# Patient Record
Sex: MALE | Race: WHITE | NOT HISPANIC OR LATINO | Employment: FULL TIME | URBAN - METROPOLITAN AREA
[De-identification: names, ages, dates, MRNs, and addresses within clinical notes are randomized per-mention and may not be internally consistent; named-entity substitution may affect disease eponyms.]

---

## 2017-10-05 ENCOUNTER — HOSPITAL ENCOUNTER (INPATIENT)
Facility: HOSPITAL | Age: 62
LOS: 2 days | Discharge: HOME | DRG: 381 | End: 2017-10-07
Attending: EMERGENCY MEDICINE | Admitting: INTERNAL MEDICINE
Payer: COMMERCIAL

## 2017-10-05 ENCOUNTER — APPOINTMENT (EMERGENCY)
Dept: RADIOLOGY | Facility: HOSPITAL | Age: 62
DRG: 381 | End: 2017-10-05
Payer: COMMERCIAL

## 2017-10-05 DIAGNOSIS — K92.2 GI BLEED: ICD-10-CM

## 2017-10-05 DIAGNOSIS — R07.9 RIGHT-SIDED CHEST PAIN: ICD-10-CM

## 2017-10-05 DIAGNOSIS — D50.9 MICROCYTIC ANEMIA: ICD-10-CM

## 2017-10-05 DIAGNOSIS — K92.1 MELENA: Primary | ICD-10-CM

## 2017-10-05 LAB
ABO GROUP BLD: NORMAL
ALBUMIN SERPL BCP-MCNC: 3.8 G/DL (ref 3.5–5)
ALP SERPL-CCNC: 59 U/L (ref 46–116)
ALT SERPL W P-5'-P-CCNC: 18 U/L (ref 12–78)
ANION GAP SERPL CALCULATED.3IONS-SCNC: 8 MMOL/L (ref 4–13)
AST SERPL W P-5'-P-CCNC: 18 U/L (ref 5–45)
BASOPHILS # BLD AUTO: 0.16 THOUSANDS/ΜL (ref 0–0.1)
BASOPHILS NFR BLD AUTO: 2 % (ref 0–1)
BILIRUB SERPL-MCNC: 0.15 MG/DL (ref 0.2–1)
BLD GP AB SCN SERPL QL: NEGATIVE
BUN SERPL-MCNC: 19 MG/DL (ref 5–25)
CALCIUM SERPL-MCNC: 10.3 MG/DL (ref 8.3–10.1)
CHLORIDE SERPL-SCNC: 101 MMOL/L (ref 100–108)
CO2 SERPL-SCNC: 28 MMOL/L (ref 21–32)
CREAT SERPL-MCNC: 1.2 MG/DL (ref 0.6–1.3)
EOSINOPHIL # BLD AUTO: 0.33 THOUSAND/ΜL (ref 0–0.61)
EOSINOPHIL NFR BLD AUTO: 4 % (ref 0–6)
ERYTHROCYTE [DISTWIDTH] IN BLOOD BY AUTOMATED COUNT: 18.4 % (ref 11.6–15.1)
GFR SERPL CREATININE-BSD FRML MDRD: 64 ML/MIN/1.73SQ M
GLUCOSE SERPL-MCNC: 104 MG/DL (ref 65–140)
HCT VFR BLD AUTO: 21 % (ref 36.5–49.3)
HGB BLD-MCNC: 6 G/DL (ref 12–17)
LIPASE SERPL-CCNC: 239 U/L (ref 73–393)
LYMPHOCYTES # BLD AUTO: 1.64 THOUSANDS/ΜL (ref 0.6–4.47)
LYMPHOCYTES NFR BLD AUTO: 17 % (ref 14–44)
MCH RBC QN AUTO: 19.6 PG (ref 26.8–34.3)
MCHC RBC AUTO-ENTMCNC: 28.6 G/DL (ref 31.4–37.4)
MCV RBC AUTO: 69 FL (ref 82–98)
MONOCYTES # BLD AUTO: 1.37 THOUSAND/ΜL (ref 0.17–1.22)
MONOCYTES NFR BLD AUTO: 15 % (ref 4–12)
NEUTROPHILS # BLD AUTO: 5.94 THOUSANDS/ΜL (ref 1.85–7.62)
NEUTS SEG NFR BLD AUTO: 62 % (ref 43–75)
NRBC BLD AUTO-RTO: 0 /100 WBCS
PLATELET # BLD AUTO: 535 THOUSANDS/UL (ref 149–390)
PMV BLD AUTO: 10.2 FL (ref 8.9–12.7)
POTASSIUM SERPL-SCNC: 3.8 MMOL/L (ref 3.5–5.3)
PROT SERPL-MCNC: 7.1 G/DL (ref 6.4–8.2)
RBC # BLD AUTO: 3.06 MILLION/UL (ref 3.88–5.62)
RH BLD: POSITIVE
SODIUM SERPL-SCNC: 137 MMOL/L (ref 136–145)
SPECIMEN EXPIRATION DATE: NORMAL
SPECIMEN SOURCE: NORMAL
TROPONIN I BLD-MCNC: 0 NG/ML (ref 0–0.08)
WBC # BLD AUTO: 9.44 THOUSAND/UL (ref 4.31–10.16)

## 2017-10-05 PROCEDURE — 86901 BLOOD TYPING SEROLOGIC RH(D): CPT | Performed by: EMERGENCY MEDICINE

## 2017-10-05 PROCEDURE — C9113 INJ PANTOPRAZOLE SODIUM, VIA: HCPCS | Performed by: PHYSICIAN ASSISTANT

## 2017-10-05 PROCEDURE — 36415 COLL VENOUS BLD VENIPUNCTURE: CPT | Performed by: EMERGENCY MEDICINE

## 2017-10-05 PROCEDURE — 83540 ASSAY OF IRON: CPT | Performed by: PHYSICIAN ASSISTANT

## 2017-10-05 PROCEDURE — 84484 ASSAY OF TROPONIN QUANT: CPT

## 2017-10-05 PROCEDURE — P9021 RED BLOOD CELLS UNIT: HCPCS

## 2017-10-05 PROCEDURE — 30233N1 TRANSFUSION OF NONAUTOLOGOUS RED BLOOD CELLS INTO PERIPHERAL VEIN, PERCUTANEOUS APPROACH: ICD-10-PCS | Performed by: INTERNAL MEDICINE

## 2017-10-05 PROCEDURE — 96361 HYDRATE IV INFUSION ADD-ON: CPT

## 2017-10-05 PROCEDURE — 93005 ELECTROCARDIOGRAM TRACING: CPT | Performed by: EMERGENCY MEDICINE

## 2017-10-05 PROCEDURE — 86850 RBC ANTIBODY SCREEN: CPT | Performed by: EMERGENCY MEDICINE

## 2017-10-05 PROCEDURE — 96374 THER/PROPH/DIAG INJ IV PUSH: CPT

## 2017-10-05 PROCEDURE — 86920 COMPATIBILITY TEST SPIN: CPT

## 2017-10-05 PROCEDURE — C9113 INJ PANTOPRAZOLE SODIUM, VIA: HCPCS | Performed by: EMERGENCY MEDICINE

## 2017-10-05 PROCEDURE — 83550 IRON BINDING TEST: CPT | Performed by: PHYSICIAN ASSISTANT

## 2017-10-05 PROCEDURE — 82728 ASSAY OF FERRITIN: CPT | Performed by: PHYSICIAN ASSISTANT

## 2017-10-05 PROCEDURE — 85025 COMPLETE CBC W/AUTO DIFF WBC: CPT | Performed by: EMERGENCY MEDICINE

## 2017-10-05 PROCEDURE — 83690 ASSAY OF LIPASE: CPT | Performed by: EMERGENCY MEDICINE

## 2017-10-05 PROCEDURE — 80053 COMPREHEN METABOLIC PANEL: CPT | Performed by: EMERGENCY MEDICINE

## 2017-10-05 PROCEDURE — 71020 HB CHEST X-RAY 2VW FRONTAL&LATL: CPT

## 2017-10-05 PROCEDURE — 86900 BLOOD TYPING SEROLOGIC ABO: CPT | Performed by: EMERGENCY MEDICINE

## 2017-10-05 RX ORDER — KETOROLAC TROMETHAMINE 30 MG/ML
15 INJECTION, SOLUTION INTRAMUSCULAR; INTRAVENOUS ONCE
Status: COMPLETED | OUTPATIENT
Start: 2017-10-05 | End: 2017-10-05

## 2017-10-05 RX ORDER — SUCRALFATE ORAL 1 G/10ML
1000 SUSPENSION ORAL ONCE
Status: COMPLETED | OUTPATIENT
Start: 2017-10-05 | End: 2017-10-05

## 2017-10-05 RX ORDER — LIDOCAINE 50 MG/G
1 PATCH TOPICAL ONCE
Status: COMPLETED | OUTPATIENT
Start: 2017-10-05 | End: 2017-10-05

## 2017-10-05 RX ORDER — ACETAMINOPHEN 325 MG/1
975 TABLET ORAL ONCE
Status: COMPLETED | OUTPATIENT
Start: 2017-10-05 | End: 2017-10-05

## 2017-10-05 RX ADMIN — SODIUM CHLORIDE 8 MG/HR: 9 INJECTION, SOLUTION INTRAVENOUS at 23:49

## 2017-10-05 RX ADMIN — SUCRALFATE 1000 MG: 1 SUSPENSION ORAL at 22:07

## 2017-10-05 RX ADMIN — SODIUM CHLORIDE 1000 ML: 0.9 INJECTION, SOLUTION INTRAVENOUS at 22:10

## 2017-10-05 RX ADMIN — SODIUM CHLORIDE 80 MG: 9 INJECTION, SOLUTION INTRAVENOUS at 23:24

## 2017-10-05 RX ADMIN — KETOROLAC TROMETHAMINE 15 MG: 30 INJECTION, SOLUTION INTRAMUSCULAR at 22:10

## 2017-10-05 RX ADMIN — LIDOCAINE 1 PATCH: 50 PATCH CUTANEOUS at 22:11

## 2017-10-05 RX ADMIN — ACETAMINOPHEN 975 MG: 325 TABLET, FILM COATED ORAL at 22:07

## 2017-10-06 ENCOUNTER — ANESTHESIA EVENT (INPATIENT)
Dept: GASTROENTEROLOGY | Facility: HOSPITAL | Age: 62
DRG: 381 | End: 2017-10-06
Payer: COMMERCIAL

## 2017-10-06 ENCOUNTER — ANESTHESIA (INPATIENT)
Dept: GASTROENTEROLOGY | Facility: HOSPITAL | Age: 62
DRG: 381 | End: 2017-10-06
Payer: COMMERCIAL

## 2017-10-06 PROBLEM — N17.9 AKI (ACUTE KIDNEY INJURY) (HCC): Status: ACTIVE | Noted: 2017-10-06

## 2017-10-06 PROBLEM — R07.9 CHEST PAIN: Status: ACTIVE | Noted: 2017-10-06

## 2017-10-06 PROBLEM — Q27.30 AVM (ARTERIOVENOUS MALFORMATION): Status: ACTIVE | Noted: 2017-10-06

## 2017-10-06 PROBLEM — D75.839 THROMBOCYTOSIS: Status: ACTIVE | Noted: 2017-10-06

## 2017-10-06 PROBLEM — K92.2 GI BLEED: Status: ACTIVE | Noted: 2017-10-05

## 2017-10-06 PROBLEM — K25.9 CAMERON ULCER: Status: ACTIVE | Noted: 2017-10-06

## 2017-10-06 PROBLEM — K92.2 GI BLEEDING: Status: ACTIVE | Noted: 2017-10-06

## 2017-10-06 PROBLEM — K92.1 MELENA: Status: ACTIVE | Noted: 2017-10-05

## 2017-10-06 PROBLEM — D50.0 IRON DEFICIENCY ANEMIA DUE TO CHRONIC BLOOD LOSS: Status: ACTIVE | Noted: 2017-10-06

## 2017-10-06 LAB
ANION GAP SERPL CALCULATED.3IONS-SCNC: 6 MMOL/L (ref 4–13)
APTT PPP: 35 SECONDS (ref 23–35)
BUN SERPL-MCNC: 16 MG/DL (ref 5–25)
CALCIUM SERPL-MCNC: 8.7 MG/DL (ref 8.3–10.1)
CHLORIDE SERPL-SCNC: 104 MMOL/L (ref 100–108)
CO2 SERPL-SCNC: 26 MMOL/L (ref 21–32)
CREAT SERPL-MCNC: 0.95 MG/DL (ref 0.6–1.3)
FERRITIN SERPL-MCNC: 2 NG/ML (ref 8–388)
GFR SERPL CREATININE-BSD FRML MDRD: 85 ML/MIN/1.73SQ M
GLUCOSE SERPL-MCNC: 98 MG/DL (ref 65–140)
HCT VFR BLD AUTO: 23.2 % (ref 36.5–49.3)
HGB BLD-MCNC: 7 G/DL (ref 12–17)
IRON SATN MFR SERPL: 50 %
IRON SERPL-MCNC: 224 UG/DL (ref 65–175)
IRON SERPL-MCNC: 32 UG/DL (ref 65–175)
POTASSIUM SERPL-SCNC: 3.8 MMOL/L (ref 3.5–5.3)
SODIUM SERPL-SCNC: 136 MMOL/L (ref 136–145)
TIBC SERPL-MCNC: 444 UG/DL (ref 250–450)
TROPONIN I SERPL-MCNC: <0.02 NG/ML
TROPONIN I SERPL-MCNC: <0.02 NG/ML

## 2017-10-06 PROCEDURE — 80048 BASIC METABOLIC PNL TOTAL CA: CPT | Performed by: PHYSICIAN ASSISTANT

## 2017-10-06 PROCEDURE — 88312 SPECIAL STAINS GROUP 1: CPT | Performed by: NURSE PRACTITIONER

## 2017-10-06 PROCEDURE — 88305 TISSUE EXAM BY PATHOLOGIST: CPT | Performed by: NURSE PRACTITIONER

## 2017-10-06 PROCEDURE — C9113 INJ PANTOPRAZOLE SODIUM, VIA: HCPCS | Performed by: INTERNAL MEDICINE

## 2017-10-06 PROCEDURE — P9021 RED BLOOD CELLS UNIT: HCPCS

## 2017-10-06 PROCEDURE — 99285 EMERGENCY DEPT VISIT HI MDM: CPT

## 2017-10-06 PROCEDURE — 84484 ASSAY OF TROPONIN QUANT: CPT | Performed by: PHYSICIAN ASSISTANT

## 2017-10-06 PROCEDURE — 85018 HEMOGLOBIN: CPT | Performed by: PHYSICIAN ASSISTANT

## 2017-10-06 PROCEDURE — 85014 HEMATOCRIT: CPT | Performed by: PHYSICIAN ASSISTANT

## 2017-10-06 PROCEDURE — 0DB78ZX EXCISION OF STOMACH, PYLORUS, VIA NATURAL OR ARTIFICIAL OPENING ENDOSCOPIC, DIAGNOSTIC: ICD-10-PCS | Performed by: INTERNAL MEDICINE

## 2017-10-06 PROCEDURE — 88342 IMHCHEM/IMCYTCHM 1ST ANTB: CPT | Performed by: NURSE PRACTITIONER

## 2017-10-06 PROCEDURE — 85730 THROMBOPLASTIN TIME PARTIAL: CPT | Performed by: PHYSICIAN ASSISTANT

## 2017-10-06 PROCEDURE — 36430 TRANSFUSION BLD/BLD COMPNT: CPT

## 2017-10-06 PROCEDURE — 36415 COLL VENOUS BLD VENIPUNCTURE: CPT | Performed by: PHYSICIAN ASSISTANT

## 2017-10-06 PROCEDURE — 0DB58ZX EXCISION OF ESOPHAGUS, VIA NATURAL OR ARTIFICIAL OPENING ENDOSCOPIC, DIAGNOSTIC: ICD-10-PCS | Performed by: INTERNAL MEDICINE

## 2017-10-06 PROCEDURE — C9113 INJ PANTOPRAZOLE SODIUM, VIA: HCPCS | Performed by: PHYSICIAN ASSISTANT

## 2017-10-06 PROCEDURE — 88344 IMHCHEM/IMCYTCHM EA MLT ANTB: CPT | Performed by: NURSE PRACTITIONER

## 2017-10-06 RX ORDER — MIDAZOLAM HYDROCHLORIDE 1 MG/ML
INJECTION INTRAMUSCULAR; INTRAVENOUS AS NEEDED
Status: DISCONTINUED | OUTPATIENT
Start: 2017-10-06 | End: 2017-10-06 | Stop reason: SURG

## 2017-10-06 RX ORDER — ACETAMINOPHEN 500 MG
500 TABLET ORAL
Status: ON HOLD | COMMUNITY
Start: 2017-03-03 | End: 2017-11-07 | Stop reason: CLARIF

## 2017-10-06 RX ORDER — SUCRALFATE ORAL 1 G/10ML
1 SUSPENSION ORAL
COMMUNITY
Start: 2017-07-18 | End: 2017-11-07 | Stop reason: HOSPADM

## 2017-10-06 RX ORDER — PANTOPRAZOLE SODIUM 40 MG/1
40 INJECTION, POWDER, FOR SOLUTION INTRAVENOUS EVERY 12 HOURS SCHEDULED
Status: DISCONTINUED | OUTPATIENT
Start: 2017-10-06 | End: 2017-10-07

## 2017-10-06 RX ORDER — SODIUM CHLORIDE 9 MG/ML
INJECTION, SOLUTION INTRAVENOUS CONTINUOUS PRN
Status: DISCONTINUED | OUTPATIENT
Start: 2017-10-06 | End: 2017-10-06 | Stop reason: SURG

## 2017-10-06 RX ORDER — DEXTROSE AND SODIUM CHLORIDE 5; .9 G/100ML; G/100ML
100 INJECTION, SOLUTION INTRAVENOUS CONTINUOUS
Status: DISCONTINUED | OUTPATIENT
Start: 2017-10-06 | End: 2017-10-06

## 2017-10-06 RX ORDER — THIAMINE MONONITRATE (VIT B1) 100 MG
100 TABLET ORAL DAILY
Status: DISCONTINUED | OUTPATIENT
Start: 2017-10-06 | End: 2017-10-07 | Stop reason: HOSPADM

## 2017-10-06 RX ORDER — FOLIC ACID 1 MG/1
1 TABLET ORAL DAILY
Status: DISCONTINUED | OUTPATIENT
Start: 2017-10-06 | End: 2017-10-07 | Stop reason: HOSPADM

## 2017-10-06 RX ORDER — SUCRALFATE ORAL 1 G/10ML
1000 SUSPENSION ORAL
Status: DISCONTINUED | OUTPATIENT
Start: 2017-10-06 | End: 2017-10-07 | Stop reason: HOSPADM

## 2017-10-06 RX ORDER — LORAZEPAM 0.5 MG/1
0.5 TABLET ORAL EVERY 8 HOURS PRN
Status: DISCONTINUED | OUTPATIENT
Start: 2017-10-06 | End: 2017-10-07 | Stop reason: HOSPADM

## 2017-10-06 RX ORDER — LANOLIN ALCOHOL/MO/W.PET/CERES
3 CREAM (GRAM) TOPICAL
Status: DISCONTINUED | OUTPATIENT
Start: 2017-10-06 | End: 2017-10-07 | Stop reason: HOSPADM

## 2017-10-06 RX ORDER — ACETAMINOPHEN 325 MG/1
650 TABLET ORAL EVERY 6 HOURS PRN
Status: DISCONTINUED | OUTPATIENT
Start: 2017-10-06 | End: 2017-10-07 | Stop reason: HOSPADM

## 2017-10-06 RX ORDER — PROPOFOL 10 MG/ML
INJECTION, EMULSION INTRAVENOUS AS NEEDED
Status: DISCONTINUED | OUTPATIENT
Start: 2017-10-06 | End: 2017-10-06 | Stop reason: SURG

## 2017-10-06 RX ADMIN — SUCRALFATE 1000 MG: 1 SUSPENSION ORAL at 18:55

## 2017-10-06 RX ADMIN — PROPOFOL 50 MG: 10 INJECTION, EMULSION INTRAVENOUS at 10:18

## 2017-10-06 RX ADMIN — DEXTROSE AND SODIUM CHLORIDE 100 ML/HR: 5; .9 INJECTION, SOLUTION INTRAVENOUS at 03:36

## 2017-10-06 RX ADMIN — PROPOFOL 100 MG: 10 INJECTION, EMULSION INTRAVENOUS at 10:15

## 2017-10-06 RX ADMIN — SUCRALFATE 1000 MG: 1 SUSPENSION ORAL at 12:14

## 2017-10-06 RX ADMIN — Medication 100 MG: at 08:15

## 2017-10-06 RX ADMIN — PROPOFOL 50 MG: 10 INJECTION, EMULSION INTRAVENOUS at 10:12

## 2017-10-06 RX ADMIN — PANTOPRAZOLE SODIUM 40 MG: 40 INJECTION, POWDER, FOR SOLUTION INTRAVENOUS at 12:14

## 2017-10-06 RX ADMIN — SODIUM CHLORIDE 8 MG/HR: 9 INJECTION, SOLUTION INTRAVENOUS at 08:37

## 2017-10-06 RX ADMIN — SUCRALFATE 1000 MG: 1 SUSPENSION ORAL at 23:07

## 2017-10-06 RX ADMIN — SUCRALFATE 1000 MG: 1 SUSPENSION ORAL at 06:32

## 2017-10-06 RX ADMIN — FOLIC ACID 1 MG: 1 TABLET ORAL at 08:15

## 2017-10-06 RX ADMIN — PROPOFOL 50 MG: 10 INJECTION, EMULSION INTRAVENOUS at 10:09

## 2017-10-06 RX ADMIN — MELATONIN TAB 3 MG 3 MG: 3 TAB at 23:07

## 2017-10-06 RX ADMIN — PROPOFOL 50 MG: 10 INJECTION, EMULSION INTRAVENOUS at 10:11

## 2017-10-06 RX ADMIN — PANTOPRAZOLE SODIUM 40 MG: 40 INJECTION, POWDER, FOR SOLUTION INTRAVENOUS at 23:07

## 2017-10-06 RX ADMIN — PROPOFOL 50 MG: 10 INJECTION, EMULSION INTRAVENOUS at 10:10

## 2017-10-06 RX ADMIN — SODIUM CHLORIDE: 0.9 INJECTION, SOLUTION INTRAVENOUS at 09:59

## 2017-10-06 RX ADMIN — MIDAZOLAM HYDROCHLORIDE 5 MG: 1 INJECTION, SOLUTION INTRAMUSCULAR; INTRAVENOUS at 10:09

## 2017-10-06 NOTE — H&P
History and Physical - [facility] Internal Medicine    Patient Information: Darrell Hahn  58 y o  male MRN: [id number]  Unit/Bed#: ED 22 Encounter: [id number]  Admitting Physician: Meera Rosa PA-C  PCP: No primary care provider on file  Date of Admission:  10/06/17    Assessment/Plan:    Hospital Problem List:     Principal Problem:    GI bleeding  Active Problems:    Iron deficiency anemia due to chronic blood loss    Chest pain    SHALA (acute kidney injury) ([facility] )    [place] ulcer    AVM (arteriovenous malformation)    Thrombocytosis ([facility] )      Plan for the Primary Problem(s):  · Acute on chronic anemia:  · Hemoglobin 6 0 on admission, MCV 69   · Baseline hemoglobin at [facility] appears to be 9-10   · Last transfused blood on 7/15/2017, 3 units with an additional 1 unit on 7/16/2017  · Transfuse 2 units PRBCs  · Iron studies  · Suspected upper GI bleeding:  · BUN not elevated, currently at 19  History of camerons ulcers, gastritis/esophagitis, duodenal AVMs on EGD 7/2017  Noncompliant with PPI at home  · PPI IV continuous infusion  · trend H&H q8hrs  · NPO for potential EGD in AM  Consult GI  · Chest pain  · Suspect GI etiology vs related to worsening anemia   · Continue to monitor on telemetry, trend troponins x3  First trop and EKG negative  Tele monitoring    Plan for Additional Problems:   · Acute kidney injury:  Baseline creatinine is noted to be 0 6-0 8  On admission 1 20  Avoid further Toradol, NSAIDs, nephrotoxins  Continue IV fluids  · Barretts esophagus: PPI infusion  carafate when able to take PO  · [place] ulcers: await GI recommendations  · Esophageal stricture: was not dilated on last EGD  · Alcohol use: 3-4 vodka/cranberry drinks twice weekly  Thiamine, folic acid when taking PO   Ativan  PRN  · Thrombocytosis:  Likely reactive   · Insomnia: melatonin    VTE Prophylaxis: Pharmacologic VTE Prophylaxis contraindicated due to GI bleeding  / sequential compression device   Code Status: FULL CODE> discussed with patient and daughter  POLST: There is no POLST form on file for this patient (pre-hospital)    Anticipated Length of Stay:  Patient will be admitted on an Inpatient basis with an anticipated length of stay of  > 2 midnights  Justification for Hospital Stay: GI bleeding    Total Time for Visit, including Counseling / Coordination of Care: 1 hour  Greater than 50% of this total time spent on direct patient counseling and coordination of care  Chief Complaint:   Chest pain    History of Present Illness:    Waylon Bey  is a 58 y o  male who presents with chest pain  He has a past medical history of GERD, chronic esophagitis maintained on Protonix and Carafate, it 3 of anemia requiring blood transfusions  The patient presented to the emergency department complaining of right-sided chest pain for the past 3 days  He reported it was noted to be worse with eating as well as with exertion however denies shortness of breath, nausea, vomiting, diarrhea  The patient denied melena, black or tarry stools  The patient reported that the pain would wax and wane  The patient was noted to have a hemoglobin of 6 and was noted to have rectal exam performed in the emergency department which was noted to be heme-positive and melanotic on exam     Recent EGD:  7/17/2017:  Esophageal stricture, Wills's esophagus, Cirilo's erosions, duodenal AVMs  Started on b i d  PPI and Carafate 4 times daily with recommended repeat EGD as an outpatient in 3 months  Recent colonoscopy:  7/18/2017:  Small nonbleeding internal hemorrhoids    Review of Systems:    Review of Systems   Constitutional: Positive for activity change, appetite change and fatigue  Respiratory: Positive for shortness of breath  Negative for cough  Cardiovascular: Positive for chest pain and palpitations  Gastrointestinal: Positive for blood in stool (on hemoccult and rectal exam melanotic stool) and nausea  Negative for diarrhea and vomiting  Musculoskeletal: Negative for neck stiffness  All other systems reviewed and are negative  Past Medical and Surgical History:     Past Medical History:   Diagnosis Date    Anemia     AVM (arteriovenous malformation) of small bowel, acquired ([facility] )     Wills's esophagus     Cirilo ulcer     Esophagitis     History of blood transfusion     22 prior transfusions       Past Surgical History:   Procedure Laterality Date    APPENDECTOMY      EGD AND COLONOSCOPY  07/18/2017       Meds/Allergies:    Prior to Admission medications    Not on File     I have reviewed home medications with patient personally  Allergies: doxylamine     Social History:     Marital Status:    Occupation:   Patient Pre-hospital Living Situation: at home   Patient Pre-hospital Level of Mobility: no restrictions  Patient Pre-hospital Diet Restrictions: none  Substance Use History:   History   Alcohol Use    Yes     History   Smoking Status    Never Smoker   Smokeless Tobacco    Never Used     History   Drug Use No       Family History:    History reviewed  No pertinent family history  Physical Exam:     Vitals:   Blood Pressure: 119/79 (10/06/17 0030)  Pulse: 77 (10/06/17 0030)  Temperature: 97 5 °F (36 4 °C) (10/06/17 0030)  Temp Source: Oral (10/06/17 0030)  Respirations: 16 (10/06/17 0030)  Weight - Scale: 75 3 kg (166 lb) (10/05/17 2144)  SpO2: 98 % (10/06/17 0015)    Physical Exam   Constitutional: No distress  HENT:   Head: Normocephalic and atraumatic  Eyes: Conjunctivae are normal    Neck: No JVD present  Cardiovascular: Normal rate, regular rhythm, normal heart sounds and intact distal pulses  No murmur heard  Pulmonary/Chest: Effort normal and breath sounds normal  No respiratory distress  He has no wheezes  He has no rales  Abdominal: Soft  Bowel sounds are normal  He exhibits no distension  There is no tenderness  There is no rebound and no guarding     ER performed rectal with melanotic stool   Musculoskeletal: He exhibits no edema  Neurological: He is alert  Speech slow but clear  Follows commands  Skin: Skin is warm and dry  Rash (face) noted  He is not diaphoretic  No erythema  There is pallor  erythamatous macules on face   Psychiatric: He has a normal mood and affect  His behavior is normal    Nursing note and vitals reviewed  Additional Data:     Lab Results: I have personally reviewed pertinent reports  Results from last 7 days  Lab Units 10/05/17  2204   WBC Thousand/uL 9 44   HEMOGLOBIN g/dL 6 0*   HEMATOCRIT % 21 0*   PLATELETS Thousands/uL 535*   NEUTROS PCT % 62   LYMPHS PCT % 17   MONOS PCT % 15*   EOS PCT % 4       Results from last 7 days  Lab Units 10/05/17  2204   SODIUM mmol/L 137   POTASSIUM mmol/L 3 8   CHLORIDE mmol/L 101   CO2 mmol/L 28   BUN mg/dL 19   CREATININE mg/dL 1 20   CALCIUM mg/dL 10 3*   TOTAL PROTEIN g/dL 7 1   BILIRUBIN TOTAL mg/dL 0 15*   ALK PHOS U/L 59   ALT U/L 18   AST U/L 18   GLUCOSE RANDOM mg/dL 104           Imaging: I have personally reviewed pertinent films in PACS    CXR: no acute pathology    EKG, Pathology, and Other Studies Reviewed on Admission:   · EKG: NSR  No acute ST/T wave changes  Allscripts / Epic Records Reviewed: Yes Care Everywhere     ** Please Note: This note has been constructed using a voice recognition system   **

## 2017-10-06 NOTE — CONSULTS
Patient MRN: [id number]  Date of Service: 10/6/2017  Referring Physician: Dr Bam Dailey  Provider Creating Note: ROMA Infante  PCP: No primary care provider on file  Reason for Consult:  GI bleed/melena  HPI  Wendy Hart  is a 58 y o  male who was admitted with GI bleeding  He reported to the ER with a chief complaint of progressive fatigue and right-sided chest pain  He also describes significant fatigue with any exertion  He was noted to have a hemoglobin of 6 0 on admission with an MCV of 69  He had no elevation in his BUN  He was recently hospitalized at [facility] on 07/14/2017 at which time he presented with GI bleed  He did receive blood transfusions at that time  He had an EGD which showed esophageal stricture, Wills's esophagus, hiatal hernia with Kiley Mena lesions, nonbleeding duodenal AVM  He also had a colonoscopy at that time which showed small nonbleeding hemorrhoids  He has a history of a food bolus in April of 2017 and that time the EGD showed severe esophagitis and previous EGD on 03/02/2017 showed esophageal stricture and he was dilated with a 10 mm balloon  He does have a history of chronic microcytic anemia  At this time the chest pain has resolved  He did receive 1 unit of packed red blood cells and current hemoglobin is 7 0  RDW are elevated  He had denies any epigastric pain nausea vomiting dysphagia or early satiety  He states that he had not noted any change in his bowel habits and did not notice melena prior to admission  He also has a history of smoking which she tells me he quit 2 months ago  He does drink alcohol 2 days a week  Troponin is less than 0 2    And EKG negative    Past Medical History:   Diagnosis Date    Anemia     AVM (arteriovenous malformation) of small bowel, acquired ([facility] )     Wills's esophagus     Cirilo ulcer     Esophagitis     History of blood transfusion     22 prior transfusions     Past Surgical History:   Procedure Laterality Date    APPENDECTOMY      EGD AND COLONOSCOPY  07/18/2017     Medications  Home Medications:   Prior to Admission medications    Medication Sig Start Date End Date Taking? Authorizing Provider   acetaminophen (TYLENOL) 500 mg tablet Take 500 mg by mouth 3/3/17  Yes Historical Provider, MD   Melatonin 1 MG CAPS Take 1 mg by mouth   Yes Historical Provider, MD   sucralfate (CARAFATE) 1 g/10 mL suspension Take 1 g by mouth 7/18/17  Yes Historical Provider, MD       Inhouse Medications    Current Facility-Administered Medications:     acetaminophen (TYLENOL) tablet 650 mg, 650 mg, Oral, Q6H PRN    dextrose 5 % and sodium chloride 0 9 % infusion, 100 mL/hr, Intravenous, Continuous, Stopped at 39/56/30 5028    folic acid (FOLVITE) tablet 1 mg, 1 mg, Oral, Daily, 1 mg at 10/06/17 0815    LORazepam (ATIVAN) tablet 0 5 mg, 0 5 mg, Oral, Q8H PRN    melatonin tablet 3 mg, 3 mg, Oral, HS    pantoprazole (PROTONIX) 80 mg in sodium chloride 0 9 % 100 mL infusion, 8 mg/hr, Intravenous, Continuous, 8 mg/hr at 10/05/17 2349    sucralfate (CARAFATE) oral suspension 1,000 mg, 1,000 mg, Oral, 4x Daily (AC & HS), 1,000 mg at 10/06/17 0860    thiamine (VITAMIN B1) tablet 100 mg, 100 mg, Oral, Daily, 100 mg at 10/06/17 0815    Allergies  Allergies   Allergen Reactions    Doxylamine      Social History   reports that he has never smoked  He has never used smokeless tobacco  He reports that he drinks about 4 8 oz of alcohol per week   He reports that he does not use drugs  Family History  History reviewed  No pertinent family history  ROS  ROS: Denies CP, SOB, fever, weight loss, adenopathy, rash  All others negative except as noted in HPI  Objective   Vitals  Blood pressure 108/73, pulse 62, temperature (!) 97 3 °F (36 3 °C), temperature source Tympanic, resp  rate 16, height 6' 1" (1 854 m), weight 65 3 kg (143 lb 15 4 oz), SpO2 99 %    [unfilled]    Laboratory Studies  Lab Results   Component Value Date    WBC 9 44 10/05/2017    HGB 7 0 (L) 10/06/2017    HCT 23 2 (L) 10/06/2017     (H) 10/05/2017    MCV 69 (L) 10/05/2017     Lab Results   Component Value Date    CREATININE 0 95 10/06/2017    BUN 16 10/06/2017    K 3 8 10/06/2017     10/06/2017    CO2 26 10/06/2017    GLUCOSE 98 10/06/2017    CALCIUM 8 7 10/06/2017    PROT 7 1 10/05/2017    ALKPHOS 59 10/05/2017    ALBUMIN 3 8 10/05/2017    BILITOT 0 15 (L) 10/05/2017    AST 18 10/05/2017    ALT 18 10/05/2017     No results found for: PROTIME, INR, APTT    Imaging and Other Studies      Assessment and Plan:  1  Acute GI bleed  Patient has history of severe esophagitis, Heddie Stabs lesions and duodenal AVMs  He will be scheduled for an EGD today  Patient will remain NPO and receive IV hydration  Continue PPI infusion  When patient is allowed oral intake will add Carafate 4 times a day  2   Acute on chronic anemia  Patient received 1 unit of packed red blood cells he will be receiving a 2nd unit today  Monitor H and H frequently      Principal Problem:    GI bleeding  Active Problems:    Iron deficiency anemia due to chronic blood loss    Chest pain    SHALA (acute kidney injury) ([facility] )    Heddie Stabs ulcer    AVM (arteriovenous malformation)    Thrombocytosis ([facility] )    Melena    GI bleed      ROMA Mcfadden

## 2017-10-06 NOTE — OP NOTE
OPERATIVE REPORT  PATIENT NAME: Taran Gallo  :  1955  MRN: [id number]  Pt Location: [facility] GI ROOM 01    SURGERY DATE: 10/6/2017    Surgeon(s) and Role:     * Peri Cortez MD - Primary    Preop Diagnosis:  Melena [K92 1]  GI bleed [K92 2]    Post-Op Diagnosis Codes:     * Melena [K92 1]     * GI bleed [K92 2]     * Esophagitis [530 1]    Procedure(s) (LRB):  ESOPHAGOGASTRODUODENOSCOPY (EGD) (N/A)    Specimen(s):  ID Type Source Tests Collected by Time Destination   1 : Gastric antrum r/o h pylori Tissue Stomach TISSUE EXAM Peri Cortez MD 10/6/2017 1023    2 : Duodenal biopsy r/o celiac Tissue Small Bowel, NOS TISSUE EXAM Felipe Nguyen MD 10/6/2017 1024    3 : distal and mid esophagus biopsies r/o viral esophagitis Tissue Esophagus TISSUE EXAM Peri Cortez MD 10/6/2017 1026        Estimated Blood Loss:   Minimal    Drains:       Anesthesia Type:   Conscious Sedation     Operative Indications:  Melena [K92 1]  GI bleed [K92 2]      Operative Findings:    Instrument: Olympus gastroscope  Medications: MAC per Anesthesia Service    UPPER ENDOSCOPY Procedure: Pre-procedure eval including cardiopulmonary exam was benign  The pt was felt to be stable for sedation and endoscopy  Consent was obtained  The pt was monitored with automatic BP, pOx and close clinical observation  UPPER ENDOSCOPY Findings: The esophagus was intubated and evaluated throughout its entire length to the GEJ at 39 cm  The hiatus was at 41 cm  There was a 2 cm hiatal hernia  The Z-line was quite indistinct at around 29 cm  There appeared to be a long segment of circumferential Wills's from 29 to 39 cm with a length of 10 cm  At 29 cm there appeared to be significant cobblestoning of the mucosa  Significant erosive esophagitis was present at 29 cm    More distally from 30 to 33 cm there was circumferential ulceration of the esophagus with significant scarring and denudation of the mucosa  The ulcer distal and a 33 cm was accompanied by mild ring-like stricturing of the lumen     There was diffuse mild bleeding from the surface of the esophageal ulcer  A few random biopsies were obtained from the area of cobblestoning  No biopsies could be obtained from the ulcerated surface as there appeared to be no mucosal tissue to obtain  The stomach was well evaluated including retroflexed views of the angulus cardia and fundus and appeared unremarkable  Biopsies were obtained from the antrum  The pylorus, duodenal bulb and sweep were well evaluated  A tiny angiodysplasia was seen in the bulb  It was probed and aspirated with the tip of the endoscope and appeared stable without bleeding  Random biopsies were obtained from the bulb and sweep  The pt tolerated the procedure well without undue dicomfort, desaturation or hypotension  The patient recovered in the GI lab and was transferred to inpatient room  Post-Endoscopic Dx:     1  Severe ulcerative reflux esophagitis with mild stricturing in the midesophagus  Severe erosive and ulcerative changes are seen in the midesophagus close to the Z-line  Long segment 10 cm circumferential Wills's esophagus with the ulceration mainly at its proximal end   2   Small hiatal hernia  3  Nonbleeding AVM in the duodenal bulb      Recommendations:   1  Start pantoprazole 40 mg twice a day and Carafate 4 times a day  2  Await pathology results  3    Repeat upper endoscopy in 2 to 3 months to confirm healing of esophagitis    Complications:   None    Procedure and Technique:     I was present for the entire procedure    Patient Disposition:  hemodynamically stable    SIGNATURE: Celeste Pat MD  DATE: October 6, 2017  TIME: 10:30 AM

## 2017-10-06 NOTE — ED NOTES
Numbness in fingers reported on and off   Dizziness reported at times  "almost fell per the family today " "He usually goes to [facility] "     Mayte Ayala RN  10/05/17 7025

## 2017-10-06 NOTE — ANESTHESIA PREPROCEDURE EVALUATION
Review of Systems/Medical History  Patient summary reviewed  Chart reviewed  No history of anesthetic complications     Cardiovascular  Negative cardio ROS    Pulmonary  Negative pulmonary ROS ,        GI/Hepatic    GI bleeding , active, PUD (caio ulcer), Esophageal disease (esophagitis) bryant esophagus and benign esophageal stricture,   Comment: Alcoholic    AVM's of small bowel     Negative  ROS        Endo/Other  Negative endo/other ROS      GYN       Hematology  Anemia acute blood loss anemia,  Lymphoma   Musculoskeletal       Neurology  Negative neurology ROS      Psychology   Negative psychology ROS            Physical Exam    Airway    Mallampati score: III  TM Distance: >3 FB  Neck ROM: full     Dental   No notable dental hx upper dentures,     Cardiovascular  Comment: Negative ROS, Rhythm: regular, Rate: normal, Cardiovascular exam normal    Pulmonary  Pulmonary exam normal Breath sounds clear to auscultation,     Other Findings        Anesthesia Plan  ASA Score- 3       Anesthesia Type- IV sedation with anesthesia        Induction- intravenous      Informed Consent  Anesthetic plan and risks discussed with patient

## 2017-10-06 NOTE — CASE MANAGEMENT
Initial Clinical Review    Admission: Date/Time/Statement: 10/5/17 @ 2314     Orders Placed This Encounter   Procedures    Inpatient Admission (expected length of stay for this patient is greater than two midnights)     Standing Status:   Standing     Number of Occurrences:   1     Order Specific Question:   Admitting Physician     Answer:   OFELIA BUCKNER [[id number]]     Order Specific Question:   Level of Care     Answer:   Med Surg [[id number]]     Order Specific Question:   Estimated length of stay     Answer:   More than 2 Midnights     Order Specific Question:   Certification     Answer:   I certify that inpatient services are medically necessary for this patient for a duration of greater than two midnights  See H&P and MD Progress Notes for additional information about the patient's course of treatment  ED: Date/Time/Mode of Arrival:   ED Arrival Information     Expected Arrival Acuity Means of Arrival Escorted By Service Admission Type    - 10/5/2017 21:29 Urgent Walk-In Family Member General Medicine Urgent    Arrival Complaint    [place] Pain; Fatigue          Chief Complaint:   Chief Complaint   Patient presents with    Chest Pain     right sided chest pain and right outward rib pain  low hemoglobin hx per family  ongoing issue per family  History of Illness:   Roderick Pastrana  is a 58 y o  male who presents with chest pain  He has a past medical history of GERD, chronic esophagitis maintained on Protonix and Carafate, it 3 of anemia requiring blood transfusions  The patient presented to the emergency department complaining of right-sided chest pain for the past 3 days  He reported it was noted to be worse with eating as well as with exertion however denies shortness of breath, nausea, vomiting, diarrhea  The patient denied melena, black or tarry stools  The patient reported that the pain would wax and wane    The patient was noted to have a hemoglobin of 6 and was noted to have rectal exam performed in the emergency department which was noted to be heme-positive and melanotic on exam      Recent EGD:  7/17/2017:  Esophageal stricture, Wills's esophagus, Cirilo's erosions, duodenal AVMs  Started on b i d   PPI and Carafate 4 times daily with recommended repeat EGD as an outpatient in 3 months        ED Vital Signs:   ED Triage Vitals   Temperature Pulse Respirations Blood Pressure SpO2   10/05/17 2144 10/05/17 2145 10/05/17 2145 10/05/17 2145 10/05/17 2147   98 2 °F (36 8 °C) 82 18 122/62 99 %      Temp Source Heart Rate Source Patient Position - Orthostatic VS BP Location FiO2 (%)   10/06/17 0030 10/05/17 2317 10/05/17 2317 10/05/17 2317 --   Oral Monitor Lying Right arm       Pain Score       10/05/17 2144       5        Wt Readings from Last 1 Encounters:   10/06/17 65 3 kg (143 lb 15 4 oz)       Vital Signs (abnormal):    above    Abnormal Labs/Diagnostic Test Results:   H/H    6/21  RBC  3 06  Total  Bili   0 15  CXR:  NAD    ED Treatment:   Medication Administration from 10/05/2017 2129 to 10/06/2017 0830       Date/Time Order Dose Route Action Action by Comments     10/05/2017 2311 sodium chloride 0 9 % bolus 1,000 mL 0 mL Intravenous Stopped Esteban Andrew RN      10/05/2017 2210 sodium chloride 0 9 % bolus 1,000 mL 1,000 mL Intravenous [address] Tyler Wen RN      10/05/2017 2211 lidocaine (LIDODERM) 5 % patch 1 patch 1 patch Transdermal Medication Applied Tyler Wen RN      10/05/2017 2207 sucralfate (CARAFATE) oral suspension 1,000 mg 1,000 mg Oral Given Tyler Wen RN      10/05/2017 2207 acetaminophen (TYLENOL) tablet 975 mg 975 mg Oral Given Tyler Wen RN      10/05/2017 2210 ketorolac (TORADOL) 30 mg/mL injection 15 mg 15 mg Intravenous Given Tyler Wen RN      10/05/2017 2349 pantoprazole (PROTONIX) 80 mg in sodium chloride 0 9 % 100 mL IVPB 0 mg Intravenous Stopped Esteban Andrew RN      10/05/2017 8634 pantoprazole (PROTONIX) 80 mg in sodium chloride 0 9 % 100 mL IVPB 80 mg Intravenous [address] Yesika Hodges, [address]      10/05/2017 2349 pantoprazole (PROTONIX) 80 mg in sodium chloride 0 9 % 100 mL infusion 8 mg/hr Intravenous New Bag Yesika Hodges RN           Past Medical/Surgical History: Active Ambulatory Problems     Diagnosis Date Noted    AVM (arteriovenous malformation) of small bowel, acquired ([facility] )     History of blood transfusion     Cirilo ulcer     Wills's esophagus      Resolved Ambulatory Problems     Diagnosis Date Noted    No Resolved Ambulatory Problems     Past Medical History:   Diagnosis Date    Anemia     AVM (arteriovenous malformation) of small bowel, acquired ([facility] )     Wills's esophagus     Cirilo ulcer     Esophagitis     History of blood transfusion        Admitting Diagnosis: Melena [K92 1]  Microcytic anemia [D50 9]  Chest pain [R07 9]  GI bleed [K92 2]  Right-sided chest pain [R07 9]    Age/Sex: 58 y o  male    · Assessment/Plan:    Acute on chronic anemia:  · Hemoglobin 6 0 on admission, MCV 69   · Baseline hemoglobin at [facility] appears to be 9-10   · Last transfused blood on 7/15/2017, 3 units with an additional 1 unit on 7/16/2017  · Transfuse 2 units PRBCs  · Iron studies  · Suspected upper GI bleeding:  · BUN not elevated, currently at 19  History of camerons ulcers, gastritis/esophagitis, duodenal AVMs on EGD 7/2017  Noncompliant with PPI at home  · PPI IV continuous infusion  · trend H&H q8hrs  · NPO for potential EGD in AM  Consult GI  · Chest pain  ? Suspect GI etiology vs related to worsening anemia   ? Continue to monitor on telemetry, trend troponins x3  First trop and EKG negative  Tele monitoring     Plan for Additional Problems:   · Acute kidney injury:  Baseline creatinine is noted to be 0 6-0 8  On admission 1 20  Avoid further Toradol, NSAIDs, nephrotoxins  Continue IV fluids  · Barretts esophagus: PPI infusion   carafate when able to take PO  · Camerons ulcers: await GI recommendations  · Esophageal stricture: was not dilated on last EGD  Alcohol use: 3-4 vodka/cranberry drinks twice weekly  Thiamine, folic acid when taking PO  Ativan  PRN  · Thrombocytosis:  Likely reactive   · Insomnia: melatonin     VTE Prophylaxis: Pharmacologic VTE Prophylaxis contraindicated due to GI bleeding  / sequential compression device   Code Status: FULL CODE> discussed with patient and daughter  POLST: There is no POLST form on file for this patient (pre-hospital)     Anticipated Length of Stay:  Patient will be admitted on an Inpatient basis with an anticipated length of stay of  > 2 midnights  Justification for Hospital Stay: GI bleeding    Admission Orders:   IP    10/5  @    5029  Scheduled Meds:   folic acid 1 mg Oral Daily   melatonin 3 mg Oral HS   pantoprazole 40 mg Intravenous Q12H [address]   sucralfate 1,000 mg Oral 4x Daily (AC & HS)   thiamine 100 mg Oral Daily     Continuous Infusions:    PRN Meds:   acetaminophen    LORazepam     Tele  Cons  GI  H/H   Q   8  Hrs  Serial troponin  PRBC  Cl liq  Diet    St  [address] [facility] in the [facility] by [address] for 2017  Network Utilization Review Department  Phone: [phone]; Fax [phone]  ATTENTION: The Network Utilization Review Department is now centralized for our 7 Facilities  Make a note that we have a new phone and fax numbers for our Department  Please call with any questions or concerns to [phone] and carefully follow the prompts so that you are directed to the right person  All voicemails are confidential  Fax any determinations, approvals, denials, and requests for initial or continue stay review clinical to [phone]  Due to HIGH CALL volume, it would be easier if you could please send faxed requests to expedite your requests and in part, help us provide discharge notifications faster

## 2017-10-06 NOTE — ED PROVIDER NOTES
History  Chief Complaint   Patient presents with    Chest Pain     right sided chest pain and right outward rib pain  low hemoglobin hx per family  ongoing issue per family  HPI  41-year-old male past history GERD, chronic esophagitis on Protonix as well as Carafate presents with right-sided chest pain for the last 3 days  Patient says his pain is exacerbated with eating as well as exertion he otherwise denies shortness of breath diaphoresis, fevers, chills, nausea, vomiting, diarrhea  No melena, black or bloody stools  Patient describes the pain as a sharp, stabbing pain that is intermittent waxes and wanes intensity as worse as a 6/10 and radiates up as well as down his chest   He has not tried taking anything for symptoms  Patient has a history of multiple endoscopies as well as colonoscopies in the past [facility]  No history of heart attack or stroke  Patient is a former smoker with a 40+ pack years no other cardiac risk factors  Impression and plan 41-year-old male presents with right-sided chest pain has exacerbated with eating as well as exertion  Is otherwise well appearing in no acute distress  Likely chest wall pain or is gastritis referred to the chest   We will do a cardiac workup, check abdominal labs  Patient has a heart score 3 will be able to do a delta troponin here will treat his symptoms with IV fluids, Carafate, Lidoderm patch, Tylenol, Toradol on reassess  None       Past Medical History:   Diagnosis Date    Anemia     Esophagitis        Past Surgical History:   Procedure Laterality Date    APPENDECTOMY         History reviewed  No pertinent family history  I have reviewed and agree with the history as documented      Social History   Substance Use Topics    Smoking status: Never Smoker    Smokeless tobacco: Never Used    Alcohol use Yes        Review of Systems   Constitutional: Negative for activity change, appetite change, chills, diaphoresis, fatigue, fever and unexpected weight change  HENT: Negative for trouble swallowing and voice change  Eyes: Negative for photophobia  Respiratory: Negative for shortness of breath  Cardiovascular: Positive for chest pain  Negative for palpitations and leg swelling  Gastrointestinal: Negative for abdominal pain, diarrhea, nausea and vomiting  Endocrine: Negative for polyuria  Genitourinary: Negative for difficulty urinating, dysuria and hematuria  Musculoskeletal: Negative for back pain  Skin: Negative for rash and wound  Allergic/Immunologic: Negative  Neurological: Negative for dizziness, syncope, weakness, numbness and headaches  Hematological: Negative for adenopathy  Does not bruise/bleed easily  Psychiatric/Behavioral: Negative for agitation  Physical Exam  ED Triage Vitals   Temperature Pulse Respirations Blood Pressure SpO2   10/05/17 2144 10/05/17 2145 10/05/17 2145 10/05/17 2145 10/05/17 2147   98 2 °F (36 8 °C) 82 18 122/62 99 %      Temp src Heart Rate Source Patient Position - Orthostatic VS BP Location FiO2 (%)   -- 10/05/17 2317 10/05/17 2317 10/05/17 2317 --    Monitor Lying Right arm       Pain Score       10/05/17 2144       5           Physical Exam   Constitutional: He is oriented to person, place, and time  He appears well-developed and well-nourished  No distress  HENT:   Head: Normocephalic and atraumatic  Right Ear: No hemotympanum  Left Ear: No hemotympanum  Nose: No nasal septal hematoma  Mouth/Throat: Uvula is midline and oropharynx is clear and moist  No oropharyngeal exudate  Eyes: EOM are normal  Pupils are equal, round, and reactive to light  Right eye exhibits no discharge  Left eye exhibits no discharge  Neck: Normal range of motion  Neck supple  No JVD present  No tracheal deviation present  Cardiovascular: Normal rate, regular rhythm, normal heart sounds and intact distal pulses  Exam reveals no gallop and no friction rub      No murmur heard   Pulmonary/Chest: Effort normal and breath sounds normal  No stridor  No respiratory distress  He has no wheezes  He has no rales  He exhibits no tenderness  No skin changes on chest wall  No chest wall tenderness   Abdominal: Soft  Bowel sounds are normal  There is no tenderness  There is no rebound and no guarding  No hernia  Genitourinary:   Genitourinary Comments: Melanotic guiac positive stool   Musculoskeletal: Normal range of motion  He exhibits no edema  Lymphadenopathy:     He has no cervical adenopathy  Neurological: He is alert and oriented to person, place, and time  He has normal strength and normal reflexes  He is not disoriented  No cranial nerve deficit or sensory deficit  GCS eye subscore is 4  GCS verbal subscore is 5  GCS motor subscore is 6  Reflex Scores:       Patellar reflexes are 2+ on the right side and 2+ on the left side  Achilles reflexes are 2+ on the right side and 2+ on the left side  Cn 2-12 grossly intact  No pronator drift  Normal gait  Normal strength/sensation   Skin: Skin is warm and dry  He is not diaphoretic  No erythema  Psychiatric: He has a normal mood and affect         ED Medications  Medications   lidocaine (LIDODERM) 5 % patch 1 patch (1 patch Transdermal Medication Applied 10/5/17 2211)   pantoprazole (PROTONIX) 80 mg in sodium chloride 0 9 % 100 mL infusion (not administered)   sodium chloride 0 9 % bolus 1,000 mL (0 mL Intravenous Stopped 10/5/17 2311)   sucralfate (CARAFATE) oral suspension 1,000 mg (1,000 mg Oral Given 10/5/17 2207)   acetaminophen (TYLENOL) tablet 975 mg (975 mg Oral Given 10/5/17 2207)   ketorolac (TORADOL) 30 mg/mL injection 15 mg (15 mg Intravenous Given 10/5/17 2210)   pantoprazole (PROTONIX) 80 mg in sodium chloride 0 9 % 100 mL IVPB (80 mg Intravenous New Bag 10/5/17 2324)       Diagnostic Studies  Labs Reviewed   CBC AND DIFFERENTIAL - Abnormal        Result Value Ref Range Status    RBC 3 06 (*) 3 88 - 5 62 Million/uL Final    Hemoglobin 6 0 (*) 12 0 - 17 0 g/dL Final    Comment: This result has been called to [address] [place] [address] Drive by Pat España on 10 05 2017 at 2241, and has been read back  Hematocrit 21 0 (*) 36 5 - 49 3 % Final    MCV 69 (*) 82 - 98 fL Final    MCH 19 6 (*) 26 8 - 34 3 pg Final    MCHC 28 6 (*) 31 4 - 37 4 g/dL Final    RDW 18 4 (*) 11 6 - 15 1 % Final    Platelets 109 (*) 321 - 390 Thousands/uL Final    Monocytes Relative 15 (*) 4 - 12 % Final    Basophils Relative 2 (*) 0 - 1 % Final    Monocytes Absolute 1 37 (*) 0 17 - 1 22 Thousand/µL Final    Basophils Absolute 0 16 (*) 0 00 - 0 10 Thousands/µL Final    WBC 9 44  4 31 - 10 16 Thousand/uL Final    MPV 10 2  8 9 - 12 7 fL Final    nRBC 0  /100 WBCs Final    Neutrophils Relative 62  43 - 75 % Final    Lymphocytes Relative 17  14 - 44 % Final    Eosinophils Relative 4  0 - 6 % Final    Neutrophils Absolute 5 94  1 85 - 7 62 Thousands/µL Final    Lymphocytes Absolute 1 64  0 60 - 4 47 Thousands/µL Final    Eosinophils Absolute 0 33  0 00 - 0 61 Thousand/µL Final   COMPREHENSIVE METABOLIC PANEL - Abnormal     Calcium 10 3 (*) 8 3 - 10 1 mg/dL Final    Total Bilirubin 0 15 (*) 0 20 - 1 00 mg/dL Final    Sodium 137  136 - 145 mmol/L Final    Potassium 3 8  3 5 - 5 3 mmol/L Final    Chloride 101  100 - 108 mmol/L Final    CO2 28  21 - 32 mmol/L Final    Anion Gap 8  4 - 13 mmol/L Final    BUN 19  5 - 25 mg/dL Final    Creatinine 1 20  0 60 - 1 30 mg/dL Final    Comment: Standardized to IDMS reference method    Glucose 104  65 - 140 mg/dL Final    Comment:   If the patient is fasting, the ADA then defines impaired fasting glucose as > 100 mg/dL and diabetes as > or equal to 123 mg/dL  Specimen collection should occur prior to Sulfasalazine administration due to the potential for falsely depressed results  Specimen collection should occur prior to Sulfapyridine administration due to the potential for falsely elevated results      AST 18  5 - 45 U/L Final Comment:   Specimen collection should occur prior to Sulfasalazine administration due to the potential for falsely depressed results  ALT 18  12 - 78 U/L Final    Comment:   Specimen collection should occur prior to Sulfasalazine administration due to the potential for falsely depressed results  Alkaline Phosphatase 59  46 - 116 U/L Final    Total Protein 7 1  6 4 - 8 2 g/dL Final    Albumin 3 8  3 5 - 5 0 g/dL Final    eGFR 64  ml/min/1 73sq m Final    Narrative:     National Kidney Disease Education Program recommendations are as follows:  GFR calculation is accurate only with a steady state creatinine  Chronic Kidney disease less than 60 ml/min/1 73 sq  meters  Kidney failure less than 15 ml/min/1 73 sq  meters  LIPASE - Normal    Lipase 239  73 - 393 u/L Final   POCT TROPONIN - Normal    POC Troponin I 0 00  0 00 - 0 08 ng/ml Final    Specimen Type VENOUS   Final    Narrative:     [employer] i-Stat handheld analyzer 99% cutoff is > 0 08ng/mL in [facility] Emergency Departments    o cTnI 99% cutoff is useful only when applied to patients in the clinical setting of myocardial ischemia  o cTnI 99% cutoff should be interpreted in the context of clinical history, ECG findings and possibly cardiac imaging to establish correct diagnosis  o cTnI 99% cutoff may be suggestive but clearly not indicative of a coronary event without the clinical setting of myocardial ischemia     TYPE AND SCREEN   PREPARE RBC       XR chest 2 views   ED Interpretation   No acute findings          Procedures  ECG 12 Lead Documentation  Date/Time: 10/5/2017 10:05 PM  Performed by: Hesham Aguilar by: Regina Garza     ECG reviewed by me, the ED Provider: yes    Patient location:  ED  Previous ECG:     Previous ECG:  Unavailable  Interpretation:     Interpretation: normal    Rate:     ECG rate assessment: normal    Rhythm:     Rhythm: sinus rhythm    Ectopy:     Ectopy: none    QRS:     QRS axis:  Right  Conduction:     Conduction: normal    ST segments:     ST segments:  Normal  T waves:     T waves: normal            Phone Consults  ED Phone Contact    ED Course  ED Course as of Oct 05 2346   Thu Oct 05, 2017   2222 On bedside US  Gb wnl  No wall thickening, no sonographic de jesus's sign  No gallstones    2227 Calcium: (!) 10 3   2238 Pt  Says pain is improved on reassessment    2258 Melanotic stool that is heme positive on rectal exam    2258 Blood consent obtained  We will give 1 U PRBCs, giev 80 mg protonix IV and admit for GI bleed          HEART Risk Score    Flowsheet Row Most Recent Value   History  1 Filed at: 10/05/2017 2203   ECG  0 Filed at: 10/05/2017 2203   Age  1 Filed at: 10/05/2017 2203   Risk Factors  1 Filed at: 10/05/2017 2203   Troponin  0 Filed at: 10/05/2017 2203   Heart Score Risk Calculator   History  1 Filed at: 10/05/2017 2203   ECG  0 Filed at: 10/05/2017 2203   Age  1 Filed at: 10/05/2017 2203   Risk Factors  1 Filed at: 10/05/2017 2203   Troponin  0 Filed at: 10/05/2017 2203   HEART Score  3 Filed at: 10/05/2017 2203   HEART Score  3 Filed at: 10/05/2017 2203                            [facility]  CritCare Time    Disposition  Final diagnoses:   Melena   Right-sided chest pain   Microcytic anemia   GI bleed     ED Disposition     ED Disposition Condition Comment    Admit  Case was discussed with JOANN and the patient's admission status was agreed to be Admission Status: inpatient status to the service of Dr Luiz Mckeon   Follow-up Information    None       Patient's Medications    No medications on file     No discharge procedures on file  ED Provider  Attending physically available and evaluated Wagner Stinson I managed the patient along with the ED Attending      Electronically Signed by       Snow Nunn MD  Resident  10/05/17 3254

## 2017-10-06 NOTE — ANESTHESIA POSTPROCEDURE EVALUATION
Post-Op Assessment Note      CV Status:  Stable    Mental Status:  Alert and awake    Hydration Status:  Euvolemic    PONV Controlled:  Controlled    Airway Patency:  Patent    Post Op Vitals Reviewed: Yes          Staff: Anesthesiologist, CRNA           /56 (10/06/17 1047)    Temp      Pulse 62 (10/06/17 1047)   Resp 15 (10/06/17 1047)    SpO2 98 % (10/06/17 1047)

## 2017-10-06 NOTE — PROGRESS NOTES
Progress Note - Shruthi Tiwari  58 y o  male MRN: [id number]    Unit/Bed#: E2 -01 Encounter: [id number]    Assessment/Plan:    Acute blood loss anemia  possibly related to GI loss       No active bleeding today       Status post 1 unit of packed red blood cells       Another unit planned for today       Serial E monitor hemoglobin    GI bleeding    no active bleeding today       Seen by GI-EGD today       Continue PPI for acid control       Possible related to ongoing alcohol abuse       History of AVMs and Cirilo ulcer    Alcohol abuse   reports drinking approximately 4 days weekly        Thiamin and folate with Ativan p r n  Recommend stop usage of alcohol forever    Acute kidney injury   appears related to dehydration       Now resolved creatinine 0 95       DC IV fluids     Chest pain    appears GI related continue acid control     Subjective:   Feels much better today denies chest pain shortness of breath nausea vomiting diarrhea no abdominal pain no fever is hungry but NPO for EGD    Objective:     Vitals: Blood pressure 108/73, pulse 62, temperature (!) 97 3 °F (36 3 °C), resp  rate 18, height 6' 1" (1 854 m), weight 65 3 kg (143 lb 15 4 oz), SpO2 99 %  ,Body mass index is 18 99 kg/m²          Results from last 7 days  Lab Units 10/06/17  0345 10/05/17  2204   WBC Thousand/uL  --  9 44   HEMOGLOBIN g/dL 7 0* 6 0*   HEMATOCRIT % 23 2* 21 0*   PLATELETS Thousands/uL  --  535*       Results from last 7 days  Lab Units 10/06/17  0451 10/05/17  2204   SODIUM mmol/L 136 137   POTASSIUM mmol/L 3 8 3 8   CHLORIDE mmol/L 104 101   CO2 mmol/L 26 28   BUN mg/dL 16 19   CREATININE mg/dL 0 95 1 20   CALCIUM mg/dL 8 7 10 3*   TOTAL PROTEIN g/dL  --  7 1   BILIRUBIN TOTAL mg/dL  --  0 15*   ALK PHOS U/L  --  59   ALT U/L  --  18   AST U/L  --  18   GLUCOSE RANDOM mg/dL 98 104       Scheduled Meds:    folic acid 1 mg Oral Daily   melatonin 3 mg Oral HS   sucralfate 1,000 mg Oral 4x Daily (AC & HS) thiamine 100 mg Oral Daily       Continuous Infusions:    dextrose 5 % and sodium chloride 0 9 % 100 mL/hr Last Rate: Stopped (10/06/17 0820)   pantoprozole (PROTONIX) infusion (Continuous) 8 mg/hr Last Rate: 8 mg/hr (10/06/17 0837)     Physical exam:  General appearance:  Alert no distress interaction appropriate   Head/Eyes:  Nonicteric pale PERRL EOMI  Neck:  Supple  Lungs: CTA bilateral no wheezing rhonchi or rales  Heart: normal S1 S2 regular  Abdomen: Soft nontender with bowel sounds  Extremities: no edema  Skin: no rash    Invasive Devices     Peripheral Intravenous Line            Peripheral IV 10/05/17 Left Antecubital less than 1 day    Peripheral IV 10/05/17 Right Wrist less than 1 day                  VTE Pharmacologic Prophylaxis:  SCDs   VTE Mechanical Prophylaxis:  SCDs                     Counseling / Coordination of Care  Total floor / unit time spent today 30  minutes  Greater than 50% of total time was spent with the patient and / or family counseling and / or coordination of care    A description of the counseling / coordination of care:  Discussed with GI

## 2017-10-06 NOTE — ED ATTENDING ATTESTATION
Carlos Negrete MD, saw and evaluated the patient  I have discussed the patient with the resident/non-physician practitioner and agree with the resident's/non-physician practitioner's findings, Plan of Care, and MDM as documented in the resident's/non-physician practitioner's note, except where noted  All available labs and Radiology studies were reviewed  At this point I agree with the current assessment done in the Emergency Department  I have conducted an independent evaluation of this patient a history and physical is as follows:    20-year-old male presents for evaluation of right side discomfort  Patient states he has had sharp constant pain for 3 days in his right lateral chest wall that radiates knows right upper quadrant of his abdomen opened on his right side  The pain is constant, without modifying factors  No associated nausea, vomiting, fevers, chills, shortness of breath, paroxysmal nocturnal dyspnea, orthopnea, dyspnea on exertion, risk factors for DVT or pulmonary embolism  Ten systems reviewed otherwise negative  On exam no acute distress, HEENT is normal, neck normal lungs normal cardiac normal, abdomen normal, no lower extremity edema no calf tenderness palpation  Medical decision making; right-sided chest/upper quadrant abdominal pain which has been constant for 3 days with a benign exam-patient has no risk factors for DVT or pulmonary embolism, is low risk on heart score  Will do bedside gallbladder ultrasound, chest x-ray, single EKG and troponin given duration of symptoms constant greater than 6 hours, treat symptoms, reassess      Critical Care Time  CritCare Time

## 2017-10-06 NOTE — ED PROCEDURE NOTE
PROCEDURE  CriticalCare Time  Performed by: Mendel Spark by: Merlin Hoguet     Critical care provider statement:     Critical care time (minutes):  35    Critical care time was exclusive of:  Separately billable procedures and treating other patients and teaching time    Critical care was necessary to treat or prevent imminent or life-threatening deterioration of the following conditions:  Cardiac failure and circulatory failure    Critical care was time spent personally by me on the following activities:  Blood draw for specimens, obtaining history from patient or surrogate, development of treatment plan with patient or surrogate, discussions with consultants, evaluation of patient's response to treatment, examination of patient, interpretation of cardiac output measurements, ordering and performing treatments and interventions, ordering and review of laboratory studies, ordering and review of radiographic studies, re-evaluation of patient's condition and review of old charts  Comments:      Anemia requiring blood transfusion

## 2017-10-07 VITALS
RESPIRATION RATE: 18 BRPM | BODY MASS INDEX: 18.76 KG/M2 | SYSTOLIC BLOOD PRESSURE: 131 MMHG | WEIGHT: 141.54 LBS | OXYGEN SATURATION: 96 % | DIASTOLIC BLOOD PRESSURE: 85 MMHG | TEMPERATURE: 96.7 F | HEIGHT: 73 IN | HEART RATE: 70 BPM

## 2017-10-07 PROBLEM — K92.2 GI BLEED: Status: RESOLVED | Noted: 2017-10-05 | Resolved: 2017-10-07

## 2017-10-07 PROBLEM — K92.1 MELENA: Status: RESOLVED | Noted: 2017-10-05 | Resolved: 2017-10-07

## 2017-10-07 PROBLEM — R07.9 CHEST PAIN: Status: RESOLVED | Noted: 2017-10-06 | Resolved: 2017-10-07

## 2017-10-07 PROBLEM — N17.9 AKI (ACUTE KIDNEY INJURY) (HCC): Status: RESOLVED | Noted: 2017-10-06 | Resolved: 2017-10-07

## 2017-10-07 PROBLEM — K92.2 GI BLEEDING: Status: RESOLVED | Noted: 2017-10-06 | Resolved: 2017-10-07

## 2017-10-07 PROBLEM — D75.839 THROMBOCYTOSIS: Status: RESOLVED | Noted: 2017-10-06 | Resolved: 2017-10-07

## 2017-10-07 LAB
ABO GROUP BLD BPU: NORMAL
ABO GROUP BLD BPU: NORMAL
ANION GAP SERPL CALCULATED.3IONS-SCNC: 8 MMOL/L (ref 4–13)
BASOPHILS # BLD AUTO: 0.19 THOUSANDS/ΜL (ref 0–0.1)
BASOPHILS NFR BLD AUTO: 4 % (ref 0–1)
BPU ID: NORMAL
BPU ID: NORMAL
BUN SERPL-MCNC: 10 MG/DL (ref 5–25)
CALCIUM SERPL-MCNC: 8.3 MG/DL (ref 8.3–10.1)
CHLORIDE SERPL-SCNC: 106 MMOL/L (ref 100–108)
CO2 SERPL-SCNC: 25 MMOL/L (ref 21–32)
CREAT SERPL-MCNC: 0.74 MG/DL (ref 0.6–1.3)
CROSSMATCH: NORMAL
CROSSMATCH: NORMAL
EOSINOPHIL # BLD AUTO: 0.43 THOUSAND/ΜL (ref 0–0.61)
EOSINOPHIL NFR BLD AUTO: 10 % (ref 0–6)
ERYTHROCYTE [DISTWIDTH] IN BLOOD BY AUTOMATED COUNT: 20 % (ref 11.6–15.1)
GFR SERPL CREATININE-BSD FRML MDRD: 99 ML/MIN/1.73SQ M
GLUCOSE SERPL-MCNC: 88 MG/DL (ref 65–140)
HCT VFR BLD AUTO: 28.2 % (ref 36.5–49.3)
HGB BLD-MCNC: 8.5 G/DL (ref 12–17)
LYMPHOCYTES # BLD AUTO: 0.87 THOUSANDS/ΜL (ref 0.6–4.47)
LYMPHOCYTES NFR BLD AUTO: 19 % (ref 14–44)
MCH RBC QN AUTO: 22.3 PG (ref 26.8–34.3)
MCHC RBC AUTO-ENTMCNC: 30.1 G/DL (ref 31.4–37.4)
MCV RBC AUTO: 74 FL (ref 82–98)
MONOCYTES # BLD AUTO: 0.64 THOUSAND/ΜL (ref 0.17–1.22)
MONOCYTES NFR BLD AUTO: 14 % (ref 4–12)
NEUTROPHILS # BLD AUTO: 2.37 THOUSANDS/ΜL (ref 1.85–7.62)
NEUTS SEG NFR BLD AUTO: 53 % (ref 43–75)
NRBC BLD AUTO-RTO: 0 /100 WBCS
PLATELET # BLD AUTO: 372 THOUSANDS/UL (ref 149–390)
PMV BLD AUTO: 9.7 FL (ref 8.9–12.7)
POTASSIUM SERPL-SCNC: 3.7 MMOL/L (ref 3.5–5.3)
RBC # BLD AUTO: 3.81 MILLION/UL (ref 3.88–5.62)
SODIUM SERPL-SCNC: 139 MMOL/L (ref 136–145)
UNIT DISPENSE STATUS: NORMAL
UNIT DISPENSE STATUS: NORMAL
UNIT PRODUCT CODE: NORMAL
UNIT PRODUCT CODE: NORMAL
UNIT RH: NORMAL
UNIT RH: NORMAL
WBC # BLD AUTO: 4.5 THOUSAND/UL (ref 4.31–10.16)

## 2017-10-07 PROCEDURE — 80048 BASIC METABOLIC PNL TOTAL CA: CPT | Performed by: INTERNAL MEDICINE

## 2017-10-07 PROCEDURE — 85025 COMPLETE CBC W/AUTO DIFF WBC: CPT | Performed by: INTERNAL MEDICINE

## 2017-10-07 RX ORDER — PANTOPRAZOLE SODIUM 40 MG/1
40 TABLET, DELAYED RELEASE ORAL
Status: DISCONTINUED | OUTPATIENT
Start: 2017-10-07 | End: 2017-10-07 | Stop reason: HOSPADM

## 2017-10-07 RX ORDER — LANOLIN ALCOHOL/MO/W.PET/CERES
100 CREAM (GRAM) TOPICAL DAILY
Qty: 30 TABLET | Refills: 3 | Status: SHIPPED | OUTPATIENT
Start: 2017-10-07 | End: 2018-01-21

## 2017-10-07 RX ORDER — FOLIC ACID 1 MG/1
1 TABLET ORAL DAILY
Qty: 30 TABLET | Refills: 3 | Status: SHIPPED | OUTPATIENT
Start: 2017-10-07 | End: 2018-01-21

## 2017-10-07 RX ORDER — PANTOPRAZOLE SODIUM 40 MG/1
40 TABLET, DELAYED RELEASE ORAL 2 TIMES DAILY
Qty: 60 TABLET | Refills: 2 | Status: ON HOLD | OUTPATIENT
Start: 2017-10-07 | End: 2018-03-27

## 2017-10-07 RX ADMIN — FOLIC ACID 1 MG: 1 TABLET ORAL at 09:09

## 2017-10-07 RX ADMIN — PANTOPRAZOLE SODIUM 40 MG: 40 TABLET, DELAYED RELEASE ORAL at 09:09

## 2017-10-07 RX ADMIN — Medication 100 MG: at 09:09

## 2017-10-07 RX ADMIN — SUCRALFATE 1000 MG: 1 SUSPENSION ORAL at 06:02

## 2017-10-07 NOTE — PROGRESS NOTES
Patient Name: Cesar Goss  Patient MRN: [id number]  Date: 10/07/17  Service: [facility]    Subjective   Cesar Goss  is a 58 y o  male who was admitted with GI bleeding  He states that he feels well today  His hemoglobin is currently 8 7  He has no esophageal pain and no epigastric pain  He has not passed any melena  Patient had an EGD yesterday which showed severe ulcerative reflux esophagitis with mild stricturing in the mid esophagus with severe erosive and ulcerative changes in the midesophagus, long segment circumferential Wills's esophagus with ulceration and small hiatal hernia  Patient was also noted to have a 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  Biopsies were taken  Patient denies: Chest pain, shortness of breath  All others negative except as noted in HPI  Objective     Vitals  Blood pressure 115/74, pulse 63, temperature (!) 97 1 °F (36 2 °C), temperature source Tympanic, resp  rate 18, height 6' 1" (1 854 m), weight 64 2 kg (141 lb 8 6 oz), SpO2 97 %  General: Alert, no apparent distress  Eyes: No scleral icterus  ENT: MMM  Card: RRR no murmur  Lungs: Clear to ascultation b/l  No wheezes, rales, rhonchi  Abdomen: Soft  Nontender  Nondistended  Bowel sounds present and normoactive  No hepatosplenomegaly    Skin: No jaundice  Neuro: Alert and oriented x3        Laboratory Studies  Lab Results   Component Value Date    CREATININE 0 74 10/07/2017    BUN 10 10/07/2017    K 3 7 10/07/2017     10/07/2017    CO2 25 10/07/2017    GLUCOSE 88 10/07/2017    CALCIUM 8 3 10/07/2017    PROT 7 1 10/05/2017    ALKPHOS 59 10/05/2017    ALBUMIN 3 8 10/05/2017    BILITOT 0 15 (L) 10/05/2017    AST 18 10/05/2017    ALT 18 10/05/2017     Lab Results   Component Value Date    WBC 4 50 10/07/2017    HGB 8 5 (L) 10/07/2017    HCT 28 2 (L) 10/07/2017     10/07/2017    MCV 74 (L) 10/07/2017     No results found for: PROTIME, INR, APTT    Imaging and Other Studies    Inhouse Medications       Current Facility-Administered Medications:     acetaminophen (TYLENOL) tablet 650 mg, 650 mg, Oral, P8M PRN    folic acid (FOLVITE) tablet 1 mg, 1 mg, Oral, Daily, 1 mg at 10/06/17 0815    LORazepam (ATIVAN) tablet 0 5 mg, 0 5 mg, Oral, Q8H PRN    melatonin tablet 3 mg, 3 mg, Oral, HS, 3 mg at 10/06/17 2307    pantoprazole (PROTONIX) EC tablet 40 mg, 40 mg, Oral, BID AC    sucralfate (CARAFATE) oral suspension 1,000 mg, 1,000 mg, Oral, 4x Daily (AC & HS), 1,000 mg at 10/07/17 0602    thiamine (VITAMIN B1) tablet 100 mg, 100 mg, Oral, Daily, 100 mg at 10/06/17 0815      Assessment/Plan:  1  Acute upper GI bleed secondary to severe ulcerative esophagitis with erosions  Patient is status post EGD 10/06/2017 which showed severe ulcerative reflux esophagitis with mild stricturing in the midesophagus and cervix severe erosive and ulcerative changes seen in the mid esophagus, long segment circumferential Wills's esophagus with ulceration, small hiatal hernia, and 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

## 2017-10-07 NOTE — PLAN OF CARE
Potential for Falls     Patient will remain free of falls Adequate for Discharge        Prexisting or High Potential for Compromised Skin Integrity     Skin integrity is maintained or improved Adequate for Discharge

## 2017-10-07 NOTE — PROGRESS NOTES
[facility] Progress Note  Patient: Teddy Dumont  58 y o  male   MRN: [id number]  PCP: No primary care provider on file  Unit/Bed#: E2 -42 Encounter: [id number]  Date Of Visit: 10/07/17      Assessment/plan  Principal problem  1  Acute blood loss anemia due to upper GI bleed- s/p 3 units of prbc  H/h stable  S/p EGD that did not show active bleeding but showed severe ulcerative reflux esophagitis with mild stricture of the mid esophagus  Severe erosive and ulcerative changes mid esophagus due close to the z line  Long segment 10 cm circumferential barretts esophagus with ulceration at the proximal end  nonbleeding avm in the duodenal bulb  GI recommended ppi bid and carafate  Awaiting pathology  He iwll need repeat endoscopy in 2 to 3 months to confirm healing  Stop using etoh  Active problems   1  Alcohol abuse- continue thiamine and folate  Stop using alcohol  Monitor for withdrawal symptoms  2  Acute kidney injury- due to dehydration  Has resolved with IVF    3  Chest pain- atypical due to GI related issues and not cardiac            dispo- d/c home if okay with GI  Follow up with GI outpt    Subjective:   Pt seen and examined  Pt states no vomiting blood  No blood in stools  No melena  No abd pain  No f/c no cp no sob     Objective:     Vitals: Blood pressure 115/74, pulse 63, temperature (!) 97 1 °F (36 2 °C), temperature source Tympanic, resp  rate 18, height 6' 1" (1 854 m), weight 64 2 kg (141 lb 8 6 oz), SpO2 97 %  ,Body mass index is 18 67 kg/m²      Lab, Imaging and other studies:    Results from last 7 days  Lab Units 10/07/17  0558   WBC Thousand/uL 4 50   HEMOGLOBIN g/dL 8 5*   HEMATOCRIT % 28 2*   PLATELETS Thousands/uL 372       Results from last 7 days  Lab Units 10/07/17  0558  10/05/17  2204   SODIUM mmol/L 139  < > 137   POTASSIUM mmol/L 3 7  < > 3 8   CHLORIDE mmol/L 106  < > 101   CO2 mmol/L 25  < > 28   BUN mg/dL 10  < > 19   CREATININE mg/dL 0 74  < > 1 20   CALCIUM mg/dL 8 3  < > 10 3*   TOTAL PROTEIN g/dL  --   --  7 1   BILIRUBIN TOTAL mg/dL  --   --  0 15*   ALK PHOS U/L  --   --  59   ALT U/L  --   --  18   AST U/L  --   --  18   GLUCOSE RANDOM mg/dL 88  < > 104   < > = values in this interval not displayed  Results from last 7 days  Lab Units 10/06/17  0351 10/06/17  0102   TROPONIN I ng/mL <0 02 <0 02     No results found for: Yulissa Pitts      Xr Chest 2 Views    Result Date: 10/6/2017  Narrative: CHEST INDICATION:  Right flank and chest pain  One week of fatigue  COMPARISON:  None VIEWS:  Frontal and lateral projections IMAGES:  2 FINDINGS:     Cardiomediastinal silhouette appears unremarkable  The lungs are clear  No pneumothorax or pleural effusion  Visualized osseous structures appear within normal limits for the patient's age  Impression: No active pulmonary disease  Workstation performed: [id number]         Physical exam:  Physical Exam  General appearance: alert, appears stated age and cooperative  Head: Normocephalic, without obvious abnormality, atraumatic  Eyes: conjunctivae/corneas clear  PERRL, EOM's intact  Fundi benign    Neck: no adenopathy, no carotid bruit, no JVD, supple, symmetrical, trachea midline and thyroid not enlarged, symmetric, no tenderness/mass/nodules  Lungs: clear to auscultation bilaterally  Heart: regular rate and rhythm, S1, S2 normal, no murmur, click, rub or gallop  Abdomen: soft, non-tender; bowel sounds normal; no masses,  no organomegaly  Extremities: extremities normal, atraumatic, no cyanosis or edema  Pulses: 2+ and symmetric  Skin: Skin color, texture, turgor normal  No rashes or lesions  Neurologic: Grossly normal

## 2017-10-07 NOTE — DISCHARGE SUMMARY
Discharge Summary - [facility]    Patient Information: Wagner Stinson  58 y o  male MRN: [id number]  Unit/Bed#: E2 -01 Encounter: [id number]    Discharging Physician / Practitioner: Toshia Hutchison DO  PCP: No primary care provider on file  Admission Date: 10/5/2017  Discharge Date: 10/07/17    Reason for Admission: GI bleed    Discharge Diagnoses:     Principal Problem:    GI bleeding  Active Problems:    Iron deficiency anemia due to chronic blood loss    Chest pain    SHALA (acute kidney injury) ([address] )    Noralee Shannon ulcer    AVM (arteriovenous malformation)    Thrombocytosis (HCC)    Melena    GI bleed  Resolved Problems:    * No resolved hospital problems  *      Consultations During Hospital Stay:  · GI, Dr Latonya Bangura  Procedures Performed:     EGD that showed severe ulcerative reflux esophagitis with mild stricture of the mid esophagus  Severe erosive and ulcerative changes mid esophagus due close to the z line  Long segment 10 cm circumferential barretts esophagus with ulceration at the proximal end  It also showed a nonbleeding avm in the duodenal bulb  Incidental Findings:   · none    Test Results Pending at Discharge (will require follow up): · Biopsy of esophagus  He can follow up with GI     Outpatient Tests Requested:  Repeat cbc in 1 week  EGD in 2 to 3 months to confirm healing    Hospital Course:     Wagner Stinson  is a 58 y o  male patient who originally presented to the hospital on 10/5/2017 due Acute blood loss anemia due to upper GI bleed  He was admitted with a hemoglobin of 6 0  He is s/p 3 units of prbc and his h/h is currently 8 5  He has had no further melena  No vomiting blood  He was assessed by GI and had an EGD that did not show active bleeding but showed severe ulcerative reflux esophagitis with mild stricture of the mid esophagus  Severe erosive and ulcerative changes mid esophagus due close to the z line   Long segment 10 cm circumferential barretts esophagus with ulceration at the proximal end  It also showed a nonbleeding avm in the duodenal bulb  GI recommended ppi bid and carafate  Pathology is currently pending  He will need repeat endoscopy in 2 to 3 months to confirm healing  He was also instructed to stop using etoh  For his ETOH abuse he was continued on thiamine and folate  He did not have withdrawal symptoms while in the hospital     Pt also had acute kidney injury due to dehydration that resolved with IVF  Finally he had right sided chest pain that was not due to acs but was due to the severe erosive gastritis      He was discharged home  He will need to follow up with GI and his family doctor  I do recommend that he has a repeat cbc in 1 week  Discharge Day Visit / Exam:     * Please refer to separate progress note for these details *    Discharge instructions/Information to patient and family:   See after visit summary for information provided to patient and family  Provisions for Follow-Up Care:  See after visit summary for information related to follow-up care and any pertinent home health orders  Discharge Statement:  I spent 32 minutes discharging the patient  This time was spent on the day of discharge  I had direct contact with the patient on the day of discharge  Greater than 50% of the total time was spent examining patient, answering all patient questions, arranging and discussing plan of care with patient as well as directly providing post-discharge instructions  Additional time then spent on discharge activities  Discharge Medications:  See after visit summary for reconciled discharge medications provided to patient and family

## 2017-10-08 LAB
ATRIAL RATE: 82 BPM
P AXIS: 79 DEGREES
PR INTERVAL: 204 MS
QRS AXIS: 104 DEGREES
QRSD INTERVAL: 88 MS
QT INTERVAL: 344 MS
QTC INTERVAL: 401 MS
T WAVE AXIS: 81 DEGREES
VENTRICULAR RATE: 82 BPM

## 2017-10-09 LAB
ABO GROUP BLD BPU: NORMAL
BPU ID: NORMAL
CROSSMATCH: NORMAL
UNIT DISPENSE STATUS: NORMAL
UNIT PRODUCT CODE: NORMAL
UNIT RH: NORMAL

## 2017-10-09 NOTE — CASE MANAGEMENT
Notification of Discharge  This is a Notification of Discharge from our facility [address]  Please be advised that this patient has been discharge from our facility  Below you will find the admission and discharge date and time including the patients disposition  PRESENTATION DATE: 10/5/2017  9:34 PM  IP ADMISSION DATE: 10/5/17 2314  DISCHARGE DATE: 10/7/2017  2:11 PM  DISPOSITION: Home/Self Care    [address] [facility] in the [facility] by [address] for 2017  Network Utilization Review Department  Phone: [phone]; Fax [phone]  ATTENTION: The Network Utilization Review Department is now centralized for our 7 Facilities  Make a note that we have a new phone and fax numbers for our Department  Please call with any questions or concerns to [phone] and carefully follow the prompts so that you are directed to the right person  All voicemails are confidential  Fax any determinations, approvals, denials, and requests for initial or continue stay review clinical to [phone]  Due to HIGH CALL volume, it would be easier if you could please send faxed requests to expedite your requests and in part, help us provide discharge notifications faster

## 2017-11-03 ENCOUNTER — HOSPITAL ENCOUNTER (INPATIENT)
Facility: HOSPITAL | Age: 62
LOS: 4 days | Discharge: HOME | DRG: 379 | End: 2017-11-07
Attending: EMERGENCY MEDICINE | Admitting: INTERNAL MEDICINE
Payer: COMMERCIAL

## 2017-11-03 ENCOUNTER — ANESTHESIA EVENT (INPATIENT)
Dept: GASTROENTEROLOGY | Facility: HOSPITAL | Age: 62
DRG: 379 | End: 2017-11-03
Payer: COMMERCIAL

## 2017-11-03 ENCOUNTER — ANESTHESIA (INPATIENT)
Dept: GASTROENTEROLOGY | Facility: HOSPITAL | Age: 62
DRG: 379 | End: 2017-11-03
Payer: COMMERCIAL

## 2017-11-03 DIAGNOSIS — K92.1 MELENA: ICD-10-CM

## 2017-11-03 DIAGNOSIS — Q27.30 AVM (ARTERIOVENOUS MALFORMATION): ICD-10-CM

## 2017-11-03 DIAGNOSIS — D50.0 IRON DEFICIENCY ANEMIA DUE TO CHRONIC BLOOD LOSS: ICD-10-CM

## 2017-11-03 DIAGNOSIS — D64.9 ANEMIA: ICD-10-CM

## 2017-11-03 DIAGNOSIS — K92.2 GI BLEEDING: Primary | ICD-10-CM

## 2017-11-03 LAB
ABO GROUP BLD: NORMAL
ALBUMIN SERPL BCP-MCNC: 3.5 G/DL (ref 3.5–5)
ALP SERPL-CCNC: 79 U/L (ref 46–116)
ALT SERPL W P-5'-P-CCNC: 18 U/L (ref 12–78)
ANION GAP SERPL CALCULATED.3IONS-SCNC: 5 MMOL/L (ref 4–13)
APTT PPP: 32 SECONDS (ref 23–35)
AST SERPL W P-5'-P-CCNC: 20 U/L (ref 5–45)
ATRIAL RATE: 70 BPM
BASOPHILS # BLD AUTO: 0.12 THOUSANDS/ΜL (ref 0–0.1)
BASOPHILS NFR BLD AUTO: 1 % (ref 0–1)
BILIRUB SERPL-MCNC: 0.45 MG/DL (ref 0.2–1)
BLD GP AB SCN SERPL QL: NEGATIVE
BUN SERPL-MCNC: 12 MG/DL (ref 5–25)
CALCIUM SERPL-MCNC: 8.3 MG/DL (ref 8.3–10.1)
CHLORIDE SERPL-SCNC: 105 MMOL/L (ref 100–108)
CO2 SERPL-SCNC: 28 MMOL/L (ref 21–32)
CREAT SERPL-MCNC: 0.91 MG/DL (ref 0.6–1.3)
EOSINOPHIL # BLD AUTO: 0.42 THOUSAND/ΜL (ref 0–0.61)
EOSINOPHIL NFR BLD AUTO: 4 % (ref 0–6)
ERYTHROCYTE [DISTWIDTH] IN BLOOD BY AUTOMATED COUNT: 21.7 % (ref 11.6–15.1)
GFR SERPL CREATININE-BSD FRML MDRD: 90 ML/MIN/1.73SQ M
GLUCOSE SERPL-MCNC: 100 MG/DL (ref 65–140)
HCT VFR BLD AUTO: 27.1 % (ref 36.5–49.3)
HCT VFR BLD AUTO: 29.2 % (ref 36.5–49.3)
HGB BLD-MCNC: 7.7 G/DL (ref 12–17)
HGB BLD-MCNC: 8.3 G/DL (ref 12–17)
INR PPP: 1.05 (ref 0.86–1.16)
LIPASE SERPL-CCNC: 201 U/L (ref 73–393)
LYMPHOCYTES # BLD AUTO: 1.28 THOUSANDS/ΜL (ref 0.6–4.47)
LYMPHOCYTES NFR BLD AUTO: 13 % (ref 14–44)
MCH RBC QN AUTO: 20.7 PG (ref 26.8–34.3)
MCHC RBC AUTO-ENTMCNC: 28.4 G/DL (ref 31.4–37.4)
MCV RBC AUTO: 73 FL (ref 82–98)
MONOCYTES # BLD AUTO: 0.77 THOUSAND/ΜL (ref 0.17–1.22)
MONOCYTES NFR BLD AUTO: 8 % (ref 4–12)
NEUTROPHILS # BLD AUTO: 7.52 THOUSANDS/ΜL (ref 1.85–7.62)
NEUTS SEG NFR BLD AUTO: 74 % (ref 43–75)
NRBC BLD AUTO-RTO: 0 /100 WBCS
P AXIS: 68 DEGREES
PLATELET # BLD AUTO: 563 THOUSANDS/UL (ref 149–390)
PMV BLD AUTO: 10 FL (ref 8.9–12.7)
POTASSIUM SERPL-SCNC: 3.9 MMOL/L (ref 3.5–5.3)
PR INTERVAL: 180 MS
PROT SERPL-MCNC: 7 G/DL (ref 6.4–8.2)
PROTHROMBIN TIME: 13.7 SECONDS (ref 12.1–14.4)
QRS AXIS: 99 DEGREES
QRSD INTERVAL: 102 MS
QT INTERVAL: 386 MS
QTC INTERVAL: 416 MS
RBC # BLD AUTO: 4.01 MILLION/UL (ref 3.88–5.62)
RH BLD: POSITIVE
SODIUM SERPL-SCNC: 138 MMOL/L (ref 136–145)
SPECIMEN EXPIRATION DATE: NORMAL
SPECIMEN SOURCE: NORMAL
T WAVE AXIS: 79 DEGREES
TROPONIN I BLD-MCNC: 0 NG/ML (ref 0–0.08)
VENTRICULAR RATE: 70 BPM
WBC # BLD AUTO: 10.11 THOUSAND/UL (ref 4.31–10.16)

## 2017-11-03 PROCEDURE — 84484 ASSAY OF TROPONIN QUANT: CPT

## 2017-11-03 PROCEDURE — 85025 COMPLETE CBC W/AUTO DIFF WBC: CPT | Performed by: EMERGENCY MEDICINE

## 2017-11-03 PROCEDURE — 88342 IMHCHEM/IMCYTCHM 1ST ANTB: CPT | Performed by: NURSE PRACTITIONER

## 2017-11-03 PROCEDURE — 82272 OCCULT BLD FECES 1-3 TESTS: CPT

## 2017-11-03 PROCEDURE — 36415 COLL VENOUS BLD VENIPUNCTURE: CPT | Performed by: EMERGENCY MEDICINE

## 2017-11-03 PROCEDURE — 0DB58ZX EXCISION OF ESOPHAGUS, VIA NATURAL OR ARTIFICIAL OPENING ENDOSCOPIC, DIAGNOSTIC: ICD-10-PCS | Performed by: INTERNAL MEDICINE

## 2017-11-03 PROCEDURE — C9113 INJ PANTOPRAZOLE SODIUM, VIA: HCPCS | Performed by: PHYSICIAN ASSISTANT

## 2017-11-03 PROCEDURE — 85018 HEMOGLOBIN: CPT | Performed by: PHYSICIAN ASSISTANT

## 2017-11-03 PROCEDURE — 85730 THROMBOPLASTIN TIME PARTIAL: CPT | Performed by: EMERGENCY MEDICINE

## 2017-11-03 PROCEDURE — 86900 BLOOD TYPING SEROLOGIC ABO: CPT | Performed by: EMERGENCY MEDICINE

## 2017-11-03 PROCEDURE — 88305 TISSUE EXAM BY PATHOLOGIST: CPT | Performed by: NURSE PRACTITIONER

## 2017-11-03 PROCEDURE — 86901 BLOOD TYPING SEROLOGIC RH(D): CPT | Performed by: EMERGENCY MEDICINE

## 2017-11-03 PROCEDURE — C9113 INJ PANTOPRAZOLE SODIUM, VIA: HCPCS | Performed by: EMERGENCY MEDICINE

## 2017-11-03 PROCEDURE — 85610 PROTHROMBIN TIME: CPT | Performed by: EMERGENCY MEDICINE

## 2017-11-03 PROCEDURE — 83690 ASSAY OF LIPASE: CPT | Performed by: EMERGENCY MEDICINE

## 2017-11-03 PROCEDURE — 96365 THER/PROPH/DIAG IV INF INIT: CPT

## 2017-11-03 PROCEDURE — 93005 ELECTROCARDIOGRAM TRACING: CPT | Performed by: EMERGENCY MEDICINE

## 2017-11-03 PROCEDURE — 86850 RBC ANTIBODY SCREEN: CPT | Performed by: EMERGENCY MEDICINE

## 2017-11-03 PROCEDURE — 88312 SPECIAL STAINS GROUP 1: CPT | Performed by: NURSE PRACTITIONER

## 2017-11-03 PROCEDURE — 85014 HEMATOCRIT: CPT | Performed by: PHYSICIAN ASSISTANT

## 2017-11-03 PROCEDURE — 80053 COMPREHEN METABOLIC PANEL: CPT | Performed by: EMERGENCY MEDICINE

## 2017-11-03 PROCEDURE — 99285 EMERGENCY DEPT VISIT HI MDM: CPT

## 2017-11-03 RX ORDER — SODIUM CHLORIDE 9 MG/ML
125 INJECTION, SOLUTION INTRAVENOUS CONTINUOUS
Status: DISCONTINUED | OUTPATIENT
Start: 2017-11-03 | End: 2017-11-06

## 2017-11-03 RX ORDER — PROPOFOL 10 MG/ML
INJECTION, EMULSION INTRAVENOUS AS NEEDED
Status: DISCONTINUED | OUTPATIENT
Start: 2017-11-03 | End: 2017-11-03 | Stop reason: SURG

## 2017-11-03 RX ORDER — ONDANSETRON 2 MG/ML
4 INJECTION INTRAMUSCULAR; INTRAVENOUS EVERY 6 HOURS PRN
Status: DISCONTINUED | OUTPATIENT
Start: 2017-11-03 | End: 2017-11-07 | Stop reason: HOSPADM

## 2017-11-03 RX ORDER — SODIUM CHLORIDE 9 MG/ML
75 INJECTION, SOLUTION INTRAVENOUS CONTINUOUS
Status: DISCONTINUED | OUTPATIENT
Start: 2017-11-03 | End: 2017-11-03 | Stop reason: SDUPTHER

## 2017-11-03 RX ORDER — ACETAMINOPHEN 325 MG/1
650 TABLET ORAL EVERY 6 HOURS PRN
Status: DISCONTINUED | OUTPATIENT
Start: 2017-11-03 | End: 2017-11-07 | Stop reason: HOSPADM

## 2017-11-03 RX ORDER — PANTOPRAZOLE SODIUM 40 MG/1
40 INJECTION, POWDER, FOR SOLUTION INTRAVENOUS EVERY 12 HOURS SCHEDULED
Status: DISCONTINUED | OUTPATIENT
Start: 2017-11-03 | End: 2017-11-07

## 2017-11-03 RX ORDER — LANOLIN ALCOHOL/MO/W.PET/CERES
3 CREAM (GRAM) TOPICAL
Status: DISCONTINUED | OUTPATIENT
Start: 2017-11-03 | End: 2017-11-07 | Stop reason: HOSPADM

## 2017-11-03 RX ORDER — LORAZEPAM 2 MG/ML
0.5 INJECTION INTRAMUSCULAR 4 TIMES DAILY PRN
Status: DISCONTINUED | OUTPATIENT
Start: 2017-11-03 | End: 2017-11-07 | Stop reason: HOSPADM

## 2017-11-03 RX ADMIN — PROPOFOL 110 MG: 10 INJECTION, EMULSION INTRAVENOUS at 13:57

## 2017-11-03 RX ADMIN — PROPOFOL 20 MG: 10 INJECTION, EMULSION INTRAVENOUS at 13:59

## 2017-11-03 RX ADMIN — PROPOFOL 40 MG: 10 INJECTION, EMULSION INTRAVENOUS at 14:03

## 2017-11-03 RX ADMIN — SODIUM CHLORIDE 80 MG: 9 INJECTION, SOLUTION INTRAVENOUS at 09:51

## 2017-11-03 RX ADMIN — PROPOFOL 20 MG: 10 INJECTION, EMULSION INTRAVENOUS at 13:58

## 2017-11-03 RX ADMIN — SODIUM CHLORIDE 75 ML/HR: 0.9 INJECTION, SOLUTION INTRAVENOUS at 13:27

## 2017-11-03 RX ADMIN — FOLIC ACID: 5 INJECTION, SOLUTION INTRAMUSCULAR; INTRAVENOUS; SUBCUTANEOUS at 15:29

## 2017-11-03 RX ADMIN — SODIUM CHLORIDE: 0.9 INJECTION, SOLUTION INTRAVENOUS at 13:48

## 2017-11-03 RX ADMIN — PROPOFOL 20 MG: 10 INJECTION, EMULSION INTRAVENOUS at 14:00

## 2017-11-03 RX ADMIN — SODIUM CHLORIDE 500 ML: 0.9 INJECTION, SOLUTION INTRAVENOUS at 09:29

## 2017-11-03 RX ADMIN — SODIUM CHLORIDE 75 ML/HR: 0.9 INJECTION, SOLUTION INTRAVENOUS at 12:13

## 2017-11-03 RX ADMIN — MELATONIN TAB 3 MG 3 MG: 3 TAB at 21:04

## 2017-11-03 RX ADMIN — LIDOCAINE HYDROCHLORIDE 60 MG: 20 INJECTION, SOLUTION INTRAVENOUS at 13:57

## 2017-11-03 RX ADMIN — PANTOPRAZOLE SODIUM 40 MG: 40 INJECTION, POWDER, FOR SOLUTION INTRAVENOUS at 21:04

## 2017-11-03 NOTE — H&P (VIEW-ONLY)
Patient MRN: [id number]  Date of Service: 11/3/2017  Referring Physician: Dr Radha Pinon  Provider Creating Note: ROMA Batres  PCP: No primary care provider on file  Reason for Consult:  melena/heme-positive stool/iron deficiency anemia  SPRING Pastrana  is a 58 y o  male who was admitted with Melena  He presented to the emergency room with chief complaint of melena x1 week with worsening fatigue  Patient is also experiencing some lower abdominal cramping no bright red blood per rectum no diarrhea  He states he has also been having some pain with swallowing and worsening GERD over the last week  He was admitted in October of 2017 with a hemoglobin of 6 0 and had an EGD which showed severe ulcerative reflux esophagitis with mild stricturing in the mid esophagus, severe erosive and ulcerative changes seen in the mid esophagus close to the Z-line and long segment 10 cm circumferential Wills's esophagus with ulceration small hiatal hernia and a 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  Patient had also presented to [facility] on 7/14/2717 with a GI bleed and did receive multiple transfusions at that time  He did have an EGD which showed Wills's esophagus hiatal hernia with [place] Frame lesions and nonbleeding duodenal AVM  He has a history of food bolus in April of 2017  Patient states that he has been taking his Protonix twice a day as well as Carafate 4 times a day and following low acid diet  He does have a history of excessive alcohol use and he states that he has significantly decreased that but has not completely stopped drinking  Presently his hemoglobin is 8 3 hemoglobin 3 weeks ago was 8 5 current MCV is 73 and MCH is 20 7 and BUN is 12 he has no nausea or vomiting and no epigastric pain  Patient has history of chronic microcytic anemia    He is scheduled for an EGD and colonoscopy outpatient in our office  Past Medical History:   Diagnosis Date    Anemia     AVM (arteriovenous malformation) of small bowel, acquired ([address] )     Wills's esophagus     Cirilo ulcer     Esophagitis     History of blood transfusion     22 prior transfusions     Past Surgical History:   Procedure Laterality Date    APPENDECTOMY      EGD AND COLONOSCOPY  07/18/2017    ESOPHAGOGASTRODUODENOSCOPY N/A 10/6/2017    Procedure: ESOPHAGOGASTRODUODENOSCOPY (EGD) with biopsy;  Surgeon: Osbaldo Easton MD;  Location: [facility]; Service: Gastroenterology     Medications  Home Medications:   Prior to Admission medications    Medication Sig Start Date End Date Taking? Authorizing Provider   acetaminophen (TYLENOL) 500 mg tablet Take 500 mg by mouth 3/3/17   Historical Provider, MD   folic acid (FOLVITE) 1 mg tablet Take 1 tablet by mouth daily 10/7/17   Rebeca Durham DO   Melatonin 1 MG CAPS Take 1 mg by mouth    Historical Provider, MD   pantoprazole (PROTONIX) 40 mg tablet Take 1 tablet by mouth 2 (two) times a day 10/7/17   Rebeca Durham DO   sucralfate (CARAFATE) 1 g/10 mL suspension Take 1 g by mouth 7/18/17   Historical Provider, MD   thiamine 100 MG tablet Take 1 tablet by mouth daily 10/7/17   Ricky Garcia DO       Inhouse Medications  No current facility-administered medications for this encounter  Current Outpatient Prescriptions:     acetaminophen (TYLENOL) 500 mg tablet    folic acid (FOLVITE) 1 mg tablet    Melatonin 1 MG CAPS    pantoprazole (PROTONIX) 40 mg tablet    sucralfate (CARAFATE) 1 g/10 mL suspension    thiamine 100 MG tablet    Allergies  Allergies   Allergen Reactions    Doxylamine      Social History   reports that he quit smoking about 3 years ago  He has never used smokeless tobacco  He reports that he drinks about 4 8 oz of alcohol per week   He reports that he does not use drugs  Family History  History reviewed  No pertinent family history  ROS  ROS: Denies CP, SOB, fever    Positive odynophagia positive lower abdominal cramping positive melena positive fatigue  All others negative except as noted in HPI  Objective   Vitals  Blood pressure 129/79, pulse 67, temperature (!) 97 4 °F (36 3 °C), temperature source Oral, resp  rate 16, weight 66 5 kg (146 lb 11 oz), SpO2 100 %  General: Alert, no apparent distress  Eyes: No scleral icterus  ENT: MMM  Card: RRR no murmur  Lungs: Clear to ascultation b/l  No wheezes, rales, rhonchi  Abdomen: Soft  Nontender  Nondistended  Bowel sounds present and normoactive  No hepatosplenomegaly  Skin: No jaundice  Neuro: Alert and oriented x3        Laboratory Studies  Lab Results   Component Value Date    WBC 10 11 11/03/2017    HGB 8 3 (L) 11/03/2017    HCT 29 2 (L) 11/03/2017     (H) 11/03/2017    MCV 73 (L) 11/03/2017     Lab Results   Component Value Date    CREATININE 0 91 11/03/2017    BUN 12 11/03/2017    K 3 9 11/03/2017     11/03/2017    CO2 28 11/03/2017    GLUCOSE 100 11/03/2017    CALCIUM 8 3 11/03/2017    PROT 7 0 11/03/2017    ALKPHOS 79 11/03/2017    ALBUMIN 3 5 11/03/2017    BILITOT 0 45 11/03/2017    AST 20 11/03/2017    ALT 18 11/03/2017     Lab Results   Component Value Date    PROTIME 13 7 11/03/2017    INR 1 05 11/03/2017       Imaging and Other Studies      Assessment and Plan:  1  Melena likely upper GI bleed  Will do EGD today  Patient should remain NPO with Protonix drip  Colonoscopy monday  2  Acute on chronic Iron deficiency anemia secondary to #1  Frequent  H&H, transfuse as needed  3  Odynophagia likely secondary to erosive esophagitis  Will likely restart Carafate after EGD      Principal Problem:    Melena  Active Problems:    Iron deficiency anemia due to chronic blood loss    Ardeth  ulcer    AVM (arteriovenous malformation)    Wills's esophagus    Esophagitis      ROMA Farias

## 2017-11-03 NOTE — ED PROVIDER NOTES
History  Chief Complaint   Patient presents with    Rectal Bleeding     First noted blood in stools since last week- getting worse  States has hx of same  Feels weak       History provided by:  Patient   used: No    Medical Problem   Quality:  Dark stools  Severity:  Moderate  Onset quality:  Gradual  Duration:  1 week  Timing:  Constant  Progression:  Unchanged  Chronicity:  Recurrent  Context:  Hx of Severe Ulcerative Esophagitis, Duodenal AVM  Relieved by:  Nothing  Worsened by:  Nothing  Ineffective treatments:  None  Associated symptoms: no abdominal pain, no chest pain, no cough, no diarrhea, no fever, no headaches, no nausea, no rash, no shortness of breath, no sore throat and no vomiting    Risk factors:  Hx of GI bleeding      Prior to Admission Medications   Prescriptions Last Dose Informant Patient Reported? Taking? Melatonin 1 MG CAPS   Yes No   Sig: Take 1 mg by mouth   acetaminophen (TYLENOL) 500 mg tablet   Yes No   Sig: Take 500 mg by mouth   folic acid (FOLVITE) 1 mg tablet   No No   Sig: Take 1 tablet by mouth daily   pantoprazole (PROTONIX) 40 mg tablet   No No   Sig: Take 1 tablet by mouth 2 (two) times a day   sucralfate (CARAFATE) 1 g/10 mL suspension   Yes No   Sig: Take 1 g by mouth   thiamine 100 MG tablet   No No   Sig: Take 1 tablet by mouth daily      Facility-Administered Medications: None       Past Medical History:   Diagnosis Date    Anemia     AVM (arteriovenous malformation) of small bowel, [facility] ([facility]     Wills's esophagus     Cirilo ulcer     Esophagitis     History of blood transfusion     22 prior transfusions       Past Surgical History:   Procedure Laterality Date    APPENDECTOMY      EGD AND COLONOSCOPY  07/18/2017    ESOPHAGOGASTRODUODENOSCOPY N/A 10/6/2017    Procedure: ESOPHAGOGASTRODUODENOSCOPY (EGD) with biopsy;  Surgeon: Rudolph Esquivel MD;  Location: [facility]; Service: Gastroenterology       History reviewed   No pertinent family history  I have reviewed and agree with the history as documented  Social History   Substance Use Topics    Smoking status: Former Smoker     Quit date: 11/3/2014    Smokeless tobacco: Never Used    Alcohol use 4 8 oz/week     8 Shots of liquor per week        Review of Systems   Constitutional: Negative for activity change, chills and fever  HENT: Negative for facial swelling, sore throat and trouble swallowing  Eyes: Negative for pain and visual disturbance  Respiratory: Negative for cough, chest tightness and shortness of breath  Cardiovascular: Negative for chest pain and leg swelling  Gastrointestinal: Negative for abdominal pain, blood in stool, diarrhea, nausea and vomiting  Jyoti   Genitourinary: Negative for dysuria and flank pain  Musculoskeletal: Negative for back pain, neck pain and neck stiffness  Skin: Negative for pallor and rash  Allergic/Immunologic: Negative for environmental allergies and immunocompromised state  Neurological: Negative for dizziness and headaches  Hematological: Negative for adenopathy  Does not bruise/bleed easily  Psychiatric/Behavioral: Negative for agitation and behavioral problems  All other systems reviewed and are negative        Physical Exam  ED Triage Vitals   Temperature Pulse Respirations Blood Pressure SpO2   11/03/17 0902 11/03/17 0902 11/03/17 0902 11/03/17 0902 11/03/17 0904   (!) 97 4 °F (36 3 °C) 68 14 140/74 100 %      Temp Source Heart Rate Source Patient Position - Orthostatic VS BP Location FiO2 (%)   11/03/17 0902 11/03/17 0957 11/03/17 1200 11/03/17 1200 --   Oral Monitor Lying Left leg       Pain Score       11/03/17 0902       No Pain           Orthostatic Vital Signs  Vitals:    11/03/17 0902 11/03/17 0957 11/03/17 1130 11/03/17 1200   BP: 140/74 129/79 132/80 130/74   Pulse: 68 67 72 76   Patient Position - Orthostatic VS:    Lying       Physical Exam   Constitutional: He is oriented to person, place, and time  He appears well-developed and well-nourished  No distress  HENT:   Head: Normocephalic and atraumatic  Eyes: EOM are normal    Neck: Normal range of motion  Neck supple  Cardiovascular: Normal rate, regular rhythm, normal heart sounds and intact distal pulses  Pulmonary/Chest: Effort normal and breath sounds normal    Abdominal: Soft  Bowel sounds are normal  There is no tenderness  There is no rebound and no guarding  Genitourinary: Rectal exam shows guaiac positive stool  Musculoskeletal: Normal range of motion  Neurological: He is alert and oriented to person, place, and time  Skin: Skin is warm and dry  Psychiatric: He has a normal mood and affect  Nursing note and vitals reviewed        ED Medications  Medications   melatonin tablet 3 mg (not administered)   ondansetron (ZOFRAN) injection 4 mg (not administered)   acetaminophen (TYLENOL) tablet 650 mg (not administered)   pantoprazole (PROTONIX) injection 40 mg (not administered)   LORazepam (ATIVAN) 2 mg/mL injection 0 5 mg (not administered)   sodium chloride 0 9 % infusion (not administered)   thiamine (VITAMIN B1) 854 mg, folic acid 1 mg in sodium chloride 0 9 % 100 mL IVPB (not administered)   sodium chloride 0 9 % bolus 500 mL (0 mL Intravenous Stopped 11/3/17 1019)   pantoprazole (PROTONIX) 80 mg in sodium chloride 0 9 % 100 mL IVPB (0 mg Intravenous Stopped 11/3/17 1011)       Diagnostic Studies  Results Reviewed     Procedure Component Value Units Date/Time    Hemoglobin and hematocrit, blood [[id number]]     Lab Status:  No result Specimen:  Blood     Comprehensive metabolic panel [[id number]] Collected:  11/03/17 0923    Lab Status:  Final result Specimen:  Blood from Arm, Left Updated:  11/03/17 0953     Sodium 138 mmol/L      Potassium 3 9 mmol/L      Chloride 105 mmol/L      CO2 28 mmol/L      Anion Gap 5 mmol/L      BUN 12 mg/dL      Creatinine 0 91 mg/dL      Glucose 100 mg/dL      Calcium 8 3 mg/dL      AST 20 U/L      ALT 18 U/L      Alkaline Phosphatase 79 U/L      Total Protein 7 0 g/dL      Albumin 3 5 g/dL      Total Bilirubin 0 45 mg/dL      eGFR 90 ml/min/1 73sq m     Narrative:         National Kidney Disease Education Program recommendations are as follows:  GFR calculation is accurate only with a steady state creatinine  Chronic Kidney disease less than 60 ml/min/1 73 sq  meters  Kidney failure less than 15 ml/min/1 73 sq  meters  Lipase [[id number]]  (Normal) Collected:  11/03/17 0923    Lab Status:  Final result Specimen:  Blood from Arm, Left Updated:  11/03/17 0953     Lipase 201 u/L     Protime-INR [[id number]]  (Normal) Collected:  11/03/17 0923    Lab Status:  Final result Specimen:  Blood from Arm, Left Updated:  11/03/17 0938     Protime 13 7 seconds      INR 1 05    APTT [[id number]]  (Normal) Collected:  11/03/17 0923    Lab Status:  Final result Specimen:  Blood from Arm, Left Updated:  11/03/17 0938     PTT 32 seconds     Narrative: Therapeutic Heparin Range = 60-90 seconds    POCT troponin [[id number]]  (Normal) Collected:  11/03/17 0924    Lab Status:  Final result Updated:  11/03/17 0937     POC Troponin I 0 00 ng/ml      Specimen Type VENOUS    Narrative:         [employer] i-Stat handheld analyzer 99% cutoff is > 0 08ng/mL in [facility]artments    o cTnI 99% cutoff is useful only when applied to patients in the clinical setting of myocardial ischemia  o cTnI 99% cutoff should be interpreted in the context of clinical history, ECG findings and possibly cardiac imaging to establish correct diagnosis  o cTnI 99% cutoff may be suggestive but clearly not indicative of a coronary event without the clinical setting of myocardial ischemia      CBC and differential [[id number]]  (Abnormal) Collected:  11/03/17 0923    Lab Status:  Final result Specimen:  Blood from Arm, Left Updated:  11/03/17 0934     WBC 10 11 Thousand/uL      RBC 4 01 Million/uL      Hemoglobin 8 3 (L) g/dL      Hematocrit 29 2 (L) %      MCV 73 (L) fL      MCH 20 7 (L) pg      MCHC 28 4 (L) g/dL      RDW 21 7 (H) %      MPV 10 0 fL      Platelets 648 (H) Thousands/uL      nRBC 0 /100 WBCs      Neutrophils Relative 74 %      Lymphocytes Relative 13 (L) %      Monocytes Relative 8 %      Eosinophils Relative 4 %      Basophils Relative 1 %      Neutrophils Absolute 7 52 Thousands/µL      Lymphocytes Absolute 1 28 Thousands/µL      Monocytes Absolute 0 77 Thousand/µL      Eosinophils Absolute 0 42 Thousand/µL      Basophils Absolute 0 12 (H) Thousands/µL                  No orders to display              Procedures  ECG 12 Lead Documentation  Date/Time: 11/3/2017 9:26 AM  Performed by: Varinder Chilel  Authorized by: Varinder Chilel     Indications / Diagnosis:  GI Bleeding  ECG reviewed by me, the ED Provider: yes    Patient location:  ED  Interpretation:     Interpretation: normal    Rate:     ECG rate:  70    ECG rate assessment: normal    Rhythm:     Rhythm: sinus rhythm    Ectopy:     Ectopy: none    QRS:     QRS axis:  Normal  Conduction:     Conduction: normal    ST segments:     ST segments:  Non-specific    Depression:  AVL  T waves:     T waves: normal    Comments:      Unchanged EKG           Phone Contacts  ED Phone Contact    ED Course  ED Course as of Nov 03 1246   Fri Nov 03, 2017   0959 Hb noted, slight drop form 8 5, 3 weeks ago  Hemoglobin: (!) 8 3   1008 Case discussed with DANTE Arias with Dr Rosalin Lefort, GI; advised to admit to [facility], will see as consult  MDM  Number of Diagnoses or Management Options  Anemia: new and requires workup  GI bleeding: new and requires workup  Diagnosis management comments: Patient with recent history of severe reflux ulcerative esophagitis and duodenal AVM, comes in with complaint of black stools for the past 1 week, worsening generalized weakness, fatigue, denies chest pain, dyspnea, fever, abdominal pain or diarrhea  On exam no acute distress, vital signs stable, abdomen is soft, nontender, rest of physical exam within normal limits  Rectal heme positive  D/D: GI Beeding, Jyoti, we will check labs, give Protonix, admit to [facility]  Will check labs including CBC, CMP, lipase, coags, give IV fluids, give Protonix, call GI  Amount and/or Complexity of Data Reviewed  Clinical lab tests: reviewed and ordered  Tests in the medicine section of CPT®: ordered and reviewed  Review and summarize past medical records: yes  Discuss the patient with other providers: yes      CritCare Time    Disposition  Final diagnoses:   GI bleeding   Anemia     Time reflects when diagnosis was documented in both MDM as applicable and the Disposition within this note     Time User Action Codes Description Comment    11/3/2017 10:19 AM Delle Mate Add [K92 2] GI bleeding     11/3/2017 10:19 AM Delle Mate Add [D64 9] Anemia     [facility] 10[facility] AVM (arteriovenous malformation)     11/3/2017 10:20 AM Blake, [address] [place] [address] [Q27 30] AVM (arteriovenous malformation)     11/3/2017 10:20 AM Blake, [address] [facility] [K92 1] Melena       ED Disposition     ED Disposition Condition Comment    Admit  Case was discussed with JOANN Amin) and the patient's admission status was agreed to be Admission Status: inpatient status to the service of Dr So Hahn          Follow-up Information    None       Current Discharge Medication List      CONTINUE these medications which have NOT CHANGED    Details   acetaminophen (TYLENOL) 500 mg tablet Take 500 mg by mouth      folic acid (FOLVITE) 1 mg tablet Take 1 tablet by mouth daily  Qty: 30 tablet, Refills: 3      Melatonin 1 MG CAPS Take 1 mg by mouth      pantoprazole (PROTONIX) 40 mg tablet Take 1 tablet by mouth 2 (two) times a day  Qty: 60 tablet, Refills: 2      sucralfate (CARAFATE) 1 g/10 mL suspension Take 1 g by mouth      thiamine 100 MG tablet Take 1 tablet by mouth daily  Qty: 30 tablet, Refills: 3           No discharge procedures on file      ED Provider  Electronically Signed by           Pat Contreras MD  11/03/17 [phone]

## 2017-11-03 NOTE — ANESTHESIA PREPROCEDURE EVALUATION
Review of Systems/Medical History  Patient summary reviewed  Chart reviewed      Cardiovascular  Negative cardio ROS    Pulmonary  Smoker ex-smoker , ,        GI/Hepatic    GI bleeding , history of,   Comment: Wills's esophagus    AVM of small bowl     Esophagitis    Cirilo ulcer  Hx of severe ulcerative esophagitis  Bright red blood per rectum this am           Endo/Other  Negative endo/other ROS      GYN       Hematology  Anemia iron deficiency anemia,     Musculoskeletal  Negative musculoskeletal ROS        Neurology  Negative neurology ROS      Psychology   Negative psychology ROS            Physical Exam    Airway    Mallampati score: I  TM Distance: <3 FB  Neck ROM: full     Dental   upper dentures,     Cardiovascular  Comment: Negative ROS, Rhythm: regular, Rate: normal, Cardiovascular exam normal    Pulmonary  Pulmonary exam normal     Other Findings        Anesthesia Plan  ASA Score- 3 Emergent      Anesthesia Type- IV sedation with anesthesia with ASA Monitors  Additional Monitors:   Airway Plan:           Induction- intravenous  Informed Consent- Anesthetic plan and risks discussed with patient

## 2017-11-03 NOTE — ANESTHESIA POSTPROCEDURE EVALUATION
Post-Op Assessment Note      CV Status:  Stable    Mental Status:  Alert and awake    Hydration Status:  Euvolemic    PONV Controlled:  Controlled    Airway Patency:  Patent    Post Op Vitals Reviewed:  Yes              BP      Temp     Pulse     Resp      SpO2

## 2017-11-03 NOTE — CONSULTS
Patient MRN: [id number]  Date of Service: 11/3/2017  Referring Physician: Dr Perez Richardson  Provider Creating Note: ROMA Kilgore  PCP: No primary care provider on file  Reason for Consult:  melena/heme-positive stool/iron deficiency anemia  HPI  Reymundo Diez  is a 58 y o  male who was admitted with Melena  He presented to the emergency room with chief complaint of melena x1 week with worsening fatigue  Patient is also experiencing some lower abdominal cramping no bright red blood per rectum no diarrhea  He states he has also been having some pain with swallowing and worsening GERD over the last week  He was admitted in October of 2017 with a hemoglobin of 6 0 and had an EGD which showed severe ulcerative reflux esophagitis with mild stricturing in the mid esophagus, severe erosive and ulcerative changes seen in the mid esophagus close to the Z-line and long segment 10 cm circumferential Wills's esophagus with ulceration small hiatal hernia and a 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  Patient had also presented to [facility] on 7/14/2717 with a GI bleed and did receive multiple transfusions at that time  He did have an EGD which showed Wills's esophagus hiatal hernia with Lerry Gildardo lesions and nonbleeding duodenal AVM  He has a history of food bolus in April of 2017  Patient states that he has been taking his Protonix twice a day as well as Carafate 4 times a day and following low acid diet  He does have a history of excessive alcohol use and he states that he has significantly decreased that but has not completely stopped drinking  Presently his hemoglobin is 8 3 hemoglobin 3 weeks ago was 8 5 current MCV is 73 and MCH is 20 7 and BUN is 12 he has no nausea or vomiting and no epigastric pain  Patient has history of chronic microcytic anemia    He is scheduled for an EGD and colonoscopy outpatient in our office  Past Medical History:   Diagnosis Date    Anemia     AVM (arteriovenous malformation) of [facility] bowel, [facility] ([facility]     Wills's esophagus     Cirilo ulcer     Esophagitis     History of blood transfusion     22 prior transfusions     Past Surgical History:   Procedure Laterality Date    APPENDECTOMY      EGD AND COLONOSCOPY  07/18/2017    ESOPHAGOGASTRODUODENOSCOPY N/A 10/6/2017    Procedure: ESOPHAGOGASTRODUODENOSCOPY (EGD) with biopsy;  Surgeon: Celeste Pat MD;  Location: [facility]; Service: Gastroenterology     Medications  Home Medications:   Prior to Admission medications    Medication Sig Start Date End Date Taking? Authorizing Provider   acetaminophen (TYLENOL) 500 mg tablet Take 500 mg by mouth 3/3/17   Historical Provider, MD   folic acid (FOLVITE) 1 mg tablet Take 1 tablet by mouth daily 10/7/17   Rebeca Durham DO   Melatonin 1 MG CAPS Take 1 mg by mouth    Historical Provider, MD   pantoprazole (PROTONIX) 40 mg tablet Take 1 tablet by mouth 2 (two) times a day 10/7/17   Rebeca Durham DO   sucralfate (CARAFATE) 1 g/10 mL suspension Take 1 g by mouth 7/18/17   Historical Provider, MD   thiamine 100 MG tablet Take 1 tablet by mouth daily 10/7/17   Lisha Mata DO       Inhouse Medications  No current facility-administered medications for this encounter  Current Outpatient Prescriptions:     acetaminophen (TYLENOL) 500 mg tablet    folic acid (FOLVITE) 1 mg tablet    Melatonin 1 MG CAPS    pantoprazole (PROTONIX) 40 mg tablet    sucralfate (CARAFATE) 1 g/10 mL suspension    thiamine 100 MG tablet    Allergies  Allergies   Allergen Reactions    Doxylamine      Social History   reports that he quit smoking about 3 years ago  He has never used smokeless tobacco  He reports that he drinks about 4 8 oz of alcohol per week   He reports that he does not use drugs  Family History  History reviewed  No pertinent family history  ROS  ROS: Denies CP, SOB, fever    Positive odynophagia positive lower abdominal cramping positive melena positive fatigue  All others negative except as noted in HPI  Objective   Vitals  Blood pressure 129/79, pulse 67, temperature (!) 97 4 °F (36 3 °C), temperature source Oral, resp  rate 16, weight 66 5 kg (146 lb 11 oz), SpO2 100 %  General: Alert, no apparent distress  Eyes: No scleral icterus  ENT: MMM  Card: RRR no murmur  Lungs: Clear to ascultation b/l  No wheezes, rales, rhonchi  Abdomen: Soft  Nontender  Nondistended  Bowel sounds present and normoactive  No hepatosplenomegaly  Skin: No jaundice  Neuro: Alert and oriented x3        Laboratory Studies  Lab Results   Component Value Date    WBC 10 11 11/03/2017    HGB 8 3 (L) 11/03/2017    HCT 29 2 (L) 11/03/2017     (H) 11/03/2017    MCV 73 (L) 11/03/2017     Lab Results   Component Value Date    CREATININE 0 91 11/03/2017    BUN 12 11/03/2017    K 3 9 11/03/2017     11/03/2017    CO2 28 11/03/2017    GLUCOSE 100 11/03/2017    CALCIUM 8 3 11/03/2017    PROT 7 0 11/03/2017    ALKPHOS 79 11/03/2017    ALBUMIN 3 5 11/03/2017    BILITOT 0 45 11/03/2017    AST 20 11/03/2017    ALT 18 11/03/2017     Lab Results   Component Value Date    PROTIME 13 7 11/03/2017    INR 1 05 11/03/2017       Imaging and Other Studies      Assessment and Plan:  1  Melena likely upper GI bleed  Will do EGD today  Patient should remain NPO with Protonix drip  Colonoscopy monday  2  Acute on chronic Iron deficiency anemia secondary to #1  Frequent  H&H, transfuse as needed  3  Odynophagia likely secondary to erosive esophagitis  Will likely restart Carafate after EGD      Principal Problem:    Melena  Active Problems:    Iron deficiency anemia due to chronic blood loss    [place] ulcer    AVM (arteriovenous malformation)    Wills's esophagus    Esophagitis      ROMA Laird

## 2017-11-03 NOTE — H&P
History and Physical - [facility]    Patient Information: Caron Luna  58 y o  male MRN: [id number]  Unit/Bed#: ED 32 Encounter: [id number]  Admitting Physician: Adebayo Salinas PA-C  PCP: No primary care provider on file  Date of Admission:  11/03/17    Assessment/Plan  Patient is a pleasant 61-year-old male with a known history of Cirilo lesions, Wills's esophagitis, duodenal AVM, and a history of intermittent alcohol excessive usage  The patient presents today with 1 week of progressive fatigue nd add dark black stools  He is getting occasional abdominal cramping  He has not seen any homer blood  The patient claims he is compliant with his medication regimen and modifying his diet  However he still is having a little bit of alcohol  Today he was extremely fatigued and came to the hospital for evaluation  In the ER the patient's vital signs were stable  His comprehensive metabolic panel was within normal limits  Hemoglobin 8 3, , platelets 589  Troponin negative  EKG 70 beats per minute normal sinus rhythm      Hospital Problem List:     Principal Problem:    Melena  Active Problems:    Iron deficiency anemia due to chronic blood loss    Cirilo ulcer    AVM (arteriovenous malformation)    Wills's esophagus    Esophagitis      Plan for the Primary Problem(s):  1  Melena likely secondary to Wills's esophagitis, Cirilo ulcer and AVM  With concomitant alcohol usage  Fortunately the patient's hemoglobin is stable at 8 3  Discussed the case with Gastroenterology nurse practitioner [facility]  She is currently investigating whether the patient may be able to get an EGD today  We will make the patient NPO  We will provide Protonix IV b i d     Check an H&H Q 6    Plan for Additional Problems:   2  Iron deficiency anemia due to chronic blood loss-the patient's hemoglobin is 8 3 on discharge approximately 3 weeks ago it was 8 5    During the prior admission the patient did need transfusion  Given his hemoglobin is stable as is his vital signs will hold off on any transfusion  Monitor with fluids for any dilutional effect  3  Alcohol excessive usage-the patient was counseled that he must stop drinking all alcohol including wine  We will provide the patient with a banana bag running at 75 mL an hour  Will provide Ativan p r n  Lynen [place] Patient has no signs of withdrawal  4  Insomnia-melatonin HS p r n  VTE Prophylaxis: scd   Code Status:  Full code    Anticipated Length of Stay:  Patient will be admitted on an Inpatient basis with an anticipated length of stay of  Greater than 2 midnights  Total Time for Visit, including Counseling / Coordination of Care: 45 minutes  Greater than 50% of this total time spent on direct patient counseling and coordination of care  Chief Complaint:     Dark stools & progressive weakness    History of Present Illness:    Emely Mccormick  is a 58 y o  male who presents with dark black stools and progressive weakness and fatigue  No homer blood  Occasional abdominal cramping not necessarily associated with bowel movements  He is also having severe reflux and burning in his chest but no substernal chest pain or shortness of breath or palpitations  He says the reflexes so severe it has caused a change in his voice  The patient denies any nausea or vomiting  He claims that his stools have been formed but very black  He denies any dysuria or dark urine or urinary retention  He further denies any fevers chills or recent illnesses    Review of Systems:  A 12-point review of systems was done  Please see the HPI for the full details  All other systems negative        Past Medical and Surgical History:     Past Medical History:   Diagnosis Date    Anemia     AVM (arteriovenous malformation) of small bowel, acquired ([facility] [address] )     Wills's esophagus     Cirilo ulcer     Esophagitis     History of blood transfusion     22 prior transfusions       Past Surgical History:   Procedure Laterality Date    APPENDECTOMY      EGD AND COLONOSCOPY  07/18/2017    ESOPHAGOGASTRODUODENOSCOPY N/A 10/6/2017    Procedure: ESOPHAGOGASTRODUODENOSCOPY (EGD) with biopsy;  Surgeon: Natalie Calzada MD;  Location: [facility]; Service: Gastroenterology       Meds/Allergies:    No current facility-administered medications for this encounter  Current Outpatient Prescriptions   Medication Sig Dispense Refill    acetaminophen (TYLENOL) 500 mg tablet Take 500 mg by mouth      folic acid (FOLVITE) 1 mg tablet Take 1 tablet by mouth daily 30 tablet 3    Melatonin 1 MG CAPS Take 1 mg by mouth      pantoprazole (PROTONIX) 40 mg tablet Take 1 tablet by mouth 2 (two) times a day 60 tablet 2    sucralfate (CARAFATE) 1 g/10 mL suspension Take 1 g by mouth      thiamine 100 MG tablet Take 1 tablet by mouth daily 30 tablet 3     Allergies   Allergen Reactions    Doxylamine      History:     Marital Status:      Substance Use History:   History   Alcohol Use    4 8 oz/week    8 Shots of liquor per week     History   Smoking Status    Former Smoker    Quit date: 11/3/2014   Smokeless Tobacco    Never Used     History   Drug Use No       Family History:    History reviewed  No pertinent family history      Physical Exam:   Vitals:   Blood Pressure: 129/79 (11/03/17 0957)  Pulse: 67 (11/03/17 0957)  Temperature: (!) 97 4 °F (36 3 °C) (11/03/17 0902)  Temp Source: Oral (11/03/17 0902)  Respirations: 16 (11/03/17 0957)  Weight - Scale: 66 5 kg (146 lb 11 oz) (11/03/17 0902)  SpO2: 100 % (11/03/17 0957)    General Appearance:    Alert, cooperative, no distress, appears older than stated age   HEENT:    Atraumatic, EOMs intact, Mucous membranes dry without   exudates   Neck:   Supple, symmetrical, trachea midline, no adenopathy;     thyroid:  no enlargement/tenderness/nodules; no carotid    bruit or JVD   Lungs:     Clear to auscultation bilaterally, respirations unlabored   Chest Wall:    No tenderness or deformity    Heart:    Regular rate and rhythm, S1 and S2 normal, no murmur, rub    or gallop   Abdomen:     Soft, non-tender, bowel sounds decreased,  no masses, no organomegaly   Extremities:   Extremities normal, atraumatic, no cyanosis or edema   Pulses:   2+ and symmetric all extremities   Skin:   Multiple petechia, but no echymosis or open wounds   Lymph nodes:   Cervical, supraclavicular, and axillary nodes normal   Neurologic:   CNII-XII intact, normal strength, sensation and reflexes     throughout       Lab Results: I have personally reviewed pertinent reports  Results from last 7 days  Lab Units 11/03/17  0923   WBC Thousand/uL 10 11   HEMOGLOBIN g/dL 8 3*   HEMATOCRIT % 29 2*   PLATELETS Thousands/uL 563*   NEUTROS PCT % 74   LYMPHS PCT % 13*   MONOS PCT % 8   EOS PCT % 4       Results from last 7 days  Lab Units 11/03/17  0923   SODIUM mmol/L 138   POTASSIUM mmol/L 3 9   CHLORIDE mmol/L 105   CO2 mmol/L 28   BUN mg/dL 12   CREATININE mg/dL 0 91   CALCIUM mg/dL 8 3   TOTAL PROTEIN g/dL 7 0   BILIRUBIN TOTAL mg/dL 0 45   ALK PHOS U/L 79   ALT U/L 18   AST U/L 20   GLUCOSE RANDOM mg/dL 100       Results from last 7 days  Lab Units 11/03/17  0923   INR  1 05     Lipase 201  Trop 0 00    Imaging: I have personally reviewed pertinent reports  Xr Chest 2 Views    Result Date: 10/6/2017  Narrative: CHEST INDICATION:  Right flank and chest pain  One week of fatigue  COMPARISON:  None VIEWS:  Frontal and lateral projections IMAGES:  2 FINDINGS:     Cardiomediastinal silhouette appears unremarkable  The lungs are clear  No pneumothorax or pleural effusion  Visualized osseous structures appear within normal limits for the patient's age  Impression: No active pulmonary disease   Workstation performed: [id number]       EKG, Pathology, and Other Studies Reviewed on Admission:   73bpm NSR no st changes    Allscripts Records Reviewed: Yes     This note has been constructed using a voice recognition system

## 2017-11-03 NOTE — OP NOTE
OPERATIVE REPORT  PATIENT NAME: Norma Schilling  :  1955  MRN: [id number]  Pt Location: [facility] 01    SURGERY DATE: 11/3/2017    Surgeon(s) and Role:     * Lenise Severance, MD - Primary    Preop Diagnosis:  GI bleeding [K92 2]  Anemia [D64 9]    Post-Op Diagnosis Codes:     * GI bleeding [K92 2]     * Anemia [D64 9]     * Esophageal stricture [K22 2]     * Wills's esophagus [K22 70]    Procedure(s) (LRB):  ESOPHAGOGASTRODUODENOSCOPY (EGD) with biopsy (N/A)    Specimen(s):  ID Type Source Tests Collected by Time Destination   1 : Biopsy at 35cm ? barretts Tissue Esophagus TISSUE EXAM Lenise Severance, MD 11/3/2017 1403    2 : Biopsy at 30 cm Tissue Esophagus TISSUE EXAM Lenise Severance, MD 11/3/2017 1404    3 : Biopsy at 25cm Tissue Esophagus TISSUE EXAM Lenise Severance, MD 11/3/2017 1405        Estimated Blood Loss:   Minimal    Anesthesia Type:   Conscious Sedation     Operative Indications:  GI bleeding [K92 2]  Anemia [D64 9]    Operative Findings:  1  Peptic esophageal stricture 2  Wills's esophagus 3  Esophagitis    Complications:   None    Procedure and Technique: With the patient in the left lateral decubitus position, the Olympus upper endoscope was introduced into the esophagus, across the esophageal stricture into the distal esophagus, stomach duodenal bulb and postbulbar duodenum  Examination of the esophagus demonstrates findings consistent with peptic esophagitis proximal to the known esophageal stricture 30 cm from the incisors  I am able to traverse the stricture with the endoscope  There is slight resistance  The area is quite friable with superficial bleeding post intubation of the stricture  There is also evidence of Wills's esophagus extending from the level of the stricture 30 cm from the incisors to the level of the GE junction around 35-38 cm from the incisors    While a hiatal hernia was seen previously, I did not appreciate a hernia on today's exam either and antral flexed or retroflexed view  Biopsies are obtained at 35 cm, 30 cm and 25 cm  They are submitted in separate containers  Examination of the stomach including a retroflex maneuver was unremarkable  The duodenal bulb was void of any AVMs as was the postbulbar duodenum  There was no blood seen in the stomach or the duodenum  The endoscope was withdrawn  The patient tolerated the procedure well  In light of the findings on today's examination, and given his microcytic anemia, we will proceed with colonoscopy on Monday  The findings of esophagitis might explain melena if no abnormalities are identified in the colon     I was present for the entire procedure    Patient Disposition:  PACU     SIGNATURE: Con Valdivia MD  DATE: November 3, 2017  TIME: 2:10 PM

## 2017-11-03 NOTE — ED NOTES
Patient presently denies any abd pain but does intermittently have abd cramping     Moy Velasquez RN  11/03/17 5071

## 2017-11-04 PROBLEM — D50.0 IRON DEFICIENCY ANEMIA DUE TO CHRONIC BLOOD LOSS: Status: ACTIVE | Noted: 2017-11-03

## 2017-11-04 LAB
ALBUMIN SERPL BCP-MCNC: 2.9 G/DL (ref 3.5–5)
ALP SERPL-CCNC: 66 U/L (ref 46–116)
ALT SERPL W P-5'-P-CCNC: 15 U/L (ref 12–78)
ANION GAP SERPL CALCULATED.3IONS-SCNC: 6 MMOL/L (ref 4–13)
AST SERPL W P-5'-P-CCNC: 18 U/L (ref 5–45)
BILIRUB SERPL-MCNC: 0.59 MG/DL (ref 0.2–1)
BUN SERPL-MCNC: 8 MG/DL (ref 5–25)
CALCIUM SERPL-MCNC: 7.9 MG/DL (ref 8.3–10.1)
CHLORIDE SERPL-SCNC: 109 MMOL/L (ref 100–108)
CO2 SERPL-SCNC: 26 MMOL/L (ref 21–32)
CREAT SERPL-MCNC: 0.74 MG/DL (ref 0.6–1.3)
ERYTHROCYTE [DISTWIDTH] IN BLOOD BY AUTOMATED COUNT: 21.8 % (ref 11.6–15.1)
GFR SERPL CREATININE-BSD FRML MDRD: 99 ML/MIN/1.73SQ M
GLUCOSE SERPL-MCNC: 87 MG/DL (ref 65–140)
HCT VFR BLD AUTO: 26.3 % (ref 36.5–49.3)
HCT VFR BLD AUTO: 27.1 % (ref 36.5–49.3)
HGB BLD-MCNC: 7.5 G/DL (ref 12–17)
HGB BLD-MCNC: 7.7 G/DL (ref 12–17)
MCH RBC QN AUTO: 20.8 PG (ref 26.8–34.3)
MCHC RBC AUTO-ENTMCNC: 28.4 G/DL (ref 31.4–37.4)
MCV RBC AUTO: 73 FL (ref 82–98)
PLATELET # BLD AUTO: 546 THOUSANDS/UL (ref 149–390)
PMV BLD AUTO: 10 FL (ref 8.9–12.7)
POTASSIUM SERPL-SCNC: 3.9 MMOL/L (ref 3.5–5.3)
PROT SERPL-MCNC: 5.9 G/DL (ref 6.4–8.2)
RBC # BLD AUTO: 3.7 MILLION/UL (ref 3.88–5.62)
SODIUM SERPL-SCNC: 141 MMOL/L (ref 136–145)
WBC # BLD AUTO: 6.1 THOUSAND/UL (ref 4.31–10.16)

## 2017-11-04 PROCEDURE — 85018 HEMOGLOBIN: CPT | Performed by: PHYSICIAN ASSISTANT

## 2017-11-04 PROCEDURE — 85014 HEMATOCRIT: CPT | Performed by: PHYSICIAN ASSISTANT

## 2017-11-04 PROCEDURE — 85027 COMPLETE CBC AUTOMATED: CPT | Performed by: PHYSICIAN ASSISTANT

## 2017-11-04 PROCEDURE — 80053 COMPREHEN METABOLIC PANEL: CPT | Performed by: PHYSICIAN ASSISTANT

## 2017-11-04 PROCEDURE — C9113 INJ PANTOPRAZOLE SODIUM, VIA: HCPCS | Performed by: PHYSICIAN ASSISTANT

## 2017-11-04 RX ORDER — SUCRALFATE ORAL 1 G/10ML
1000 SUSPENSION ORAL
Status: DISCONTINUED | OUTPATIENT
Start: 2017-11-04 | End: 2017-11-07 | Stop reason: HOSPADM

## 2017-11-04 RX ADMIN — MELATONIN TAB 3 MG 3 MG: 3 TAB at 21:27

## 2017-11-04 RX ADMIN — SODIUM CHLORIDE 125 ML/HR: 0.9 INJECTION, SOLUTION INTRAVENOUS at 06:50

## 2017-11-04 RX ADMIN — SUCRALFATE 1000 MG: 1 SUSPENSION ORAL at 21:27

## 2017-11-04 RX ADMIN — SUCRALFATE 1000 MG: 1 SUSPENSION ORAL at 12:30

## 2017-11-04 RX ADMIN — FOLIC ACID: 5 INJECTION, SOLUTION INTRAMUSCULAR; INTRAVENOUS; SUBCUTANEOUS at 08:40

## 2017-11-04 RX ADMIN — PANTOPRAZOLE SODIUM 40 MG: 40 INJECTION, POWDER, FOR SOLUTION INTRAVENOUS at 09:11

## 2017-11-04 RX ADMIN — SUCRALFATE 1000 MG: 1 SUSPENSION ORAL at 16:22

## 2017-11-04 RX ADMIN — PANTOPRAZOLE SODIUM 40 MG: 40 INJECTION, POWDER, FOR SOLUTION INTRAVENOUS at 21:27

## 2017-11-04 NOTE — ASSESSMENT & PLAN NOTE
· Secondary to GI bleed  · Continue to monitor H&H which is currently stable 7 5-7 7  · Asymptomatic with stable VS  · For colonoscopy on Monday

## 2017-11-04 NOTE — ASSESSMENT & PLAN NOTE
· S/p EGD which revealed peptic esophageal stricture, Wills's esophagus and esophagitis  · For colonoscopy on Monday  · Patient's Hb dropped initially and has been stable 7 5-7 7  · Check CBC in am

## 2017-11-04 NOTE — PROGRESS NOTES
Progress Note - Darrell Hahn  58 y o  male MRN: [id number]    Unit/Bed#: [facility] 2 - Encounter: [id number]        Iron deficiency anemia due to chronic blood loss   Assessment & Plan    · S/p EGD which revealed peptic esophageal stricture, Wills's esophagus and esophagitis  · For colonoscopy on Monday  · GI managing  · Patient's Hb dropped initially and has been stable 7 5-7 7  · Check CBC in am         Esophagitis   Assessment & Plan    · S/p EGD yesterday with multiple biopsied pending results  · Continue PPI         Wills's esophagus   Assessment & Plan    · S/p EGD   · Outpatient surveilence  · Continue PPI            Pharmacologic: contraindicated due to active bleed  Mechanical VTE Prophylaxis in Place: Yes    Patient Centered Rounds: I have performed bedside rounds with nursing staff today  Discussions with Specialists or Other Care Team Provider: Reviewed documentation    Education and Discussions with Family / Patient: Patient    Time Spent for Care: 30 minutes  More than 50% of total time spent on counseling and coordination of care as described above  Current Length of Stay: 1 day(s)    Current Patient Status: Inpatient   Certification Statement: The patient will continue to require additional inpatient hospital stay due to need for close monitoring    Discharge Plan / Estimated Discharge Date: to be determined      Code Status: Level 1 - Full Code      Subjective:   Patient seen and examined  He has no complaints  He is asking about his blood work  He denies abdominal pain, nausea, or vomiting, denies difficulty eating, denies dizziness, lightheadedness or SOB  Objective:     Vitals:   Temp (24hrs), Av 9 °F (36 1 °C), Min:96 3 °F (35 7 °C), Max:98 °F (36 7 °C)    HR:  [62-87] 87  Resp:  [16-18] 18  BP: (113-137)/(60-75) 126/68  SpO2:  [94 %-97 %] 95 %  Body mass index is 19 35 kg/m²  Input and Output Summary (last 24 hours):        Intake/Output Summary (Last 24 hours) at 11/04/17 1603  Last data filed at 11/04/17 0650   Gross per 24 hour   Intake             1000 ml   Output                0 ml   Net             1000 ml       Physical Exam:     Physical Exam   Constitutional: He is oriented to person, place, and time  Thin   HENT:   Head: Normocephalic  Eyes: Pupils are equal, round, and reactive to light  Neck: Normal range of motion  Cardiovascular: Normal rate and regular rhythm  Exam reveals no gallop and no friction rub  No murmur heard  Pulmonary/Chest: Effort normal    Abdominal: Soft  He exhibits no distension  There is no tenderness  There is no guarding  Musculoskeletal: He exhibits no edema  Neurological: He is alert and oriented to person, place, and time  Skin:   Scattered erythematous maculopapular lesions 3-4 mm in diameter on face       Additional Data:     Labs:      Results from last 7 days  Lab Units 11/04/17 0526 11/03/17  0923   WBC Thousand/uL 6 10  --  10 11   HEMOGLOBIN g/dL 7 7*  < > 8 3*   HEMATOCRIT % 27 1*  < > 29 2*   PLATELETS Thousands/uL 546*  --  563*   NEUTROS PCT %  --   --  74   LYMPHS PCT %  --   --  13*   MONOS PCT %  --   --  8   EOS PCT %  --   --  4   < > = values in this interval not displayed      Results from last 7 days  Lab Units 11/04/17  0526   SODIUM mmol/L 141   POTASSIUM mmol/L 3 9   CHLORIDE mmol/L 109*   CO2 mmol/L 26   BUN mg/dL 8   CREATININE mg/dL 0 74   CALCIUM mg/dL 7 9*   TOTAL PROTEIN g/dL 5 9*   BILIRUBIN TOTAL mg/dL 0 59   ALK PHOS U/L 66   ALT U/L 15   AST U/L 18   GLUCOSE RANDOM mg/dL 87       Results from last 7 days  Lab Units 11/03/17  0923   INR  1 05         Recent Cultures (last 7 days):           Last 24 Hours Medication List:     melatonin 3 mg Oral HS   pantoprazole 40 mg Intravenous Q12H [address]   sucralfate 1,000 mg Oral 4x Daily (AC & HS)   IVPB builder  Intravenous Daily        Today, Patient Was Seen By: Cl Acosta MD

## 2017-11-04 NOTE — PROGRESS NOTES
Patient Name: Emely Mccormick  Patient MRN: [id number]  Date: 11/04/17  [facility]    Subjective   Emely Mccormick  is a 58 y o  male who was admitted with Melena  Patient had EGD yesterday which showed a peptic esophageal stricture that was quite friable, esophagitis, Wills's esophagus and no evidence of hiatal hernia  Biopsies were taken  Patient's hemoglobin has dropped from 8 3-7 7 but has remained stable  He has not had any melena this a m  He has no complaints of any abdominal pain and is currently on clear liquids  Patient denies: Chest pain, shortness of breath, or abdominal pain  All others negative except as noted in HPI  Objective     Vitals  Blood pressure 118/70, pulse 64, temperature (!) 96 3 °F (35 7 °C), temperature source Tympanic, resp  rate 18, weight 66 5 kg (146 lb 11 oz), SpO2 95 %  General: Alert, no apparent distress  Eyes: No scleral icterus  ENT: MMM  Card: RRR no murmur  Lungs: Clear to ascultation b/l  No wheezes, rales, rhonchi  Abdomen: Soft  Nontender  Nondistended  Bowel sounds present and normoactive  No hepatosplenomegaly    Skin: No jaundice  Neuro: Alert and oriented x3        Laboratory Studies  Lab Results   Component Value Date    CREATININE 0 74 11/04/2017    BUN 8 11/04/2017    K 3 9 11/04/2017     (H) 11/04/2017    CO2 26 11/04/2017    GLUCOSE 87 11/04/2017    CALCIUM 7 9 (L) 11/04/2017    PROT 5 9 (L) 11/04/2017    ALKPHOS 66 11/04/2017    ALBUMIN 2 9 (L) 11/04/2017    BILITOT 0 59 11/04/2017    AST 18 11/04/2017    ALT 15 11/04/2017     Lab Results   Component Value Date    WBC 6 10 11/04/2017    HGB 7 7 (L) 11/04/2017    HCT 27 1 (L) 11/04/2017     (H) 11/04/2017    MCV 73 (L) 11/04/2017     Lab Results   Component Value Date    PROTIME 13 7 11/03/2017    INR 1 05 11/03/2017       Imaging and Other Studies    Inhouse Medications       Current Facility-Administered Medications:     acetaminophen (TYLENOL) tablet 650 mg, 650 mg, Oral, Q6H PRN    LORazepam (ATIVAN) 2 mg/mL injection 0 5 mg, 0 5 mg, Intravenous, 4x Daily PRN    melatonin tablet 3 mg, 3 mg, Oral, HS, 3 mg at 11/03/17 2104    ondansetron (ZOFRAN) injection 4 mg, 4 mg, Intravenous, Q6H PRN    pantoprazole (PROTONIX) injection 40 mg, 40 mg, Intravenous, Q12H VIPUL, 40 mg at 11/03/17 2104    sodium chloride 0 9 % infusion, 125 mL/hr, Intravenous, Continuous, 125 mL/hr at 11/04/17 0650    thiamine (VITAMIN B1) 657 mg, folic acid 1 mg in sodium chloride 0 9 % 100 mL IVPB, , Intravenous, Daily, Stopped at 11/03/17 1559      Assessment/Plan:  1  Melena/acute GI bleed  Status post EGD which showed peptic esophageal stricture that was quite friable, esophagitis and Wills's esophagus  Biopsies are pending  Patient will be scheduled for a colonoscopy on Monday  Continue clear liquid diet  Continue PPI b i d  will discuss adding Carafate and advancing diet to full liquid with primary  2 acute on chronic microcytic anemia  Hemoglobin is low but currently stable  The continues to drop transfuse as needed  Continue frequent H&H      ROMA Markham

## 2017-11-04 NOTE — CASE MANAGEMENT
[address] [facility] in the [facility] by Moe Harvey for 2017  Network Utilization Review Department  Phone: [phone]; Fax [phone]  ATTENTION: The Network Utilization Review Department is now centralized for our 7 Facilities  Make a note that we have a new phone and fax numbers for our Department  Please call with any questions or concerns to [phone] and carefully follow the prompts so that you are directed to the right person  All voicemails are confidential  Fax any determinations, approvals, denials, and requests for initial or continue stay review clinical to [phone]  Due to HIGH CALL volume, it would be easier if you could please send faxed requests to expedite your requests and in part, help us provide discharge notifications faster  Initial Clinical Review    Admission: Date/Time/Statement: 11/3/17 @ 1020     Orders Placed This Encounter   Procedures    Inpatient Admission (expected length of stay for this patient is greater than two midnights)     Standing Status:   Standing     Number of Occurrences:   1     Order Specific Question:   Admitting Physician     Answer:   Lesly Alves [[id number]]     Order Specific Question:   Level of Care     Answer:   Med Surg [[id number]]     Order Specific Question:   Estimated length of stay     Answer:   More than 2 Midnights     Order Specific Question:   Certification     Answer:   I certify that inpatient services are medically necessary for this patient for a duration of greater than two midnights  See H&P and MD Progress Notes for additional information about the patient's course of treatment           ED: Date/Time/Mode of Arrival:   ED Arrival Information     Expected Arrival Acuity Means of Arrival Escorted By Service Admission Type    - 11/3/2017 08:53 Urgent Walk-In Family Member General Medicine Urgent    Arrival Complaint    rectom bleeding, fatigue          Chief Complaint:   Chief Complaint   Patient presents with    Rectal Bleeding     First noted blood in stools since last week- getting worse  States has hx of same  Feels weak       History of Illness: Patient is a pleasant 71-year-old male with a known history of Cirilo lesions, Wills's esophagitis, duodenal AVM, and a history of intermittent alcohol excessive usage  The patient presents today with 1 week of progressive fatigue nd add dark black stools  He is getting occasional abdominal cramping  He has not seen any homer blood  The patient claims he is compliant with his medication regimen and modifying his diet  However he still is having a little bit of alcohol  Today he was extremely fatigued and came to the hospital for evaluation      In the ER the patient's vital signs were stable  His comprehensive metabolic panel was within normal limits  Hemoglobin 8 3, , platelets 480  Troponin negative  EKG 70 beats per minute normal sinus rhythm    dark black stools and progressive weakness and fatigue  No homer blood  Occasional abdominal cramping not necessarily associated with bowel movements  He is also having severe reflux and burning in his chest but no substernal chest pain or shortness of breath or palpitations  He says the reflexes so severe it has caused a change in his voice  The patient denies any nausea or vomiting  He claims that his stools have been formed but very black  He denies any dysuria or dark urine or urinary retention    He further denies any fevers chills or recent illnesses    ED Vital Signs:   ED Triage Vitals   Temperature Pulse Respirations Blood Pressure SpO2   11/03/17 0902 11/03/17 0902 11/03/17 0902 11/03/17 0902 11/03/17 0904   (!) 97 4 °F (36 3 °C) 68 14 140/74 100 %      Temp Source Heart Rate Source Patient Position - Orthostatic VS BP Location FiO2 (%)   11/03/17 0902 11/03/17 0957 11/03/17 1200 11/03/17 1200 --   Oral Monitor Lying Left leg       Pain Score       11/03/17 0902       No Pain Wt Readings from Last 1 Encounters:   11/03/17 66 5 kg (146 lb 11 oz)     Abnormal Labs/Diagnostic Test Results:   11/3: hgb 8 3, 7 7     hct 29 2, 27 1   Plate 703  71/2: hgb 7 5, 7 7   hct 26 3, 27 1   Cl 109   Ca 7 9   t prot 5 9   Alb 2 9   Plate 577  Egd: 1  Peptic esophageal stricture 2  Wills's esophagus 3  Esophagitis   EKG: nsr, R axis, poor R progression    ED Treatment:   Medication Administration from 11/03/2017 0853 to 11/03/2017 1149       Date/Time Order Dose Route Action Action by Comments     11/03/2017 1019 sodium chloride 0 9 % bolus 500 mL 0 mL Intravenous Stopped Fabiola Montgomery, ADRIANNE      11/03/2017 0929 sodium chloride 0 9 % bolus 500 mL 500 mL Intravenous New Bag Kendall Patel RN      11/03/2017 1011 pantoprazole (PROTONIX) 80 mg in sodium chloride 0 9 % 100 mL IVPB 0 mg Intravenous Stopped Fabiola Montgomery RN      11/03/2017 0951 pantoprazole (PROTONIX) 80 mg in sodium chloride 0 9 % 100 mL IVPB 80 mg Intravenous New Bag Fabiola Montgomery RN           Past Medical/Surgical History:    Active Ambulatory Problems     Diagnosis Date Noted    Iron deficiency anemia due to chronic blood loss 10/06/2017   Deep Ashley ulcer 10/06/2017    AVM (arteriovenous malformation) 10/06/2017    AVM (arteriovenous malformation) of small bowel, acquired ([facility] [address] )     History of blood transfusion     Cirilo ulcer     Wills's esophagus     Melena 10/05/2017     Resolved Ambulatory Problems     Diagnosis Date Noted    Chest pain 10/06/2017    GI bleeding 10/06/2017    SHALA (acute kidney injury) ([facility] [address] ) 10/06/2017    Thrombocytosis ([facility] [address] ) 10/06/2017    GI bleed 10/05/2017     Past Medical History:   Diagnosis Date    Anemia     AVM (arteriovenous malformation) of small bowel, acquired ([facility] [address] )     Wills's esophagus     Cirilo ulcer     Esophagitis     History of blood transfusion        Admitting Diagnosis: Melena [K92 1]  GI bleeding [K92 2]  Rectal bleeding [K62 5]  AVM (arteriovenous malformation) [Q27 30]  Anemia [D64 9]    Age/Sex: 58 y o  male    Assessment/Plan: Principal Problem:    Melena  Active Problems:    Iron deficiency anemia due to chronic blood loss    Cirilo ulcer    AVM (arteriovenous malformation)    Wills's esophagus    Esophagitis        Plan for the Primary Problem(s):  1  Melena likely secondary to Wills's esophagitis, Cirilo ulcer and AVM  With concomitant alcohol usage  Fortunately the patient's hemoglobin is stable at 8 3  Discussed the case with Gastroenterology nurse practitioner [facility]  She is currently investigating whether the patient may be able to get an EGD today  We will make the patient NPO  We will provide Protonix IV b i d     Check an H&H Q 6     Plan for Additional Problems:   2  Iron deficiency anemia due to chronic blood loss-the patient's hemoglobin is 8 3 on discharge approximately 3 weeks ago it was 8 5  During the prior admission the patient did need transfusion  Given his hemoglobin is stable as is his vital signs will hold off on any transfusion  Monitor with fluids for any dilutional effect  3  Alcohol excessive usage-the patient was counseled that he must stop drinking all alcohol including wine  We will provide the patient with a banana bag running at 75 mL an hour  Will provide Ativan p r n  Lucita Mort Patient has no signs of withdrawal  4    Insomnia-melatonin HS p r n         VTE Prophylaxis: scd   Code Status:  Full code     Anticipated Length of Stay:  Patient will be admitted on an Inpatient basis with an anticipated length of stay of  Greater than 2 midnights           Admission Orders:  Scheduled Meds:   melatonin 3 mg Oral HS   pantoprazole 40 mg Intravenous Q12H [address]   sucralfate 1,000 mg Oral 4x Daily (AC & HS)   IVPB builder  Intravenous Daily     Continuous Infusions:   sodium chloride 125 mL/hr Last Rate: 125 mL/hr (11/04/17 0650)     PRN Meds:   acetaminophen    LORazepam    ondansetron     hse dental soft thin liq diet  Up w/assist  H&h q6h  SCD's    PER GI 11/3:   Assessment and Plan:  1  Melena likely upper GI bleed  Will do EGD today  Patient should remain NPO with Protonix drip  Colonoscopy monday  2  Acute on chronic Iron deficiency anemia secondary to #1  Frequent  H&H, transfuse as needed  3  Odynophagia likely secondary to erosive esophagitis  Will likely restart Carafate after EGD      Impression:  1  Melena, acute GI bleed  2   History of peptic esophageal stricture, Wills's esophagus     Plan:  1  EGD today  2   Possible colonoscopy on Monday 3  Continue PPI drip 4  Agree with the addition of Carafate    PER GI 11/4:  Assessment/Plan:  1  Melena/acute GI bleed  Status post EGD which showed peptic esophageal stricture that was quite friable, esophagitis and Wills's esophagus  Biopsies are pending  Patient will be scheduled for a colonoscopy on Monday  Continue clear liquid diet  Continue PPI b i d  will discuss adding Carafate and advancing diet to full liquid with primary  2 acute on chronic microcytic anemia  Hemoglobin is low but currently stable  The continues to drop transfuse as needed    Continue frequent H&H

## 2017-11-05 ENCOUNTER — ANESTHESIA EVENT (INPATIENT)
Dept: GASTROENTEROLOGY | Facility: HOSPITAL | Age: 62
DRG: 379 | End: 2017-11-05
Payer: COMMERCIAL

## 2017-11-05 PROBLEM — D64.9 ANEMIA: Status: ACTIVE | Noted: 2017-11-03

## 2017-11-05 LAB
BASOPHILS # BLD AUTO: 0.13 THOUSANDS/ΜL (ref 0–0.1)
BASOPHILS NFR BLD AUTO: 2 % (ref 0–1)
EOSINOPHIL # BLD AUTO: 0.64 THOUSAND/ΜL (ref 0–0.61)
EOSINOPHIL NFR BLD AUTO: 10 % (ref 0–6)
ERYTHROCYTE [DISTWIDTH] IN BLOOD BY AUTOMATED COUNT: 21.5 % (ref 11.6–15.1)
HCT VFR BLD AUTO: 27.5 % (ref 36.5–49.3)
HGB BLD-MCNC: 7.8 G/DL (ref 12–17)
LYMPHOCYTES # BLD AUTO: 1.1 THOUSANDS/ΜL (ref 0.6–4.47)
LYMPHOCYTES NFR BLD AUTO: 17 % (ref 14–44)
MCH RBC QN AUTO: 20.7 PG (ref 26.8–34.3)
MCHC RBC AUTO-ENTMCNC: 28.4 G/DL (ref 31.4–37.4)
MCV RBC AUTO: 73 FL (ref 82–98)
MONOCYTES # BLD AUTO: 0.82 THOUSAND/ΜL (ref 0.17–1.22)
MONOCYTES NFR BLD AUTO: 13 % (ref 4–12)
NEUTROPHILS # BLD AUTO: 3.74 THOUSANDS/ΜL (ref 1.85–7.62)
NEUTS SEG NFR BLD AUTO: 58 % (ref 43–75)
NRBC BLD AUTO-RTO: 0 /100 WBCS
PLATELET # BLD AUTO: 535 THOUSANDS/UL (ref 149–390)
PMV BLD AUTO: 10.1 FL (ref 8.9–12.7)
RBC # BLD AUTO: 3.77 MILLION/UL (ref 3.88–5.62)
WBC # BLD AUTO: 6.43 THOUSAND/UL (ref 4.31–10.16)

## 2017-11-05 PROCEDURE — 85025 COMPLETE CBC W/AUTO DIFF WBC: CPT | Performed by: FAMILY MEDICINE

## 2017-11-05 PROCEDURE — C9113 INJ PANTOPRAZOLE SODIUM, VIA: HCPCS | Performed by: PHYSICIAN ASSISTANT

## 2017-11-05 RX ADMIN — SODIUM CHLORIDE 125 ML/HR: 0.9 INJECTION, SOLUTION INTRAVENOUS at 04:35

## 2017-11-05 RX ADMIN — SUCRALFATE 1000 MG: 1 SUSPENSION ORAL at 21:36

## 2017-11-05 RX ADMIN — PANTOPRAZOLE SODIUM 40 MG: 40 INJECTION, POWDER, FOR SOLUTION INTRAVENOUS at 09:03

## 2017-11-05 RX ADMIN — SUCRALFATE 1000 MG: 1 SUSPENSION ORAL at 12:30

## 2017-11-05 RX ADMIN — SUCRALFATE 1000 MG: 1 SUSPENSION ORAL at 17:00

## 2017-11-05 RX ADMIN — SUCRALFATE 1000 MG: 1 SUSPENSION ORAL at 06:27

## 2017-11-05 RX ADMIN — MELATONIN TAB 3 MG 3 MG: 3 TAB at 21:36

## 2017-11-05 RX ADMIN — FOLIC ACID: 5 INJECTION, SOLUTION INTRAMUSCULAR; INTRAVENOUS; SUBCUTANEOUS at 09:00

## 2017-11-05 RX ADMIN — PANTOPRAZOLE SODIUM 40 MG: 40 INJECTION, POWDER, FOR SOLUTION INTRAVENOUS at 21:36

## 2017-11-05 RX ADMIN — POLYETHYLENE GLYCOL 3350, SODIUM SULFATE ANHYDROUS, SODIUM BICARBONATE, SODIUM CHLORIDE, POTASSIUM CHLORIDE 4000 ML: 236; 22.74; 6.74; 5.86; 2.97 POWDER, FOR SOLUTION ORAL at 17:00

## 2017-11-05 NOTE — ANESTHESIA PREPROCEDURE EVALUATION
Review of Systems/Medical History  Patient summary reviewed  Chart reviewed      Cardiovascular  Negative cardio ROS    Pulmonary  Negative pulmonary ROS ,        GI/Hepatic    GI bleeding , Esophageal disease bryant esophagus,   Comment: Hx AVM's     Negative  ROS        Endo/Other  Negative endo/other ROS      GYN       Hematology  Anemia chronic blood loss anemia,    Comment: History of multiple blood transfusions  Musculoskeletal    Comment: Pt unable to use right hand due to previous injury      Neurology      Comment: Right hand injury Psychology           Physical Exam    Airway    Mallampati score: II  TM Distance: >3 FB  Neck ROM: full     Dental   upper dentures,     Cardiovascular  Comment: Negative ROS, Rhythm: regular, Rate: normal, Cardiovascular exam normal    Pulmonary  Pulmonary exam normal Breath sounds clear to auscultation,     Other Findings        Anesthesia Plan  ASA Score- 3       Anesthesia Type- IV sedation with anesthesia with ASA Monitors  Additional Monitors:   Airway Plan:           Induction-       Informed Consent- Anesthetic plan and risks discussed with patient

## 2017-11-05 NOTE — PROGRESS NOTES
Patient Name: Cortney Lyn  Patient MRN: [id number]  Date: 11/05/17  Service: [facility]    Subjective   Cortney Lyn  is a 58 y o  male who was admitted with Melena  He states that he has had no melena  He has no abdominal pain  He is tolerating his soft diet without any dysphagia  Patient will be scheduled for colonoscopy tomorrow  Hemoglobin is 7 8 in stable  Patient denies: Chest pain, shortness of breath or abdominal pain  All others negative except as noted in HPI  Objective     Vitals  Blood pressure 118/68, pulse 69, temperature 97 7 °F (36 5 °C), temperature source Tympanic, resp  rate 18, height 6' 1" (1 854 m), weight 66 5 kg (146 lb 11 oz), SpO2 98 %  General: Alert, no apparent distress  Eyes: No scleral icterus  ENT: MMM  Card: RRR no murmur  Lungs: Clear to ascultation b/l  No wheezes, rales, rhonchi  Abdomen: Soft  Nontender  Nondistended  Bowel sounds present and normoactive  No hepatosplenomegaly    Skin: No jaundice  Neuro: Alert and oriented x3        Laboratory Studies  Lab Results   Component Value Date    CREATININE 0 74 11/04/2017    BUN 8 11/04/2017    K 3 9 11/04/2017     (H) 11/04/2017    CO2 26 11/04/2017    GLUCOSE 87 11/04/2017    CALCIUM 7 9 (L) 11/04/2017    PROT 5 9 (L) 11/04/2017    ALKPHOS 66 11/04/2017    ALBUMIN 2 9 (L) 11/04/2017    BILITOT 0 59 11/04/2017    AST 18 11/04/2017    ALT 15 11/04/2017     Lab Results   Component Value Date    WBC 6 43 11/05/2017    HGB 7 8 (L) 11/05/2017    HCT 27 5 (L) 11/05/2017     (H) 11/05/2017    MCV 73 (L) 11/05/2017     Lab Results   Component Value Date    PROTIME 13 7 11/03/2017    INR 1 05 11/03/2017       Imaging and Other Studies    Inhouse Medications       Current Facility-Administered Medications:     acetaminophen (TYLENOL) tablet 650 mg, 650 mg, Oral, Q6H PRN    LORazepam (ATIVAN) 2 mg/mL injection 0 5 mg, 0 5 mg, Intravenous, 4x Daily PRN    melatonin tablet 3 mg, 3 mg, Oral, HS, 3 mg at 11/04/17 2127    ondansetron (ZOFRAN) injection 4 mg, 4 mg, Intravenous, Q6H PRN    pantoprazole (PROTONIX) injection 40 mg, 40 mg, Intravenous, Q12H VIPUL, 40 mg at 11/04/17 2127    sodium chloride 0 9 % infusion, 125 mL/hr, Intravenous, Continuous, 125 mL/hr at 11/05/17 0435    sucralfate (CARAFATE) oral suspension 1,000 mg, 1,000 mg, Oral, 4x Daily (AC & HS), 1,000 mg at 11/05/17 0627    thiamine (VITAMIN B1) 957 mg, folic acid 1 mg in sodium chloride 0 9 % 100 mL IVPB, , Intravenous, Daily, Stopped at 11/04/17 0910      Assessment/Plan:  1  Melena/acute GI bleed  Status post EGD 11/3 which showed peptic esophageal stricture that was quite friable, esophageal stricture and Wills's esophagus  Biopsies are pending  Patient will be scheduled for a colonoscopy tomorrow  Clear liquid diet today  Continue PPI b i d  and Carafate a c  and HS  2   Acute on chronic microcytic anemia  Hemoglobin is low but is stable  Check H& H in a m  transfuse as needed      ROMA Stephens

## 2017-11-06 ENCOUNTER — ANESTHESIA (INPATIENT)
Dept: GASTROENTEROLOGY | Facility: HOSPITAL | Age: 62
DRG: 379 | End: 2017-11-06
Payer: COMMERCIAL

## 2017-11-06 LAB
BASOPHILS # BLD AUTO: 0.17 THOUSANDS/ΜL (ref 0–0.1)
BASOPHILS NFR BLD AUTO: 3 % (ref 0–1)
EOSINOPHIL # BLD AUTO: 0.44 THOUSAND/ΜL (ref 0–0.61)
EOSINOPHIL NFR BLD AUTO: 8 % (ref 0–6)
ERYTHROCYTE [DISTWIDTH] IN BLOOD BY AUTOMATED COUNT: 21.5 % (ref 11.6–15.1)
HCT VFR BLD AUTO: 27.8 % (ref 36.5–49.3)
HGB BLD-MCNC: 7.8 G/DL (ref 12–17)
LYMPHOCYTES # BLD AUTO: 1.19 THOUSANDS/ΜL (ref 0.6–4.47)
LYMPHOCYTES NFR BLD AUTO: 23 % (ref 14–44)
MCH RBC QN AUTO: 20.4 PG (ref 26.8–34.3)
MCHC RBC AUTO-ENTMCNC: 28.1 G/DL (ref 31.4–37.4)
MCV RBC AUTO: 73 FL (ref 82–98)
MONOCYTES # BLD AUTO: 0.66 THOUSAND/ΜL (ref 0.17–1.22)
MONOCYTES NFR BLD AUTO: 13 % (ref 4–12)
NEUTROPHILS # BLD AUTO: 2.84 THOUSANDS/ΜL (ref 1.85–7.62)
NEUTS SEG NFR BLD AUTO: 53 % (ref 43–75)
NRBC BLD AUTO-RTO: 0 /100 WBCS
PLATELET # BLD AUTO: 544 THOUSANDS/UL (ref 149–390)
PMV BLD AUTO: 10.2 FL (ref 8.9–12.7)
RBC # BLD AUTO: 3.82 MILLION/UL (ref 3.88–5.62)
WBC # BLD AUTO: 5.3 THOUSAND/UL (ref 4.31–10.16)

## 2017-11-06 PROCEDURE — 0DJD8ZZ INSPECTION OF LOWER INTESTINAL TRACT, VIA NATURAL OR ARTIFICIAL OPENING ENDOSCOPIC: ICD-10-PCS | Performed by: INTERNAL MEDICINE

## 2017-11-06 PROCEDURE — C9113 INJ PANTOPRAZOLE SODIUM, VIA: HCPCS | Performed by: PHYSICIAN ASSISTANT

## 2017-11-06 PROCEDURE — 85025 COMPLETE CBC W/AUTO DIFF WBC: CPT | Performed by: FAMILY MEDICINE

## 2017-11-06 RX ORDER — PROPOFOL 10 MG/ML
INJECTION, EMULSION INTRAVENOUS AS NEEDED
Status: DISCONTINUED | OUTPATIENT
Start: 2017-11-06 | End: 2017-11-06 | Stop reason: SURG

## 2017-11-06 RX ORDER — FOLIC ACID 1 MG/1
1 TABLET ORAL DAILY
Status: DISCONTINUED | OUTPATIENT
Start: 2017-11-07 | End: 2017-11-07 | Stop reason: HOSPADM

## 2017-11-06 RX ORDER — ONDANSETRON 2 MG/ML
4 INJECTION INTRAMUSCULAR; INTRAVENOUS ONCE AS NEEDED
Status: DISCONTINUED | OUTPATIENT
Start: 2017-11-06 | End: 2017-11-06 | Stop reason: HOSPADM

## 2017-11-06 RX ORDER — THIAMINE MONONITRATE (VIT B1) 100 MG
100 TABLET ORAL DAILY
Status: DISCONTINUED | OUTPATIENT
Start: 2017-11-07 | End: 2017-11-07 | Stop reason: HOSPADM

## 2017-11-06 RX ADMIN — SODIUM CHLORIDE: 0.9 INJECTION, SOLUTION INTRAVENOUS at 12:18

## 2017-11-06 RX ADMIN — FOLIC ACID: 5 INJECTION, SOLUTION INTRAMUSCULAR; INTRAVENOUS; SUBCUTANEOUS at 08:48

## 2017-11-06 RX ADMIN — SODIUM CHLORIDE 125 ML/HR: 0.9 INJECTION, SOLUTION INTRAVENOUS at 10:43

## 2017-11-06 RX ADMIN — SODIUM CHLORIDE 125 ML/HR: 0.9 INJECTION, SOLUTION INTRAVENOUS at 08:49

## 2017-11-06 RX ADMIN — SODIUM CHLORIDE 125 ML/HR: 0.9 INJECTION, SOLUTION INTRAVENOUS at 01:37

## 2017-11-06 RX ADMIN — PROPOFOL 50 MG: 10 INJECTION, EMULSION INTRAVENOUS at 12:27

## 2017-11-06 RX ADMIN — SUCRALFATE 1000 MG: 1 SUSPENSION ORAL at 05:49

## 2017-11-06 RX ADMIN — PROPOFOL 50 MG: 10 INJECTION, EMULSION INTRAVENOUS at 12:23

## 2017-11-06 RX ADMIN — PROPOFOL 100 MG: 10 INJECTION, EMULSION INTRAVENOUS at 12:20

## 2017-11-06 RX ADMIN — PANTOPRAZOLE SODIUM 40 MG: 40 INJECTION, POWDER, FOR SOLUTION INTRAVENOUS at 21:48

## 2017-11-06 RX ADMIN — IRON SUCROSE 100 MG: 20 INJECTION, SOLUTION INTRAVENOUS at 19:35

## 2017-11-06 RX ADMIN — SUCRALFATE 1000 MG: 1 SUSPENSION ORAL at 21:48

## 2017-11-06 RX ADMIN — PANTOPRAZOLE SODIUM 40 MG: 40 INJECTION, POWDER, FOR SOLUTION INTRAVENOUS at 08:48

## 2017-11-06 RX ADMIN — SUCRALFATE 1000 MG: 1 SUSPENSION ORAL at 16:44

## 2017-11-06 RX ADMIN — MELATONIN TAB 3 MG 3 MG: 3 TAB at 21:48

## 2017-11-06 RX ADMIN — PROPOFOL 50 MG: 10 INJECTION, EMULSION INTRAVENOUS at 12:26

## 2017-11-06 NOTE — OP NOTE
OPERATIVE REPORT  PATIENT NAME: Waylon Bey  :  1955  MRN: [id number]  Pt Location: [facility] ROOM 01    SURGERY DATE: 2017    Surgeon(s) and Role:     * Sarahi Helm MD - Primary    Preop Diagnosis:  GI bleeding [K92 2]  Anemia [D64 9]    Post-Op Diagnosis Codes:     * GI bleeding [K92 2]     * Anemia [D64 9]    Procedure(s) (LRB):  COLONOSCOPY (N/A)    Specimen(s):  * No specimens in log *    Estimated Blood Loss:   Minimal    Drains:       Anesthesia Type:   Conscious Sedation     Operative Indications:  GI bleeding [K92 2]  Anemia [D64 9]      Operative Findings:  Hemorrhoids, prominent veins in descending colon  Complications:   None    Procedure and Technique: The endoscope was introduced without difficulty and passed to the cecum where the appendiceal orifice and ileocecal valve are seen and photographed  Preparation was very good  There was slight tortuosity but careful examination failed to reveal any bleeding lesions except there is some prominence of the vascularity in the descending colon although nothing that would clearly represent arterial venous malformations    Direct and retroflexed exam shows evidence of internal hemorrhoids   I was present for the entire procedure    Patient Disposition:  hemodynamically stable    SIGNATURE: Sarahi Helm MD  DATE: 2017  TIME: 12:42 PM

## 2017-11-06 NOTE — ASSESSMENT & PLAN NOTE
· S/p EGD which revealed peptic esophageal stricture, Wills's esophagus and esophagitis  · For colonoscopy in am  · Patient's Hb dropped initially and has been stable 7 8 today  · Check CBC in am

## 2017-11-06 NOTE — SOCIAL WORK
Met with patient he lives alone in an apartment  His daughter drives him to all appointments  Pt Pt uses No D[facility]  Works for [employer]  He declines needs for Alcohol rehab    Pt uses [facility] for Pcp

## 2017-11-06 NOTE — ASSESSMENT & PLAN NOTE
· Most likely UGIB  · H&H stable s/p EGD  · Melena resolved per patient  · For colonoscopy tomorrow, has tolerated bowel prep

## 2017-11-06 NOTE — PROGRESS NOTES
[facility] Progress Note  Patient: Beatriz Alex  58 y o  male   MRN: [id number]  PCP: No primary care provider on file  Unit/Bed#: [facility] 2 -01 Encounter: [id number]  Date Of Visit: 11/06/17    Assessment  Principal Problem:    Melena  Active Problems:    Iron deficiency anemia due to chronic blood loss    Tabitha Fried ulcer    AVM (arteriovenous malformation)    Wills's esophagus    Esophagitis    GI bleeding  Resolved Problems:    * No resolved hospital problems  *        Plan  1  Melena/gastrointestinal bleeding  Found to have esophagitis  Hemoglobin stable  Last iron 32  Give iron transfusion x1  Continue PPI and Carafate  2  Microcytic anemia  Give iron transfusion x1   3  Alcohol use  Thiamine and folic acid  VTE Prophylaxis: Pharmacologic VTE Prophylaxis contraindicated due to gastrointestinal bleeding  Education and Discussions:   Discharge Plan:  Evaluate for discharge tomorrow if hemoglobin stable  Time Spent for Care:  30 Mins  More than 50% of total time spent on counseling and coordination of care as described above    ______________________________________________________________________________    Subjective:   Patient seen and examined  Returns from colonoscopy  Denies any abdominal pain  Objective:   Vitals: Blood pressure 132/67, pulse 60, temperature 97 7 °F (36 5 °C), temperature source Tympanic, resp  rate 18, height 6' 1" (1 854 m), weight 66 5 kg (146 lb 11 oz), SpO2 95 %      Physical Exam:   General appearance: alert, appears stated age and cooperative  Head: Normocephalic, without obvious abnormality, atraumatic  Lungs: clear to auscultation bilaterally  Heart: regular rate and rhythm  Abdomen: soft, non-tender; bowel sounds normal; no masses,  no organomegaly  Back: negative  Extremities: extremities normal, atraumatic, no cyanosis or edema  Neurologic: Grossly normal    Additional Data:   Labs:    Results from last 7 days  Lab Units 11/06/17  0547 11/05/17  0452 11/04/17  0526 11/04/17  0007 11/03/17  1949 11/03/17  0923   WBC Thousand/uL 5 30 6 43 6 10  --   --  10 11   HEMOGLOBIN g/dL 7 8* 7 8* 7 7* 7 5* 7 7* 8 3*   HEMATOCRIT % 27 8* 27 5* 27 1* 26 3* 27 1* 29 2*   MCV fL 73* 73* 73*  --   --  73*   PLATELETS Thousands/uL 544* 535* 546*  --   --  563*   INR   --   --   --   --   --  1 05       Results from last 7 days  Lab Units 11/04/17  0526 11/03/17  0923   SODIUM mmol/L 141 138   POTASSIUM mmol/L 3 9 3 9   CHLORIDE mmol/L 109* 105   CO2 mmol/L 26 28   ANION GAP mmol/L 6 5   BUN mg/dL 8 12   CREATININE mg/dL 0 74 0 91   CALCIUM mg/dL 7 9* 8 3   ALBUMIN g/dL 2 9* 3 5   BILIRUBIN TOTAL mg/dL 0 59 0 45   ALK PHOS U/L 66 79   ALT U/L 15 18   AST U/L 18 20   EGFR ml/min/1 73sq m 99 90   GLUCOSE RANDOM mg/dL 87 100                                  * I Have Reviewed All Lab Data Listed Above      Cultures:           Imaging:  Imaging Reports Reviewed Today Include:       Scheduled Meds:  melatonin 3 mg Oral HS   pantoprazole 40 mg Intravenous Q12H [address]   sucralfate 1,000 mg Oral 4x Daily (AC & HS)   IVPB builder  Intravenous Daily     Continuous Infusions:  sodium chloride 125 mL/hr Last Rate: 125 mL/hr (11/06/17 1453)     PRN Meds:    acetaminophen    LORazepam    ondansetron     Radha Bile, DO

## 2017-11-06 NOTE — CASE MANAGEMENT
Ebony Barbour RN Registered Nurse Signed   Case Management Date of Service: 11/4/2017 12:45 PM         []Hide copied text  [address] [facility] in the [facility] by Moe Harvey for 2017  Network Utilization Review Department  Phone: [phone]; Fax [phone]  ATTENTION: The Network Utilization Review Department is now centralized for our 7 Facilities  Make a note that we have a new phone and fax numbers for our Department  Please call with any questions or concerns to [phone] and carefully follow the prompts so that you are directed to the right person  All voicemails are confidential  Fax any determinations, approvals, denials, and requests for initial or continue stay review clinical to [phone]  Due to HIGH CALL volume, it would be easier if you could please send faxed requests to expedite your requests and in part, help us provide discharge notifications faster      Initial Clinical Review     Admission: Date/Time/Statement: 11/3/17 @ 1020            Orders Placed This Encounter   Procedures    Inpatient Admission (expected length of stay for this patient is greater than two midnights)       Standing Status:   Standing       Number of Occurrences:   1       Order Specific Question:   Admitting Physician       Answer:   Christopher Oglesby [[id number]]       Order Specific Question:   Level of Care       Answer:   Med Surg [16]       Order Specific Question:   Estimated length of stay       Answer:   More than 2 Midnights       Order Specific Question:   Certification       Answer:   I certify that inpatient services are medically necessary for this patient for a duration of greater than two midnights   See H&P and MD Progress Notes for additional information about the patient's course of treatment             ED: Date/Time/Mode of Arrival:             ED Arrival Information      Expected Arrival Acuity Means of Arrival Escorted By Service Admission Type     - 11/3/2017 08:53 Urgent Walk-In Family Member General Medicine Urgent     Arrival Complaint     rectom bleeding, fatigue             Chief Complaint:        Chief Complaint   Patient presents with    Rectal Bleeding       First noted blood in stools since last week- getting worse  States has hx of same    Feels weak         History of Illness: Patient is a pleasant 79-year-old male with a known history of Cirilo lesions, Wills's esophagitis, duodenal AVM, and a history of intermittent alcohol excessive usage   The patient presents today with 1 week of progressive fatigue nd add dark black stools   He is getting occasional abdominal cramping   He has not seen any homer blood   The patient claims he is compliant with his medication regimen and modifying his diet   However he still is having a little bit of alcohol   Today he was extremely fatigued and came to the hospital for evaluation      In the ER the patient's vital signs were stable   His comprehensive metabolic panel was within normal limits   Hemoglobin 8 3, , platelets 668   Troponin negative   EKG 70 beats per minute normal sinus rhythm     dark black stools and progressive weakness and fatigue   No homer blood   Occasional abdominal cramping not necessarily associated with bowel movements   He is also having severe reflux and burning in his chest but no substernal chest pain or shortness of breath or palpitations   He says the reflexes so severe it has caused a change in his voice   The patient denies any nausea or vomiting   He claims that his stools have been formed but very black   He denies any dysuria or dark urine or urinary retention  [facility] any fevers chills or recent illnesses     ED Vital Signs:            ED Triage Vitals   Temperature Pulse Respirations Blood Pressure SpO2   11/03/17 0902 11/03/17 0902 11/03/17 0902 11/03/17 0902 11/03/17 0904   (!) 97 4 °F (36 3 °C) 68 14 140/74 100 %       Temp Source Heart Rate Source Patient Position - Orthostatic VS BP Location FiO2 (%)   11/03/17 0902 11/03/17 0957 11/03/17 1200 11/03/17 1200 --   Oral Monitor Lying Left leg         Pain Score           11/03/17 0902           No Pain                Wt Readings from Last 1 Encounters:   11/03/17 66 5 kg (146 lb 11 oz)      Abnormal Labs/Diagnostic Test Results:   11/3: hgb 8 3, 7 7     hct 29 2, 27 1   Plate 149  76/1: hgb 7 5, 7 7   hct 26 3, 27 1   Cl 109   Ca 7 9   t prot 5 9   Alb 2 9   Plate 067  Egd: 1   Peptic esophageal stricture 2   Wills's esophagus 3   Esophagitis   EKG: nsr, R axis, poor R progression     ED Treatment:              Medication Administration from 11/03/2017 0853 to 11/03/2017 1149        Date/Time Order Dose Route Action Action by Comments       11/03/2017 1019 sodium chloride 0 9 % bolus 500 mL 0 mL Intravenous Stopped Vahid Skinner RN         11/03/2017 0929 sodium chloride 0 9 % bolus 500 mL 500 mL Intravenous New Bag Moy Velasquez RN         11/03/2017 1011 pantoprazole (PROTONIX) 80 mg in sodium chloride 0 9 % 100 mL IVPB 0 mg Intravenous Stopped Vahid Skinner RN         11/03/2017 0951 pantoprazole (PROTONIX) 80 mg in sodium chloride 0 9 % 100 mL IVPB 80 mg Intravenous New Bag Vahid Skinner RN               Past Medical/Surgical History:         Active Ambulatory Problems     Diagnosis Date Noted    Iron deficiency anemia due to chronic blood loss 10/06/2017   Gelene Kvng ulcer 10/06/2017    AVM (arteriovenous malformation) 10/06/2017    AVM (arteriovenous malformation) of small bowel, acquired ([facility] )      History of blood transfusion      Cirilo ulcer      Wills's esophagus      Melena 10/05/2017           Resolved Ambulatory Problems     Diagnosis Date Noted    Chest pain 10/06/2017    GI bleeding 10/06/2017    SHALA (acute kidney injury) ([facility] [address] ) 10/06/2017    [facility]2017    GI bleed 10/05/2017           Past Medical History:   Diagnosis Date    Anemia      AVM (arteriovenous malformation) of small bowel, acquired ([facility] [address] )      Wills's esophagus      Cirilo ulcer      Esophagitis      History of blood transfusion           Admitting Diagnosis: Melena [K92 1]  GI bleeding [K92 2]  Rectal bleeding [K62 5]  AVM (arteriovenous malformation) [Q27 30]  Anemia [D64 9]     Age/Sex: 58 y o  male     Assessment/Plan: Principal Problem:    Melena  Active Problems:    Iron deficiency anemia due to chronic blood loss    Cirilo ulcer    AVM (arteriovenous malformation)    Wills's esophagus    Esophagitis        Plan for the Primary Problem(s):  1   Melena likely secondary to Wills's esophagitis, Cirilo ulcer and AVM   With concomitant alcohol usage   Fortunately the patient's hemoglobin is stable at 8 3   Discussed the case with Gastroenterology nurse practitioner with [facility]  Magalis Dos Santos is currently investigating whether the patient may be able to get an EGD today  Shane Hernández will make the patient NPO   We will provide Protonix IV b i d  Debra [place] an H&H Q 6     Plan for Additional Problems:   2   Iron deficiency anemia due to chronic blood loss-the patient's hemoglobin is 8 3 on discharge approximately 3 weeks ago it was 8 5   During the prior admission the patient did need transfusion  Leonor Cho his hemoglobin is stable as is his vital signs will hold off on any transfusion   Monitor with fluids for any dilutional effect  3   Alcohol excessive usage-the patient was counseled that he must stop drinking all alcohol including wine   We will provide the patient with a banana bag running at 75 mL an hour   Will provide Ativan p r n    Medina Flores has no signs of withdrawal  4   Insomnia-melatonin HS p r n         VTE Prophylaxis: scd   Code Status:  Full code     Anticipated Length of Stay: Medina Flores will be admitted on an Inpatient basis with an anticipated length of stay of  Greater than 2 midnights            Admission Orders:  Scheduled Meds:   melatonin 3 mg Oral HS pantoprazole 40 mg Intravenous Q12H [address]   sucralfate 1,000 mg Oral 4x Daily (AC & HS)   IVPB builder   Intravenous Daily      Continuous Infusions:   sodium chloride 125 mL/hr Last Rate: 125 mL/hr (11/04/17 0650)      PRN Meds:   acetaminophen    LORazepam    ondansetron      hse dental soft thin liq diet  Up w/assist  H&h q6h  SCD's     PER GI 11/3:   Assessment and Plan:  1   Melena likely upper GI bleed   Will do EGD today   Patient should remain NPO with Protonix drip  Colonoscopy monday  2   Acute on chronic Iron deficiency anemia secondary to #1   Frequent  H&H, transfuse as needed  3   Odynophagia likely secondary to erosive esophagitis   Will likely restart Carafate after EGD      Impression:  1   Melena, acute GI bleed   2   History of peptic esophageal stricture, Wills's esophagus     Plan:  1   EGD today   2   Possible colonoscopy on Monday 3   Continue PPI drip 4   Agree with the addition of Carafate     PER GI 11/4:  Assessment/Plan:  1   Melena/acute GI bleed   Status post EGD which showed peptic esophageal stricture that was quite friable, esophagitis and Wills's esophagus     Biopsies are pending   Patient will be scheduled for a colonoscopy on Monday  Continue clear liquid diet  Continue PPI b i d  will discuss adding Carafate and advancing diet to full liquid with primary    2 acute on chronic microcytic anemia   Hemoglobin is low but currently stable   The continues to drop transfuse as needed   Continue frequent H&H

## 2017-11-06 NOTE — PROGRESS NOTES
Patient Name: Caron Luna  Patient MRN: [id number]  Date: 11/06/17  Service: [facility]    Subjective   Patient reports no active gi bleeding  He is resting comfortably and does not want to be bothered with questions  Denies any abdominal pain, nausea, vomiting, dysphagia, odynophagia, chest pain or shortness of breath  Discussed with nursing - patient completed bowel prep and was having clear stools  Vitals  Blood pressure 111/65, pulse 67, temperature 98 5 °F (36 9 °C), temperature source Tympanic, resp  rate 18, height 6' 1" (1 854 m), weight 66 5 kg (146 lb 11 oz), SpO2 96 %  Physical Exam:     General Appearance:    Awake/alert, resting comfortably, no distress  Hard of hearing  Head:    Normocephalic without obvious abnormality   Eyes:    PERRL, conjunctiva/corneas clear, EOM's intact        Neck:   Supple, no adenopathy   Throat:   Mucous membranes moist   Lungs:     Clear to auscultation bilaterally, no wheezing or rhonchi   Heart:    Regular rate and rhythm, S1 and S2 normal, no murmur   Abdomen:     Soft, non-tender, non-distended  bowel sounds active    Extremities:   Extremities normal  No clubbing, cyanosis or edema   Psych:   Normal Affect   Neurologic:   CNII-XII intact   speech intact         Laboratory Studies    Results from last 7 days  Lab Units 11/06/17  0547 11/05/17  0452 11/04/17  0526 11/04/17  0007 11/03/17  1949 11/03/17  0923   WBC Thousand/uL 5 30 6 43 6 10  --   --  10 11   HEMOGLOBIN g/dL 7 8* 7 8* 7 7* 7 5* 7 7* 8 3*   HEMATOCRIT % 27 8* 27 5* 27 1* 26 3* 27 1* 29 2*   PLATELETS Thousands/uL 544* 535* 546*  --   --  563*   INR   --   --   --   --   --  1 05       Results from last 7 days  Lab Units 11/04/17  0526 11/03/17  0923   SODIUM mmol/L 141 138   POTASSIUM mmol/L 3 9 3 9   CHLORIDE mmol/L 109* 105   CO2 mmol/L 26 28   BUN mg/dL 8 12   CREATININE mg/dL 0 74 0 91   CALCIUM mg/dL 7 9* 8 3   TOTAL PROTEIN g/dL 5 9* 7 0   BILIRUBIN TOTAL mg/dL 0 59 0  45   ALK PHOS U/L 66 79   ALT U/L 15 18   AST U/L 18 20   GLUCOSE RANDOM mg/dL 87 100       Imaging and Other Studies      Inhouse Medications     Current Facility-Administered Medications:     acetaminophen (TYLENOL) tablet 650 mg, 650 mg, Oral, Q6H PRN    LORazepam (ATIVAN) 2 mg/mL injection 0 5 mg, 0 5 mg, Intravenous, 4x Daily PRN    melatonin tablet 3 mg, 3 mg, Oral, HS, 3 mg at 11/05/17 2136    ondansetron (ZOFRAN) injection 4 mg, 4 mg, Intravenous, Q6H PRN    pantoprazole (PROTONIX) injection 40 mg, 40 mg, Intravenous, Q12H VIPUL, 40 mg at 11/05/17 2136    sodium chloride 0 9 % infusion, 125 mL/hr, Intravenous, Continuous, 125 mL/hr at 11/06/17 0137    sucralfate (CARAFATE) oral suspension 1,000 mg, 1,000 mg, Oral, 4x Daily (AC & HS), 1,000 mg at 11/06/17 0549    thiamine (VITAMIN B1) 939 mg, folic acid 1 mg in sodium chloride 0 9 % 100 mL IVPB, , Intravenous, Daily      Assessment/Plan:  1  Acute GI bleed with melena  Status post EGD 11/3 revealed friable peptic esophageal stricture, esophageal stricture and Wills's esophagus  Pathology pending  Scheduled for colonoscopy today  NPO  Continue PPI BID and Carafate once taking PO  Last colonoscopy 7/18/2017 at [facility] showed small nonbleeding internal hemorrhoids  Prior colon 10/2016 poor prep  2  Acute/symptompatic on chronic iron deficiency anemia  Hemoglobin low/stable  Continue to monitor and transfuse prn  Consider adding Iron and Vit C supplement after colonoscopy  3  Alcohol use disorder with recent decrease in use- recommend cessation  Withdrawal protocol in place at admission  4  Severe ulcerative reflux esophagitis with mild stricturing, severe mid-esophageal erosive /ulcerative changes, Wills's esophagus with ulceration, small hiatal hernia and nonbleeding duodenal AVM on EGD 10/6/2017 by Dr Citlaly Morocho  5  History of food impaction 4/2017 at [facility]  No current dysphagia or odynophagia complaints         Priscilla Frost PA-C

## 2017-11-06 NOTE — PROGRESS NOTES
Progress Note - Tunde Arredondo  58 y o  male MRN: [id number]    Unit/Bed#: [facility] 2 -01 Encounter: [id number]        GI bleeding   Assessment & Plan    · Most likely UGIB  · H&H stable s/p EGD  · Melena resolved per patient  · For colonoscopy tomorrow, has tolerated bowel prep        Iron deficiency anemia due to chronic blood loss   Assessment & Plan    · S/p EGD which revealed peptic esophageal stricture, Wills's esophagus and esophagitis  · For colonoscopy in am  · Patient's Hb dropped initially and has been stable 7 8 today  · Check CBC in am         Esophagitis   Assessment & Plan    · S/p EGD yesterday with multiple biopsied pending results  · Continue PPI         Wills's esophagus   Assessment & Plan    · S/p EGD   · Outpatient surveilence  · Continue PPI        * Melena   Assessment & Plan    · Resolved with stable H&H  · Colonoscopy tomorrow          Pharmacologic: contraindicated due to GI bleed  Mechanical VTE Prophylaxis in Place: Yes    Patient Centered Rounds: I have performed bedside rounds with nursing staff today  Discussions with Specialists or Other Care Team Provider: Reviewed record    Education and Discussions with Family / Patient: Patient    Time Spent for Care: 15 minutes  More than 50% of total time spent on counseling and coordination of care as described above  Current Length of Stay: 2 day(s)    Current Patient Status: Inpatient   Certification Statement: The patient will continue to require additional inpatient hospital stay due to diagnostic workup, need for close monitoring    Discharge Plan / Estimated Discharge Date: to be determined      Code Status: Level 1 - Full Code      Subjective:   Patient seen and examined this afternoon  He reports feeling much improved  He states he has been tolerating bowel prep well  He reports a completely normal bowel movement today  He states his melena has resolved    He denies lightheadedness, shortness of breath or palpitations  Denies abdominal pain, nausea, vomiting  Objective:     Vitals:   Temp (24hrs), Av 2 °F (36 8 °C), Min:97 7 °F (36 5 °C), Max:98 9 °F (37 2 °C)    HR:  [66-69] 66  Resp:  [18] 18  BP: (118-151)/(59-77) 151/77  SpO2:  [94 %-98 %] 94 %  Body mass index is 19 35 kg/m²  Input and Output Summary (last 24 hours): Intake/Output Summary (Last 24 hours) at 17  Last data filed at 17 0433   Gross per 24 hour   Intake             1000 ml   Output                0 ml   Net             1000 ml       Physical Exam:     Physical Exam   Constitutional: He is oriented to person, place, and time  Thin   HENT:   Head: Normocephalic  Eyes: Conjunctivae are normal    Neck: Neck supple  Cardiovascular: Normal rate and regular rhythm  Exam reveals no gallop and no friction rub  No murmur heard  Pulmonary/Chest: Effort normal  No respiratory distress  He has no wheezes  He has no rales  Abdominal: Soft  He exhibits no distension  There is no tenderness  Musculoskeletal: Normal range of motion  He exhibits no edema  Neurological: He is alert and oriented to person, place, and time     Skin:   Superficial stable macular rash on face       Additional Data:     Labs:      Results from last 7 days  Lab Units 17  0452   WBC Thousand/uL 6 43   HEMOGLOBIN g/dL 7 8*   HEMATOCRIT % 27 5*   PLATELETS Thousands/uL 535*   NEUTROS PCT % 58   LYMPHS PCT % 17   MONOS PCT % 13*   EOS PCT % 10*       Results from last 7 days  Lab Units 17  0526   SODIUM mmol/L 141   POTASSIUM mmol/L 3 9   CHLORIDE mmol/L 109*   CO2 mmol/L 26   BUN mg/dL 8   CREATININE mg/dL 0 74   CALCIUM mg/dL 7 9*   TOTAL PROTEIN g/dL 5 9*   BILIRUBIN TOTAL mg/dL 0 59   ALK PHOS U/L 66   ALT U/L 15   AST U/L 18   GLUCOSE RANDOM mg/dL 87       Results from last 7 days  Lab Units 17  0923   INR  1 05         Recent Cultures (last 7 days):           Last 24 Hours Medication List:     melatonin 3 mg Oral HS pantoprazole 40 mg Intravenous Q12H [address]   sucralfate 1,000 mg Oral 4x Daily (AC & HS)   IVPB builder  Intravenous Daily        Today, Patient Was Seen By: Franck Ann MD

## 2017-11-07 VITALS
OXYGEN SATURATION: 96 % | TEMPERATURE: 98.4 F | HEART RATE: 64 BPM | HEIGHT: 73 IN | WEIGHT: 146.69 LBS | RESPIRATION RATE: 18 BRPM | BODY MASS INDEX: 19.44 KG/M2 | SYSTOLIC BLOOD PRESSURE: 134 MMHG | DIASTOLIC BLOOD PRESSURE: 62 MMHG

## 2017-11-07 LAB
ALBUMIN SERPL BCP-MCNC: 3 G/DL (ref 3.5–5)
ALP SERPL-CCNC: 58 U/L (ref 46–116)
ALT SERPL W P-5'-P-CCNC: 16 U/L (ref 12–78)
ANION GAP SERPL CALCULATED.3IONS-SCNC: 8 MMOL/L (ref 4–13)
AST SERPL W P-5'-P-CCNC: 20 U/L (ref 5–45)
BILIRUB SERPL-MCNC: 0.4 MG/DL (ref 0.2–1)
BUN SERPL-MCNC: 8 MG/DL (ref 5–25)
CALCIUM SERPL-MCNC: 8.4 MG/DL (ref 8.3–10.1)
CHLORIDE SERPL-SCNC: 108 MMOL/L (ref 100–108)
CO2 SERPL-SCNC: 25 MMOL/L (ref 21–32)
CREAT SERPL-MCNC: 0.72 MG/DL (ref 0.6–1.3)
ERYTHROCYTE [DISTWIDTH] IN BLOOD BY AUTOMATED COUNT: 21.7 % (ref 11.6–15.1)
GFR SERPL CREATININE-BSD FRML MDRD: 100 ML/MIN/1.73SQ M
GLUCOSE SERPL-MCNC: 92 MG/DL (ref 65–140)
HCT VFR BLD AUTO: 28 % (ref 36.5–49.3)
HGB BLD-MCNC: 8 G/DL (ref 12–17)
MCH RBC QN AUTO: 20.6 PG (ref 26.8–34.3)
MCHC RBC AUTO-ENTMCNC: 28.6 G/DL (ref 31.4–37.4)
MCV RBC AUTO: 72 FL (ref 82–98)
PLATELET # BLD AUTO: 534 THOUSANDS/UL (ref 149–390)
PMV BLD AUTO: 9.9 FL (ref 8.9–12.7)
POTASSIUM SERPL-SCNC: 3.7 MMOL/L (ref 3.5–5.3)
PROT SERPL-MCNC: 6 G/DL (ref 6.4–8.2)
RBC # BLD AUTO: 3.88 MILLION/UL (ref 3.88–5.62)
SODIUM SERPL-SCNC: 141 MMOL/L (ref 136–145)
WBC # BLD AUTO: 5.2 THOUSAND/UL (ref 4.31–10.16)

## 2017-11-07 PROCEDURE — 80053 COMPREHEN METABOLIC PANEL: CPT | Performed by: INTERNAL MEDICINE

## 2017-11-07 PROCEDURE — 85027 COMPLETE CBC AUTOMATED: CPT | Performed by: INTERNAL MEDICINE

## 2017-11-07 RX ORDER — ASCORBIC ACID 250 MG
250 TABLET ORAL DAILY
Refills: 0
Start: 2017-11-07 | End: 2018-01-21

## 2017-11-07 RX ORDER — SUCRALFATE ORAL 1 G/10ML
1000 SUSPENSION ORAL
Qty: 420 ML | Refills: 2 | Status: ON HOLD | OUTPATIENT
Start: 2017-11-07 | End: 2018-03-27

## 2017-11-07 RX ORDER — PANTOPRAZOLE SODIUM 40 MG/1
40 TABLET, DELAYED RELEASE ORAL
Status: DISCONTINUED | OUTPATIENT
Start: 2017-11-07 | End: 2017-11-07 | Stop reason: HOSPADM

## 2017-11-07 RX ORDER — FERROUS SULFATE TAB EC 324 MG (65 MG FE EQUIVALENT) 324 (65 FE) MG
324 TABLET DELAYED RESPONSE ORAL
Refills: 0
Start: 2017-11-07 | End: 2018-01-21

## 2017-11-07 RX ADMIN — FOLIC ACID 1 MG: 1 TABLET ORAL at 09:22

## 2017-11-07 RX ADMIN — SUCRALFATE 1000 MG: 1 SUSPENSION ORAL at 06:31

## 2017-11-07 RX ADMIN — Medication 100 MG: at 09:22

## 2017-11-07 RX ADMIN — PANTOPRAZOLE SODIUM 40 MG: 40 TABLET, DELAYED RELEASE ORAL at 09:00

## 2017-11-07 NOTE — PROGRESS NOTES
Patient Name: Grant Castrejon  Patient MRN: [id number]  Date: 11/07/17  Service: [facility]    Subjective   Labs and noted reviewed  Patient remains hemodynamically stable  Hemoglobin with slight improvement from yesterday (8 0g)  Patient is tolerating non-ulcerogenic dysphagia 3 diet for breakfast this morning  Denies dysphagia/odynophagia  He reports a brown BM yesterday after his colonoscopy  He denies any abdominal pain, nausea, vomiting, active gi bleeding  No CP or SOB  He reports his last ETOH drink was last Wednesday  He acknowledges that he needs to stay sober  Vitals  Blood pressure 126/59, pulse 66, temperature 98 2 °F (36 8 °C), temperature source Tympanic, resp  rate 18, height 6' 1" (1 854 m), weight 66 5 kg (146 lb 11 oz), SpO2 95 %  Physical Exam:     General Appearance:    Awake, alert, oriented x3, no distress   Head:    Normocephalic without obvious abnormality   Eyes:    PERRL, conjunctiva/corneas clear, EOM's intact        Neck:   Supple, no adenopathy   Throat:   Mucous membranes moist   Lungs:     Clear to auscultation bilaterally, no wheezing or rhonchi   Heart:    Regular rate and rhythm, S1 and S2 normal, no murmur   Abdomen:     Soft, non-tender, non-distended  bowel sounds active    Extremities:   Extremities without edema   Psych:   Normal Affect   Neurologic:   CNII-XII intact   Strength symmetric, speech intact         Laboratory Studies    Results from last 7 days  Lab Units 11/07/17  0538 11/06/17  0547 11/05/17  0452 11/04/17  0526 11/04/17  0007 11/03/17  1949 11/03/17  0923   WBC Thousand/uL 5 20 5 30 6 43 6 10  --   --  10 11   HEMOGLOBIN g/dL 8 0* 7 8* 7 8* 7 7* 7 5* 7 7* 8 3*   HEMATOCRIT % 28 0* 27 8* 27 5* 27 1* 26 3* 27 1* 29 2*   PLATELETS Thousands/uL 534* 544* 535* 546*  --   --  563*   INR   --   --   --   --   --   --  1 05       Results from last 7 days  Lab Units 11/07/17  0538 11/04/17  0526 11/03/17  0923   SODIUM mmol/L 141 141 138 POTASSIUM mmol/L 3 7 3 9 3 9   CHLORIDE mmol/L 108 109* 105   CO2 mmol/L 25 26 28   BUN mg/dL 8 8 12   CREATININE mg/dL 0 72 0 74 0 91   CALCIUM mg/dL 8 4 7 9* 8 3   TOTAL PROTEIN g/dL 6 0* 5 9* 7 0   BILIRUBIN TOTAL mg/dL 0 40 0 59 0 45   ALK PHOS U/L 58 66 79   ALT U/L 16 15 18   AST U/L 20 18 20   GLUCOSE RANDOM mg/dL 92 87 100       Imaging and Other Studies      Inhouse Medications     Current Facility-Administered Medications:     acetaminophen (TYLENOL) tablet 650 mg, 650 mg, Oral, I8R PRN    folic acid (FOLVITE) tablet 1 mg, 1 mg, Oral, Daily    LORazepam (ATIVAN) 2 mg/mL injection 0 5 mg, 0 5 mg, Intravenous, 4x Daily PRN    melatonin tablet 3 mg, 3 mg, Oral, HS, 3 mg at 11/06/17 2148    ondansetron (ZOFRAN) injection 4 mg, 4 mg, Intravenous, Q6H PRN    pantoprazole (PROTONIX) injection 40 mg, 40 mg, Intravenous, Q12H VIPUL, 40 mg at 11/06/17 2148    sucralfate (CARAFATE) oral suspension 1,000 mg, 1,000 mg, Oral, 4x Daily (AC & HS), 1,000 mg at 11/07/17 0631    thiamine (VITAMIN B1) tablet 100 mg, 100 mg, Oral, Daily      Assessment/Plan:  1  Acute GI bleed with melena  Status post EGD 11/3 revealed friable peptic esophageal stricture, esophageal stricture and Wills's esophagus  Pathology pending  Colonoscopy 11/6 revealed slight tortuosity but failed to show any bleeding lesions  There was some prominent veins in the descending colon and internal hemorrhoids noted  Continue PPI BID, change to PO and Carafate slurry  2  Acute/symptompatic on chronic iron deficiency anemia  Hemoglobin low/stable  Continue to monitor and transfuse prn  Iron infusion yesterday  3  Alcohol use disorder with recent decrease in use- recommend cessation  Withdrawal protocol in place at admission    4  Severe ulcerative reflux esophagitis with mild stricturing, severe mid-esophageal erosive /ulcerative changes, Wills's esophagus with ulceration, small hiatal hernia and nonbleeding duodenal AVM on EGD 10/6/2017 by Dr Christian Collins  5  History of food impaction 4/2017 at [facility]  No current dysphagia or odynophagia complaints        Saran Valencia PA-C

## 2017-11-07 NOTE — PLAN OF CARE
Problem: Potential for Falls  Goal: Patient will remain free of falls  INTERVENTIONS:  - Assess patient frequently for physical needs  -  Identify cognitive and physical deficits and behaviors that affect risk of falls  -  [place] fall precautions as indicated by assessment   - Educate patient/family on patient safety including physical limitations  - Instruct patient to call for assistance with activity based on assessment  - Modify environment to reduce risk of injury  - Consider OT/PT consult to assist with strengthening/mobility   Outcome: Progressing      Problem: Nutrition/Hydration-ADULT  Goal: Nutrient/Hydration intake appropriate for improving, restoring or maintaining nutritional needs  Monitor and assess patient's nutrition/hydration status for malnutrition (ex- brittle hair, bruises, dry skin, pale skin and conjunctiva, muscle wasting, smooth red tongue, and disorientation)  Collaborate with interdisciplinary team and initiate plan and interventions as ordered  Monitor patient's weight and dietary intake as ordered or per policy  Utilize nutrition screening tool and intervene per policy  Determine patient's food preferences and provide high-protein, high-caloric foods as appropriate       INTERVENTIONS:  - Monitor oral intake, urinary output, labs, and treatment plans  - Assess nutrition and hydration status and recommend course of action  - Evaluate amount of meals eaten  - Assist patient with eating if necessary   - Allow adequate time for meals  - Recommend/ encourage appropriate diets, oral nutritional supplements, and vitamin/mineral supplements  - Order, calculate, and assess calorie counts as needed  - Recommend, monitor, and adjust tube feedings and TPN/PPN based on assessed needs  - Assess need for intravenous fluids  - Provide specific nutrition/hydration education as appropriate  - Include patient/family/caregiver in decisions related to nutrition   Outcome: Progressing

## 2017-11-07 NOTE — CASE MANAGEMENT
Mercedes Gupta, RN Registered Nurse Signed   Case Management Date of Service: 11/6/2017 12:31 PM         []Hide copied text  Domenico Reagan RN Registered Nurse Signed     Case Management Date of Service: 11/4/2017 12:45 PM          []Hide copied text     St  [address] [facility] in the [facility] by [address] 2017  Network Utilization Review Department  Phone: [phone]; Fax [phone]  ATTENTION: The Network Utilization Review Department is now centralized for our 7 Facilities  Make a note that we have a new phone and fax numbers for our Department  Please call with any questions or concerns to [phone] VPT carefully follow the prompts so that you are directed to the right person  All voicemails are confidential  Fax any determinations, approvals, denials, and requests for initial or continue stay review clinical to [phone]  Due to HIGH CALL volume, it would be easier if you could please send faxed requests to expedite your requests and in part, help us provide discharge notifications faster      Initial Clinical Review     Admission: Date/Time/Statement: 11/3/17 @ 1020                Orders Placed This Encounter   Procedures    Inpatient Admission (expected length of stay for this patient is greater than two midnights)       Standing Status:   Standing       Number of Occurrences:   1       Order Specific Question:   Admitting Physician       Answer:   LEON LEMON [[id number]]       Order Specific Question:   Level of Care       Answer:   Med Surg [16]       Order Specific Question:   Estimated length of stay       Answer:   More than 2 Midnights       Order Specific Question:   Certification       Answer:   I certify that inpatient services are medically necessary for this patient for a duration of greater than two midnights   See H&P and MD Progress Notes for additional information about the patient's course of treatment             ED: Date/Time/Mode of Arrival:                                ED Arrival Information      Expected Arrival Acuity Means of Arrival Escorted By Service Admission Type     - 11/3/2017 08:53 Urgent Walk-In Family Member General Medicine Urgent     Arrival Complaint     rectom bleeding, fatigue             Chief Complaint:           Chief Complaint   Patient presents with    Rectal Bleeding       First noted blood in stools since last week- getting worse   States has hx of same   Feels weak         History of Illness: Patient is a pleasant 69-year-old male with a known history of Cirilo lesions, Wills's esophagitis, duodenal AVM, and a history of intermittent alcohol excessive usage   The patient presents today with 1 week of progressive fatigue nd add dark black stools   He is getting occasional abdominal cramping   He has not seen any homer blood   The patient claims he is compliant with his medication regimen and modifying his diet   However he still is having a little bit of alcohol   Today he was extremely fatigued and came to the hospital for evaluation      In the ER the patient's vital signs were stable   His comprehensive metabolic panel was within normal limits   Hemoglobin 8 3, , platelets 033   Troponin negative   EKG 70 beats per minute normal sinus rhythm     dark black stools and progressive weakness and fatigue   No homer blood   Occasional abdominal cramping not necessarily associated with bowel movements   He is also having severe reflux and burning in his chest but no substernal chest pain or shortness of breath or palpitations   He says the reflexes so severe it has caused a change in his voice   The patient denies any nausea or vomiting   He claims that his stools have been formed but very black   He denies any dysuria or dark urine or urinary retention  Barrett Revering further denies any fevers chills or recent illnesses     ED Vital Signs:                             ED Triage Vitals   Temperature Pulse Respirations Blood Pressure SpO2   11/03/17 0902 11/03/17 0902 11/03/17 0902 11/03/17 0902 11/03/17 0904   (!) 97 4 °F (36 3 °C) 68 14 140/74 100 %       Temp Source Heart Rate Source Patient Position - Orthostatic VS BP Location FiO2 (%)   11/03/17 0902 11/03/17 0957 11/03/17 1200 11/03/17 1200 --   Oral Monitor Lying Left leg         Pain Score           11/03/17 0902           No Pain                  Wt Readings from Last 1 Encounters:   11/03/17 66 5 kg (146 lb 11 oz)      Abnormal Labs/Diagnostic Test Results:   11/3: hgb 8 3, 7 7     hct 29 2, 27 1   Plate 330  84/4: hgb 7 5, 7 7   hct 26 3, 27 1   Cl 109   Ca 7 9   t prot 5 9   Alb 2 9   Plate 872  Egd: 1   Peptic esophageal stricture 2   Wills's esophagus 3   Esophagitis   EKG: nsr, R axis, poor R progression     ED Treatment:                                        Medication Administration from 11/03/2017 0853 to 11/03/2017 1149        Date/Time Order Dose Route Action Action by Comments       11/03/2017 1019 sodium chloride 0 9 % bolus 500 mL 0 mL Intravenous Stopped Gabby Briggs, ADRIANNE         11/03/2017 0929 sodium chloride 0 9 % bolus 500 mL 500 mL Intravenous New Bag Yasemin Blancas RN         11/03/2017 1011 pantoprazole (PROTONIX) 80 mg in sodium chloride 0 9 % 100 mL IVPB 0 mg Intravenous Stopped Gabby Briggs RN         11/03/2017 0951 pantoprazole (PROTONIX) 80 mg in sodium chloride 0 9 % 100 mL IVPB 80 mg Intravenous New Bag Gabby Briggs RN               Past Medical/Surgical History:           Active Ambulatory Problems     Diagnosis Date Noted    Iron deficiency anemia due to chronic blood loss 10/06/2017    Noralee Shannon ulcer 10/06/2017    AVM (arteriovenous malformation) 10/06/2017    AVM (arteriovenous malformation) of small bowel, acquired ([facility] )      History of blood transfusion      Cirilo ulcer      Wills's esophagus      Melena 10/05/2017              Resolved Ambulatory Problems     Diagnosis Date Noted    Chest pain 10/06/2017  GI bleeding 10/06/2017    SHALA [facility]acute kidney injury) ([facility] ) 10/06/2017    Thrombocytosis ([facility] ) 10/06/2017    GI bleed 10/05/2017              Past Medical History:   Diagnosis Date    Anemia      AVM (arteriovenous malformation) of small bowel, acquired ([facility] )      Wills's esophagus      Cirilo ulcer      Esophagitis      History of blood transfusion           Admitting Diagnosis: Melena [K92 1]  GI bleeding [K92 2]  Rectal bleeding [K62 5]  AVM (arteriovenous malformation) [Q27 30]  Anemia [D64 9]     Age/Sex: 58 y  o  male     Assessment/Plan: Principal Problem:    Melena  Active Problems:    Iron deficiency anemia due to chronic blood loss    Cirilo ulcer    AVM (arteriovenous malformation)    Wills's esophagus    Esophagitis        Plan for the Primary Problem(s):  1   Melena likely secondary to Wills's esophagitis, Cirilo ulcer and AVM   With concomitant alcohol usage   Fortunately the patient's hemoglobin is stable at 8 3   Discussed the case with Gastroenterology nurse practitioner [facility]  Ginger Webster is currently investigating whether the patient may be able to get an EGD today  Belma Cheadle will make the patient NPO   We will provide Protonix IV b i d  Burgess Irene an H&H Q 6     Plan for Additional Problems:   2   Iron deficiency anemia due to chronic blood loss-the patient's hemoglobin is 8 3 on discharge approximately 3 weeks ago it was 8 5   During the prior admission the patient did need transfusion  Sammi Alistair his hemoglobin is stable as is his vital signs will hold off on any transfusion   Monitor with fluids for any dilutional effect  3   Alcohol excessive usage-the patient was counseled that he must stop drinking all alcohol including wine   We will provide the patient with a banana bag running at 75 mL an hour   Will provide Ativan p r n  Lynne Burrows has no signs of withdrawal  4   Insomnia-melatonin HS p r n         VTE Prophylaxis: scd   Code Status:  Full code     Anticipated Length of Stay: Dionne Murillo will be admitted on an Inpatient basis with an anticipated length of stay of  Greater than 2 midnights            Admission Orders:  Scheduled Meds:   melatonin 3 mg Oral HS   pantoprazole 40 mg Intravenous Q12H [address]   sucralfate 1,000 mg Oral 4x Daily (AC & HS)   IVPB builder   Intravenous Daily      Continuous Infusions:   sodium chloride 125 mL/hr Last Rate: 125 mL/hr (11/04/17 0650)      PRN Meds:   acetaminophen  Monna Schooner     hse dental soft thin liq diet  Up w/assist  H&h q6h  SCD's     PER GI 11/3:   Assessment and Plan:  1   Melena likely upper GI bleed   Will do EGD today   Patient should remain NPO with Protonix drip  Colonoscopy monday  2   Acute on chronic Iron deficiency anemia secondary to #1   Frequent  H&H, transfuse as needed  3   Odynophagia likely secondary to erosive esophagitis   Will likely restart Carafate after EGD      Impression:  1   Melena, acute GI bleed   2   History of peptic esophageal stricture, Wills's esophagus     Plan:  1   EGD today   2   Possible colonoscopy on Monday 3   Continue PPI drip 4   Agree with the addition of Carafate     PER GI 11/4:  Assessment/Plan:  1   Melena/acute GI bleed   Status post EGD which showed peptic esophageal stricture that was quite friable, esophagitis and Wills's esophagus     Biopsies are pending   Patient will be scheduled for a colonoscopy on Monday  Continue clear liquid diet  Continue PPI b i d  will discuss adding Carafate and advancing diet to full liquid with primary    2 acute on chronic microcytic anemia   Hemoglobin is low but currently stable   The continues to drop transfuse as needed   Continue frequent H&H

## 2017-11-07 NOTE — NURSING NOTE
Patient discharged to home accompanied by daughter    IV line pulled and discharge instructions were reviewed

## 2017-11-07 NOTE — DISCHARGE SUMMARY
[address] INTERNAL MEDICINE  [facility]    Humberto Garcia  58 y o  male   MRN: [id number]   Encounter: [id number]   Unit/Bed: Reinaldo Jimenez [address] [place] [address] [id number]     Admitting Provider:  Jose Henriquez MD  Discharge Provider:  Franco Davey DO  Admission Date: 11/3/2017       Discharge Date: 11/07/17   LOS: 4  Primary Care Physician at Discharge: Christina Pedroza [phone]    DISCHARGE DIAGNOSES  Principal Problem:    Melena  Active Problems:    Iron deficiency anemia due to chronic blood loss    Reshma Schirmer ulcer    AVM (arteriovenous malformation)    Wills's esophagus    Esophagitis    GI bleeding  Resolved Problems:    * No resolved hospital problems  *    HOSPITAL COURSE:  Humberto Garcia  is a 58 y o  male with a known history of Wills's esophagitis duodenal AVM and alcohol use presents with one week duration of melena  No homer blood  Upon presentation he was found have a hemoglobin of 8 3  During hospitalization patient underwent both EGD and colonoscopy  No active bleeding noted  Hemoglobin stabilized  He was given iron transfusion x1  At time of discharge no abdominal pain and patient [facility]  1  Melena with gastrointestinal bleeding  Patient known to have Wills's esophagitis and duodenal AVM  No active bleeding noted during upper and lower endoscopy  Patient to continue pantoprazole as previously taken b i d  however prescription be given for sucralfate solution  He will be given a prescription for hemoglobin check within a week as well  2  Microcytic anemia  Chronic  History of requiring iron infusions  Can continue ferrous sulfate with ascorbic acid  3  Alcohol use  Continue thiamine and folic acid  Discussed importance of cessation    Patient does not have a primary care provider at the moment    He is interested in following up with [facility]   Case discussed with patient's daughter at time of discharge  All questions have been answered  Beni Dickens - Gastroenterology    PROCEDURES PERFORMED  EGD  Date: 11/3/2017  Impression:  Peptic esophagitis with esophageal stricture  Evidence of Wills's esophagus  Duodenal bulb was void of AVMs    Colonscopy  Date: 11/6/2017  Impression:  Hemorrhoids and prominent veins    Other Pertinent Test Results    Results from last 7 days  Lab Units 11/07/17  0538 11/06/17  0547 11/05/17  0452 11/04/17  0526 11/04/17  0007 11/03/17  1949 11/03/17  0923   WBC Thousand/uL 5 20 5 30 6 43 6 10  --   --  10 11   HEMOGLOBIN g/dL 8 0* 7 8* 7 8* 7 7* 7 5* 7 7* 8 3*   HEMATOCRIT % 28 0* 27 8* 27 5* 27 1* 26 3* 27 1* 29 2*   MCV fL 72* 73* 73* 73*  --   --  73*   PLATELETS Thousands/uL 534* 544* 535* 546*  --   --  563*   INR   --   --   --   --   --   --  1 05       Results from last 7 days  Lab Units 11/07/17  0538 11/04/17  0526 11/03/17  0923   SODIUM mmol/L 141 141 138   POTASSIUM mmol/L 3 7 3 9 3 9   CHLORIDE mmol/L 108 109* 105   CO2 mmol/L 25 26 28   ANION GAP mmol/L 8 6 5   BUN mg/dL 8 8 12   CREATININE mg/dL 0 72 0 74 0 91   CALCIUM mg/dL 8 4 7 9* 8 3   ALBUMIN g/dL 3 0* 2 9* 3 5   BILIRUBIN TOTAL mg/dL 0 40 0 59 0 45   ALK PHOS U/L 58 66 79   ALT U/L 16 15 18   AST U/L 20 18 20   EGFR ml/min/1 73sq m 100 99 90   GLUCOSE RANDOM mg/dL 92 87 100             PHYSICAL EXAM:  Vitals:   Blood Pressure: 134/62 (11/07/17 0758)  Pulse: 64 (11/07/17 0758)  Temperature: 98 4 °F (36 9 °C) (11/07/17 0758)  Temp Source: Tympanic (11/07/17 0758)  Respirations: 18 (11/07/17 0758)  Height: 6' 1" (185 4 cm) (11/04/17 1157)  Weight - Scale: 66 5 kg (146 lb 11 oz) (11/03/17 0902)  SpO2: 96 % (11/07/17 0758)    General appearance: alert, appears stated age and cooperative  Head: Normocephalic, without obvious abnormality, atraumatic  Eyes: conjunctivae/corneas clear  PERRL, EOM's intact    Lungs: clear to auscultation bilaterally  Heart: regular rate and rhythm and murmur present  Abdomen: Soft nontender nondistended  Back: range of motion normal  Extremities: extremities normal, atraumatic, no cyanosis or edema  Neurologic: Grossly normal    Planned Re-admission: No  Discharge Disposition: Home/Self Care    Test Results Pending at Discharge:  Biopsy results from endoscopy  Incidental findings: None    Medications   Please see After Visit Summary for reconciled discharge medications provided to patient and family  Diet restrictions: Soft diet   Activity restrictions: No strenuous activity  Discharge Condition: fair    Outpatient Follow-Up  1  Joao Collins PA-C [facility] [address] [employer] [phone] - follow-up within one week  2  [facility]  Follow-up as scheduled within 1-2 weeks    Code Status: Level 1 - Full Code  Discharge Statement   I spent 35 minutes discharging the patient  This time was spent on the day of discharge  Greater than 50% of total time was spent with the patient and / or family counseling and / or coordination of care

## 2017-11-07 NOTE — PLAN OF CARE
[facility]  Internal Medicine  [address] [facility]  (j) [phone] (a) [phone]  17      RE:  Shruthi Tiwari  58 y o  male  : 1955    To whom it may concern,     Shruthi Tiwari  was hospitalized under my care from 11/3/2017 to 17  Please excuse from all appointments and/or work and school related activities during this interim  Patient may return to work at previous activity level on/or after 2017  Feel free to contact me with any questions if necessary           Sincerely,                 Samantha Lujan, [address] [place] [address] Internal Medicine       Diplomate, [employer] Board of Internal Medicine

## 2017-11-21 NOTE — CASE MANAGEMENT
Shaan Anglin, RN Registered Nurse Signed   Case Management Date of Service: 11/4/2017 12:45 PM         []Hide copied text  1969 [facility] in the [facility] by Moe [place] for 2017  Network Utilization Review Department  Phone: [phone]; Fax [phone]  ATTENTION: The Network Utilization Review Department is now centralized for our 7 Facilities  Make a note that we have a new phone and fax numbers for our Department  Please call with any questions or concerns to [phone] and carefully follow the prompts so that you are directed to the right person  All voicemails are confidential  Fax any determinations, approvals, denials, and requests for initial or continue stay review clinical to [phone]  Due to HIGH CALL volume, it would be easier if you could please send faxed requests to expedite your requests and in part, help us provide discharge notifications faster      Initial Clinical Review     Admission: Date/Time/Statement: 11/3/17 @ 1020            Orders Placed This Encounter   Procedures    Inpatient Admission (expected length of stay for this patient is greater than two midnights)       Standing Status:   Standing       Number of Occurrences:   1       Order Specific Question:   Admitting Physician       Answer:   Estefani Arguello [[id number]]       Order Specific Question:   Level of Care       Answer:   Med Surg [16]       Order Specific Question:   Estimated length of stay       Answer:   More than 2 Midnights       Order Specific Question:   Certification       Answer:   I certify that inpatient services are medically necessary for this patient for a duration of greater than two midnights   See H&P and MD Progress Notes for additional information about the patient's course of treatment             ED: Date/Time/Mode of Arrival:             ED Arrival Information      Expected Arrival Acuity Means of Arrival Escorted By Service Admission Type     - 11/3/2017 08:53 Urgent Walk-In Family Member General Medicine Urgent     Arrival Complaint     rectom bleeding, fatigue             Chief Complaint:        Chief Complaint   Patient presents with    Rectal Bleeding       First noted blood in stools since last week- getting worse  States has hx of same    Feels weak         History of Illness: Patient is a pleasant 57-year-old male with a known history of Cirilo lesions, Wills's esophagitis, duodenal AVM, and a history of intermittent alcohol excessive usage   The patient presents today with 1 week of progressive fatigue nd add dark black stools   He is getting occasional abdominal cramping   He has not seen any homer blood   The patient claims he is compliant with his medication regimen and modifying his diet   However he still is having a little bit of alcohol   Today he was extremely fatigued and came to the hospital for evaluation      In the ER the patient's vital signs were stable   His comprehensive metabolic panel was within normal limits   Hemoglobin 8 3, , platelets 617   Troponin negative   EKG 70 beats per minute normal sinus rhythm     dark black stools and progressive weakness and fatigue   No homer blood   Occasional abdominal cramping not necessarily associated with bowel movements   He is also having severe reflux and burning in his chest but no substernal chest pain or shortness of breath or palpitations   He says the reflexes so severe it has caused a change in his voice   The patient denies any nausea or vomiting   He claims that his stools have been formed but very black   He denies any dysuria or dark urine or urinary retention  [facility] further denies any fevers chills or recent illnesses     ED Vital Signs:            ED Triage Vitals   Temperature Pulse Respirations Blood Pressure SpO2   11/03/17 0902 11/03/17 0902 11/03/17 0902 11/03/17 0902 11/03/17 0904   (!) 97 4 °F (36 3 °C) 68 14 140/74 100 %       Temp Source Heart Rate Source Patient Position - Orthostatic VS BP Location FiO2 (%)   11/03/17 0902 11/03/17 0957 11/03/17 1200 11/03/17 1200 --   Oral Monitor Lying Left leg         Pain Score           11/03/17 0902           No Pain                Wt Readings from Last 1 Encounters:   11/03/17 66 5 kg (146 lb 11 oz)      Abnormal Labs/Diagnostic Test Results:   11/3: hgb 8 3, 7 7     hct 29 2, 27 1   Plate 137  20/8: hgb 7 5, 7 7   hct 26 3, 27 1   Cl 109   Ca 7 9   t prot 5 9   Alb 2 9   Plate 191  Egd: 1   Peptic esophageal stricture 2   Wills's esophagus 3   Esophagitis   EKG: nsr, R axis, poor R progression     ED Treatment:              Medication Administration from 11/03/2017 0853 to 11/03/2017 1149        Date/Time Order Dose Route Action Action by Comments       11/03/2017 1019 sodium chloride 0 9 % bolus 500 mL 0 mL Intravenous Stopped Pasquale Chadwick RN         11/03/2017 0929 sodium chloride 0 9 % bolus 500 mL 500 mL Intravenous New Bag Beltran Morris RN         11/03/2017 1011 pantoprazole (PROTONIX) 80 mg in sodium chloride 0 9 % 100 mL IVPB 0 mg Intravenous Stopped Pasquale Chadwick RN         11/03/2017 0951 pantoprazole (PROTONIX) 80 mg in sodium chloride 0 9 % 100 mL IVPB 80 mg Intravenous New Bag Pasquale Chadwick RN               Past Medical/Surgical History:         Active Ambulatory Problems     Diagnosis Date Noted    Iron deficiency anemia due to chronic blood loss 10/06/2017   Cristine Boatman ulcer 10/06/2017    AVM (arteriovenous malformation) 10/06/2017    AVM (arteriovenous malformation) of small bowel, acquired ([facility] )      History of blood transfusion      Cirilo ulcer      Wills's esophagus      Melena 10/05/2017           Resolved Ambulatory Problems     Diagnosis Date Noted    Chest pain 10/06/2017    GI bleeding 10/06/2017    SHALA (acute kidney injury) ([facility] [address] ) 10/06/2017    Thrombocytosis ([facility] ) 10/06/2017    GI bleed 10/05/2017           Past Medical History:   Diagnosis Date    Anemia      AVM (arteriovenous malformation) of small bowel, acquired ([facility] [address] )      Wills's esophagus      Cirilo ulcer      Esophagitis      History of blood transfusion           Admitting Diagnosis: Melena [K92 1]  GI bleeding [K92 2]  Rectal bleeding [K62 5]  AVM (arteriovenous malformation) [Q27 30]  Anemia [D64 9]     Age/Sex: 58 y o  male     Assessment/Plan: Principal Problem:    Melena  Active Problems:    Iron deficiency anemia due to chronic blood loss    Cirilo ulcer    AVM (arteriovenous malformation)    Wills's esophagus    Esophagitis        Plan for the Primary Problem(s):  1   Melena likely secondary to Wills's esophagitis, Cirilo ulcer and AVM   With concomitant alcohol usage   Fortunately the patient's hemoglobin is stable at 8 3   Discussed the case with Gastroenterology nurse practitioner [facility]  Jose Schultz is currently investigating whether the patient may be able to get an EGD today  Peggi Asa will make the patient NPO   We will provide Protonix IV b i d  Dufm Tash an H&H Q 6     Plan for Additional Problems:   2   Iron deficiency anemia due to chronic blood loss-the patient's hemoglobin is 8 3 on discharge approximately 3 weeks ago it was 8 5   During the prior admission the patient did need transfusion  Terri Kenya his hemoglobin is stable as is his vital signs will hold off on any transfusion   Monitor with fluids for any dilutional effect  3   Alcohol excessive usage-the patient was counseled that he must stop drinking all alcohol including wine   We will provide the patient with a banana bag running at 75 mL an hour   Will provide Ativan p r n    Osei Xavier has no signs of withdrawal  4   Insomnia-melatonin HS p r n         VTE Prophylaxis: scd   Code Status:  Full code     Anticipated Length of Stay: Osei Xavier will be admitted on an Inpatient basis with an anticipated length of stay of  Greater than 2 midnights            Admission Orders:  Scheduled Meds:   melatonin 3 mg Oral HS pantoprazole 40 mg Intravenous Q12H [facility]   sucralfate 1,000 mg Oral 4x Daily (AC & HS)   IVPB builder   Intravenous Daily      Continuous Infusions:   sodium chloride 125 mL/hr Last Rate: 125 mL/hr (11/04/17 0650)      PRN Meds:   acetaminophen    LORazepam    ondansetron      hse dental soft thin liq diet  Up w/assist  H&h q6h  SCD's     PER GI 11/3:   Assessment and Plan:  1   Melena likely upper GI bleed   Will do EGD today   Patient should remain NPO with Protonix drip  Colonoscopy monday  2   Acute on chronic Iron deficiency anemia secondary to #1   Frequent  H&H, transfuse as needed  3   Odynophagia likely secondary to erosive esophagitis   Will likely restart Carafate after EGD      Impression:  1   Melena, acute GI bleed   2   History of peptic esophageal stricture, Wills's esophagus     Plan:  1   EGD today   2   Possible colonoscopy on Monday 3   Continue PPI drip 4   Agree with the addition of Carafate     PER GI 11/4:  Assessment/Plan:  1   Melena/acute GI bleed   Status post EGD which showed peptic esophageal stricture that was quite friable, esophagitis and Wills's esophagus     Biopsies are pending   Patient will be scheduled for a colonoscopy on Monday  Continue clear liquid diet  Continue PPI b i d  will discuss adding Carafate and advancing diet to full liquid with primary    2 acute on chronic microcytic anemia   Hemoglobin is low but currently stable   The continues to drop transfuse as needed   Continue frequent H&H

## 2017-11-21 NOTE — CASE MANAGEMENT
Mercedes Gupta, RN Registered Nurse Signed   Case Management Date of Service: 11/6/2017 12:31 PM         []Hide copied text  Domenico Reagan RN Registered Nurse Signed     Case Management Date of Service: 11/4/2017 12:45 PM          []Hide copied text     St  [address] [facility] in the [facility] by [address] 2017  Network Utilization Review Department  Phone: [phone]; Fax [phone]  ATTENTION: The Network Utilization Review Department is now centralized for our 7 Facilities  Make a note that we have a new phone and fax numbers for our Department  Please call with any questions or concerns to [phone] IRMA carefully follow the prompts so that you are directed to the right person  All voicemails are confidential  Fax any determinations, approvals, denials, and requests for initial or continue stay review clinical to [phone]  Due to HIGH CALL volume, it would be easier if you could please send faxed requests to expedite your requests and in part, help us provide discharge notifications faster      Initial Clinical Review     Admission: Date/Time/Statement: 11/3/17 @ 1020                Orders Placed This Encounter   Procedures    Inpatient Admission (expected length of stay for this patient is greater than two midnights)       Standing Status:   Standing       Number of Occurrences:   1       Order Specific Question:   Admitting Physician       Answer:   LEON LEMON [[id number]]       Order Specific Question:   Level of Care       Answer:   Med Surg [16]       Order Specific Question:   Estimated length of stay       Answer:   More than 2 Midnights       Order Specific Question:   Certification       Answer:   I certify that inpatient services are medically necessary for this patient for a duration of greater than two midnights   See H&P and MD Progress Notes for additional information about the patient's course of treatment             ED: Date/Time/Mode of Arrival:                                ED Arrival Information      Expected Arrival Acuity Means of Arrival Escorted By Service Admission Type     - 11/3/2017 08:53 Urgent Walk-In Family Member General Medicine Urgent     Arrival Complaint     rectom bleeding, fatigue             Chief Complaint:           Chief Complaint   Patient presents with    Rectal Bleeding       First noted blood in stools since last week- getting worse   States has hx of same   Feels weak         History of Illness: Patient is a pleasant 41-year-old male with a known history of Cirilo lesions, Wills's esophagitis, duodenal AVM, and a history of intermittent alcohol excessive usage   The patient presents today with 1 week of progressive fatigue nd add dark black stools   He is getting occasional abdominal cramping   He has not seen any homer blood   The patient claims he is compliant with his medication regimen and modifying his diet   However he still is having a little bit of alcohol   Today he was extremely fatigued and came to the hospital for evaluation      In the ER the patient's vital signs were stable   His comprehensive metabolic panel was within normal limits   Hemoglobin 8 3, , platelets 452   Troponin negative   EKG 70 beats per minute normal sinus rhythm     dark black stools and progressive weakness and fatigue   No homer blood   Occasional abdominal cramping not necessarily associated with bowel movements   He is also having severe reflux and burning in his chest but no substernal chest pain or shortness of breath or palpitations   He says the reflexes so severe it has caused a change in his voice   The patient denies any nausea or vomiting   He claims that his stools have been formed but very black   He denies any dysuria or dark urine or urinary retention  Mark Mcgraw further denies any fevers chills or recent illnesses     ED Vital Signs:                             ED Triage Vitals   Temperature Pulse Respirations Blood Pressure SpO2   11/03/17 0902 11/03/17 0902 11/03/17 0902 11/03/17 0902 11/03/17 0904   (!) 97 4 °F (36 3 °C) 68 14 140/74 100 %       Temp Source Heart Rate Source Patient Position - Orthostatic VS BP Location FiO2 (%)   11/03/17 0902 11/03/17 0957 11/03/17 1200 11/03/17 1200 --   Oral Monitor Lying Left leg         Pain Score           11/03/17 0902           No Pain                  Wt Readings from Last 1 Encounters:   11/03/17 66 5 kg (146 lb 11 oz)      Abnormal Labs/Diagnostic Test Results:   11/3: hgb 8 3, 7 7     hct 29 2, 27 1   Plate 616  66/8: hgb 7 5, 7 7   hct 26 3, 27 1   Cl 109   Ca 7 9   t prot 5 9   Alb 2 9   Plate 131  Egd: 1   Peptic esophageal stricture 2   Wills's esophagus 3   Esophagitis   EKG: nsr, R axis, poor R progression     ED Treatment:                                        Medication Administration from 11/03/2017 0853 to 11/03/2017 1149        Date/Time Order Dose Route Action Action by Comments       11/03/2017 1019 sodium chloride 0 9 % bolus 500 mL 0 mL Intravenous Stopped Latoya Clinton RN         11/03/2017 0929 sodium chloride 0 9 % bolus 500 mL 500 mL Intravenous New Bag Rut Hylton RN         11/03/2017 1011 pantoprazole (PROTONIX) 80 mg in sodium chloride 0 9 % 100 mL IVPB 0 mg Intravenous Stopped Latoya Clinton RN         11/03/2017 0951 pantoprazole (PROTONIX) 80 mg in sodium chloride 0 9 % 100 mL IVPB 80 mg Intravenous New Bag Latoya Clinton RN               Past Medical/Surgical History:           Active Ambulatory Problems     Diagnosis Date Noted    Iron deficiency anemia due to chronic blood loss 10/06/2017    Lerry Gildardo ulcer 10/06/2017    AVM (arteriovenous malformation) 10/06/2017    AVM (arteriovenous malformation) of small bowel, acquired ([facility] [address] )      History of blood transfusion      Cirilo ulcer      Wills's esophagus      Melena 10/05/2017              Resolved Ambulatory Problems     Diagnosis Date Noted    Chest pain 10/06/2017  GI bleeding 10/06/2017    SHALA (acute kidney injury) ([facility] ) 10/06/2017    Thrombocytosis ([facility] ) 10/06/2017    GI bleed 10/05/2017              Past Medical History:   Diagnosis Date    Anemia      AVM (arteriovenous malformation) of small bowel, acquired ([facility] )      Wills's esophagus      Cirilo ulcer      Esophagitis      History of blood transfusion           Admitting Diagnosis: Melena [K92 1]  GI bleeding [K92 2]  Rectal bleeding [K62 5]  AVM (arteriovenous malformation) [Q27 30]  Anemia [D64 9]     Age/Sex: 58 y  o  male     Assessment/Plan: Principal Problem:    Melena  Active Problems:    Iron deficiency anemia due to chronic blood loss    Cirilo ulcer    AVM (arteriovenous malformation)    Wills's esophagus    Esophagitis        Plan for the Primary Problem(s):  1   Melena likely secondary to Wills's esophagitis, Cirilo ulcer and AVM   With concomitant alcohol usage   Fortunately the patient's hemoglobin is stable at 8 3   Discussed the case with Gastroenterology nurse practitioner [facility]  Deo Betancourt is currently investigating whether the patient may be able to get an EGD today  Sebas Mota will make the patient NPO   We will provide Protonix IV b i d  Maude Cano an H&H Q 6     Plan for Additional Problems:   2   Iron deficiency anemia due to chronic blood loss-the patient's hemoglobin is 8 3 on discharge approximately 3 weeks ago it was 8 5   During the prior admission the patient did need transfusion  Lianet Downs his hemoglobin is stable as is his vital signs will hold off on any transfusion   Monitor with fluids for any dilutional effect  3   Alcohol excessive usage-the patient was counseled that he must stop drinking all alcohol including wine   We will provide the patient with a banana bag running at 75 mL an hour   Will provide Ativan p r n  Marshal Jocelynn Abdi has no signs of withdrawal  4   Insomnia-melatonin HS p r n         VTE Prophylaxis: scd   Code Status:  Full code     Anticipated Length of Stay: Medina Flores will be admitted on an Inpatient basis with an anticipated length of stay of  Greater than 2 midnights            Admission Orders:  Scheduled Meds:   melatonin 3 mg Oral HS   pantoprazole 40 mg Intravenous Q12H [address]   sucralfate 1,000 mg Oral 4x Daily (AC & HS)   IVPB builder   Intravenous Daily      Continuous Infusions:   sodium chloride 125 mL/hr Last Rate: 125 mL/hr (11/04/17 0650)      PRN Meds:   acetaminophen  Burnice Conn     hse dental soft thin liq diet  Up w/assist  H&h q6h  SCD's     PER GI 11/3:   Assessment and Plan:  1   Melena likely upper GI bleed   Will do EGD today   Patient should remain NPO with Protonix drip  Colonoscopy monday  2   Acute on chronic Iron deficiency anemia secondary to #1   Frequent  H&H, transfuse as needed  3   Odynophagia likely secondary to erosive esophagitis   Will likely restart Carafate after EGD      Impression:  1   Melena, acute GI bleed   2   History of peptic esophageal stricture, Wills's esophagus     Plan:  1   EGD today   2   Possible colonoscopy on Monday 3   Continue PPI drip 4   Agree with the addition of Carafate     PER GI 11/4:  Assessment/Plan:  1   Melena/acute GI bleed   Status post EGD which showed peptic esophageal stricture that was quite friable, esophagitis and Wills's esophagus     Biopsies are pending   Patient will be scheduled for a colonoscopy on Monday  Continue clear liquid diet  Continue PPI b i d  will discuss adding Carafate and advancing diet to full liquid with primary    2 acute on chronic microcytic anemia   Hemoglobin is low but currently stable   The continues to drop transfuse as needed   Continue frequent H&H

## 2018-01-21 ENCOUNTER — APPOINTMENT (INPATIENT)
Dept: RADIOLOGY | Facility: HOSPITAL | Age: 63
DRG: 381 | End: 2018-01-21
Payer: COMMERCIAL

## 2018-01-21 ENCOUNTER — HOSPITAL ENCOUNTER (INPATIENT)
Facility: HOSPITAL | Age: 63
LOS: 3 days | Discharge: HOME | DRG: 381 | End: 2018-01-24
Attending: EMERGENCY MEDICINE | Admitting: INTERNAL MEDICINE
Payer: COMMERCIAL

## 2018-01-21 DIAGNOSIS — K25.9 CAMERON ULCER: ICD-10-CM

## 2018-01-21 DIAGNOSIS — IMO0002 ALCOHOL USE DISORDER: ICD-10-CM

## 2018-01-21 DIAGNOSIS — K22.70 BARRETT'S ESOPHAGUS: ICD-10-CM

## 2018-01-21 DIAGNOSIS — D64.9 ANEMIA: Primary | ICD-10-CM

## 2018-01-21 DIAGNOSIS — D50.0 IRON DEFICIENCY ANEMIA DUE TO CHRONIC BLOOD LOSS: ICD-10-CM

## 2018-01-21 DIAGNOSIS — K20.90 ESOPHAGITIS: ICD-10-CM

## 2018-01-21 PROBLEM — R06.02 SOB (SHORTNESS OF BREATH): Status: ACTIVE | Noted: 2018-01-21

## 2018-01-21 LAB
ABO GROUP BLD: NORMAL
ALBUMIN SERPL BCP-MCNC: 3.5 G/DL (ref 3.5–5)
ALP SERPL-CCNC: 100 U/L (ref 46–116)
ALT SERPL W P-5'-P-CCNC: 16 U/L (ref 12–78)
ANION GAP SERPL CALCULATED.3IONS-SCNC: 7 MMOL/L (ref 4–13)
APTT PPP: 31 SECONDS (ref 23–35)
AST SERPL W P-5'-P-CCNC: 19 U/L (ref 5–45)
ATRIAL RATE: 66 BPM
BASOPHILS # BLD AUTO: 0.19 THOUSANDS/ΜL (ref 0–0.1)
BASOPHILS NFR BLD AUTO: 3 % (ref 0–1)
BILIRUB SERPL-MCNC: 0.49 MG/DL (ref 0.2–1)
BLD GP AB SCN SERPL QL: NEGATIVE
BUN SERPL-MCNC: 11 MG/DL (ref 5–25)
CALCIUM SERPL-MCNC: 8.3 MG/DL (ref 8.3–10.1)
CHLORIDE SERPL-SCNC: 104 MMOL/L (ref 100–108)
CO2 SERPL-SCNC: 29 MMOL/L (ref 21–32)
CREAT SERPL-MCNC: 0.9 MG/DL (ref 0.6–1.3)
EOSINOPHIL # BLD AUTO: 0.36 THOUSAND/ΜL (ref 0–0.61)
EOSINOPHIL NFR BLD AUTO: 6 % (ref 0–6)
ERYTHROCYTE [DISTWIDTH] IN BLOOD BY AUTOMATED COUNT: 21 % (ref 11.6–15.1)
GFR SERPL CREATININE-BSD FRML MDRD: 91 ML/MIN/1.73SQ M
GLUCOSE SERPL-MCNC: 112 MG/DL (ref 65–140)
HCT VFR BLD AUTO: 24.3 % (ref 36.5–49.3)
HCT VFR BLD AUTO: 24.9 % (ref 36.5–49.3)
HCT VFR BLD AUTO: 25.8 % (ref 36.5–49.3)
HGB BLD-MCNC: 6.6 G/DL (ref 12–17)
HGB BLD-MCNC: 6.8 G/DL (ref 12–17)
HGB BLD-MCNC: 7 G/DL (ref 12–17)
INR PPP: 1.09 (ref 0.86–1.16)
LYMPHOCYTES # BLD AUTO: 0.87 THOUSANDS/ΜL (ref 0.6–4.47)
LYMPHOCYTES NFR BLD AUTO: 14 % (ref 14–44)
MCH RBC QN AUTO: 17.6 PG (ref 26.8–34.3)
MCHC RBC AUTO-ENTMCNC: 26.5 G/DL (ref 31.4–37.4)
MCV RBC AUTO: 66 FL (ref 82–98)
MONOCYTES # BLD AUTO: 0.94 THOUSAND/ΜL (ref 0.17–1.22)
MONOCYTES NFR BLD AUTO: 15 % (ref 4–12)
NEUTROPHILS # BLD AUTO: 4.07 THOUSANDS/ΜL (ref 1.85–7.62)
NEUTS SEG NFR BLD AUTO: 62 % (ref 43–75)
P AXIS: 65 DEGREES
PLATELET # BLD AUTO: 791 THOUSANDS/UL (ref 149–390)
PMV BLD AUTO: 9.7 FL (ref 8.9–12.7)
POTASSIUM SERPL-SCNC: 3.7 MMOL/L (ref 3.5–5.3)
PR INTERVAL: 194 MS
PROT SERPL-MCNC: 7.2 G/DL (ref 6.4–8.2)
PROTHROMBIN TIME: 14.1 SECONDS (ref 12.1–14.4)
QRS AXIS: 102 DEGREES
QRSD INTERVAL: 84 MS
QT INTERVAL: 386 MS
QTC INTERVAL: 404 MS
RBC # BLD AUTO: 3.75 MILLION/UL (ref 3.88–5.62)
RH BLD: POSITIVE
SODIUM SERPL-SCNC: 140 MMOL/L (ref 136–145)
SPECIMEN EXPIRATION DATE: NORMAL
SPECIMEN SOURCE: NORMAL
T WAVE AXIS: 84 DEGREES
TROPONIN I BLD-MCNC: 0.01 NG/ML (ref 0–0.08)
TROPONIN I SERPL-MCNC: <0.02 NG/ML
TROPONIN I SERPL-MCNC: <0.02 NG/ML
VENTRICULAR RATE: 66 BPM
WBC # BLD AUTO: 6.43 THOUSAND/UL (ref 4.31–10.16)

## 2018-01-21 PROCEDURE — P9040 RBC LEUKOREDUCED IRRADIATED: HCPCS

## 2018-01-21 PROCEDURE — 86850 RBC ANTIBODY SCREEN: CPT | Performed by: EMERGENCY MEDICINE

## 2018-01-21 PROCEDURE — 85025 COMPLETE CBC W/AUTO DIFF WBC: CPT | Performed by: EMERGENCY MEDICINE

## 2018-01-21 PROCEDURE — 85018 HEMOGLOBIN: CPT | Performed by: EMERGENCY MEDICINE

## 2018-01-21 PROCEDURE — 93005 ELECTROCARDIOGRAM TRACING: CPT

## 2018-01-21 PROCEDURE — 84484 ASSAY OF TROPONIN QUANT: CPT | Performed by: PHYSICIAN ASSISTANT

## 2018-01-21 PROCEDURE — 36415 COLL VENOUS BLD VENIPUNCTURE: CPT | Performed by: EMERGENCY MEDICINE

## 2018-01-21 PROCEDURE — 99285 EMERGENCY DEPT VISIT HI MDM: CPT

## 2018-01-21 PROCEDURE — 85610 PROTHROMBIN TIME: CPT | Performed by: EMERGENCY MEDICINE

## 2018-01-21 PROCEDURE — 30233N1 TRANSFUSION OF NONAUTOLOGOUS RED BLOOD CELLS INTO PERIPHERAL VEIN, PERCUTANEOUS APPROACH: ICD-10-PCS | Performed by: FAMILY MEDICINE

## 2018-01-21 PROCEDURE — 84484 ASSAY OF TROPONIN QUANT: CPT

## 2018-01-21 PROCEDURE — 71045 X-RAY EXAM CHEST 1 VIEW: CPT

## 2018-01-21 PROCEDURE — P9016 RBC LEUKOCYTES REDUCED: HCPCS

## 2018-01-21 PROCEDURE — 86923 COMPATIBILITY TEST ELECTRIC: CPT

## 2018-01-21 PROCEDURE — 85014 HEMATOCRIT: CPT | Performed by: EMERGENCY MEDICINE

## 2018-01-21 PROCEDURE — 36430 TRANSFUSION BLD/BLD COMPNT: CPT

## 2018-01-21 PROCEDURE — 80053 COMPREHEN METABOLIC PANEL: CPT | Performed by: EMERGENCY MEDICINE

## 2018-01-21 PROCEDURE — 93005 ELECTROCARDIOGRAM TRACING: CPT | Performed by: PHYSICIAN ASSISTANT

## 2018-01-21 PROCEDURE — 82272 OCCULT BLD FECES 1-3 TESTS: CPT

## 2018-01-21 PROCEDURE — 85014 HEMATOCRIT: CPT | Performed by: PHYSICIAN ASSISTANT

## 2018-01-21 PROCEDURE — 85730 THROMBOPLASTIN TIME PARTIAL: CPT | Performed by: EMERGENCY MEDICINE

## 2018-01-21 PROCEDURE — 86900 BLOOD TYPING SEROLOGIC ABO: CPT | Performed by: EMERGENCY MEDICINE

## 2018-01-21 PROCEDURE — 86901 BLOOD TYPING SEROLOGIC RH(D): CPT | Performed by: EMERGENCY MEDICINE

## 2018-01-21 PROCEDURE — 85018 HEMOGLOBIN: CPT | Performed by: PHYSICIAN ASSISTANT

## 2018-01-21 RX ORDER — PANTOPRAZOLE SODIUM 40 MG/1
40 TABLET, DELAYED RELEASE ORAL
Status: DISCONTINUED | OUTPATIENT
Start: 2018-01-21 | End: 2018-01-24 | Stop reason: HOSPADM

## 2018-01-21 RX ORDER — FERROUS SULFATE 325(65) MG
325 TABLET ORAL
Status: DISCONTINUED | OUTPATIENT
Start: 2018-01-22 | End: 2018-01-24 | Stop reason: HOSPADM

## 2018-01-21 RX ORDER — ASCORBIC ACID 500 MG
500 TABLET ORAL DAILY
Status: DISCONTINUED | OUTPATIENT
Start: 2018-01-21 | End: 2018-01-24 | Stop reason: HOSPADM

## 2018-01-21 RX ORDER — ONDANSETRON 2 MG/ML
4 INJECTION INTRAMUSCULAR; INTRAVENOUS EVERY 6 HOURS PRN
Status: DISCONTINUED | OUTPATIENT
Start: 2018-01-21 | End: 2018-01-24 | Stop reason: HOSPADM

## 2018-01-21 RX ORDER — SUCRALFATE ORAL 1 G/10ML
1000 SUSPENSION ORAL
Status: DISCONTINUED | OUTPATIENT
Start: 2018-01-21 | End: 2018-01-24 | Stop reason: HOSPADM

## 2018-01-21 RX ORDER — ACETAMINOPHEN 325 MG/1
650 TABLET ORAL EVERY 6 HOURS PRN
Status: DISCONTINUED | OUTPATIENT
Start: 2018-01-21 | End: 2018-01-24 | Stop reason: HOSPADM

## 2018-01-21 RX ORDER — THIAMINE MONONITRATE (VIT B1) 100 MG
100 TABLET ORAL DAILY
Status: DISCONTINUED | OUTPATIENT
Start: 2018-01-21 | End: 2018-01-24 | Stop reason: HOSPADM

## 2018-01-21 RX ORDER — LORAZEPAM 2 MG/ML
2 INJECTION INTRAMUSCULAR EVERY 4 HOURS PRN
Status: DISCONTINUED | OUTPATIENT
Start: 2018-01-21 | End: 2018-01-24 | Stop reason: HOSPADM

## 2018-01-21 RX ORDER — CHOLECALCIFEROL (VITAMIN D3) 125 MCG
1000 CAPSULE ORAL DAILY
Status: DISCONTINUED | OUTPATIENT
Start: 2018-01-21 | End: 2018-01-24 | Stop reason: HOSPADM

## 2018-01-21 RX ORDER — LANOLIN ALCOHOL/MO/W.PET/CERES
1000 CREAM (GRAM) TOPICAL DAILY
Status: ON HOLD | COMMUNITY
End: 2019-05-17 | Stop reason: SDUPTHER

## 2018-01-21 RX ADMIN — CYANOCOBALAMIN TAB 500 MCG 1000 MCG: 500 TAB at 13:37

## 2018-01-21 RX ADMIN — PANTOPRAZOLE SODIUM 40 MG: 40 TABLET, DELAYED RELEASE ORAL at 16:06

## 2018-01-21 RX ADMIN — Medication 100 MG: at 13:37

## 2018-01-21 RX ADMIN — SUCRALFATE 1000 MG: 1 SUSPENSION ORAL at 16:05

## 2018-01-21 RX ADMIN — SUCRALFATE 1000 MG: 1 SUSPENSION ORAL at 23:03

## 2018-01-21 RX ADMIN — OXYCODONE HYDROCHLORIDE AND ACETAMINOPHEN 500 MG: 500 TABLET ORAL at 13:37

## 2018-01-21 NOTE — ED PROVIDER NOTES
History  Chief Complaint   Patient presents with    Fatigue     Pt c/o feeling fatigued for about a week and states he believed his hemoglobin is low causing him to feel fatigued, reports "throat pain" for a month, reports SOB, denies CP       History provided by:  Patient   used: No    Fatigue   Severity:  Moderate  Onset quality:  Gradual  Duration:  1 week  Timing:  Constant  Progression:  Worsening  Chronicity:  Recurrent  Context: alcohol use    Context comment:  H/O GI bleeding in the past with AVMs, esophagitis and ulcers last admitted november and received iron  Relieved by:  Nothing  Worsened by: Activity (laaying down)  Ineffective treatments:  None tried (takes medication as directed )  Associated symptoms: no abdominal pain, no anorexia, no chest pain, no cough, no diarrhea, no dysuria, no fever, no hematochezia, no loss of consciousness, no melena, no nausea, no shortness of breath and no vomiting    Risk factors: anemia    Risk factors: no heart disease      Patient has received multiple blood transfusions in the past   Has not noticed any blood in his stools or melena recently but has been feeling very fatigued  Looking through his history he had an iron transfusion back in November but had blood transfusions prior to that  He has a history of esophagitis, AVMs and his intestines and apparently also his esophagus  He does drink alcohol 3 times a week  He does not smoke  He has no chest pain  He is feeling fatigued both with some activity and lying down but he has no chest pain or shortness of breath  No known history of cardiac disease  Looking through the old records hemoglobin seems to be run in the low 8s  Prior to Admission Medications   Prescriptions Last Dose Informant Patient Reported? Taking?    cyanocobalamin (VITAMIN B-12) 1,000 mcg tablet   Yes Yes   Sig: Take 1,000 mcg by mouth daily   pantoprazole (PROTONIX) 40 mg tablet   No Yes   Sig: Take 1 tablet by mouth 2 (two) times a day   sucralfate (CARAFATE) 1 g/10 mL suspension   No Yes   Sig: Take 10 mL by mouth 4 (four) times a day (before meals and at bedtime)      Facility-Administered Medications: None       Past Medical History:   Diagnosis Date    Anemia     AVM (arteriovenous malformation) of small bowel, acquired ([facility] [address] )     Wills's esophagus     Cirilo ulcer     Esophagitis     History of blood transfusion     22 prior transfusions       Past Surgical History:   Procedure Laterality Date    APPENDECTOMY      COLONOSCOPY N/A 11/6/2017    Procedure: COLONOSCOPY;  Surgeon: Alexis Burks MD;  [facility]; Service: Gastroenterology    EGD AND COLONOSCOPY  07/18/2017    ESOPHAGOGASTRODUODENOSCOPY N/A 10/6/2017    Procedure: ESOPHAGOGASTRODUODENOSCOPY (EGD) with biopsy;  Surgeon: Bouchra Gaines MD;  [facility]: [facility]; Service: Gastroenterology    ESOPHAGOGASTRODUODENOSCOPY N/A 11/3/2017    Procedure: ESOPHAGOGASTRODUODENOSCOPY (EGD) with biopsy;  Surgeon: Gage Casas MD;  Location: [facility]; Service: Gastroenterology       History reviewed  No pertinent family history  I have reviewed and agree with the history as documented  Social History   Substance Use Topics    Smoking status: Former Smoker     Quit date: 11/3/2014    Smokeless tobacco: Never Used    Alcohol use 4 8 oz/week     8 Shots of liquor per week        Review of Systems   Constitutional: Positive for fatigue  Negative for chills and fever  HENT: Negative for congestion and sore throat  Respiratory: Negative for cough, chest tightness and shortness of breath  Cardiovascular: Negative for chest pain  Gastrointestinal: Negative for abdominal pain, anorexia, blood in stool, diarrhea, hematochezia, melena, nausea and vomiting  Genitourinary: Negative for dysuria and flank pain  Skin: Positive for rash  Many telangiectasias to the face     Neurological: Negative for loss of consciousness, weakness and light-headedness  All other systems reviewed and are negative  Physical Exam  ED Triage Vitals [01/21/18 0945]   Temperature Pulse Respirations Blood Pressure SpO2   98 °F (36 7 °C) 70 18 144/67 100 %      Temp Source Heart Rate Source Patient Position - Orthostatic VS BP Location FiO2 (%)   Temporal Monitor Sitting Right arm --      Pain Score       1           Orthostatic Vital Signs  Vitals:    01/21/18 0945   BP: 144/67   Pulse: 70   Patient Position - Orthostatic VS: Sitting       Physical Exam   Constitutional: He appears well-developed and well-nourished  He is cooperative  Non-toxic appearance  He does not have a sickly appearance  He does not appear ill  No distress  HENT:   Head: Normocephalic and atraumatic  Right Ear: Hearing normal  No drainage or swelling  Left Ear: Hearing normal  No drainage or swelling  Mouth/Throat: Uvula is midline, oropharynx is clear and moist and mucous membranes are normal  No oropharyngeal exudate  Eyes: Conjunctivae and lids are normal  Right eye exhibits no discharge  Left eye exhibits no discharge  Neck: Trachea normal and normal range of motion  No JVD present  Cardiovascular: Normal rate, regular rhythm, normal heart sounds, intact distal pulses and normal pulses  Exam reveals no gallop and no friction rub  No murmur heard  Pulmonary/Chest: Effort normal and breath sounds normal  No stridor  No respiratory distress  He has no wheezes  He has no rales  Abdominal: Soft  Normal appearance  He exhibits no distension, no ascites and no mass  There is no hepatosplenomegaly  There is no tenderness  There is no rebound, no guarding and no CVA tenderness  Genitourinary: Rectum normal  Rectal exam shows guaiac negative stool  Musculoskeletal: Normal range of motion  He exhibits no edema  Lymphadenopathy:        Right: No inguinal adenopathy present  Left: No inguinal adenopathy present  Neurological: He is alert   He has normal strength  No sensory deficit  He exhibits normal muscle tone  Gait normal  GCS eye subscore is 4  GCS verbal subscore is 5  GCS motor subscore is 6  Skin: Skin is warm, dry and intact  No rash noted  He is not diaphoretic  No pallor  Facial telangiectasias  Psychiatric: He has a normal mood and affect  His speech is normal  Cognition and memory are normal    Nursing note and vitals reviewed  ED Medications  Medications - No data to display    Diagnostic Studies  Results Reviewed     Procedure Component Value Units Date/Time    Protime-INR [[id number]]  (Normal) Collected:  01/21/18 0957    Lab Status:  Final result Specimen:  Blood from Arm, Left Updated:  01/21/18 1027     Protime 14 1 seconds      INR 1 09    APTT [[id number]]  (Normal) Collected:  01/21/18 0957    Lab Status:  Final result Specimen:  Blood from Arm, Left Updated:  01/21/18 1027     PTT 31 seconds     Narrative: Therapeutic Heparin Range = 60-90 seconds    Hemoglobin and hematocrit, blood [[id number]]     Lab Status:  No result Specimen:  Blood     Comprehensive metabolic panel [[id number]] Collected:  01/21/18 0957    Lab Status:  Final result Specimen:  Blood from Arm, Left Updated:  01/21/18 1015     Sodium 140 mmol/L      Potassium 3 7 mmol/L      Chloride 104 mmol/L      CO2 29 mmol/L      Anion Gap 7 mmol/L      BUN 11 mg/dL      Creatinine 0 90 mg/dL      Glucose 112 mg/dL      Calcium 8 3 mg/dL      AST 19 U/L      ALT 16 U/L      Alkaline Phosphatase 100 U/L      Total Protein 7 2 g/dL      Albumin 3 5 g/dL      Total Bilirubin 0 49 mg/dL      eGFR 91 ml/min/1 73sq m     Narrative:         National Kidney Disease Education Program recommendations are as follows:  GFR calculation is accurate only with a steady state creatinine  Chronic Kidney disease less than 60 ml/min/1 73 sq  meters  Kidney failure less than 15 ml/min/1 73 sq  meters      CBC and differential [[id number]]  (Abnormal) Collected:  01/21/18 0957    Lab Status:  Final result Specimen:  Blood from Arm, Left Updated:  01/21/18 1009     WBC 6 43 Thousand/uL      RBC 3 75 (L) Million/uL      Hemoglobin 6 6 (LL) g/dL      Hematocrit 24 9 (L) %      MCV 66 (L) fL      MCH 17 6 (L) pg      MCHC 26 5 (L) g/dL      RDW 21 0 (H) %      MPV 9 7 fL      Platelets 817 (H) Thousands/uL      Neutrophils Relative 62 %      Lymphocytes Relative 14 %      Monocytes Relative 15 (H) %      Eosinophils Relative 6 %      Basophils Relative 3 (H) %      Neutrophils Absolute 4 07 Thousands/µL      Lymphocytes Absolute 0 87 Thousands/µL      Monocytes Absolute 0 94 Thousand/µL      Eosinophils Absolute 0 36 Thousand/µL      Basophils Absolute 0 19 (H) Thousands/µL                  No orders to display              Procedures  CriticalCare Time  Performed by: Kenneth Gonzalez by: Laurita Lagunas [facility] provider statement:     Critical care time (minutes):  35    Critical care time was exclusive of:  Separately billable procedures and treating other patients and teaching time    Critical care was necessary to treat or prevent imminent or life-threatening deterioration of the following conditions:  Circulatory failure (anemia requiring blood transfusion)    Critical care was time spent personally by me on the following activities:  Obtaining history from patient or surrogate, development of treatment plan with patient or surrogate, discussions with primary provider, evaluation of patient's response to treatment, review of old charts, re-evaluation of patient's condition and ordering and review of laboratory studies    I assumed direction of critical care for this patient from another provider in my specialty: no             Phone Contacts  ED Phone Contact    ED Course  ED Course                                MDM  Number of Diagnoses or Management Options  Anemia:   Diagnosis management comments: Symptomatic anemia hemoglobin less than 7 will require blood transfusion  No evidence of any active GI bleeding at the present time  He is taking his current medications including Protonix and Carafate  Will hold off on giving a Protonix infusion at the present time  This also may be due to iron deficiency as his MCV is very low as well and may need iron infusion  This vital signs currently are stable and per rectal exam he has no active bleeding at the present time  I discussed the case with Internal Medicine will admit to the hospital   Case was discussed with the family and the patient and consent form for blood transfusion obtained  Amount and/or Complexity of Data Reviewed  Clinical lab tests: reviewed and ordered  Decide to obtain previous medical records or to obtain history from someone other than the patient: yes  Review and summarize past medical records: yes  Discuss the patient with other providers: yes    Patient Progress  Patient progress: other (comment) (serious)    The patient presented with a condition in which there was a high probability of imminent or life-threatening deterioration, and critical care services (excluding separately billable procedures) totalled 30-74 minutes  Disposition  Final diagnoses:   Anemia     Time reflects when diagnosis was documented in both MDM as applicable and the Disposition within this note     Time User Action Codes Description Comment    1/21/2018 10:24 AM Axel Troncoso Add [D64 9] Anemia       ED Disposition     ED Disposition Condition Comment    Admit  Case was discussed with PA and the patient's admission status was agreed to be Admission Status: inpatient status to the service of Dr Huerta End   Follow-up Information    None       Patient's Medications   Discharge Prescriptions    No medications on file     No discharge procedures on file      ED Provider  Electronically Signed by           Sejal Brown MD  01/21/18 1250

## 2018-01-21 NOTE — ASSESSMENT & PLAN NOTE
Severe, symptomatic hgb of 6 6 from bl 7 8-8 0  Hx of venofer transfusions, consider 2* UGI microscopic bleed  Pt reports he is compliant with his iron, B12 and folate denies any melena or BRBPR, he does report that his GERD is waxing and waning and has been more severe the week prior to admission  FOBT negative by myself and ED provider  Recheck ferritin level and iron panel, patient is being transfused 1 IU PRBC by ED

## 2018-01-21 NOTE — H&P
H&P- Anais Galeana 1955, 58 y o  male MRN: [id number]    Unit/Bed#: ED 30 Encounter: [id number]    Primary Care Provider: So Modi PA-C   Date and time admitted to hospital: 1/21/2018  9:46 AM        1  History and Physical - [facility]    Patient Information: Anais Galeana  58 y o  male MRN: [id number]  Unit/Bed#: ED 30 Encounter: [id number]  Admitting Physician: Gaston Lyle PA-C  PCP: So Modi PA-C  Date of Admission:  01/21/18    Assessment/Plan:    Hospital Problem List:     Principal Problem:    SOB (shortness of breath)  Active Problems:    Iron deficiency anemia due to chronic blood loss    Esophagitis    Cirilo ulcer    AVM (arteriovenous malformation) of small bowel, acquired ([facility] [address] )    Wills's esophagus    * SOB (shortness of breath)   Assessment & Plan    Suspect 2* symptomatic anemia  Pt w/chronic sore throat/laryngitis suspect 2* severe erosive esophagitis  No hx of htn/hld/dm  Check CXR check EKG check POC troponin  Will risk stratify w/flp/hgb a1c        Iron deficiency anemia due to chronic blood loss   Assessment & Plan    Severe, symptomatic hgb of 6 6 from bl 7 8-8 0  Hx of venofer transfusions, consider 2* UGI microscopic bleed  Pt reports he is compliant with his iron, B12 and folate denies any melena or BRBPR, he does report that his GERD is waxing and waning and has been more severe the week prior to admission  FOBT negative by myself and ED provider  Recheck ferritin level and iron panel, patient is being transfused 1 IU PRBC by ED            Esophagitis   Assessment & Plan    Patient reports he is compliant with his diet but still drinks alcohol 2 to 3 times a week  Status post EGD 11/3 revealed friable peptic esophageal stricture, esophageal stricture and Wills's esophagus  Pathology pending  Colonoscopy 11/6 revealed slight tortuosity but failed to show any bleeding lesions    There was some prominent veins in the descending colon and internal hemorrhoids noted  Bxs at that time w/metaplasia but no malignancy  Will repeaat GI consult as pt's GERD s/sx appear to be waxing/waning but had been more severe, especially in light of worsening anemia        Braynt's esophagus   Assessment & Plan    Pt stopped smoking 4 years ago, still w/intermittent drinking  Has been avoiding red meats, and is on a NSA diet per his daughter  Continue surveillance per GI        AVM (arteriovenous malformation) of small bowel, acquired ([facility] [address] )   Assessment & Plan    On prior admission no evidence of active bleed        Cirilo ulcer   Assessment & Plan    Hx of, continue carafate/ppi          Alcohol use   Intermittent, pt has been encouraged complete cessation due to underlying GI issues, but still is drinking   No s/sx of withdrawal   Continue b12, will add thiamine, ativan prn s/sx withdrawal      VTE Prophylaxis: Pharmacologic VTE Prophylaxis contraindicated due to concern for microscopic GIB  / sequential compression device   Code Status:   POLST: There is no POLST form on file for this patient (pre-hospital)    Anticipated Length of Stay:  Patient will be admitted on an Inpatient basis with an anticipated length of stay of  Greater than 2 midnights  Justification for Hospital Stay: hx of erosive esophagitis, peptic esophagitis, ulcers, worsening anemia    Total Time for Visit, including Counseling / Coordination of Care: 45 minutes  Greater than 50% of this total time spent on direct patient counseling and coordination of care      Chief Complaint:   Sob, worsening fatigue x 2 weeks    History of Present Illness:    Felipe Prajapati  is a 58 y o  male who presents with PMH of severe ulcerative reflux esophagitis w/mild stricturing, severe mid esophageal erosive/ulcerative changes, bryant's esophagus w/ulceration, small hiatal hernia w/cirilo lesion, nonbleeding duodenal AVM on EGD, and hx of food impaction in April 2017, and iron deficiency anemia coming into the ED for worsening fatigue over the last 2 weeks  He is company by his imelda and son-in-law were at bedside  Per the patient he has been having worsening fatigue over the last 2 weeks  He has also been finding that he become slightly presyncopal with over exertion  He reports that his GERD symptoms have been waxing and waning but over the last week his daughter noted that he was complaining of worsening symptoms daily  He also has been noticing a worsening sore throat and hoarseness  He reports that when he lies down at night he also become short of breath  He reports that his reflux symptoms started in his epigastrium and radiated up to his neck  He denies any other type of chest pain  He denies any regurgitation symptoms, and reports that he is doing well with his low-salt diet although he is still drinking 2-3 times a week with a shot of mikael  He has not been smoking for over 4 years now  He has not been losing weight  He reports that he has been taking his Carafate 4 times a day, his PPI 2 times a day, his iron supplement daily, his folate and his B12  He was last admitted in in November of 2017  from November 3rd to November 7th for one week of melena  At that time [facility] and underwent EGD and colonoscopy which were negative for any active bleeding  His hemoglobin remained stable at 8 3 to 7 8  At time of discharge was 8 0  He has previously required iron transfusions for his anemia  Review of Systems:    Review of Systems   Constitutional: Negative for appetite change, chills and fever  HENT: Positive for sore throat  Respiratory: Positive for shortness of breath  Negative for cough  Cardiovascular: Positive for chest pain  Negative for palpitations  Gastrointestinal: Positive for abdominal pain  Negative for anal bleeding, blood in stool, diarrhea, nausea and vomiting  Neurological: Positive for light-headedness  All other systems reviewed and are negative  Past Medical and Surgical History:     Past Medical History:   Diagnosis Date    Anemia     AVM (arteriovenous malformation) of small bowel, acquired ([facility] [address] [facility]     Wills's esophagus     Cirilo ulcer     Esophagitis     History of blood transfusion     22 prior transfusions       Past Surgical History:   Procedure Laterality Date    APPENDECTOMY      COLONOSCOPY N/A 11/6/2017    Procedure: COLONOSCOPY;  Surgeon: Radha Andres MD;  [facility]: [facility]; Service: Gastroenterology    EGD AND COLONOSCOPY  07/18/2017    ESOPHAGOGASTRODUODENOSCOPY N/A 10/6/2017    Procedure: ESOPHAGOGASTRODUODENOSCOPY (EGD) with biopsy;  Surgeon: Radha Hammond MD;  Location: [facility]; Service: Gastroenterology    ESOPHAGOGASTRODUODENOSCOPY N/A 11/3/2017    Procedure: ESOPHAGOGASTRODUODENOSCOPY (EGD) with biopsy;  Surgeon: Laura Wilkinson MD;  [facility]: [facility]; Service: Gastroenterology       Meds/Allergies:    Prior to Admission medications    Medication Sig Start Date End Date Taking? Authorizing Provider   cyanocobalamin (VITAMIN B-12) 1,000 mcg tablet Take 1,000 mcg by mouth daily   Yes Historical Provider, MD   pantoprazole (PROTONIX) 40 mg tablet Take 1 tablet by mouth 2 (two) times a day 10/7/17  Yes Rebeca Durham DO   sucralfate (CARAFATE) 1 g/10 mL suspension Take 10 mL by mouth 4 (four) times a day (before meals and at bedtime) 11/7/17  Yes Boyd Lim DO   ascorbic acid (VITAMIN C) 250 MG tablet Take 1 tablet by mouth daily 11/7/17 1/21/18  Boyd Lim DO   ferrous sulfate 324 (65 Fe) mg Take 1 tablet by mouth daily before breakfast 11/7/17 1/21/18  Boyd Lim DO   folic acid (FOLVITE) 1 mg tablet Take 1 tablet by mouth daily 10/7/17 1/21/18  Isabel Ordonez DO   thiamine 100 MG tablet Take 1 tablet by mouth daily 10/7/17 1/21/18  Isabel Ordonez DO     I have reviewed home medications with patient personally  Allergies:    Allergies Allergen Reactions    Doxylamine GI Intolerance       Social History:     Marital Status:    Occupation:      Patient Pre-hospital Living Situation:   Patient Pre-hospital Level of Mobility:   Patient Pre-hospital Diet Restrictions:   Substance Use History:   History   Alcohol Use    4 8 oz/week    8 Shots of liquor per week     History   Smoking Status    Former Smoker    Quit date: 11/3/2014   Smokeless Tobacco    Never Used     History   Drug Use No       Family History:    History reviewed  No pertinent family history  Physical Exam:     Vitals:   Blood Pressure: 144/67 (01/21/18 0945)  Pulse: 70 (01/21/18 0945)  Temperature: 98 °F (36 7 °C) (01/21/18 0945)  Temp Source: Temporal (01/21/18 0945)  Respirations: 18 (01/21/18 0945)  Weight - Scale: 72 6 kg (160 lb) (01/21/18 0945)  SpO2: 100 % (01/21/18 0945)    Physical Exam   Constitutional: He appears well-developed and well-nourished  No distress  HENT:   Head: Normocephalic and atraumatic  Right Ear: External ear normal    Left Ear: External ear normal    Mouth/Throat: No oropharyngeal exudate  Mucous membranes are somewhat dry  Cherry hemangiomas noted on face, chronic   Eyes: Right eye exhibits no discharge  Left eye exhibits no discharge  No scleral icterus  Neck: Normal range of motion  Cardiovascular: Normal rate, regular rhythm, normal heart sounds and intact distal pulses  Exam reveals no gallop and no friction rub  No murmur heard  Pulmonary/Chest: Effort normal and breath sounds normal  No respiratory distress  He has no wheezes  He has no rales  He exhibits no tenderness  Abdominal: Bowel sounds are normal  He exhibits no distension  There is no tenderness  There is no rebound and no guarding  Genitourinary: Rectum normal  Rectal exam shows guaiac negative stool  Genitourinary Comments: No external hemorrhoids noted, rectal vault empty   Musculoskeletal: He exhibits no edema  Neurological: He is alert  Skin: Skin is warm and dry  He is not diaphoretic  Vitals reviewed  Additional Data:     Lab Results: I have personally reviewed pertinent reports  Results from last 7 days  Lab Units 01/21/18  0957   WBC Thousand/uL 6 43   HEMOGLOBIN g/dL 6 6*   HEMATOCRIT % 24 9*   PLATELETS Thousands/uL 791*   NEUTROS PCT % 62   LYMPHS PCT % 14   MONOS PCT % 15*   EOS PCT % 6       Results from last 7 days  Lab Units 01/21/18  0957   SODIUM mmol/L 140   POTASSIUM mmol/L 3 7   CHLORIDE mmol/L 104   CO2 mmol/L 29   BUN mg/dL 11   CREATININE mg/dL 0 90   CALCIUM mg/dL 8 3   TOTAL PROTEIN g/dL 7 2   BILIRUBIN TOTAL mg/dL 0 49   ALK PHOS U/L 100   ALT U/L 16   AST U/L 19   GLUCOSE RANDOM mg/dL 112       Results from last 7 days  Lab Units 01/21/18  0957   INR  1 09       Imaging: I have personally reviewed pertinent reports  No results found  EKG, Pathology, and Other Studies Reviewed on Admission:   · EKG: NSR w/o ST changes or T wave inversions 2+ leads    Allscripts / Epic Records Reviewed: Yes     ** Please Note: This note has been constructed using a voice recognition system   **

## 2018-01-21 NOTE — LETTER
January 24, 2018     Patient: Zita Santiago  YOB: 1955   Date of Visit: 1/21/2018       To Whom it May Concern:    Surjit Rosa was under my professional care  He was admitted to [facility] from January 21, 2018 and discharged on January 24, 2018  It is my opinion that he may return to work on Monday, January 29th, 2018       If you have any questions or concerns, please don't hesitate to call           Sincerely,          MD [facility], [address]  [phone] Office

## 2018-01-21 NOTE — ASSESSMENT & PLAN NOTE
Patient reports he is compliant with his diet but still drinks alcohol 2 to 3 times a week  Status post EGD 11/3 revealed friable peptic esophageal stricture, esophageal stricture and Wills's esophagus  Pathology pending  Colonoscopy 11/6 revealed slight tortuosity but failed to show any bleeding lesions    There was some prominent veins in the descending colon and internal hemorrhoids noted  Bxs at that time w/metaplasia but no malignancy  Will repeaat GI consult as pt's GERD s/sx appear to be waxing/waning but had been more severe, especially in light of worsening anemia

## 2018-01-21 NOTE — ASSESSMENT & PLAN NOTE
Pt stopped smoking 4 years ago, still w/intermittent drinking  Has been avoiding red meats, and is on a NSA diet per his daughter  Continue surveillance per GI

## 2018-01-21 NOTE — ASSESSMENT & PLAN NOTE
Suspect 2* symptomatic anemia  Pt w/chronic sore throat/laryngitis suspect 2* severe erosive esophagitis  No hx of htn/hld/dm  Check CXR check EKG check POC troponin  Will risk stratify w/flp/hgb a1c

## 2018-01-22 ENCOUNTER — ANESTHESIA EVENT (INPATIENT)
Dept: GASTROENTEROLOGY | Facility: HOSPITAL | Age: 63
DRG: 381 | End: 2018-01-22
Payer: COMMERCIAL

## 2018-01-22 PROBLEM — IMO0002 ALCOHOL USE DISORDER: Status: ACTIVE | Noted: 2018-01-22

## 2018-01-22 PROBLEM — D64.9 ANEMIA: Status: ACTIVE | Noted: 2018-01-21

## 2018-01-22 PROBLEM — F10.90 ALCOHOL USE DISORDER: Status: ACTIVE | Noted: 2018-01-22

## 2018-01-22 LAB
ABO GROUP BLD BPU: NORMAL
ABO GROUP BLD BPU: NORMAL
BASOPHILS # BLD AUTO: 0.09 THOUSANDS/ΜL (ref 0–0.1)
BASOPHILS NFR BLD AUTO: 2 % (ref 0–1)
BPU ID: NORMAL
BPU ID: NORMAL
CHOLEST SERPL-MCNC: 118 MG/DL (ref 50–200)
EOSINOPHIL # BLD AUTO: 0.44 THOUSAND/ΜL (ref 0–0.61)
EOSINOPHIL NFR BLD AUTO: 10 % (ref 0–6)
ERYTHROCYTE [DISTWIDTH] IN BLOOD BY AUTOMATED COUNT: 22.5 % (ref 11.6–15.1)
EST. AVERAGE GLUCOSE BLD GHB EST-MCNC: 91 MG/DL
FERRITIN SERPL-MCNC: 36 NG/ML (ref 8–388)
FOLATE SERPL-MCNC: 7.5 NG/ML (ref 3.1–17.5)
HBA1C MFR BLD: 4.8 % (ref 4.2–6.3)
HCT VFR BLD AUTO: 29.1 % (ref 36.5–49.3)
HCT VFR BLD AUTO: 29.9 % (ref 36.5–49.3)
HDLC SERPL-MCNC: 39 MG/DL (ref 40–60)
HGB BLD-MCNC: 8.1 G/DL (ref 12–17)
HGB BLD-MCNC: 8.6 G/DL (ref 12–17)
IRON SATN MFR SERPL: 45 %
IRON SERPL-MCNC: 148 UG/DL (ref 65–175)
LDLC SERPL CALC-MCNC: 58 MG/DL (ref 0–100)
LYMPHOCYTES # BLD AUTO: 1.01 THOUSANDS/ΜL (ref 0.6–4.47)
LYMPHOCYTES NFR BLD AUTO: 22 % (ref 14–44)
MCH RBC QN AUTO: 19.6 PG (ref 26.8–34.3)
MCHC RBC AUTO-ENTMCNC: 27.8 G/DL (ref 31.4–37.4)
MCV RBC AUTO: 71 FL (ref 82–98)
MONOCYTES # BLD AUTO: 0.72 THOUSAND/ΜL (ref 0.17–1.22)
MONOCYTES NFR BLD AUTO: 16 % (ref 4–12)
NEUTROPHILS # BLD AUTO: 2.35 THOUSANDS/ΜL (ref 1.85–7.62)
NEUTS SEG NFR BLD AUTO: 50 % (ref 43–75)
NRBC BLD AUTO-RTO: 0 /100 WBCS
PLATELET # BLD AUTO: 559 THOUSANDS/UL (ref 149–390)
PMV BLD AUTO: 9.7 FL (ref 8.9–12.7)
RBC # BLD AUTO: 4.13 MILLION/UL (ref 3.88–5.62)
TIBC SERPL-MCNC: 329 UG/DL (ref 250–450)
TRIGL SERPL-MCNC: 103 MG/DL
TSH SERPL DL<=0.05 MIU/L-ACNC: 3.16 UIU/ML (ref 0.36–3.74)
UNIT DISPENSE STATUS: NORMAL
UNIT DISPENSE STATUS: NORMAL
UNIT PRODUCT CODE: NORMAL
UNIT PRODUCT CODE: NORMAL
UNIT RH: NORMAL
UNIT RH: NORMAL
VIT B12 SERPL-MCNC: 605 PG/ML (ref 100–900)
WBC # BLD AUTO: 4.61 THOUSAND/UL (ref 4.31–10.16)

## 2018-01-22 PROCEDURE — 85025 COMPLETE CBC W/AUTO DIFF WBC: CPT | Performed by: PHYSICIAN ASSISTANT

## 2018-01-22 PROCEDURE — 82728 ASSAY OF FERRITIN: CPT | Performed by: PHYSICIAN ASSISTANT

## 2018-01-22 PROCEDURE — 83036 HEMOGLOBIN GLYCOSYLATED A1C: CPT | Performed by: PHYSICIAN ASSISTANT

## 2018-01-22 PROCEDURE — 85018 HEMOGLOBIN: CPT | Performed by: PHYSICIAN ASSISTANT

## 2018-01-22 PROCEDURE — 82607 VITAMIN B-12: CPT | Performed by: PHYSICIAN ASSISTANT

## 2018-01-22 PROCEDURE — 82746 ASSAY OF FOLIC ACID SERUM: CPT | Performed by: PHYSICIAN ASSISTANT

## 2018-01-22 PROCEDURE — 83550 IRON BINDING TEST: CPT | Performed by: PHYSICIAN ASSISTANT

## 2018-01-22 PROCEDURE — 85014 HEMATOCRIT: CPT | Performed by: PHYSICIAN ASSISTANT

## 2018-01-22 PROCEDURE — 83540 ASSAY OF IRON: CPT | Performed by: PHYSICIAN ASSISTANT

## 2018-01-22 PROCEDURE — 84443 ASSAY THYROID STIM HORMONE: CPT | Performed by: PHYSICIAN ASSISTANT

## 2018-01-22 PROCEDURE — 80061 LIPID PANEL: CPT | Performed by: PHYSICIAN ASSISTANT

## 2018-01-22 RX ADMIN — SUCRALFATE 1000 MG: 1 SUSPENSION ORAL at 05:57

## 2018-01-22 RX ADMIN — CYANOCOBALAMIN TAB 500 MCG 1000 MCG: 500 TAB at 08:43

## 2018-01-22 RX ADMIN — SUCRALFATE 1000 MG: 1 SUSPENSION ORAL at 16:28

## 2018-01-22 RX ADMIN — Medication 100 MG: at 08:43

## 2018-01-22 RX ADMIN — OXYCODONE HYDROCHLORIDE AND ACETAMINOPHEN 500 MG: 500 TABLET ORAL at 08:43

## 2018-01-22 RX ADMIN — SUCRALFATE 1000 MG: 1 SUSPENSION ORAL at 12:07

## 2018-01-22 RX ADMIN — FERROUS SULFATE TAB 325 MG (65 MG ELEMENTAL FE) 325 MG: 325 (65 FE) TAB at 08:43

## 2018-01-22 RX ADMIN — PANTOPRAZOLE SODIUM 40 MG: 40 TABLET, DELAYED RELEASE ORAL at 05:57

## 2018-01-22 RX ADMIN — PANTOPRAZOLE SODIUM 40 MG: 40 TABLET, DELAYED RELEASE ORAL at 16:28

## 2018-01-22 RX ADMIN — SUCRALFATE 1000 MG: 1 SUSPENSION ORAL at 21:47

## 2018-01-22 NOTE — CASE MANAGEMENT
Notification of Inpatient Admission/Inpatient Authorization Request  This is a Notification of Inpatient Admission/Request for Inpatient Authorization to our facility [address]  Please be advised that this patient is currently in our facility under Inpatient Status  Below you will find the Attending Physician and Facilitys information including NPI# and contact information for the Utilization  assigned to the [facility] where the patient is receiving services  Please feel free to contact the Utilization Review Department with any questions  Patient Information:  PATIENT NAME: Riley Clarke  MRN: [id number]  YOB: 1955    PRESENTATION DATE: 1/21/2018  9:46 AM  IP ADMISSION DATE: 1/21/18 1025  DISCHARGE DATE: No discharge date for patient encounter  DISPOSITION: Home/Self Care    Attending Physician:    LISA Mcduffie  Specialty- Hospitalist,   Bernard Marie [facility] [id number]     Primary Office:  [address]  [place], [address]  Phone 1: [phone]  Phone 2:   Fax: [phone]     Facility:  [address], [address]  [phone]  Tax ID: [id number]  NPI: [id number]    [facility] in the [facility] by Moe Harvey for 2017  Network Utilization Review Department  Phone: [phone]; Fax [phone]  ATTENTION: The Network Utilization Review Department is now centralized for our 7 Facilities  Make a note that we have a new phone and fax numbers for our Department  Please call with any questions or concerns to [phone] and carefully follow the prompts so that you are directed to the right person  All voicemails are confidential  Fax any determinations, approvals, denials, and requests for initial or continue stay review clinical to [phone]   Due to HIGH CALL volume, it would be easier if you could please send faxed requests to expedite your requests and in part, help us provide discharge notifications faster

## 2018-01-22 NOTE — ASSESSMENT & PLAN NOTE
· Most likely contributing to esophagitis  · Patient recommended complete cessation of alcohol use  · Ativan PRN withdrawal symptoms (not present)

## 2018-01-22 NOTE — CASE MANAGEMENT
Initial Clinical Review    Admission: Date/Time/Statement: 1/21/18 @ 1025 Inpatient Written     Orders Placed This Encounter   Procedures    Inpatient Admission (expected length of stay for this patient is greater than two midnights)     Standing Status:   Standing     Number of Occurrences:   1     Order Specific Question:   Admitting Physician     Answer:   Portia Solis [[id number]]     Order Specific Question:   Level of Care     Answer:   Med Surg [[id number]]     Order Specific Question:   Estimated length of stay     Answer:   More than 2 Midnights     Order Specific Question:   Certification     Answer:   I certify that inpatient services are medically necessary for this patient for a duration of greater than two midnights  See H&P and MD Progress Notes for additional information about the patient's course of treatment  ED: Date/Time/Mode of Arrival:   ED Arrival Information     Expected Arrival Acuity Means of Arrival Escorted By Service Admission Type    - 1/21/2018 09:32 Urgent Walk-In Family Member General Medicine Urgent    Arrival Complaint    SOB,Throat tight          Chief Complaint:   Chief Complaint   Patient presents with    Fatigue     Pt c/o feeling fatigued for about a week and states he believed his hemoglobin is low causing him to feel fatigued, reports "throat pain" for a month, reports SOB, denies CP       History of Illness: Patient has received multiple blood transfusions in the past   Has not noticed any blood in his stools or melena recently but has been feeling very fatigued  Looking through his history he had an iron transfusion back in November but had blood transfusions prior to that  He has a history of esophagitis, AVMs and his intestines and apparently also his esophagus  He does drink alcohol 3 times a week  He does not smoke  He has no chest pain  He is feeling fatigued both with some activity and lying down but he has no chest pain or shortness of breath    No known history of cardiac disease  Looking through the old records hemoglobin seems to be run in the low 8s  ED Vital Signs:   ED Triage Vitals [01/21/18 0945]   Temperature Pulse Respirations Blood Pressure SpO2   98 °F (36 7 °C) 70 18 144/67 100 %      Temp Source Heart Rate Source Patient Position - Orthostatic VS BP Location FiO2 (%)   Temporal Monitor Sitting Right arm --      Pain Score       1        Wt Readings from Last 1 Encounters:   01/21/18 72 6 kg (160 lb)       Vital Signs (abnormal): temp 100 6    Abnormal Labs/Diagnostic Test Results:   HEMOGLOBIN 6 6*   HEMATOCRIT 24 9*   PLATELETS 253*   MONOS PCT 15*     EKG:  NSR  CXR:  NAD    ED Treatment:   Medication Administration from 01/21/2018 0932 to 01/21/2018 1623       Date/Time Order Dose Route Action     01/21/2018 1605 sucralfate (CARAFATE) oral suspension 1,000 mg 1,000 mg Oral Given     01/21/2018 1606 pantoprazole (PROTONIX) EC tablet 40 mg 40 mg Oral Given     01/21/2018 1337 cyanocobalamin (VITAMIN B-12) tablet 1,000 mcg 1,000 mcg Oral Given     01/21/2018 1337 thiamine (VITAMIN B1) tablet 100 mg 100 mg Oral Given     01/21/2018 1337 ascorbic acid (VITAMIN C) tablet 500 mg 500 mg Oral Given          Past Medical/Surgical History:    Active Ambulatory Problems     Diagnosis Date Noted    Iron deficiency anemia due to chronic blood loss 10/06/2017   Brilliant Halie ulcer 10/06/2017    AVM (arteriovenous malformation) 10/06/2017    AVM (arteriovenous malformation) of small bowel, acquired ([facility] )     History of blood transfusion     Cirilo ulcer     Wills's esophagus     Melena 10/05/2017    Esophagitis     GI bleeding 11/03/2017     Resolved Ambulatory Problems     Diagnosis Date Noted    Chest pain 10/06/2017    GI bleeding 10/06/2017    SHALA (acute kidney injury) ([facility] ) 10/06/2017    Thrombocytosis ([facility] ) 10/06/2017    GI bleed 10/05/2017     Past Medical History:   Diagnosis Date    Anemia     AVM (arteriovenous malformation) of small bowel, [facility] ([facility] )     Wills's esophagus     Cirilo ulcer     Esophagitis     History of blood transfusion        Admitting Diagnosis: Wills's esophagus [K22 70]  SOB (shortness of breath) [R06 02]  Esophagitis [K20 9]  Anemia [D64 9]  Steve Bozeman ulcer [K25 9]    Age/Sex: 58 y o  male    Assessment/Plan:   Principal Problem:    SOB (shortness of breath)  Active Problems:    Iron deficiency anemia due to chronic blood loss    Esophagitis    Cirilo ulcer    AVM (arteriovenous malformation) of small bowel, acquired ([facility] )    Wills's esophagus         * SOB (shortness of breath)   Assessment & Plan     Suspect 2* symptomatic anemia  Pt w/chronic sore throat/laryngitis suspect 2* severe erosive esophagitis  No hx of htn/hld/dm  Check CXR check EKG check POC troponin  Will risk stratify w/flp/hgb a1c       Iron deficiency anemia due to chronic blood loss   Assessment & Plan     Severe, symptomatic hgb of 6 6 from bl 7 8-8 0  Hx of venofer transfusions, consider 2* UGI microscopic bleed  Pt reports he is compliant with his iron, B12 and folate denies any melena or BRBPR, he does report that his GERD is waxing and waning and has been more severe the week prior to admission  FOBT negative by myself and ED provider  Recheck ferritin level and iron panel, patient is being transfused 1 IU PRBC by ED             Esophagitis   Assessment & Plan     Patient reports he is compliant with his diet but still drinks alcohol 2 to 3 times a week  Status post EGD 11/3 revealed friable peptic esophageal stricture, esophageal stricture and Wills's esophagus  Pathology pending   Colonoscopy 11/6 revealed slight tortuosity but failed to show any bleeding lesions  Germán Morocho was some prominent veins in the descending colon and internal hemorrhoids noted  Bxs at that time w/metaplasia but no malignancy  Will repeaat GI consult as pt's GERD s/sx appear to be waxing/waning but had been more severe, especially in light of worsening anemia     Wills's esophagus   Assessment & Plan     Pt stopped smoking 4 years ago, still w/intermittent drinking  Has been avoiding red meats, and is on a NSA diet per his daughter  Continue surveillance per GI       AVM (arteriovenous malformation) of small bowel, acquired [facility])   Assessment & Plan     On prior admission no evidence of active bleed       Cirilo ulcer   Assessment & Plan     Hx of, continue carafate/ppi          Alcohol use               Intermittent, pt has been encouraged complete cessation due to underlying GI issues, but still is drinking               No s/sx of withdrawal               Continue b12, will add thiamine, ativan prn s/sx withdrawal        VTE Prophylaxis: Pharmacologic VTE Prophylaxis contraindicated due to concern for microscopic GIB  / sequential compression device   Code Status: FC  POLST: There is no POLST form on file for this patient (pre-hospital)     Anticipated Length of Stay:  Patient will be admitted on an Inpatient basis with an anticipated length of stay of  Greater than 2 midnights  Justification for Hospital Stay: hx of erosive esophagitis, peptic esophagitis, ulcers, worsening anemia    Admission Orders:  Telemetry   Transfuse 4 unit PRBCs  Clear liquid diet  OOB with assistance  VS q3h  freq H&H  Trop Salina Garcia  Consult gastroenterology  Sequential compression device    Scheduled Meds:   ascorbic acid 500 mg Oral Daily   cyanocobalamin 1,000 mcg Oral Daily   ferrous sulfate 325 mg Oral Daily With Breakfast   pantoprazole 40 mg Oral BID AC   sucralfate 1,000 mg Oral 4x Daily (AC & HS)   thiamine 100 mg Oral Daily     Continuous Infusions:    PRN Meds:   acetaminophen    LORazepam    ondansetron    Thank you,  [address] [place] [address] in the [facility] by Moe Harvey for 2017  Network Utilization Review Department  Phone: [phone];  Fax [phone]  ATTENTION: The Network Utilization Review Department is now centralized for our 7 Facilities  Make a note that we have a new phone and fax numbers for our Department  Please call with any questions or concerns to [phone] and carefully follow the prompts so that you are directed to the right person  All voicemails are confidential  Fax any determinations, approvals, denials, and requests for initial or continue stay review clinical to [phone]  Due to HIGH CALL volume, it would be easier if you could please send faxed requests to expedite your requests and in part, help us provide discharge notifications faster

## 2018-01-22 NOTE — PROGRESS NOTES
Progress Note - Thien Zelaya 1955, 58 y o  male MRN: [id number]    Unit/Bed#: E5 -01 Encounter: [id number]    Primary Care Provider: Alex Quintana PA-C   Date and time admitted to hospital: 1/21/2018  9:46 AM        Iron deficiency anemia due to chronic blood loss   Assessment & Plan    · Acute on chronic iron deficiency anemia s/p 2 u PRBC with appropriate response  · Possibly due to esophagitis found previously, will possibly need small bowel study per GI recommendations  · Continue to trend H&H        Alcohol use disorder ([facility] [address] )   Assessment & Plan    · Most likely contributing to esophagitis  · Patient recommended complete cessation of alcohol use  · Ativan PRN withdrawal symptoms (not present)        Anemia   Assessment & Plan    · Acute on chronic anemia  · S/p 2 u PRBC with appropriate response  · Heme test negative  · Recent endoscopy with severe esophagitis with unrevealing colonoscopy  · For repeat EGD tomorrow  · GI input appreciated          VTE Pharmacologic Prophylaxis:   Pharmacologic: contraindicated due to acute anemia  Mechanical VTE Prophylaxis in Place: Yes    Patient Centered Rounds: I have performed bedside rounds with nursing staff today  Discussions with Specialists or Other Care Team Provider: GI    Education and Discussions with Family / Patient: Patient    Time Spent for Care: 30 minutes  More than 50% of total time spent on counseling and coordination of care as described above  Current Length of Stay: 1 day(s)    Current Patient Status: Inpatient   Certification Statement: The patient will continue to require additional inpatient hospital stay due to need for diagnsotic workup and close monitoring    Discharge Plan: TBD    Code Status: Level 1 - Full Code      Subjective:   Patient seen and examined  He states he feels well and his SOB has resolved  He denies blood in his stool  Denies lightheadedness or dizziness  Denies palpitations   Denies abdominal pain, nausea or vomiting  Objective:     Vitals:   Temp (24hrs), Av 9 °F (37 7 °C), Min:99 5 °F (37 5 °C), Max:100 6 °F (38 1 °C)    HR:  [66-75] 74  Resp:  [16-20] 18  BP: (117-143)/(59-79) 143/65  SpO2:  [95 %-99 %] 96 %  Body mass index is 21 11 kg/m²  Input and Output Summary (last 24 hours): Intake/Output Summary (Last 24 hours) at 18 1709  Last data filed at 18 1500   Gross per 24 hour   Intake           799 17 ml   Output                0 ml   Net           799 17 ml       Physical Exam:     Physical Exam   Constitutional: He is oriented to person, place, and time  No distress  HENT:   Head: Normocephalic and atraumatic  Eyes: Conjunctivae are normal    Neck: No JVD present  Cardiovascular: Normal rate and regular rhythm  Exam reveals no gallop  No murmur heard  Pulmonary/Chest: Effort normal  No respiratory distress  He has no wheezes  He exhibits no tenderness  Abdominal: Soft  He exhibits no distension  There is no tenderness  There is no guarding  Musculoskeletal: He exhibits no edema  Neurological: He is alert and oriented to person, place, and time  Skin: Skin is warm and dry  Chronic petechial facial rash       Additional Data:     Labs:      Results from last 7 days  Lab Units 18  1221 18  0531   WBC Thousand/uL  --  4 61   HEMOGLOBIN g/dL 8 6* 8 1*   HEMATOCRIT % 29 9* 29 1*   PLATELETS Thousands/uL  --  559*   NEUTROS PCT %  --  50   LYMPHS PCT %  --  22   MONOS PCT %  --  16*   EOS PCT %  --  10*       Results from last 7 days  Lab Units 18  0957   SODIUM mmol/L 140   POTASSIUM mmol/L 3 7   CHLORIDE mmol/L 104   CO2 mmol/L 29   BUN mg/dL 11   CREATININE mg/dL 0 90   CALCIUM mg/dL 8 3   TOTAL PROTEIN g/dL 7 2   BILIRUBIN TOTAL mg/dL 0 49   ALK PHOS U/L 100   ALT U/L 16   AST U/L 19   GLUCOSE RANDOM mg/dL 112       Results from last 7 days  Lab Units 18  0957   INR  1 09       * I Have Reviewed All Lab Data Listed Above    * Additional Pertinent Lab Tests Reviewed: Dasha 66 Admission Reviewed    Imaging:    Imaging Reports Reviewed Today Include: none today    Recent Cultures (last 7 days):           Last 24 Hours Medication List:     ascorbic acid 500 mg Oral Daily   cyanocobalamin 1,000 mcg Oral Daily   ferrous sulfate 325 mg Oral Daily With Breakfast   pantoprazole 40 mg Oral BID AC   sucralfate 1,000 mg Oral 4x Daily (AC & HS)   thiamine 100 mg Oral Daily        Today, Patient Was Seen By: Oris Fleischer, MD    ** Please Note: Dictation voice to text software may have been used in the creation of this document   **

## 2018-01-22 NOTE — PROGRESS NOTES
Rapid Response NP called regarding patient Hgb of 6 8 and an order for 1 unit of blood has been ordered due to low Hgb

## 2018-01-22 NOTE — ANESTHESIA PREPROCEDURE EVALUATION
Review of Systems/Medical History  Patient summary reviewed  Chart reviewed      Cardiovascular  Negative cardio ROS    Pulmonary  Negative pulmonary ROS        GI/Hepatic    GI bleeding , Esophageal disease bryant esophagus,   Comment: Hx AVM's     Negative  ROS        Endo/Other  Negative endo/other ROS      GYN       Hematology  Anemia chronic blood loss anemia,    Comment: History of multiple blood transfusions  Musculoskeletal    Comment: Pt unable to use right hand due to previous injury      Neurology      Comment: Right hand injury Psychology       Comment: Significant etoh use in recent past         Physical Exam    Airway    Mallampati score: II  TM Distance: >3 FB  Neck ROM: full     Dental   upper dentures,     Cardiovascular  Comment: Negative ROS, Rhythm: regular, Rate: normal, Cardiovascular exam normal    Pulmonary  Pulmonary exam normal Breath sounds clear to auscultation,     Other Findings        Anesthesia Plan  ASA Score- 3     Anesthesia Type- general with ASA Monitors  Additional Monitors:   Airway Plan:         Plan Factors-    Induction-     Postoperative Plan-     Informed Consent- Anesthetic plan and risks discussed with patient

## 2018-01-22 NOTE — CONSULTS
Patient MRN: [id number]  Date of Service: 1/22/2018  Referring Provider: Margy Timmons PA-C  Provider Creating Note: Lana Peter PA-C  PCP: Иван Harkins    Reason for Consult: Anemia, Wills's esophagus, Deric Sabot ulcer, Esophagitis    HISTORY OF PRESENT ILLNESS:  Teddy Nicholas  is a 58 y o  male known to our service with a history of iron deficiency anemia, peptic esophageal stricture, Wills's esophagus, severe ulcerative reflux esophagitis, small hiatal hernia and nonbleeding duodenal AVM  He has required iron transfusion as well as blood transfusions in the past   This is his 3rd hospitalization for this complaint since October 2017  He also has a history of alcohol use disorder, drinking at least 3 times per week with a history of heavier use in the recent past   He states he is compliant with Protonix, Carafate, iron supplements, folate, and B12  He came to the emergency department yesterday for worsening fatigue, shortness of breath and sore throat/hoarseness over the past couple weeks  He also complains of some dyspnea on exertion  He states his reflux symptoms have worsened over the past week despite compliance with his medications  He denies any melena, hematochezia, or bright red blood per rectum  He has not had any nausea, vomiting, dysphagia or odynophagia  He is not on any blood thinners, aspirin or any other NSAIDs  Endoscopic history:  EGD 11/3/2017 LB showed peptic esophageal stricture  Wills's esophagus and esophagitis  Colonoscopy 11/6/2017 CB hemorrhoids, prominent veins in descending colon  No biopsy  Very good prep  EGD 10/6/2017 SV showed severe ulcerative reflux esophagitis with mild stricturing, severe mid-esophageal erosive/ulcerative changes, Wills's esophagus with ulceration, small hiatal hernia and nonbleeding duodenal AVM  Review of Systems:    General:   No fever or chills; No significant weight loss or gain     EENT:   No ear pain, facial swelling; No sneezing, sore throat  Skin:   No rashes, color changes  Respiratory:     No shortness of breath, cough, wheezing, stridor  Cardiovascular:     No chest pain, palpitations  Gastrointestinal:    As per HPI  Musculoskeletal:     No arthralgias, myalgias, swelling  Neurologic:   No dizziness, numbness, weakness  No speech difficulties  Psych:   No agitation, suicidal ideations    Otherwise, All other twelve-point review of systems normal      Past Medical History:   Diagnosis Date    Anemia     AVM (arteriovenous malformation) of small bowel, acquired ([facility] [address] )     Wills's esophagus     Cirilo ulcer     Esophagitis     History of blood transfusion     22 prior transfusions     Past Surgical History:   Procedure Laterality Date    APPENDECTOMY      COLONOSCOPY N/A 11/6/2017    Procedure: COLONOSCOPY;  Surgeon: Jose Elias Martin MD;  Location: [facility]; Service: Gastroenterology    EGD AND COLONOSCOPY  07/18/2017    ESOPHAGOGASTRODUODENOSCOPY N/A 10/6/2017    Procedure: ESOPHAGOGASTRODUODENOSCOPY (EGD) with biopsy;  Surgeon: May Magaña MD;  Location: [facility]; Service: Gastroenterology    ESOPHAGOGASTRODUODENOSCOPY N/A 11/3/2017    Procedure: ESOPHAGOGASTRODUODENOSCOPY (EGD) with biopsy;  Surgeon: Piyush Knott MD;  Location: [facility]; Service: Gastroenterology     Allergies   Allergen Reactions    Doxylamine GI Intolerance       Medications:  Home Medications  Prior to Admission medications    Medication Sig Start Date End Date Taking?  Authorizing Provider   cyanocobalamin (VITAMIN B-12) 1,000 mcg tablet Take 1,000 mcg by mouth daily   Yes Historical Provider, MD   pantoprazole (PROTONIX) 40 mg tablet Take 1 tablet by mouth 2 (two) times a day 10/7/17  Yes Rebeca Durham, DO   sucralfate (CARAFATE) 1 g/10 mL suspension Take 10 mL by mouth 4 (four) times a day (before meals and at bedtime) 11/7/17  Yes Sharda Lucio, DO       Inhouse Medications    Current Facility-Administered Medications:     acetaminophen (TYLENOL) tablet 650 mg, 650 mg, Oral, Q6H PRN    ascorbic acid (VITAMIN C) tablet 500 mg, 500 mg, Oral, Daily, 500 mg at 01/22/18 0843    cyanocobalamin (VITAMIN B-12) tablet 1,000 mcg, 1,000 mcg, Oral, Daily, 1,000 mcg at 01/22/18 0843    ferrous sulfate tablet 325 mg, 325 mg, Oral, Daily With Breakfast, 325 mg at 01/22/18 0843    LORazepam (ATIVAN) 2 mg/mL injection 2 mg, 2 mg, Intravenous, Q4H PRN    ondansetron (ZOFRAN) injection 4 mg, 4 mg, Intravenous, Q6H PRN    pantoprazole (PROTONIX) EC tablet 40 mg, 40 mg, Oral, BID AC, 40 mg at 01/22/18 0557    sucralfate (CARAFATE) oral suspension 1,000 mg, 1,000 mg, Oral, 4x Daily (AC & HS), 1,000 mg at 01/22/18 0557    thiamine (VITAMIN B1) tablet 100 mg, 100 mg, Oral, Daily, 100 mg at 01/22/18 8305      Social History   reports that he quit smoking about 3 years ago  He has never used smokeless tobacco  He reports that he drinks about 4 8 oz of alcohol per week   He reports that he does not use drugs  Family History  History reviewed  No pertinent family history  OBJECTIVE:    /59 (BP Location: Right arm)   Pulse 71   Temp 99 5 °F (37 5 °C) (Temporal)   Resp 20   Ht 6' 1" (1 854 m)   Wt 72 6 kg (160 lb)   SpO2 95%   BMI 21 11 kg/m²   Physical Exam:     General Appearance:    Awake, alert, oriented x3, no distress, well developed, thin  Head:    Normocephalic without obvious abnormality   Eyes:    PERRL, conjunctiva/corneas clear, EOM's intact        Neck:   Supple, no adenopathy   Throat:   Mucous membranes moist  Poorly fitting dentures  Lungs:     Clear to auscultation bilaterally, no wheezing or rhonchi   Heart:    Regular rate and rhythm, S1 and S2 normal, no murmur   Abdomen:     Soft, mild epigastric tenderness, non-distended  bowel sounds active  No    masses, rebound or guarding  Rectal exam in ED was hemoccult negative     Extremities:   Extremities normal  No clubbing, cyanosis or edema   Psych  Derm:   Normal affect    No jaundice  Cherry hemangiomas noted on face  Neurologic:   CNII-XII grossly intact  Speech intact         Laboratory Studies:    Results from last 7 days  Lab Units 01/22/18  0531 01/21/18 2017 01/21/18  1454 01/21/18  0957   WBC Thousand/uL 4 61  --   --  6 43   HEMOGLOBIN g/dL 8 1* 6 8* 7 0* 6 6*   HEMATOCRIT % 29 1* 24 3* 25 8* 24 9*   MCV fL 71*  --   --  66*   PLATELETS Thousands/uL 559*  --   --  791*   INR   --   --   --  1 09       Results from last 7 days  Lab Units 01/21/18  0957   SODIUM mmol/L 140   POTASSIUM mmol/L 3 7   CHLORIDE mmol/L 104   CO2 mmol/L 29   BUN mg/dL 11   CREATININE mg/dL 0 90   CALCIUM mg/dL 8 3   TOTAL PROTEIN g/dL 7 2   BILIRUBIN TOTAL mg/dL 0 49   ALK PHOS U/L 100   ALT U/L 16   AST U/L 19   GLUCOSE RANDOM mg/dL 112           Imaging and Other Studies:  Xr Chest Portable    Result Date: 1/21/2018  Narrative: CHEST INDICATION:  Shortness of breath COMPARISON:  October 5, 2017 VIEWS:   AP frontal IMAGES:  1 FINDINGS:     Cardiomediastinal silhouette appears unremarkable  The lungs are clear  No pneumothorax or pleural effusion  Visualized osseous structures appear within normal limits for the patient's age  Impression: No active pulmonary disease  Workstation performed: [id number]         ASSESSMENT AND PLAN:  1  Acute/symptomatic on chronic anemia with significant gi history  He has required iron and blood transfusions in the past  Iron panel today was not consistent with FRANCOIS  His last EGD and colonoscopy 11/2017 as above  His baseline hemoglobin appears to be 8-9g - admission hemoglobin was 6 6g  He was hemoccult negative x2 this admission  He is also on Folate and B12  He does continue to drink alcohol and there may be a component of bone marrow suppression  Will recheck B12, folate, celiac, and TSH for completeness  May need hematology evaluation  Will discuss plan with GI attending     2  Wills's esophagus with ongoing GERD symptoms including hoarseness/sore throat  He is scheduled for recall EGD 11/2018  He is compliant with PPI BID, Carafate slurry QID  Consider addition of pepcid at night  Continue current management  He has quit tobacco but continues with ETOH use  3  ETOH use disorder  Recommend complete cessation given multiple GI problems  No evidence for cirrhosis  Agree with DT protocol  4  History of SB AVM  No active bleeding  5  History of Cirilo ulcer  No active bleeding  6  History of food impaction 4/2017 at [facility]  No current dysphagia or odynophagia complaints        Marylin Mcconnell PA-C

## 2018-01-22 NOTE — ASSESSMENT & PLAN NOTE
· Acute on chronic anemia  · S/p 2 u PRBC with appropriate response  · Heme test negative  · Recent endoscopy with severe esophagitis with unrevealing colonoscopy  · For repeat EGD tomorrow  · GI input appreciated

## 2018-01-22 NOTE — ASSESSMENT & PLAN NOTE
· Acute on chronic iron deficiency anemia s/p 2 u PRBC with appropriate response  · Possibly due to esophagitis found previously, will possibly need small bowel study per GI recommendations  · Continue to trend H&H

## 2018-01-23 ENCOUNTER — ANESTHESIA (INPATIENT)
Dept: GASTROENTEROLOGY | Facility: HOSPITAL | Age: 63
DRG: 381 | End: 2018-01-23
Payer: COMMERCIAL

## 2018-01-23 LAB
ANION GAP SERPL CALCULATED.3IONS-SCNC: 9 MMOL/L (ref 4–13)
BASOPHILS # BLD AUTO: 0.17 THOUSANDS/ΜL (ref 0–0.1)
BASOPHILS NFR BLD AUTO: 3 % (ref 0–1)
BUN SERPL-MCNC: 8 MG/DL (ref 5–25)
CALCIUM SERPL-MCNC: 8.2 MG/DL (ref 8.3–10.1)
CHLORIDE SERPL-SCNC: 105 MMOL/L (ref 100–108)
CO2 SERPL-SCNC: 26 MMOL/L (ref 21–32)
CREAT SERPL-MCNC: 0.83 MG/DL (ref 0.6–1.3)
EOSINOPHIL # BLD AUTO: 0.71 THOUSAND/ΜL (ref 0–0.61)
EOSINOPHIL NFR BLD AUTO: 13 % (ref 0–6)
ERYTHROCYTE [DISTWIDTH] IN BLOOD BY AUTOMATED COUNT: 23.1 % (ref 11.6–15.1)
GFR SERPL CREATININE-BSD FRML MDRD: 94 ML/MIN/1.73SQ M
GLUCOSE SERPL-MCNC: 93 MG/DL (ref 65–140)
HCT VFR BLD AUTO: 28.7 % (ref 36.5–49.3)
HGB BLD-MCNC: 8.2 G/DL (ref 12–17)
LYMPHOCYTES # BLD AUTO: 1.08 THOUSANDS/ΜL (ref 0.6–4.47)
LYMPHOCYTES NFR BLD AUTO: 20 % (ref 14–44)
MCH RBC QN AUTO: 19.9 PG (ref 26.8–34.3)
MCHC RBC AUTO-ENTMCNC: 28.6 G/DL (ref 31.4–37.4)
MCV RBC AUTO: 70 FL (ref 82–98)
MONOCYTES # BLD AUTO: 0.76 THOUSAND/ΜL (ref 0.17–1.22)
MONOCYTES NFR BLD AUTO: 14 % (ref 4–12)
NEUTROPHILS # BLD AUTO: 2.77 THOUSANDS/ΜL (ref 1.85–7.62)
NEUTS SEG NFR BLD AUTO: 50 % (ref 43–75)
NRBC BLD AUTO-RTO: 0 /100 WBCS
PLATELET # BLD AUTO: 472 THOUSANDS/UL (ref 149–390)
PMV BLD AUTO: 9.4 FL (ref 8.9–12.7)
POTASSIUM SERPL-SCNC: 3.8 MMOL/L (ref 3.5–5.3)
RBC # BLD AUTO: 4.13 MILLION/UL (ref 3.88–5.62)
SODIUM SERPL-SCNC: 140 MMOL/L (ref 136–145)
WBC # BLD AUTO: 5.49 THOUSAND/UL (ref 4.31–10.16)

## 2018-01-23 PROCEDURE — 82784 ASSAY IGA/IGD/IGG/IGM EACH: CPT | Performed by: PHYSICIAN ASSISTANT

## 2018-01-23 PROCEDURE — 88312 SPECIAL STAINS GROUP 1: CPT | Performed by: INTERNAL MEDICINE

## 2018-01-23 PROCEDURE — 87252 VIRUS INOCULATION TISSUE: CPT | Performed by: INTERNAL MEDICINE

## 2018-01-23 PROCEDURE — 87255 GENET VIRUS ISOLATE HSV: CPT | Performed by: INTERNAL MEDICINE

## 2018-01-23 PROCEDURE — 85025 COMPLETE CBC W/AUTO DIFF WBC: CPT | Performed by: FAMILY MEDICINE

## 2018-01-23 PROCEDURE — 88305 TISSUE EXAM BY PATHOLOGIST: CPT | Performed by: INTERNAL MEDICINE

## 2018-01-23 PROCEDURE — 0DB28ZX EXCISION OF MIDDLE ESOPHAGUS, VIA NATURAL OR ARTIFICIAL OPENING ENDOSCOPIC, DIAGNOSTIC: ICD-10-PCS | Performed by: INTERNAL MEDICINE

## 2018-01-23 PROCEDURE — 87254 VIRUS INOCULATION SHELL VIA: CPT | Performed by: INTERNAL MEDICINE

## 2018-01-23 PROCEDURE — 80048 BASIC METABOLIC PNL TOTAL CA: CPT | Performed by: FAMILY MEDICINE

## 2018-01-23 PROCEDURE — 0DB38ZX EXCISION OF LOWER ESOPHAGUS, VIA NATURAL OR ARTIFICIAL OPENING ENDOSCOPIC, DIAGNOSTIC: ICD-10-PCS | Performed by: INTERNAL MEDICINE

## 2018-01-23 PROCEDURE — 83516 IMMUNOASSAY NONANTIBODY: CPT | Performed by: PHYSICIAN ASSISTANT

## 2018-01-23 PROCEDURE — 86255 FLUORESCENT ANTIBODY SCREEN: CPT | Performed by: PHYSICIAN ASSISTANT

## 2018-01-23 RX ORDER — SODIUM CHLORIDE 9 MG/ML
INJECTION, SOLUTION INTRAVENOUS CONTINUOUS PRN
Status: DISCONTINUED | OUTPATIENT
Start: 2018-01-23 | End: 2018-01-23 | Stop reason: SURG

## 2018-01-23 RX ORDER — PROPOFOL 10 MG/ML
INJECTION, EMULSION INTRAVENOUS AS NEEDED
Status: DISCONTINUED | OUTPATIENT
Start: 2018-01-23 | End: 2018-01-23 | Stop reason: SURG

## 2018-01-23 RX ADMIN — Medication 100 MG: at 08:22

## 2018-01-23 RX ADMIN — PROPOFOL 100 MG: 10 INJECTION, EMULSION INTRAVENOUS at 13:15

## 2018-01-23 RX ADMIN — SODIUM CHLORIDE: 0.9 INJECTION, SOLUTION INTRAVENOUS at 13:14

## 2018-01-23 RX ADMIN — PANTOPRAZOLE SODIUM 40 MG: 40 TABLET, DELAYED RELEASE ORAL at 17:16

## 2018-01-23 RX ADMIN — PANTOPRAZOLE SODIUM 40 MG: 40 TABLET, DELAYED RELEASE ORAL at 06:26

## 2018-01-23 RX ADMIN — PROPOFOL 50 MG: 10 INJECTION, EMULSION INTRAVENOUS at 13:25

## 2018-01-23 RX ADMIN — PROPOFOL 50 MG: 10 INJECTION, EMULSION INTRAVENOUS at 13:20

## 2018-01-23 RX ADMIN — FERROUS SULFATE TAB 325 MG (65 MG ELEMENTAL FE) 325 MG: 325 (65 FE) TAB at 08:22

## 2018-01-23 RX ADMIN — SUCRALFATE 1000 MG: 1 SUSPENSION ORAL at 17:16

## 2018-01-23 RX ADMIN — OXYCODONE HYDROCHLORIDE AND ACETAMINOPHEN 500 MG: 500 TABLET ORAL at 08:23

## 2018-01-23 RX ADMIN — SUCRALFATE 1000 MG: 1 SUSPENSION ORAL at 06:26

## 2018-01-23 RX ADMIN — SUCRALFATE 1000 MG: 1 SUSPENSION ORAL at 22:09

## 2018-01-23 RX ADMIN — CYANOCOBALAMIN TAB 500 MCG 1000 MCG: 500 TAB at 08:23

## 2018-01-23 NOTE — ASSESSMENT & PLAN NOTE
· Acute on chronic iron deficiency anemia s/p 2 u PRBC with appropriate response  · Most likely due to severe esophagitis   · Continue to monitor CBC

## 2018-01-23 NOTE — ASSESSMENT & PLAN NOTE
· Severe class D esophagitis persistent on current endoscopy today  · Will advance diet from liquid to pureed  · Continue PPI BID and Carafate  · Awaiting biopsy results  · GI advising

## 2018-01-23 NOTE — PROGRESS NOTES
Progress Note - Michael Amel 1955, 58 y o  male MRN: [id number]    Unit/Bed#: E5 -01 Encounter: [id number]    Primary Care Provider: Juan Winn PA-C   Date and time admitted to hospital: 1/21/2018  9:46 AM        Iron deficiency anemia due to chronic blood loss   Assessment & Plan    · Acute on chronic iron deficiency anemia s/p 2 u PRBC with appropriate response  · Most likely due to severe esophagitis   · Continue to monitor CBC        Esophagitis   Assessment & Plan    · Severe class D esophagitis persistent on current endoscopy today  · Will advance diet from liquid to pureed  · Continue PPI BID and Carafate  · Awaiting biopsy results  · GI advising        Alcohol use disorder ([facility] [address] )   Assessment & Plan    · Most likely contributing to severe class D esophagitis  · Patient recommended complete cessation of alcohol use  · Ativan PRN withdrawal symptoms (not present)        Anemia   Assessment & Plan    · Acute on chronic anemia  · S/p 2 u PRBC with appropriate response  · Heme test negative  · Endoscopy today revealed persistent esophagitis suggestive of viral etiology  · GI input appreciated          VTE Pharmacologic Prophylaxis:   Pharmacologic: none due to acute anemia  Mechanical VTE Prophylaxis in Place: Yes    Discussions with Specialists or Other Care Team Provider: Reviewed record    Education and Discussions with Family / Patient: Patient    Time Spent for Care: 30 minutes  More than 50% of total time spent on counseling and coordination of care as described above  Current Length of Stay: 2 day(s)    Current Patient Status: Inpatient   Certification Statement: The patient will continue to require additional inpatient hospital stay due to need for close monitroing and management adjustement    Discharge Plan: TBD    Code Status: Level 1 - Full Code      Subjective:   Patient seen and examined after his endoscopy today    The patient tolerated the procedure well early denies any pain  He also states he tolerated clear liquid diet and would like his diet advanced  She denies nausea or vomiting  She denies shortness of breath, palpitations or dizziness  He denies blood in her stool or melena and states that his bowel movements have been normal     Objective:     Vitals:   Temp (24hrs), Av 5 °F (36 9 °C), Min:97 6 °F (36 4 °C), Max:99 1 °F (37 3 °C)    HR:  [65-78] 73  Resp:  [16-18] 18  BP: (112-137)/(63-83) 129/69  SpO2:  [96 %-100 %] 98 %  Body mass index is 21 11 kg/m²  Input and Output Summary (last 24 hours): Intake/Output Summary (Last 24 hours) at 18 1720  Last data filed at 18 1330   Gross per 24 hour   Intake              500 ml   Output                0 ml   Net              500 ml       Physical Exam:     Physical Exam   Constitutional: He is oriented to person, place, and time  No distress  HENT:   Head: Normocephalic and atraumatic  Neck: No JVD present  Cardiovascular: Normal rate and regular rhythm  Exam reveals no gallop and no friction rub  No murmur heard  Pulmonary/Chest: Effort normal  No respiratory distress  He has no wheezes  He has no rales  Abdominal: Soft  He exhibits no distension  There is no tenderness  There is no guarding  Musculoskeletal: He exhibits no edema  Neurological: He is alert and oriented to person, place, and time  Skin:   Chronic petechial facial rash   Psychiatric: He has a normal mood and affect         Additional Data:     Labs:      Results from last 7 days  Lab Units 18  0523   WBC Thousand/uL 5 49   HEMOGLOBIN g/dL 8 2*   HEMATOCRIT % 28 7*   PLATELETS Thousands/uL 472*   NEUTROS PCT % 50   LYMPHS PCT % 20   MONOS PCT % 14*   EOS PCT % 13*       Results from last 7 days  Lab Units 18  0523 18  0957   SODIUM mmol/L 140 140   POTASSIUM mmol/L 3 8 3 7   CHLORIDE mmol/L 105 104   CO2 mmol/L 26 29   BUN mg/dL 8 11   CREATININE mg/dL 0 83 0 90   CALCIUM mg/dL 8 2* 8 3   TOTAL PROTEIN g/dL  --  7 2   BILIRUBIN TOTAL mg/dL  --  0 49   ALK PHOS U/L  --  100   ALT U/L  --  16   AST U/L  --  19   GLUCOSE RANDOM mg/dL 93 112       Results from last 7 days  Lab Units 01/21/18  0957   INR  1 09       * I Have Reviewed All Lab Data Listed Above  * Additional Pertinent Lab Tests Reviewed: [address] Admission Reviewed    Imaging:    Imaging Reports Reviewed Today Include: endoscopy report    Recent Cultures (last 7 days):           Last 24 Hours Medication List:     ascorbic acid 500 mg Oral Daily   cyanocobalamin 1,000 mcg Oral Daily   ferrous sulfate 325 mg Oral Daily With Breakfast   pantoprazole 40 mg Oral BID AC   sucralfate 1,000 mg Oral 4x Daily (AC & HS)   thiamine 100 mg Oral Daily        Today, Patient Was Seen By: Avtar Coreas MD    ** Please Note: Dictation voice to text software may have been used in the creation of this document   **

## 2018-01-23 NOTE — PLAN OF CARE
DISCHARGE PLANNING     Discharge to home or other facility with appropriate resources Progressing        INFECTION - ADULT     Absence or prevention of progression during hospitalization Progressing     Absence of fever/infection during neutropenic period Progressing        Knowledge Deficit     Patient/family/caregiver demonstrates understanding of disease process, treatment plan, medications, and discharge instructions Progressing        PAIN - ADULT     Verbalizes/displays adequate comfort level or baseline comfort level Progressing        Potential for Falls     Patient will remain free of falls Progressing        RESPIRATORY - ADULT     Achieves optimal ventilation and oxygenation Progressing        SAFETY ADULT     Patient will remain free of falls Progressing     Maintain or return to baseline ADL function Progressing     Maintain or return mobility status to optimal level Progressing

## 2018-01-23 NOTE — PROGRESS NOTES
Patient Name: Teddy Nicholas  Patient MRN: [id number]  Date: 01/23/18  Service: [facility]    Subjective   Patient states his sore throat and hoarseness have improved since admission  No heartburn complaints  No abdominal pain, nausea or vomiting  NPO for EGD today  Vitals  Blood pressure 112/63, pulse 71, temperature 98 7 °F (37 1 °C), temperature source Tympanic, resp  rate 18, height 6' 1" (1 854 m), weight 72 6 kg (160 lb), SpO2 97 %  Physical Exam:     General Appearance:    Awake, alert, oriented x3, no distress, well developed, well    nourished   Head:    Normocephalic without obvious abnormality   Eyes:    PERRL, conjunctiva/corneas clear, EOM's intact        Neck:   Supple, no adenopathy   Throat:   Mucous membranes moist  Poorly fitting dentures   Lungs:     Clear to auscultation bilaterally, no wheezing or rhonchi   Heart:    Regular rate and rhythm, S1 and S2 normal, no murmur   Abdomen:     Soft, non-tender, non-distended  bowel sounds active  No    masses, rebound or guarding  Extremities:   Extremities normal  No clubbing, cyanosis or edema   Psych  Derm:   Normal affect    No jaundice  +facial telangiectasias   Neurologic:   CNII-XII grossly intact  Speech intact   +R hand tremor         Laboratory Studies    Results from last 7 days  Lab Units 01/23/18  0523 01/22/18  1221 01/22/18  0531 01/21/18  2017 01/21/18  1454 01/21/18  0957   WBC Thousand/uL 5 49  --  4 61  --   --  6 43   HEMOGLOBIN g/dL 8 2* 8 6* 8 1* 6 8* 7 0* 6 6*   HEMATOCRIT % 28 7* 29 9* 29 1* 24 3* 25 8* 24 9*   PLATELETS Thousands/uL 472*  --  559*  --   --  791*   INR   --   --   --   --   --  1 09       Results from last 7 days  Lab Units 01/23/18  0523 01/21/18  0957   SODIUM mmol/L 140 140   POTASSIUM mmol/L 3 8 3 7   CHLORIDE mmol/L 105 104   CO2 mmol/L 26 29   BUN mg/dL 8 11   CREATININE mg/dL 0 83 0 90   CALCIUM mg/dL 8 2* 8 3   TOTAL PROTEIN g/dL  --  7 2   BILIRUBIN TOTAL mg/dL  --  0 49 ALK PHOS U/L  --  100   ALT U/L  --  16   AST U/L  --  19   GLUCOSE RANDOM mg/dL 93 112     TSH 3 160  Iron 148  Ferritin 36  45% saturation  TIBC 329  Folate 7 5  B12 605    Imaging and Other Studies      Inhouse Medications     Current Facility-Administered Medications:     acetaminophen (TYLENOL) tablet 650 mg, 650 mg, Oral, Q6H PRN    ascorbic acid (VITAMIN C) tablet 500 mg, 500 mg, Oral, Daily, 500 mg at 01/23/18 7110    cyanocobalamin (VITAMIN B-12) tablet 1,000 mcg, 1,000 mcg, Oral, Daily, 1,000 mcg at 01/23/18 4387    ferrous sulfate tablet 325 mg, 325 mg, Oral, Daily With Breakfast, 325 mg at 01/23/18 4365    LORazepam (ATIVAN) 2 mg/mL injection 2 mg, 2 mg, Intravenous, Q4H PRN    ondansetron (ZOFRAN) injection 4 mg, 4 mg, Intravenous, Q6H PRN    pantoprazole (PROTONIX) EC tablet 40 mg, 40 mg, Oral, BID AC, 40 mg at 01/23/18 0626    sucralfate (CARAFATE) oral suspension 1,000 mg, 1,000 mg, Oral, 4x Daily (AC & HS), 1,000 mg at 01/23/18 1558    thiamine (VITAMIN B1) tablet 100 mg, 100 mg, Oral, Daily, 100 mg at 01/23/18 6848      Assessment/Plan:  1  Acute/symptomatic on chronic anemia with significant gi history  He has required iron and blood transfusions in the past  Iron panel yesterday was not consistent with FRANCOIS  His last EGD and colonoscopy 11/2017 (see consult)  His baseline hemoglobin appears to be 8-9g - admission hemoglobin was 6 6g  Currently stable after transfusion in 8g range  He was hemoccult negative x2 this admission  He is also on Folate and B12, with normal lab levels  He does continue to drink alcohol and there may be a component of bone marrow suppression  Celiac pending for completeness  For EGD today  NPO  Further plan based on results - if no esophagitis, consider CT enterography to r/o SB etiology of anemia  2  Wills's esophagus with ongoing GERD symptoms including hoarseness/sore throat  He is scheduled for recall EGD 11/2018   He is compliant with PPI BID, Carafate slurry QID  Consider addition of pepcid at night  Continue current management  He has quit tobacco but continues with ETOH use  EGD today  3  ETOH use disorder  Recommend complete cessation given multiple GI problems  No evidence for cirrhosis  Agree with DT protocol  4  History of SB AVM  No active bleeding  5  History of Cirilo ulcer  No active bleeding  6  History of food impaction 4/2017 at [facility]  No current dysphagia or odynophagia complaints            Ghazala Hargrove PA-C

## 2018-01-23 NOTE — ASSESSMENT & PLAN NOTE
· Most likely contributing to severe class D esophagitis  · Patient recommended complete cessation of alcohol use  · Ativan PRN withdrawal symptoms (not present)

## 2018-01-23 NOTE — ASSESSMENT & PLAN NOTE
· Acute on chronic anemia  · S/p 2 u PRBC with appropriate response  · Heme test negative  · Endoscopy today revealed persistent esophagitis suggestive of viral etiology  · GI input appreciated

## 2018-01-23 NOTE — ANESTHESIA POSTPROCEDURE EVALUATION
Post-Op Assessment Note      CV Status:  Stable    Mental Status:  Alert and awake    Hydration Status:  Euvolemic    PONV Controlled:  Controlled    Airway Patency:  Patent    Post Op Vitals Reviewed: Yes          Staff: Anesthesiologist           /78 (01/23/18 1353)    Temp      Pulse 65 (01/23/18 1353)   Resp 16 (01/23/18 1353)    SpO2 96 % (01/23/18 1353)

## 2018-01-24 VITALS
SYSTOLIC BLOOD PRESSURE: 129 MMHG | HEIGHT: 73 IN | WEIGHT: 160 LBS | DIASTOLIC BLOOD PRESSURE: 75 MMHG | RESPIRATION RATE: 18 BRPM | HEART RATE: 73 BPM | BODY MASS INDEX: 21.2 KG/M2 | OXYGEN SATURATION: 96 % | TEMPERATURE: 98.8 F

## 2018-01-24 PROBLEM — R06.02 SOB (SHORTNESS OF BREATH): Status: RESOLVED | Noted: 2018-01-21 | Resolved: 2018-01-24

## 2018-01-24 LAB
ANION GAP SERPL CALCULATED.3IONS-SCNC: 10 MMOL/L (ref 4–13)
BASOPHILS # BLD AUTO: 0.18 THOUSANDS/ΜL (ref 0–0.1)
BASOPHILS NFR BLD AUTO: 3 % (ref 0–1)
BUN SERPL-MCNC: 9 MG/DL (ref 5–25)
CALCIUM SERPL-MCNC: 8.3 MG/DL (ref 8.3–10.1)
CHLORIDE SERPL-SCNC: 104 MMOL/L (ref 100–108)
CO2 SERPL-SCNC: 25 MMOL/L (ref 21–32)
CREAT SERPL-MCNC: 0.76 MG/DL (ref 0.6–1.3)
ENDOMYSIUM IGA SER QL: NEGATIVE
EOSINOPHIL # BLD AUTO: 0.56 THOUSAND/ΜL (ref 0–0.61)
EOSINOPHIL NFR BLD AUTO: 8 % (ref 0–6)
ERYTHROCYTE [DISTWIDTH] IN BLOOD BY AUTOMATED COUNT: 23.9 % (ref 11.6–15.1)
GFR SERPL CREATININE-BSD FRML MDRD: 98 ML/MIN/1.73SQ M
GLIADIN PEPTIDE IGA SER-ACNC: 2 UNITS (ref 0–19)
GLIADIN PEPTIDE IGG SER-ACNC: 2 UNITS (ref 0–19)
GLUCOSE SERPL-MCNC: 87 MG/DL (ref 65–140)
HCT VFR BLD AUTO: 29.5 % (ref 36.5–49.3)
HGB BLD-MCNC: 8.4 G/DL (ref 12–17)
IGA SERPL-MCNC: 205 MG/DL (ref 61–437)
LYMPHOCYTES # BLD AUTO: 1.06 THOUSANDS/ΜL (ref 0.6–4.47)
LYMPHOCYTES NFR BLD AUTO: 16 % (ref 14–44)
MCH RBC QN AUTO: 20.2 PG (ref 26.8–34.3)
MCHC RBC AUTO-ENTMCNC: 28.5 G/DL (ref 31.4–37.4)
MCV RBC AUTO: 71 FL (ref 82–98)
MONOCYTES # BLD AUTO: 0.86 THOUSAND/ΜL (ref 0.17–1.22)
MONOCYTES NFR BLD AUTO: 13 % (ref 4–12)
NEUTROPHILS # BLD AUTO: 3.98 THOUSANDS/ΜL (ref 1.85–7.62)
NEUTS SEG NFR BLD AUTO: 60 % (ref 43–75)
NRBC BLD AUTO-RTO: 0 /100 WBCS
PLATELET # BLD AUTO: 466 THOUSANDS/UL (ref 149–390)
PMV BLD AUTO: 9.9 FL (ref 8.9–12.7)
POTASSIUM SERPL-SCNC: 4.2 MMOL/L (ref 3.5–5.3)
RBC # BLD AUTO: 4.15 MILLION/UL (ref 3.88–5.62)
SODIUM SERPL-SCNC: 139 MMOL/L (ref 136–145)
TTG IGA SER-ACNC: <2 U/ML (ref 0–3)
TTG IGG SER-ACNC: <2 U/ML (ref 0–5)
WBC # BLD AUTO: 6.64 THOUSAND/UL (ref 4.31–10.16)

## 2018-01-24 PROCEDURE — 80048 BASIC METABOLIC PNL TOTAL CA: CPT | Performed by: FAMILY MEDICINE

## 2018-01-24 PROCEDURE — 99239 HOSP IP/OBS DSCHRG MGMT >30: CPT | Performed by: FAMILY MEDICINE

## 2018-01-24 PROCEDURE — 85025 COMPLETE CBC W/AUTO DIFF WBC: CPT | Performed by: FAMILY MEDICINE

## 2018-01-24 RX ORDER — FERROUS SULFATE 325(65) MG
325 TABLET ORAL
Qty: 30 TABLET | Refills: 0 | Status: SHIPPED | OUTPATIENT
Start: 2018-01-25 | End: 2019-06-27 | Stop reason: HOSPADM

## 2018-01-24 RX ORDER — ASCORBIC ACID 500 MG
500 TABLET ORAL DAILY
Qty: 30 TABLET | Refills: 0 | Status: ON HOLD | OUTPATIENT
Start: 2018-01-25 | End: 2019-05-17 | Stop reason: SDUPTHER

## 2018-01-24 RX ORDER — LANOLIN ALCOHOL/MO/W.PET/CERES
100 CREAM (GRAM) TOPICAL DAILY
Qty: 30 TABLET | Refills: 0 | Status: SHIPPED | OUTPATIENT
Start: 2018-01-25 | End: 2018-07-22

## 2018-01-24 RX ADMIN — PANTOPRAZOLE SODIUM 40 MG: 40 TABLET, DELAYED RELEASE ORAL at 06:45

## 2018-01-24 RX ADMIN — Medication 100 MG: at 09:10

## 2018-01-24 RX ADMIN — CYANOCOBALAMIN TAB 500 MCG 1000 MCG: 500 TAB at 09:10

## 2018-01-24 RX ADMIN — FERROUS SULFATE TAB 325 MG (65 MG ELEMENTAL FE) 325 MG: 325 (65 FE) TAB at 09:10

## 2018-01-24 RX ADMIN — OXYCODONE HYDROCHLORIDE AND ACETAMINOPHEN 500 MG: 500 TABLET ORAL at 09:10

## 2018-01-24 RX ADMIN — SUCRALFATE 1000 MG: 1 SUSPENSION ORAL at 06:45

## 2018-01-24 NOTE — DISCHARGE SUMMARY
Discharge Summary - [address] [facility]    Patient Information: Tato Boyer  58 y o  male MRN: [id number]  Unit/Bed#: E5 -01 Encounter: [id number]    Discharging Physician / Practitioner: Oris Fleischer, MD  PCP: Aster Moser PA-C  Admission Date: 1/21/2018  Discharge Date: 01/24/18    Reason for Admission: SOB    Discharge Diagnoses:     Principal Problem (Resolved):    SOB (shortness of breath)  Active Problems:    Iron deficiency anemia due to chronic blood loss    Wills's esophagus    Esophagitis    [place] ulcer    AVM (arteriovenous malformation) of small bowel, acquired ([facility] )    Anemia    Alcohol use disorder ([place] [id number] )      Consultations During Hospital Stay:  · Gastroenterology    Procedures Performed:     · Upper Endoscopy  1  Severe class D extensive esophagitis with ulceration refractory to treatment PPI and Carafate  Suspect possible viral esophagitis  2   Esophagitis demonstrates significant friability with hemorrhage  It appears to be the cause of the patient's acute blood loss anemia and iron deficiency anemia  Significant Findings / Test Results:     · Hb 8 4    Incidental Findings:   · None     Test Results Pending at Discharge (will require follow up): · Esophageal biopsy     Outpatient Tests Requested:  · None    Complications:  None    Hospital Course:     Tato Boyer  is a 58 y o  male patient who originally presented to the hospital on 1/21/2018 due to shortness of breath and fatigue  He was found to have acute on chronic anemia with hemoglobin of 6 8  The patient received 2 units of PRBC with subsequent appropriate improvement of hemoglobin with 8 4 at the time of the discharge patient after the transfusion, the hemoglobin has been stable  Due to patient's history of esophagitis an upper endoscopy was performed  It revealed severe esophagitis and the most likely cause of bleeding    It was deemed that a could be improved with medical treatment and which the patient was discharged  Was also noted that patient's alcohol use exacerbates patient's esophagitis and therefore the patient was strongly discouraged from drinking  He verbalized understanding  The time of the discharge the patient was feeling well  His vital signs were stable  His hemoglobin was stable  His oral intake was good  His oxygen saturation was normal on room air  All post discharge instructions were explained to the patient  New prescriptions were sent to patient's pharmacy  The patient was instructed to follow up with PCP and with Gastroenterology  An FMLA form was filled out for the patient per his request for short-term disability and handed the patient and his daughter  Condition at Discharge: stable     Discharge Day Visit / Exam:     Subjective:  Patient seen and examined  He states that he is feeling well  He denies any pain  He denies shortness of breath or palpitations  He denies lightheadedness or dizziness  He denies abdominal discomfort, nausea, vomiting, diarrhea or constipation  Denies blood in stool or melena  Able to tolerate his diet without problems  Vitals: Blood Pressure: 129/75 (01/24/18 0700)  Pulse: 73 (01/24/18 0700)  Temperature: 98 8 °F (37 1 °C) (01/24/18 0700)  Temp Source: Temporal (01/24/18 0700)  Respirations: 18 (01/24/18 0700)  Height: 6' 1" (185 4 cm) (01/21/18 1625)  Weight - Scale: 72 6 kg (160 lb) (01/21/18 0945)  SpO2: 96 % (01/24/18 0700)  Exam:     Physical Exam    Discussion with Family: Daughter    Discharge instructions/Information to patient and family:   See after visit summary for information provided to patient and family  Provisions for Follow-Up Care:  See after visit summary for information related to follow-up care and any pertinent home health orders        Disposition:     Home    For Discharges to [facility] SNF:   · Not Applicable to this Patient - Not Applicable to this Patient    Planned Readmission: No     Discharge Statement:  I spent 45 minutes discharging the patient  This time was spent on the day of discharge  I had direct contact with the patient on the day of discharge  Greater than 50% of the total time was spent examining patient, answering all patient questions, arranging and discussing plan of care with patient as well as directly providing post-discharge instructions  Additional time then spent on discharge activities  Discharge Medications:  See after visit summary for reconciled discharge medications provided to patient and family        ** Please Note: This note has been constructed using a voice recognition system **

## 2018-01-24 NOTE — PROGRESS NOTES
GI PROGRESS NOTE      Patient Name: Sherine Nunn  Patient MRN:  [id number]  Date:  01/24/18  Service: Gastroenterology / Hepatology    Assessment / Plan:   1  Refractory extensive severe ulcerative esophagitis, persistent despite PPI and Carafate  Alcohol may be contributing to this  Suspect possible viral esophagitis  Await biopsy results  Patient hemoglobin is stable at this time  Okay to discharge from a GI standpoint and follow up as an outpatient  Urged patient to completely quit alcohol  Subjective      Sherine Nunn  is a 58 y o  male who was admitted with acute blood-loss anemia and iron deficiency anemia  Upper endoscopy showed severe ulcerative esophagitis likely the cause of anemia      ROS:  No fever/ chills/ / arthralgias  No chest pain/ dyspnea/   No dizziness/ headache/   No urinary symptoms/      Objective      Vitals  /75 (BP Location: Right arm)   Pulse 73   Temp 98 8 °F (37 1 °C) (Temporal)   Resp 18   Ht 6' 1" (1 854 m)   Wt 72 6 kg (160 lb)   SpO2 96%   BMI 21 11 kg/m²     PhysicalExam  No jaundice / red eyes  No thyromegaly  Trachea midline  Normal breath sounds  No crackles or wheezes  RRR  No gallop or murmur    Abdomen: good BS, soft, non-distended, non-tender  No cyanosis or edema  Awake, alert, oriented  Neurologic exam: non-focal      Intake/Output Summary (Last 24 hours) at 01/24/18 0853  Last data filed at 01/23/18 1330   Gross per 24 hour   Intake              500 ml   Output                0 ml   Net              500 ml       Laboratory Studies   Lab Results   Component Value Date    CREATININE 0 76 01/24/2018    BUN 9 01/24/2018    K 4 2 01/24/2018     01/24/2018    CO2 25 01/24/2018    GLUCOSE 87 01/24/2018    CALCIUM 8 3 01/24/2018    PROT 7 2 01/21/2018    ALKPHOS 100 01/21/2018    BILITOT 0 49 01/21/2018    AST 19 01/21/2018    ALT 16 01/21/2018     Lab Results   Component Value Date    WBC 6 64 01/24/2018    HGB 8 4 (L) 01/24/2018 HCT 29 5 (L) 01/24/2018     (H) 01/24/2018    MCV 71 (L) 01/24/2018     Lab Results   Component Value Date    PROTIME 14 1 01/21/2018    INR 1 09 01/21/2018     Lab Results   Component Value Date    HGB 8 4 (L) 01/24/2018    HGB 8 2 (L) 01/23/2018    HGB 8 6 (L) 01/22/2018         Medications      Current Facility-Administered Medications:     acetaminophen (TYLENOL) tablet 650 mg, 650 mg, Oral, Q6H PRN    ascorbic acid (VITAMIN C) tablet 500 mg, 500 mg, Oral, Daily, 500 mg at 01/23/18 6677    cyanocobalamin (VITAMIN B-12) tablet 1,000 mcg, 1,000 mcg, Oral, Daily, 1,000 mcg at 01/23/18 6705    ferrous sulfate tablet 325 mg, 325 mg, Oral, Daily With Breakfast, 325 mg at 01/23/18 6612    LORazepam (ATIVAN) 2 mg/mL injection 2 mg, 2 mg, Intravenous, Q4H PRN    ondansetron (ZOFRAN) injection 4 mg, 4 mg, Intravenous, Q6H PRN    pantoprazole (PROTONIX) EC tablet 40 mg, 40 mg, Oral, BID AC, 40 mg at 01/24/18 0645    sucralfate (CARAFATE) oral suspension 1,000 mg, 1,000 mg, Oral, 4x Daily (AC & HS), 1,000 mg at 01/24/18 0645    thiamine (VITAMIN B1) tablet 100 mg, 100 mg, Oral, Daily, 100 mg at 01/23/18 7041    Past Medical History:   Diagnosis Date    Anemia     AVM (arteriovenous malformation) of small bowel, acquired ([facility] )     Wills's esophagus     Cirilo ulcer     Esophagitis     History of blood transfusion     22 prior transfusions           Total time spent with patient 20 minutes, >50% spent counseling and/or coordination of care         Mason Reyes MD

## 2018-01-24 NOTE — PLAN OF CARE
DISCHARGE PLANNING     Discharge to home or other facility with appropriate resources Adequate for Discharge        INFECTION - ADULT     Absence or prevention of progression during hospitalization Adequate for Discharge     Absence of fever/infection during neutropenic period Adequate for Discharge        Knowledge Deficit     Patient/family/caregiver demonstrates understanding of disease process, treatment plan, medications, and discharge instructions Adequate for Discharge        PAIN - ADULT     Verbalizes/displays adequate comfort level or baseline comfort level Adequate for Discharge        Potential for Falls     Patient will remain free of falls Adequate for Discharge        RESPIRATORY - ADULT     Achieves optimal ventilation and oxygenation Adequate for Discharge        SAFETY ADULT     Patient will remain free of falls Adequate for Discharge     Maintain or return to baseline ADL function Adequate for Discharge     Maintain or return mobility status to optimal level Adequate for Discharge

## 2018-01-26 LAB — HSV SPEC CULT: NORMAL

## 2018-01-31 LAB — CMV SPEC QL CULT: NORMAL

## 2018-03-24 ENCOUNTER — HOSPITAL ENCOUNTER (INPATIENT)
Facility: HOSPITAL | Age: 63
LOS: 3 days | Discharge: HOME | DRG: 812 | End: 2018-03-27
Attending: EMERGENCY MEDICINE | Admitting: INTERNAL MEDICINE
Payer: COMMERCIAL

## 2018-03-24 DIAGNOSIS — K92.2 GI BLEEDING: ICD-10-CM

## 2018-03-24 DIAGNOSIS — Q27.30 AVM (ARTERIOVENOUS MALFORMATION): ICD-10-CM

## 2018-03-24 DIAGNOSIS — K20.90 ESOPHAGITIS: ICD-10-CM

## 2018-03-24 DIAGNOSIS — D64.9 SYMPTOMATIC ANEMIA: Primary | ICD-10-CM

## 2018-03-24 DIAGNOSIS — K22.70 BARRETT'S ESOPHAGUS WITHOUT DYSPLASIA: ICD-10-CM

## 2018-03-24 DIAGNOSIS — IMO0002 ALCOHOL USE DISORDER: ICD-10-CM

## 2018-03-24 DIAGNOSIS — D50.0 IRON DEFICIENCY ANEMIA DUE TO CHRONIC BLOOD LOSS: ICD-10-CM

## 2018-03-24 LAB
ABO GROUP BLD: NORMAL
ALBUMIN SERPL BCP-MCNC: 3.6 G/DL (ref 3.5–5)
ALP SERPL-CCNC: 87 U/L (ref 46–116)
ALT SERPL W P-5'-P-CCNC: 14 U/L (ref 12–78)
ANION GAP SERPL CALCULATED.3IONS-SCNC: 11 MMOL/L (ref 4–13)
APTT PPP: 32 SECONDS (ref 23–35)
AST SERPL W P-5'-P-CCNC: 19 U/L (ref 5–45)
BASOPHILS # BLD AUTO: 0.2 THOUSANDS/ΜL (ref 0–0.1)
BASOPHILS NFR BLD AUTO: 4 % (ref 0–1)
BILIRUB SERPL-MCNC: 0.21 MG/DL (ref 0.2–1)
BLD GP AB SCN SERPL QL: NEGATIVE
BUN SERPL-MCNC: 11 MG/DL (ref 5–25)
CALCIUM SERPL-MCNC: 8.7 MG/DL (ref 8.3–10.1)
CHLORIDE SERPL-SCNC: 107 MMOL/L (ref 100–108)
CO2 SERPL-SCNC: 27 MMOL/L (ref 21–32)
CREAT SERPL-MCNC: 0.84 MG/DL (ref 0.6–1.3)
EOSINOPHIL # BLD AUTO: 0.26 THOUSAND/ΜL (ref 0–0.61)
EOSINOPHIL NFR BLD AUTO: 5 % (ref 0–6)
ERYTHROCYTE [DISTWIDTH] IN BLOOD BY AUTOMATED COUNT: 19.7 % (ref 11.6–15.1)
ETHANOL SERPL-MCNC: 95 MG/DL (ref 0–3)
GFR SERPL CREATININE-BSD FRML MDRD: 94 ML/MIN/1.73SQ M
GLUCOSE SERPL-MCNC: 104 MG/DL (ref 65–140)
HCT VFR BLD AUTO: 25.5 % (ref 36.5–49.3)
HGB BLD-MCNC: 7 G/DL (ref 12–17)
INR PPP: 1.11 (ref 0.86–1.16)
LYMPHOCYTES # BLD AUTO: 1.31 THOUSANDS/ΜL (ref 0.6–4.47)
LYMPHOCYTES NFR BLD AUTO: 23 % (ref 14–44)
MCH RBC QN AUTO: 18.9 PG (ref 26.8–34.3)
MCHC RBC AUTO-ENTMCNC: 27.5 G/DL (ref 31.4–37.4)
MCV RBC AUTO: 69 FL (ref 82–98)
MONOCYTES # BLD AUTO: 0.74 THOUSAND/ΜL (ref 0.17–1.22)
MONOCYTES NFR BLD AUTO: 13 % (ref 4–12)
NEUTROPHILS # BLD AUTO: 3.13 THOUSANDS/ΜL (ref 1.85–7.62)
NEUTS SEG NFR BLD AUTO: 55 % (ref 43–75)
NRBC BLD AUTO-RTO: 0 /100 WBCS
PLATELET # BLD AUTO: 385 THOUSANDS/UL (ref 149–390)
PMV BLD AUTO: 9.6 FL (ref 8.9–12.7)
POTASSIUM SERPL-SCNC: 3.7 MMOL/L (ref 3.5–5.3)
PROT SERPL-MCNC: 7.2 G/DL (ref 6.4–8.2)
PROTHROMBIN TIME: 14.3 SECONDS (ref 12.1–14.4)
RBC # BLD AUTO: 3.7 MILLION/UL (ref 3.88–5.62)
RH BLD: POSITIVE
SODIUM SERPL-SCNC: 145 MMOL/L (ref 136–145)
SPECIMEN EXPIRATION DATE: NORMAL
WBC # BLD AUTO: 5.64 THOUSAND/UL (ref 4.31–10.16)

## 2018-03-24 PROCEDURE — 85730 THROMBOPLASTIN TIME PARTIAL: CPT | Performed by: EMERGENCY MEDICINE

## 2018-03-24 PROCEDURE — 80053 COMPREHEN METABOLIC PANEL: CPT | Performed by: EMERGENCY MEDICINE

## 2018-03-24 PROCEDURE — 86900 BLOOD TYPING SEROLOGIC ABO: CPT | Performed by: EMERGENCY MEDICINE

## 2018-03-24 PROCEDURE — 86923 COMPATIBILITY TEST ELECTRIC: CPT

## 2018-03-24 PROCEDURE — 82272 OCCULT BLD FECES 1-3 TESTS: CPT

## 2018-03-24 PROCEDURE — 30233N1 TRANSFUSION OF NONAUTOLOGOUS RED BLOOD CELLS INTO PERIPHERAL VEIN, PERCUTANEOUS APPROACH: ICD-10-PCS | Performed by: INTERNAL MEDICINE

## 2018-03-24 PROCEDURE — 36415 COLL VENOUS BLD VENIPUNCTURE: CPT

## 2018-03-24 PROCEDURE — 86850 RBC ANTIBODY SCREEN: CPT | Performed by: EMERGENCY MEDICINE

## 2018-03-24 PROCEDURE — 96374 THER/PROPH/DIAG INJ IV PUSH: CPT

## 2018-03-24 PROCEDURE — 99223 1ST HOSP IP/OBS HIGH 75: CPT | Performed by: FAMILY MEDICINE

## 2018-03-24 PROCEDURE — 99285 EMERGENCY DEPT VISIT HI MDM: CPT

## 2018-03-24 PROCEDURE — 86901 BLOOD TYPING SEROLOGIC RH(D): CPT | Performed by: EMERGENCY MEDICINE

## 2018-03-24 PROCEDURE — 85610 PROTHROMBIN TIME: CPT | Performed by: EMERGENCY MEDICINE

## 2018-03-24 PROCEDURE — G0480 DRUG TEST DEF 1-7 CLASSES: HCPCS | Performed by: EMERGENCY MEDICINE

## 2018-03-24 PROCEDURE — 80320 DRUG SCREEN QUANTALCOHOLS: CPT | Performed by: EMERGENCY MEDICINE

## 2018-03-24 PROCEDURE — C9113 INJ PANTOPRAZOLE SODIUM, VIA: HCPCS | Performed by: EMERGENCY MEDICINE

## 2018-03-24 PROCEDURE — 85025 COMPLETE CBC W/AUTO DIFF WBC: CPT | Performed by: EMERGENCY MEDICINE

## 2018-03-24 PROCEDURE — P9016 RBC LEUKOCYTES REDUCED: HCPCS

## 2018-03-24 RX ORDER — PANTOPRAZOLE SODIUM 40 MG/1
40 INJECTION, POWDER, FOR SOLUTION INTRAVENOUS
Status: DISCONTINUED | OUTPATIENT
Start: 2018-03-25 | End: 2018-03-25

## 2018-03-24 RX ORDER — FERROUS SULFATE 325(65) MG
325 TABLET ORAL
Status: DISCONTINUED | OUTPATIENT
Start: 2018-03-25 | End: 2018-03-27 | Stop reason: HOSPADM

## 2018-03-24 RX ORDER — SUCRALFATE ORAL 1 G/10ML
1000 SUSPENSION ORAL
Status: DISCONTINUED | OUTPATIENT
Start: 2018-03-24 | End: 2018-03-27 | Stop reason: HOSPADM

## 2018-03-24 RX ORDER — THIAMINE MONONITRATE (VIT B1) 100 MG
100 TABLET ORAL DAILY
Status: DISCONTINUED | OUTPATIENT
Start: 2018-03-25 | End: 2018-03-27 | Stop reason: HOSPADM

## 2018-03-24 RX ORDER — PANTOPRAZOLE SODIUM 40 MG/1
40 INJECTION, POWDER, FOR SOLUTION INTRAVENOUS ONCE
Status: COMPLETED | OUTPATIENT
Start: 2018-03-24 | End: 2018-03-24

## 2018-03-24 RX ORDER — ASCORBIC ACID 500 MG
500 TABLET ORAL DAILY
Status: DISCONTINUED | OUTPATIENT
Start: 2018-03-25 | End: 2018-03-27 | Stop reason: HOSPADM

## 2018-03-24 RX ADMIN — PANTOPRAZOLE SODIUM 40 MG: 40 INJECTION, POWDER, FOR SOLUTION INTRAVENOUS at 21:12

## 2018-03-24 RX ADMIN — SUCRALFATE 1000 MG: 1 SUSPENSION ORAL at 23:48

## 2018-03-25 PROBLEM — F10.10 ETOH ABUSE: Chronic | Status: ACTIVE | Noted: 2018-03-25

## 2018-03-25 PROBLEM — D64.9 SYMPTOMATIC ANEMIA: Status: ACTIVE | Noted: 2018-03-24

## 2018-03-25 LAB
ABO GROUP BLD BPU: NORMAL
BPU ID: NORMAL
ERYTHROCYTE [DISTWIDTH] IN BLOOD BY AUTOMATED COUNT: 20.1 % (ref 11.6–15.1)
GLUCOSE SERPL-MCNC: 88 MG/DL (ref 65–140)
HCT VFR BLD AUTO: 25.1 % (ref 36.5–49.3)
HGB BLD-MCNC: 7.2 G/DL (ref 12–17)
MCH RBC QN AUTO: 20.1 PG (ref 26.8–34.3)
MCHC RBC AUTO-ENTMCNC: 28.7 G/DL (ref 31.4–37.4)
MCV RBC AUTO: 70 FL (ref 82–98)
PLATELET # BLD AUTO: 335 THOUSANDS/UL (ref 149–390)
PMV BLD AUTO: 9.5 FL (ref 8.9–12.7)
RBC # BLD AUTO: 3.59 MILLION/UL (ref 3.88–5.62)
UNIT DISPENSE STATUS: NORMAL
UNIT PRODUCT CODE: NORMAL
UNIT RH: NORMAL
WBC # BLD AUTO: 4.73 THOUSAND/UL (ref 4.31–10.16)

## 2018-03-25 PROCEDURE — 85027 COMPLETE CBC AUTOMATED: CPT | Performed by: FAMILY MEDICINE

## 2018-03-25 PROCEDURE — P9021 RED BLOOD CELLS UNIT: HCPCS

## 2018-03-25 PROCEDURE — 99254 IP/OBS CNSLTJ NEW/EST MOD 60: CPT | Performed by: INTERNAL MEDICINE

## 2018-03-25 PROCEDURE — C9113 INJ PANTOPRAZOLE SODIUM, VIA: HCPCS | Performed by: FAMILY MEDICINE

## 2018-03-25 PROCEDURE — C9113 INJ PANTOPRAZOLE SODIUM, VIA: HCPCS | Performed by: INTERNAL MEDICINE

## 2018-03-25 PROCEDURE — 99232 SBSQ HOSP IP/OBS MODERATE 35: CPT | Performed by: INTERNAL MEDICINE

## 2018-03-25 PROCEDURE — 82948 REAGENT STRIP/BLOOD GLUCOSE: CPT

## 2018-03-25 RX ORDER — ACETAMINOPHEN 325 MG/1
650 TABLET ORAL EVERY 6 HOURS PRN
Status: DISCONTINUED | OUTPATIENT
Start: 2018-03-25 | End: 2018-03-27 | Stop reason: HOSPADM

## 2018-03-25 RX ORDER — PANTOPRAZOLE SODIUM 40 MG/1
40 INJECTION, POWDER, FOR SOLUTION INTRAVENOUS EVERY 12 HOURS SCHEDULED
Status: DISCONTINUED | OUTPATIENT
Start: 2018-03-25 | End: 2018-03-27 | Stop reason: HOSPADM

## 2018-03-25 RX ADMIN — Medication 100 MG: at 08:01

## 2018-03-25 RX ADMIN — SUCRALFATE 1000 MG: 1 SUSPENSION ORAL at 11:13

## 2018-03-25 RX ADMIN — OXYCODONE HYDROCHLORIDE AND ACETAMINOPHEN 500 MG: 500 TABLET ORAL at 08:01

## 2018-03-25 RX ADMIN — SUCRALFATE 1000 MG: 1 SUSPENSION ORAL at 22:11

## 2018-03-25 RX ADMIN — PANTOPRAZOLE SODIUM 40 MG: 40 INJECTION, POWDER, FOR SOLUTION INTRAVENOUS at 22:11

## 2018-03-25 RX ADMIN — ACETAMINOPHEN 650 MG: 325 TABLET, FILM COATED ORAL at 16:36

## 2018-03-25 RX ADMIN — FERROUS SULFATE TAB 325 MG (65 MG ELEMENTAL FE) 325 MG: 325 (65 FE) TAB at 08:01

## 2018-03-25 RX ADMIN — PANTOPRAZOLE SODIUM 40 MG: 40 INJECTION, POWDER, FOR SOLUTION INTRAVENOUS at 08:01

## 2018-03-25 RX ADMIN — SUCRALFATE 1000 MG: 1 SUSPENSION ORAL at 15:16

## 2018-03-25 RX ADMIN — SUCRALFATE 1000 MG: 1 SUSPENSION ORAL at 06:45

## 2018-03-25 NOTE — PLAN OF CARE
Problem: Potential for Falls  Goal: Patient will remain free of falls  INTERVENTIONS:  - Assess patient frequently for physical needs  -  Identify cognitive and physical deficits and behaviors that affect risk of falls  -  [place] fall precautions as indicated by assessment   - Educate patient/family on patient safety including physical limitations  - Instruct patient to call for assistance with activity based on assessment  - Modify environment to reduce risk of injury  - Consider OT/PT consult to assist with strengthening/mobility   Outcome: Progressing      Problem: DISCHARGE PLANNING - CARE MANAGEMENT  Goal: Discharge to post-acute care or home with appropriate resources  INTERVENTIONS:  - Conduct assessment to determine patient/family and health care team treatment goals, and need for post-acute services based on payer coverage, community resources, and patient preferences, and barriers to discharge  - Address psychosocial, clinical, and financial barriers to discharge as identified in assessment in conjunction with the patient/family and health care team  - Arrange appropriate level of post-acute services according to patients   needs and preference and payer coverage in collaboration with the physician and health care team  - Communicate with and update the patient/family, physician, and health care team regarding progress on the discharge plan  - Arrange appropriate transportation to post-acute venues   Outcome: Progressing      Problem: Knowledge Deficit  Goal: Patient/family/caregiver demonstrates understanding of disease process, treatment plan, medications, and discharge instructions  Complete learning assessment and assess knowledge base    Interventions:  - Provide teaching at level of understanding  - Provide teaching via preferred learning methods   Outcome: Progressing      Problem: HEMATOLOGIC - ADULT  Goal: Maintains hematologic stability  INTERVENTIONS  - Assess for signs and symptoms of bleeding or hemorrhage  - Monitor labs  - Administer supportive blood products/factors as ordered and appropriate  Outcome: Progressing      Problem: MUSCULOSKELETAL - ADULT  Goal: Maintain or return mobility to safest level of function  INTERVENTIONS:  - Assess patient's ability to carry out ADLs; assess patient's baseline for ADL function and identify physical deficits which impact ability to perform ADLs (bathing, care of mouth/teeth, toileting, grooming, dressing, etc )  - Assess/evaluate cause of self-care deficits   - Assess range of motion  - Assess patient's mobility; develop plan if impaired  - Assess patient's need for assistive devices and provide as appropriate  - Encourage maximum independence but intervene and supervise when necessary  - Involve family in performance of ADLs  - Assess for home care needs following discharge   - Request OT consult to assist with ADL evaluation and planning for discharge  - Provide patient education as appropriate  Outcome: Progressing

## 2018-03-25 NOTE — H&P
History and Physical - [facility]    Patient Information: Laura Zabala 58 y o  male MRN: [id number]  Unit/Bed#: CYN Encounter: [id number]  Admitting Physician: Ladonna Araujo MD  PCP: Kimberly Parrish PA-C  Date of Admission:  03/24/18    Assessment/Plan:    Hospital Problem List:     Principal Problem:    Iron deficiency anemia due to chronic blood loss  Active Problems:    AVM (arteriovenous malformation)    Esophagitis    GI bleeding      Plan for the Primary Problem(s):  1  Admit the patient  2  Type/screen  Transfuse one unit of PRBCs  will monitor Hb/HCT  Further units will be transfuse as needed  3  NPO   4  GI consultation  5  Protonix 40 mg IV every 24 hours, continue Carafate  6  The patient counseled regarding completely quitting alcohol  VTE Prophylaxis: No heparin   / sequential compression device   Code Status: full code  Anticipated Length of Stay:  Patient will be admitted on an Inpatient basis with an anticipated length of stay of  3-4 midnights  Justification for Hospital Stay: symptomatic iron deficiency anemia  GI bleed  Total Time for Visit, including Counseling / Coordination of Care: 30 minutes  Greater than 50% of this total time spent on direct patient counseling and coordination of care  Chief Complaint:   Fatigue  History of Present Illness:    Laura Zabala  is a 58 y o  male who presents with fatigue for the past few days, found to have microcytic anemia with hemoglobin level of 7 ( was 8 4 2 months ago)  The patient is well known to our service because of previous similar admissions  He has a history of alcohol abuse, severe ulcerative reflux esophagitis w/mild stricturing, severe mid esophageal erosive/ulcerative changes, bryant's esophagus w/ulceration, small hiatal hernia w/caio lesion, nonbleeding duodenal AVM and iron deficiency anemia with multiple previous PRBCs transfusions   During his last admission here 2 months ago he underwent EGD which showed refractory extensive severe ulcerative esophagitis,  persistent despite PPI and Carafate ( ? Alcohol related, ? viral esophagitis), today he stated that he is cutting sown on his alcohol intake, although he admitted to drink 2-3 drinks when he goes out, probably once a week, as he mentioned  The last drink as last night  No other symptoms reported today  No chest pain, no shortness of breath  No change in mental status  No fever or chills  He denies any  Gross blood loss  No melena or dark stool, no diarrhea or constipation  No hematemesis or hemoptysis  Today his stool tested hem occult positive  Review of Systems:    Review of Systems   Constitutional: Positive for fatigue  Negative for activity change, chills, diaphoresis, fever and unexpected weight change  HENT: Negative for congestion, ear pain, facial swelling, mouth sores, nosebleeds, rhinorrhea, sinus pain, sinus pressure, tinnitus, trouble swallowing and voice change  Eyes: Negative for photophobia, discharge, redness, itching and visual disturbance  Respiratory: Negative for cough, chest tightness, wheezing and stridor  Cardiovascular: Negative for chest pain, palpitations and leg swelling  Gastrointestinal: Negative for abdominal distention, abdominal pain, anal bleeding, blood in stool, constipation, diarrhea, nausea, rectal pain and vomiting  Genitourinary: Negative for dysuria, enuresis, flank pain, frequency, hematuria and urgency  Musculoskeletal: Negative for arthralgias, back pain, gait problem, joint swelling, myalgias, neck pain and neck stiffness  Skin: Negative for color change, rash and wound  Neurological: Positive for weakness (generalized ) and light-headedness  Negative for dizziness, tremors, seizures, syncope, facial asymmetry, speech difficulty, numbness and headaches  Psychiatric/Behavioral: Negative for agitation, behavioral problems, confusion and hallucinations   The patient is not nervous/anxious  Past Medical and Surgical History:     Past Medical History:   Diagnosis Date    Anemia     AVM (arteriovenous malformation) of small bowel, acquired ([facility] [address] )     Wills's esophagus     Cirilo ulcer     Esophagitis     History of blood transfusion     22 prior transfusions       Past Surgical History:   Procedure Laterality Date    APPENDECTOMY      COLONOSCOPY N/A 11/6/2017    Procedure: COLONOSCOPY;  Surgeon: Martha Tejada MD;  Location: [facility]; Service: Gastroenterology    EGD AND COLONOSCOPY  07/18/2017    ESOPHAGOGASTRODUODENOSCOPY N/A 10/6/2017    Procedure: ESOPHAGOGASTRODUODENOSCOPY (EGD) with biopsy;  Surgeon: Dejah Sparks MD;  Location: [facility]; Service: Gastroenterology    ESOPHAGOGASTRODUODENOSCOPY N/A 11/3/2017    Procedure: ESOPHAGOGASTRODUODENOSCOPY (EGD) with biopsy;  Surgeon: Raymond Griffin MD;  Location: [facility]; Service: Gastroenterology    ESOPHAGOGASTRODUODENOSCOPY N/A 1/23/2018    Procedure: ESOPHAGOGASTRODUODENOSCOPY (EGD) with biopsy;  Surgeon: Dejah Sparks MD;  Location: [facility]; Service: Gastroenterology       Meds/Allergies:    Prior to Admission medications    Medication Sig Start Date End Date Taking?  Authorizing Provider   ascorbic acid (VITAMIN C) 500 MG tablet Take 1 tablet by mouth daily 1/25/18  Yes Vicenta Romero MD   cyanocobalamin (VITAMIN B-12) 1,000 mcg tablet Take 1,000 mcg by mouth daily   Yes Historical Provider, MD   ferrous sulfate 325 (65 Fe) mg tablet Take 1 tablet by mouth daily with breakfast 1/25/18  Yes Vicenta Romero MD   pantoprazole (PROTONIX) 40 mg tablet Take 1 tablet by mouth 2 (two) times a day 10/7/17  Yes Rebeca Durham, DO   sucralfate (CARAFATE) 1 g/10 mL suspension Take 10 mL by mouth 4 (four) times a day (before meals and at bedtime) 11/7/17  Yes Courtney Ellsworth, DO   thiamine 100 MG tablet Take 1 tablet by mouth daily 1/25/18  Yes Vicenta Romero MD     I have reviewed home medications with patient personally  Allergies: Allergies   Allergen Reactions    Doxylamine GI Intolerance       Social History:     Marital Status:      Alcohol, currently down to 2-3 drinks per week  History   Smoking Status    Former Smoker    Quit date: 11/3/2014   Smokeless Tobacco    Never Used     History   Drug Use No       Family History:    non-contributory    Physical Exam:     Vitals:   Blood Pressure: 117/73 (03/24/18 2030)  Pulse: 77 (03/24/18 2030)  Temperature: 98 8 °F (37 1 °C) (03/24/18 2030)  Temp Source: Temporal (03/24/18 2030)  Respirations: 18 (03/24/18 2030)  Weight - Scale: 65 8 kg (145 lb) (03/24/18 2030)  SpO2: 100 % (03/24/18 2030)    Physical Exam   Constitutional: He is oriented to person, place, and time  No distress  HENT:   Head: Normocephalic and atraumatic  Right Ear: External ear normal    Left Ear: External ear normal    Nose: Nose normal    Mouth/Throat: No oropharyngeal exudate  Pale m m  Multiple chronic facial hemangiomas  Eyes: Conjunctivae and EOM are normal  Pupils are equal, round, and reactive to light  Right eye exhibits no discharge  Left eye exhibits no discharge  No scleral icterus  Neck: Normal range of motion  Neck supple  No JVD present  No tracheal deviation present  No thyromegaly present  Cardiovascular: Normal rate and regular rhythm  Exam reveals no gallop and no friction rub  Murmur (soft systolic murmur over the left sternal border ) heard  Pulmonary/Chest: Effort normal and breath sounds normal  No stridor  No respiratory distress  He has no wheezes  He has no rales  He exhibits no tenderness  Abdominal: Soft  Bowel sounds are normal  He exhibits no distension  There is no tenderness  There is no rebound and no guarding  Recta exam: good sphincter tone, no stool impaction  Hem positive stool  Musculoskeletal: Normal range of motion  He exhibits no edema, tenderness or deformity     Lymphadenopathy:     He has no cervical adenopathy  Neurological: He is alert and oriented to person, place, and time  He displays normal reflexes  No cranial nerve deficit  He exhibits normal muscle tone  Coordination normal    Skin: Skin is warm and dry  No rash noted  He is not diaphoretic  Psychiatric: He has a normal mood and affect  His behavior is normal          Additional Data:     Lab Results: I have personally reviewed pertinent reports  Results from last 7 days  Lab Units 03/24/18  2042   WBC Thousand/uL 5 64   HEMOGLOBIN g/dL 7 0*   HEMATOCRIT % 25 5*   PLATELETS Thousands/uL 385   NEUTROS PCT % 55   LYMPHS PCT % 23   MONOS PCT % 13*   EOS PCT % 5       Results from last 7 days  Lab Units 03/24/18  2042   SODIUM mmol/L 145   POTASSIUM mmol/L 3 7   CHLORIDE mmol/L 107   CO2 mmol/L 27   BUN mg/dL 11   CREATININE mg/dL 0 84   CALCIUM mg/dL 8 7   TOTAL PROTEIN g/dL 7 2   BILIRUBIN TOTAL mg/dL 0 21   ALK PHOS U/L 87   ALT U/L 14   AST U/L 19   GLUCOSE RANDOM mg/dL 104       Results from last 7 days  Lab Units 03/24/18  2111   INR  1 11             Allscripts / Epic Records Reviewed:  Yes

## 2018-03-25 NOTE — PLAN OF CARE
Problem: Potential for Falls  Goal: Patient will remain free of falls  INTERVENTIONS:  - Assess patient frequently for physical needs  -  Identify cognitive and physical deficits and behaviors that affect risk of falls    -  [place] fall precautions as indicated by assessment   - Educate patient/family on patient safety including physical limitations  - Instruct patient to call for assistance with activity based on assessment  - Modify environment to reduce risk of injury  - Consider OT/PT consult to assist with strengthening/mobility   Outcome: Progressing

## 2018-03-25 NOTE — CONSULTS
Consultation - [facility]) [facility]  Leticia Galindo  58 y o  male MRN: [id number]  Unit/Bed#: E5 -01 Encounter: [id number]        Inpatient consult to gastroenterology  Consult performed by: Herson Alvarenga  Consult ordered by: Jeb Deluna          Reason for Consult / Principal Problem: Iron deficiency anemia due to chronic blood loss    HPI: Leticia Galindo  is a 58y o  year old male who presents with fatigue and Weakness  He was found to have severe anemia  Patient denies nausea vomiting  He denies blood in stool  He denies melena  He denies abdominal pain  He denies it is weight loss  He has chronic microcytic anemia  He is  Alcohol dependent for a long time  His alcohol level was elevated upon admission  Patient underwent an EGD  2 months ago and was found to have severe esophagitis  Patient denies odynophagia or dysphagia currently  Patient has not been very compliant  He also had a colonoscopy in November with good prep and internal hemorrhoids  Review of Systems: as per HPI  Review of Systems   Constitutional: Positive for fatigue  HENT: Negative  Eyes: Negative  Respiratory: Negative  Cardiovascular: Negative  Gastrointestinal: Negative  Endocrine: Negative  Genitourinary: Negative  Musculoskeletal: Negative  Skin: Negative  Allergic/Immunologic: Negative  Neurological: Negative  Hematological: Negative  Psychiatric/Behavioral: Negative  Historical Information   Past Medical History:   Diagnosis Date    Anemia     [facility]     Wills's esophagus     Cirilo ulcer     Esophagitis     History of blood transfusion     22 prior transfusions     Past Surgical History:   Procedure Laterality Date    APPENDECTOMY      COLONOSCOPY N/A 11/6/2017    Procedure: COLONOSCOPY;  Surgeon: Jose Elias Martin MD;  [facility];   Service: Gastroenterology    EGD AND COLONOSCOPY  07/18/2017    ESOPHAGOGASTRODUODENOSCOPY N/A 10/6/2017    Procedure: ESOPHAGOGASTRODUODENOSCOPY (EGD) with biopsy;  Surgeon: Savanna Novak MD;  [facility]; Service: Gastroenterology    ESOPHAGOGASTRODUODENOSCOPY N/A 11/3/2017    Procedure: ESOPHAGOGASTRODUODENOSCOPY (EGD) with biopsy;  Surgeon: Ruperto Garzon MD;  [facility]; Service: Gastroenterology    ESOPHAGOGASTRODUODENOSCOPY N/A 1/23/2018    Procedure: ESOPHAGOGASTRODUODENOSCOPY (EGD) with biopsy;  Surgeon: Savanna Novak MD;  [facility]; Service: Gastroenterology     Social History   History   Alcohol Use    Yes     Comment: once or twice a week      History   Drug Use No     History   Smoking Status    Former Smoker    Quit date: 11/3/2014   Smokeless Tobacco    Never Used     Family History   Problem Relation Age of Onset    Hemangiomas Father        Meds/Allergies     Prescriptions Prior to Admission   Medication    ascorbic acid (VITAMIN C) 500 MG tablet    cyanocobalamin (VITAMIN B-12) 1,000 mcg tablet    ferrous sulfate 325 (65 Fe) mg tablet    pantoprazole (PROTONIX) 40 mg tablet    sucralfate (CARAFATE) 1 g/10 mL suspension    thiamine 100 MG tablet     Current Facility-Administered Medications   Medication Dose Route Frequency    ascorbic acid (VITAMIN C) tablet 500 mg  500 mg Oral Daily    ferrous sulfate tablet 325 mg  325 mg Oral Daily With Breakfast    pantoprazole (PROTONIX) injection 40 mg  40 mg Intravenous Q12H [address]    sucralfate (CARAFATE) oral suspension 1,000 mg  1,000 mg Oral 4x Daily (AC & HS)    thiamine (VITAMIN B1) tablet 100 mg  100 mg Oral Daily       Allergies   Allergen Reactions    Doxylamine GI Intolerance       Objective     Blood pressure 129/72, pulse 68, temperature 99 9 °F (37 7 °C), temperature source Temporal, resp  rate 19, weight 65 8 kg (145 lb), SpO2 97 %        Intake/Output Summary (Last 24 hours) at 03/25/18 1124  Last data filed at 03/25/18 0230   Gross per 24 hour   Intake              366 ml   Output                0 ml   Net              366 ml       PHYSICAL EXAM     Physical Exam   Constitutional: He is oriented to person, place, and time  No distress  HENT:   Head: Normocephalic  Neck: Normal range of motion  Cardiovascular: Normal rate  Pulmonary/Chest: Effort normal    Abdominal: Soft  Bowel sounds are normal  He exhibits no distension  There is no tenderness  Musculoskeletal: Normal range of motion  Neurological: He is alert and oriented to person, place, and time  Skin: Skin is warm  Rash noted  He is not diaphoretic  Rash on the face   Psychiatric: He has a normal mood and affect         Lab Results:   Admission on 03/24/2018   Component Date Value    Sodium 03/24/2018 145     Potassium 03/24/2018 3 7     Chloride 03/24/2018 107     CO2 03/24/2018 27     Anion Gap 03/24/2018 11     BUN 03/24/2018 11     Creatinine 03/24/2018 0 84     Glucose 03/24/2018 104     Calcium 03/24/2018 8 7     AST 03/24/2018 19     ALT 03/24/2018 14     Alkaline Phosphatase 03/24/2018 87     Total Protein 03/24/2018 7 2     Albumin 03/24/2018 3 6     Total Bilirubin 03/24/2018 0 21     eGFR 03/24/2018 94     WBC 03/24/2018 5 64     RBC 03/24/2018 3 70*    Hemoglobin 03/24/2018 7 0*    Hematocrit 03/24/2018 25 5*    MCV 03/24/2018 69*    MCH 03/24/2018 18 9*    MCHC 03/24/2018 27 5*    RDW 03/24/2018 19 7*    MPV 03/24/2018 9 6     Platelets 82/03/4551 385     nRBC 03/24/2018 0     Neutrophils Relative 03/24/2018 55     Lymphocytes Relative 03/24/2018 23     Monocytes Relative 03/24/2018 13*    Eosinophils Relative 03/24/2018 5     Basophils Relative 03/24/2018 4*    Neutrophils Absolute 03/24/2018 3 13     Lymphocytes Absolute 03/24/2018 1 31     Monocytes Absolute 03/24/2018 0 74     Eosinophils Absolute 03/24/2018 0 26     Basophils Absolute 03/24/2018 0 20*    Protime 03/24/2018 14 3     INR 03/24/2018 1 11     PTT 03/24/2018 32     ABO Grouping 03/24/2018 O     Rh Factor 03/24/2018 Positive     Antibody Screen 03/24/2018 Negative     Specimen Expiration Date 03/24/2018 [id number]     Unit Product Code 03/25/2018 [id number]     Unit Number 03/25/2018 [id number]     Unit ABO 03/25/2018 O     Unit RH 03/25/2018 POS     Unit Dispense Status 03/25/2018 Presumed Trans     Ethanol Lvl 03/24/2018 95*    WBC 03/25/2018 4 73     RBC 03/25/2018 3 59*    Hemoglobin 03/25/2018 7 2*    Hematocrit 03/25/2018 25 1*    MCV 03/25/2018 70*    MCH 03/25/2018 20 1*    MCHC 03/25/2018 28 7*    RDW 03/25/2018 20 1*    Platelets 80/97/3411 335     MPV 03/25/2018 9 5     POC Glucose 03/25/2018 88     Unit Product Code 03/25/2018 I5761R41     Unit Number 03/25/2018 [id number]     Unit ABO 03/25/2018 O     Unit RH 03/25/2018 POS     Unit Dispense Status 03/25/2018 Crossmatched      Imaging Studies: CXR: normal      ASSESSMENT:    Principal Problem:    Iron deficiency anemia due to chronic blood loss  Active Problems:    AVM (arteriovenous malformation)    Esophagitis    GI bleeding      57 yo M admitted for symptomatic anemia with recent EGD/colonosocpy workups showing severe esophagitis  Patient is not compliant in follow up as outpatient  PLAN:     Anemia:  - it was attributed to his severe esophagitis during last EGD  - patient is asymptomatic currently  Will repeat EGD to re-evaluate as inpatient  - PPI bid  - NPO after midnight  - follow up H/H after transfusion

## 2018-03-25 NOTE — PROGRESS NOTES
- 1  Progress Note - [place]  58 y o  male MRN: [id number]    Unit/Bed#: E5 -01 Encounter: [id number]    Assessment/Plan:    Symptomatic anemia  patient with a history of AVMs and severe esophagitis, which continues to be exacerbated by alcohol intake, patient is status post 1 unit of blood denies any acute GI loss of blood last 24 hours, continue to monitor hemoglobin with GI    Esophagitis   this is continually exacerbated by intake of alcohol, patient has been to stop continue PPI and Carafate and await GI input    Alcohol abuse   recommend cessation counseling provided continue thiamin    Iron deficiency   continue p o  iron and vitamin-C    Subjective:   Still feels weak tired but denies chest pain shortness of breath nausea vomiting diarrhea no fevers chills good appetite no blood in stool     Objective:     Vitals: Blood pressure 129/72, pulse 68, temperature 99 9 °F (37 7 °C), temperature source Temporal, resp  rate 19, weight 65 8 kg (145 lb), SpO2 97 %  ,Body mass index is 19 13 kg/m²          Results from last 7 days  Lab Units 03/25/18  0445 03/24/18  2111   WBC Thousand/uL 4 73  --    HEMOGLOBIN g/dL 7 2*  --    HEMATOCRIT % 25 1*  --    PLATELETS Thousands/uL 335  --    INR   --  1 11       Results from last 7 days  Lab Units 03/24/18  2042   SODIUM mmol/L 145   POTASSIUM mmol/L 3 7   CHLORIDE mmol/L 107   CO2 mmol/L 27   BUN mg/dL 11   CREATININE mg/dL 0 84   CALCIUM mg/dL 8 7   TOTAL PROTEIN g/dL 7 2   BILIRUBIN TOTAL mg/dL 0 21   ALK PHOS U/L 87   ALT U/L 14   AST U/L 19   GLUCOSE RANDOM mg/dL 104       Scheduled Meds:    Current Facility-Administered Medications:  ascorbic acid 500 mg Oral Daily Zoe Lou MD   ferrous sulfate 325 mg Oral Daily With Breakfast Zoe Lou MD   pantoprazole 40 mg Intravenous Q12H [facility],    sucralfate 1,000 mg Oral 4x Daily (AC & HS) Zoe Lou MD   thiamine 100 mg Oral Daily Zoe Lou MD       Continuous Infusions: Physical exam:  General appearance:  Alert no distress interaction appropriate  Head/Eyes:  Nonicteric pale PERRL EOMI facial excoriations  Neck:  Supple  Lungs: CTA bilateral no wheezing rhonchi or rales  Heart: normal S1 S2 regular  Abdomen: Soft nontender with bowel sounds  Extremities: no edema  Skin: no rash    Invasive Devices     Peripheral Intravenous Line            Peripheral IV 03/24/18 Left Antecubital less than 1 day                  VTE Pharmacologic Prophylaxis:  SCDs   VTE Mechanical Prophylaxis:  SCDs                     Counseling / Coordination of Care  Total floor / unit time spent today  30  minutes  Greater than 50% of total time was spent with the patient and / or family counseling and / or coordination of care    A description of the counseling / coordination of care:

## 2018-03-25 NOTE — ED NOTES
Patient offered wheelchair numerous times to transport from triage to ED room  Declined all offers, reports that he is fine to walk  Denies dizziness          Dawn Canela RN  03/24/18 2037

## 2018-03-25 NOTE — ED PROVIDER NOTES
History  Chief Complaint   Patient presents with    Weakness - Generalized     Patient presents complaining of generalized weakness x 1 5 weeks  Patient and family member reports that patient has appeared more pale recently  Hx of "low hemoglobin"  Reports hx of numerous blood transfusions  26-year-old male presents for evaluation of worsening moderate weakness that has been ongoing for approximately a week and half  Patient has recurrent history of weakness secondary to anemia  The patient denies any associated chest pain, pressure  He does have some exertional shortness of breath  The patient is unsure if he has had any dark black or bloody bowel movements  The patient states that his symptoms are worse with exertion  History provided by:  Patient      Prior to Admission Medications   Prescriptions Last Dose Informant Patient Reported? Taking?   ascorbic acid (VITAMIN C) 500 MG tablet   No Yes   Sig: Take 1 tablet by mouth daily   cyanocobalamin (VITAMIN B-12) 1,000 mcg tablet   Yes Yes   Sig: Take 1,000 mcg by mouth daily   ferrous sulfate 325 (65 Fe) mg tablet   No Yes   Sig: Take 1 tablet by mouth daily with breakfast   pantoprazole (PROTONIX) 40 mg tablet   No Yes   Sig: Take 1 tablet by mouth 2 (two) times a day   sucralfate (CARAFATE) 1 g/10 mL suspension   No Yes   Sig: Take 10 mL by mouth 4 (four) times a day (before meals and at bedtime)   thiamine 100 MG tablet   No Yes   Sig: Take 1 tablet by mouth daily      Facility-Administered Medications: None       Past Medical History:   Diagnosis Date    Anemia     AVM (arteriovenous malformation) of small bowel, acquired ([facility] [address] )     Wills's esophagus     Cirilo ulcer     Esophagitis     History of blood transfusion     22 prior transfusions       Past Surgical History:   Procedure Laterality Date    APPENDECTOMY      COLONOSCOPY N/A 11/6/2017    Procedure: COLONOSCOPY;  Surgeon: Rocael Lucio MD;  [facility];   Service: Gastroenterology    EGD AND COLONOSCOPY  07/18/2017    ESOPHAGOGASTRODUODENOSCOPY N/A 10/6/2017    Procedure: ESOPHAGOGASTRODUODENOSCOPY (EGD) with biopsy;  Surgeon: Ntahen Munoz MD;  [facility]; Service: Gastroenterology    ESOPHAGOGASTRODUODENOSCOPY N/A 11/3/2017    Procedure: ESOPHAGOGASTRODUODENOSCOPY (EGD) with biopsy;  Surgeon: Jin Hsu MD;  [facility]; Service: Gastroenterology    ESOPHAGOGASTRODUODENOSCOPY N/A 1/23/2018    Procedure: ESOPHAGOGASTRODUODENOSCOPY (EGD) with biopsy;  Surgeon: Nathen Munoz MD;  [facility]; Service: Gastroenterology       Family History   Problem Relation Age of Onset    Hemangiomas Father      I have reviewed and agree with the history as documented  Social History   Substance Use Topics    Smoking status: Former Smoker     Quit date: 11/3/2014    Smokeless tobacco: Never Used    Alcohol use No        Review of Systems   Constitutional: Positive for fatigue  Respiratory: Positive for shortness of breath  Cardiovascular: Negative for chest pain and palpitations  Gastrointestinal: Negative for anal bleeding and blood in stool  Skin: Positive for pallor and rash  Neurological: Positive for weakness  All other systems reviewed and are negative  Physical Exam  ED Triage Vitals [03/24/18 2030]   Temperature Pulse Respirations Blood Pressure SpO2   98 8 °F (37 1 °C) 77 18 117/73 100 %      Temp Source Heart Rate Source Patient Position - Orthostatic VS BP Location FiO2 (%)   Temporal Monitor Sitting Right arm --      Pain Score       2           Orthostatic Vital Signs  Vitals:    03/24/18 2030   BP: 117/73   Pulse: 77   Patient Position - Orthostatic VS: Sitting       Physical Exam   Constitutional: He is oriented to person, place, and time  He appears well-developed and well-nourished  No distress  HENT:   Head: Normocephalic and atraumatic     Right Ear: External ear normal    Left Ear: External ear normal    Mouth/Throat: No oropharyngeal exudate  Eyes: EOM are normal  Pupils are equal, round, and reactive to light  No scleral icterus  Neck: Normal range of motion  Neck supple  Cardiovascular: Normal rate, regular rhythm and normal heart sounds  Pulmonary/Chest: Effort normal and breath sounds normal  No respiratory distress  Abdominal: Soft  Bowel sounds are normal  There is no tenderness  There is no rebound and no guarding  Genitourinary: Rectal exam shows guaiac positive stool  Musculoskeletal: Normal range of motion  Neurological: He is alert and oriented to person, place, and time  Skin: Skin is warm and dry  Petechiae ( Facial) noted  No rash noted  There is pallor  Psychiatric: He has a normal mood and affect  Nursing note and vitals reviewed  ED Medications  Medications   ascorbic acid (VITAMIN C) tablet 500 mg (not administered)   ferrous sulfate tablet 325 mg (not administered)   sucralfate (CARAFATE) oral suspension 1,000 mg (not administered)   thiamine (VITAMIN B1) tablet 100 mg (not administered)   pantoprazole (PROTONIX) injection 40 mg (not administered)   pantoprazole (PROTONIX) injection 40 mg (40 mg Intravenous Given 3/24/18 2112)       Diagnostic Studies  Results Reviewed     Procedure Component Value Units Date/Time    Ethanol [[id number]]  (Abnormal) Collected:  03/24/18 2111    Lab Status:  Final result Specimen:  Blood from Arm, Left Updated:  03/24/18 2145     Ethanol Lvl 95 (H) mg/dL     Protime-INR [[id number]]  (Normal) Collected:  03/24/18 2111    Lab Status:  Final result Specimen:  Blood from Arm, Left Updated:  03/24/18 2129     Protime 14 3 seconds      INR 1 11    APTT [[id number]]  (Normal) Collected:  03/24/18 2111    Lab Status:  Final result Specimen:  Blood from Arm, Left Updated:  03/24/18 2129     PTT 32 seconds     Narrative:          Therapeutic Heparin Range = 60-90 seconds    Comprehensive metabolic panel [[id number]] Collected:  03/24/18 2042    Lab Status:  Final result Specimen:  Blood from Arm, Left Updated:  03/24/18 2102     Sodium 145 mmol/L      Potassium 3 7 mmol/L      Chloride 107 mmol/L      CO2 27 mmol/L      Anion Gap 11 mmol/L      BUN 11 mg/dL      Creatinine 0 84 mg/dL      Glucose 104 mg/dL      Calcium 8 7 mg/dL      AST 19 U/L      ALT 14 U/L      Alkaline Phosphatase 87 U/L      Total Protein 7 2 g/dL      Albumin 3 6 g/dL      Total Bilirubin 0 21 mg/dL      eGFR 94 ml/min/1 73sq m     Narrative:         National Kidney Disease Education Program recommendations are as follows:  GFR calculation is accurate only with a steady state creatinine  Chronic Kidney disease less than 60 ml/min/1 73 sq  meters  Kidney failure less than 15 ml/min/1 73 sq  meters  CBC and differential [[id number]]  (Abnormal) Collected:  03/24/18 2042    Lab Status:  Final result Specimen:  Blood from Arm, Left Updated:  03/24/18 2048     WBC 5 64 Thousand/uL      RBC 3 70 (L) Million/uL      Hemoglobin 7 0 (L) g/dL      Hematocrit 25 5 (L) %      MCV 69 (L) fL      MCH 18 9 (L) pg      MCHC 27 5 (L) g/dL      RDW 19 7 (H) %      MPV 9 6 fL      Platelets 761 Thousands/uL      nRBC 0 /100 WBCs      Neutrophils Relative 55 %      Lymphocytes Relative 23 %      Monocytes Relative 13 (H) %      Eosinophils Relative 5 %      Basophils Relative 4 (H) %      Neutrophils Absolute 3 13 Thousands/µL      Lymphocytes Absolute 1 31 Thousands/µL      Monocytes Absolute 0 74 Thousand/µL      Eosinophils Absolute 0 26 Thousand/µL      Basophils Absolute 0 20 (H) Thousands/µL                  No orders to display              Procedures  Procedures       Phone Contacts  ED Phone Contact    ED Course  ED Course as of Mar 24 2238   Sat Mar 24, 2018   2117 Call placed to [facility] for admit    2119   I discussed the case with the hospitalist  We reviewed the HPI, pertinent PMH, ED course and workup   Hospitalist agreed with plan and will admit the patient to the hospital  MDM  Number of Diagnoses or Management Options  Alcohol use disorder ([place] [address] ): minor  GI bleeding: new and requires workup  Symptomatic anemia: new and requires workup  Diagnosis management comments: 55-year-old male presents for evaluation over 1 week of generalized weakness and exertional shortness of breath  Laboratories reviewed noting worsening anemia  Rectal exam was heme positive  Old records indicate that the patient has a history of recurrent anemia as well as GI losses    The plan is for transfusion and admission       Amount and/or Complexity of Data Reviewed  Clinical lab tests: reviewed and ordered  Review and summarize past medical records: yes  Discuss the patient with other providers: yes      CritCare Time    Disposition  Final diagnoses:   Symptomatic anemia   GI bleeding   Alcohol use disorder ([place] [address] )     Time reflects when diagnosis was documented in both MDM as applicable and the Disposition within this note     Time User Action Codes Description Comment    3/24/2018  9:12 PM Ez Eckertmann Add [D64 9] Symptomatic anemia     3/24/2018  9:12 PM Antonio Galindo [K92 2] GI bleeding     3/24/2018  9:12 PM Ez Sullivan Add [F10 99] Alcohol use disorder ([place] [address] )     3/24/2018  9:58 PM Ahmed, Karen Fuelling Add [D50 0] Iron deficiency anemia due to chronic blood loss     3/24/2018  9:58 PM AhFrancesco hebert Parrot S Modify [D50 0] Iron deficiency anemia due to chronic blood loss     3/24/2018  9:58 PM AhTrue hebertius Parrot S Add [K20 9] Esophagitis     3/24/2018  9:58 PM Karen Meyer Fuelling Modify [K20 9] Esophagitis       ED Disposition     None      Follow-up Information    None       Current Discharge Medication List      CONTINUE these medications which have NOT CHANGED    Details   ascorbic acid (VITAMIN C) 500 MG tablet Take 1 tablet by mouth daily  Qty: 30 tablet, Refills: 0    Associated Diagnoses: Iron deficiency anemia due to chronic blood loss      cyanocobalamin (VITAMIN B-12) 1,000 mcg tablet Take 1,000 mcg by mouth daily      ferrous sulfate 325 (65 Fe) mg tablet Take 1 tablet by mouth daily with breakfast  Qty: 30 tablet, Refills: 0    Associated Diagnoses: Iron deficiency anemia due to chronic blood loss      pantoprazole (PROTONIX) 40 mg tablet Take 1 tablet by mouth 2 (two) times a day  Qty: 60 tablet, Refills: 2      sucralfate (CARAFATE) 1 g/10 mL suspension Take 10 mL by mouth 4 (four) times a day (before meals and at bedtime)  Qty: 420 mL, Refills: 2      thiamine 100 MG tablet Take 1 tablet by mouth daily  Qty: 30 tablet, Refills: 0    Associated Diagnoses: Alcohol use disorder ([facility] [address] )           No discharge procedures on file      ED Provider  Electronically Signed by           Jaimee Younger DO  03/24/18 2824

## 2018-03-25 NOTE — CASE MANAGEMENT
Thank you,  [facility] in the [facility] by Moe Harvey for 2017  Network Utilization Review Department  Phone: [phone]; Fax [phone]  ATTENTION: The Network Utilization Review Department is now centralized for our 7 Facilities  Make a note that we have a new phone and fax numbers for our Department  Please call with any questions or concerns to [phone] and carefully follow the prompts so that you are directed to the right person  All voicemails are confidential  Fax any determinations, approvals, denials, and requests for initial or continue stay review clinical to [phone]  Due to HIGH CALL volume, it would be easier if you could please send faxed requests to expedite your requests and in part, help us provide discharge notifications faster  Initial Clinical Review    Admission: Date/Time/Statement: INPATIENT  3/24/18 @ 2121     Orders Placed This Encounter   Procedures    Inpatient Admission (expected length of stay for this patient is greater than two midnights)     Standing Status:   Standing     Number of Occurrences:   1     Order Specific Question:   Admitting Physician     Answer:   Lilly Pardo [[id number]]     Order Specific Question:   Level of Care     Answer:   Med Surg [16]     Order Specific Question:   Estimated length of stay     Answer:   More than 2 Midnights     Order Specific Question:   Certification     Answer:   I certify that inpatient services are medically necessary for this patient for a duration of greater than two midnights  See H&P and MD Progress Notes for additional information about the patient's course of treatment       ED: Date/Time/Mode of Arrival:   ED Arrival Information     Expected Arrival Acuity Means of Arrival Escorted By Service Admission Type    - 3/24/2018 20:25 Urgent Walk-In Family Member General Medicine Urgent    Arrival Complaint    Weakness         Chief Complaint   Patient presents with  Weakness - Generalized     Patient presents complaining of generalized weakness x 1 5 weeks  Patient and family member reports that patient has appeared more pale recently  Hx of "low hemoglobin"  Reports hx of numerous blood transfusions  History of Illness: Sohail Esparza  is a 58 y o  male who presents with fatigue for the past few days, found to have microcytic anemia with hemoglobin level of 7 ( was 8 4 2 months ago)  The patient is well known to our service because of previous similar admissions  He has a history of alcohol abuse, severe ulcerative reflux esophagitis w/mild stricturing, severe mid esophageal erosive/ulcerative changes, bryant's esophagus w/ulceration, small hiatal hernia w/caio lesion, nonbleeding duodenal AVM and iron deficiency anemia with multiple previous PRBCs transfusions  During his last admission here 2 months ago he underwent EGD which showed refractory extensive severe ulcerative esophagitis,  persistent despite PPI and Carafate ( ? Alcohol related, ? viral esophagitis), today he stated that he is cutting sown on his alcohol intake, although he admitted to drink 2-3 drinks when he goes out, probably once a week, as he mentioned  The last drink as last night  No other symptoms reported today  No chest pain, no shortness of breath  No change in mental status  No fever or chills  He denies any  Gross blood loss  No melena or dark stool, no diarrhea or constipation  No hematemesis or hemoptysis  Today his stool tested hem occult positive       ED Vital Signs:   ED Triage Vitals [03/24/18 2030]   Temperature Pulse Respirations Blood Pressure SpO2   98 8 °F (37 1 °C) 77 18 117/73 100 %      Temp Source Heart Rate Source Patient Position - Orthostatic VS BP Location FiO2 (%)   Temporal Monitor Sitting Right arm --      Pain Score       2        Wt Readings from Last 1 Encounters:   03/24/18 65 8 kg (145 lb)       Vital Signs (abnormal):   03/25/18 1205  99 8 °F (37 7 °C) 74  18  129/72  99 %  None (Room air)  --   03/25/18 0742  99 9 °F (37 7 °C)  68  19  129/72  97 %  None (Room air)  Lying   03/25/18 0230  99 1 °F (37 3 °C)  70  16  125/76  --  --  --   03/25/18 0024  99 3 °F (37 4 °C)  70  18  129/78  --  --  Lying   03/24/18 2344  99 1 °F (37 3 °C)  71  16  131/73  --  --  --     Abnormal Labs/Diagnostic Test Results:   3/24: hgb 7   hct 25 5  etoh 95  2/25: hgb 7 2   hct 25 1    ED Treatment:   Medication Administration from 03/24/2018 2025 to 03/24/2018 2228       Date/Time Order Dose Route Action Action by Comments     03/24/2018 2112 pantoprazole (PROTONIX) injection 40 mg 40 mg Intravenous Given Anushka Miranda RN           Past Medical/Surgical History:    Active Ambulatory Problems     Diagnosis Date Noted    Iron deficiency anemia due to chronic blood loss 10/06/2017   Domingo Kenneth ulcer 10/06/2017    AVM (arteriovenous malformation) 10/06/2017    AVM (arteriovenous malformation) of small bowel, acquired ([facility] )     History of blood transfusion     Cirilo ulcer     Wills's esophagus     Melena 10/05/2017    Esophagitis     GI bleeding 11/03/2017    Anemia 01/21/2018    Alcohol use disorder ([facility] ) 01/22/2018     Resolved Ambulatory Problems     Diagnosis Date Noted    Chest pain 10/06/2017    GI bleeding 10/06/2017    SHALA (acute kidney injury) ([facility] ) 10/06/2017    Thrombocytosis ([facility] ) 10/06/2017    GI bleed 10/05/2017    SOB (shortness of breath) 01/21/2018     Past Medical History:   Diagnosis Date    Anemia     AVM (arteriovenous malformation) of small bowel, acquired ([facility] )     Wills's esophagus     Cirilo ulcer     Esophagitis     History of blood transfusion        Admitting Diagnosis: GI bleeding [K92 2]  Weakness [R53 1]  Esophagitis [K20 9]  Iron deficiency anemia due to chronic blood loss [D50 0]  Symptomatic anemia [D64 9]  Alcohol use disorder ([facility] ) [F10 99]    Age/Sex: 58 y o  male    Assessment/Plan: Principal Problem:  Iron deficiency anemia due to chronic blood loss  Active Problems:    AVM (arteriovenous malformation)    Esophagitis    GI bleeding      Plan for the Primary Problem(s):  1  Admit the patient  2  Type/screen  Transfuse one unit of PRBCs  will monitor Hb/HCT  Further units will be transfuse as needed  3  NPO   4  GI consultation  5  Protonix 40 mg IV every 24 hours, continue Carafate  6  The patient counseled regarding completely quitting alcohol  VTE Prophylaxis: No heparin   / sequential compression device   Code Status: full code     Anticipated Length of Stay:  Patient will be admitted on an Inpatient basis with an anticipated length of stay of  3-4 midnights  Justification for Hospital Stay: symptomatic iron deficiency anemia  GI bleed       Admission Orders:  Scheduled Meds:   Current Facility-Administered Medications:  ascorbic acid 500 mg Oral Daily Joe Altamirano MD   ferrous sulfate 325 mg Oral Daily With Breakfast Joe Altamirano MD   pantoprazole 40 mg Intravenous Q12H [facility],    sucralfate 1,000 mg Oral 4x Daily (AC & HS) Joe Altamirano MD   thiamine 100 mg Oral Daily Joe Altamirano MD     Transfuse 2u prbc's 3/25  hse diet  Activity as xavi  Cbc in am  Cons case mgmt  Cons GI re: iron def anemia due to chronic blood loss, esophagitis, gi bleeding  SCD's      PER MED 3/25:  Feels weak and tired  Symptomatic anemia              patient with a history of AVMs and severe esophagitis, which continues to be exacerbated by alcohol intake, patient is status post 1 unit of blood denies any acute GI loss of blood last 24 hours, continue to monitor hemoglobin with GI     Esophagitis                             this is continually exacerbated by intake of alcohol, patient has been to stop continue PPI and Carafate and await GI input     Alcohol abuse                         recommend cessation counseling provided continue thiamin     Iron deficiency                         continue p o  iron and vitamin-C

## 2018-03-26 ENCOUNTER — ANESTHESIA EVENT (INPATIENT)
Dept: GASTROENTEROLOGY | Facility: HOSPITAL | Age: 63
DRG: 812 | End: 2018-03-26
Payer: COMMERCIAL

## 2018-03-26 ENCOUNTER — ANESTHESIA (INPATIENT)
Dept: GASTROENTEROLOGY | Facility: HOSPITAL | Age: 63
DRG: 812 | End: 2018-03-26
Payer: COMMERCIAL

## 2018-03-26 LAB
ABO GROUP BLD BPU: NORMAL
BASOPHILS # BLD AUTO: 0.17 THOUSANDS/ΜL (ref 0–0.1)
BASOPHILS NFR BLD AUTO: 3 % (ref 0–1)
BPU ID: NORMAL
EOSINOPHIL # BLD AUTO: 0.37 THOUSAND/ΜL (ref 0–0.61)
EOSINOPHIL NFR BLD AUTO: 7 % (ref 0–6)
ERYTHROCYTE [DISTWIDTH] IN BLOOD BY AUTOMATED COUNT: 21.1 % (ref 11.6–15.1)
HCT VFR BLD AUTO: 30.2 % (ref 36.5–49.3)
HGB BLD-MCNC: 8.7 G/DL (ref 12–17)
LYMPHOCYTES # BLD AUTO: 1.5 THOUSANDS/ΜL (ref 0.6–4.47)
LYMPHOCYTES NFR BLD AUTO: 29 % (ref 14–44)
MCH RBC QN AUTO: 20.7 PG (ref 26.8–34.3)
MCHC RBC AUTO-ENTMCNC: 28.8 G/DL (ref 31.4–37.4)
MCV RBC AUTO: 72 FL (ref 82–98)
MONOCYTES # BLD AUTO: 0.83 THOUSAND/ΜL (ref 0.17–1.22)
MONOCYTES NFR BLD AUTO: 16 % (ref 4–12)
NEUTROPHILS # BLD AUTO: 2.29 THOUSANDS/ΜL (ref 1.85–7.62)
NEUTS SEG NFR BLD AUTO: 45 % (ref 43–75)
NRBC BLD AUTO-RTO: 0 /100 WBCS
PLATELET # BLD AUTO: 304 THOUSANDS/UL (ref 149–390)
PMV BLD AUTO: 9.7 FL (ref 8.9–12.7)
RBC # BLD AUTO: 4.2 MILLION/UL (ref 3.88–5.62)
UNIT DISPENSE STATUS: NORMAL
UNIT PRODUCT CODE: NORMAL
UNIT RH: NORMAL
WBC # BLD AUTO: 5.16 THOUSAND/UL (ref 4.31–10.16)

## 2018-03-26 PROCEDURE — C9113 INJ PANTOPRAZOLE SODIUM, VIA: HCPCS | Performed by: INTERNAL MEDICINE

## 2018-03-26 PROCEDURE — 85025 COMPLETE CBC W/AUTO DIFF WBC: CPT | Performed by: INTERNAL MEDICINE

## 2018-03-26 PROCEDURE — 0DJ08ZZ INSPECTION OF UPPER INTESTINAL TRACT, VIA NATURAL OR ARTIFICIAL OPENING ENDOSCOPIC: ICD-10-PCS | Performed by: INTERNAL MEDICINE

## 2018-03-26 PROCEDURE — 99232 SBSQ HOSP IP/OBS MODERATE 35: CPT | Performed by: INTERNAL MEDICINE

## 2018-03-26 RX ORDER — SODIUM CHLORIDE 9 MG/ML
50 INJECTION, SOLUTION INTRAVENOUS CONTINUOUS
Status: DISCONTINUED | OUTPATIENT
Start: 2018-03-26 | End: 2018-03-27 | Stop reason: HOSPADM

## 2018-03-26 RX ORDER — PROPOFOL 10 MG/ML
INJECTION, EMULSION INTRAVENOUS AS NEEDED
Status: DISCONTINUED | OUTPATIENT
Start: 2018-03-26 | End: 2018-03-26 | Stop reason: SURG

## 2018-03-26 RX ADMIN — PANTOPRAZOLE SODIUM 40 MG: 40 INJECTION, POWDER, FOR SOLUTION INTRAVENOUS at 21:32

## 2018-03-26 RX ADMIN — PANTOPRAZOLE SODIUM 40 MG: 40 INJECTION, POWDER, FOR SOLUTION INTRAVENOUS at 08:03

## 2018-03-26 RX ADMIN — FERROUS SULFATE TAB 325 MG (65 MG ELEMENTAL FE) 325 MG: 325 (65 FE) TAB at 08:03

## 2018-03-26 RX ADMIN — SODIUM CHLORIDE 50 ML/HR: 0.9 INJECTION, SOLUTION INTRAVENOUS at 11:33

## 2018-03-26 RX ADMIN — PROPOFOL 100 MG: 10 INJECTION, EMULSION INTRAVENOUS at 10:49

## 2018-03-26 RX ADMIN — SUCRALFATE 1000 MG: 1 SUSPENSION ORAL at 15:44

## 2018-03-26 RX ADMIN — SUCRALFATE 1000 MG: 1 SUSPENSION ORAL at 11:31

## 2018-03-26 RX ADMIN — OXYCODONE HYDROCHLORIDE AND ACETAMINOPHEN 500 MG: 500 TABLET ORAL at 08:03

## 2018-03-26 RX ADMIN — SUCRALFATE 1000 MG: 1 SUSPENSION ORAL at 21:32

## 2018-03-26 RX ADMIN — Medication 100 MG: at 08:03

## 2018-03-26 RX ADMIN — SUCRALFATE 1000 MG: 1 SUSPENSION ORAL at 06:11

## 2018-03-26 RX ADMIN — SODIUM CHLORIDE 50 ML/HR: 0.9 INJECTION, SOLUTION INTRAVENOUS at 09:19

## 2018-03-26 NOTE — PROGRESS NOTES
[facility] Progress Note  Patient: Felipe Prajapati  58 y o  male   MRN: [id number]  PCP: Marycruz Alvarado PA-C  Unit/Bed#: [facility] Encounter: [id number]  Date Of Visit: 18    Assessment:    Principal Problem:    Iron deficiency anemia due to chronic blood loss  Active Problems:    AVM (arteriovenous malformation)    Esophagitis    GI bleeding    ETOH abuse    Symptomatic anemia      Plan:    · GI bleeding with symptomatic anemia and again found to have severe esophagitis similar to previous EGD done 2 months ago presume in exacerbation from alcohol intake  His receive 1 unit packed red blood cells and hemoglobin now at 8 7 from 7 2/no active bleeding was seen on EGD today  · Severe esophagitis/Wills's will continue PPI and sucralfate will have to comply with cessation of alcohol intake and his meds  · Iron deficiency anemia continue iron replacement therapy      VTE Pharmacologic Prophylaxis:   Pharmacologic: Pharmacologic VTE Prophylaxis contraindicated due to GI bleed  Mechanical VTE Prophylaxis in Place: Yes    Discussions with Specialists or Other Care Team Provider:  Now    Time Spent for Care: 30 minutes  More than 50% of total time spent on counseling and coordination of care as described above  Subjective:   Still admits to weakness however no at further emesis or blood loss noted adequate appetite pre EGD will reinstitute    Objective:     Vitals:   Temp (24hrs), Av 4 °F (37 4 °C), Min:98 1 °F (36 7 °C), Max:100 °F (37 8 °C)    HR:  [58-74] 58  Resp:  [16-20] 16  BP: (119-143)/(59-83) 128/83  SpO2:  [95 %-99 %] 98 %  Body mass index is 19 13 kg/m²  Input and Output Summary (last 24 hours):        Intake/Output Summary (Last 24 hours) at 18 1128  Last data filed at 18 1055   Gross per 24 hour   Intake              500 ml   Output                0 ml   Net              500 ml       Physical Exam:     Physical Exam:   General appearance: alert, appears stated age and cooperative  Head: Normocephalic, without obvious abnormality, atraumatic  Lungs: clear to auscultation bilaterally  Heart: regular rate and rhythm  Abdomen: soft, non-tender; bowel sounds normal; no masses,  no organomegaly  Back: negative  Extremities: extremities normal, atraumatic, no cyanosis or edema  Neurologic: Grossly normal      Additional Data:     Labs:      Results from last 7 days  Lab Units 03/26/18  0456   WBC Thousand/uL 5 16   HEMOGLOBIN g/dL 8 7*   HEMATOCRIT % 30 2*   PLATELETS Thousands/uL 304   NEUTROS PCT % 45   LYMPHS PCT % 29   MONOS PCT % 16*   EOS PCT % 7*       Results from last 7 days  Lab Units 03/24/18  2042   SODIUM mmol/L 145   POTASSIUM mmol/L 3 7   CHLORIDE mmol/L 107   CO2 mmol/L 27   BUN mg/dL 11   CREATININE mg/dL 0 84   CALCIUM mg/dL 8 7   TOTAL PROTEIN g/dL 7 2   BILIRUBIN TOTAL mg/dL 0 21   ALK PHOS U/L 87   ALT U/L 14   AST U/L 19   GLUCOSE RANDOM mg/dL 104       Results from last 7 days  Lab Units 03/24/18  2111   INR  1 11       * I Have Reviewed All Lab Data Listed Above  * Additional Pertinent Lab Tests Reviewed: [facility]    Imaging:  No results found  Imaging Reports Reviewed Today Include:  Now  Imaging Personally Reviewed by Myself Includes:  no  No results found       Recent Cultures (last 7 days):           Last 24 Hours Medication List:     Current Facility-Administered Medications:  acetaminophen 650 mg Oral Q6H PRN Sigrid Hernandez MD    ascorbic acid 500 mg Oral Daily Manuel Momin MD    ferrous sulfate 325 mg Oral Daily With Breakfast Manuel Momin MD    pantoprazole 40 mg Intravenous Q12H [facility],     sodium chloride 50 mL/hr Intravenous Continuous Akbar Handley MD Last Rate: 50 mL/hr (03/26/18 0919)   sucralfate 1,000 mg Oral 4x Daily (AC & HS) Manuel Momin MD    thiamine 100 mg Oral Daily Manuel Momin MD         Today, Patient Was Seen By: Trudy Dumont MD    ** Please Note: [employer] Dictation voice to text software may have been used in the creation of this document   **

## 2018-03-26 NOTE — PLAN OF CARE
DISCHARGE PLANNING - CARE MANAGEMENT     Discharge to post-acute care or home with appropriate resources Progressing        HEMATOLOGIC - ADULT     Maintains hematologic stability Progressing        Knowledge Deficit     Patient/family/caregiver demonstrates understanding of disease process, treatment plan, medications, and discharge instructions Progressing        MUSCULOSKELETAL - ADULT     Maintain or return mobility to safest level of function Progressing        Potential for Falls     Patient will remain free of falls Progressing

## 2018-03-26 NOTE — ANESTHESIA PREPROCEDURE EVALUATION
Review of Systems/Medical History  Patient summary reviewed  Chart reviewed      Cardiovascular  Negative cardio ROS    Pulmonary  Negative pulmonary ROS        GI/Hepatic    GI bleeding , Esophageal disease bryant esophagus,   Comment: Hx AVM's     Negative  ROS        Endo/Other  Negative endo/other ROS      GYN       Hematology  Anemia chronic blood loss anemia,    Comment: History of multiple blood transfusions  Musculoskeletal    Comment: Pt unable to use right hand due to previous injury      Neurology      Comment: Right hand injury Psychology       Comment: Significant etoh use in recent past         Physical Exam    Airway    Mallampati score: II  TM Distance: <3 FB  Neck ROM: full     Dental   upper dentures,     Cardiovascular  Comment: Negative ROS, Rhythm: regular, Rate: normal, Cardiovascular exam normal    Pulmonary  Pulmonary exam normal Breath sounds clear to auscultation,     Other Findings        Anesthesia Plan  ASA Score- 3     Anesthesia Type- IV sedation with anesthesia with ASA Monitors  Additional Monitors:   Airway Plan:         Plan Factors- Patient instructed to abstain from smoking on day of procedure  Patient did not smoke on day of surgery  Induction- intravenous  Postoperative Plan-     Informed Consent- Anesthetic plan and risks discussed with patient

## 2018-03-26 NOTE — PROGRESS NOTES
Patient Name: Iftikhar Ayala  Patient MRN: [id number]  Date: 03/26/18  Service: [facility]    Subjective   Iftikhar Ayala  is a 58 y o  male who was admitted with Iron deficiency anemia due to chronic blood loss  He is for egd  Patient denies: Chest pain, shortness of breath, fever, weight loss, rash, adenopathy  All others negative except as noted in HPI  Objective     Vitals  Blood pressure 143/81, pulse 61, temperature 98 1 °F (36 7 °C), resp  rate 16, weight 65 8 kg (145 lb), SpO2 98 %  ROS:  Patient denies: Chest pain, shortness of breath, fever, weight loss, rash, adenopathy  All others negative except as noted in HPI  General: Alert, no apparent distress  Eyes: No scleral icterus  ENT: MMM  Card: RRR no murmur  Lungs: Clear to ascultation b/l  No wheezes, rales, rhonchi  Abdomen: Soft  Nontender  Nondistended  Bowel sounds present and normoactive  No hepatosplenomegaly    Skin: No jaundice  Neuro: Alert and oriented x3    Laboratory Studies  Lab Results   Component Value Date    CREATININE 0 84 03/24/2018    BUN 11 03/24/2018    K 3 7 03/24/2018     03/24/2018    CO2 27 03/24/2018    GLUCOSE 104 03/24/2018    CALCIUM 8 7 03/24/2018    PROT 7 2 03/24/2018    ALKPHOS 87 03/24/2018    BILITOT 0 21 03/24/2018    AST 19 03/24/2018    ALT 14 03/24/2018     Lab Results   Component Value Date    WBC 5 16 03/26/2018    HGB 8 7 (L) 03/26/2018    HCT 30 2 (L) 03/26/2018     03/26/2018    MCV 72 (L) 03/26/2018     Lab Results   Component Value Date    PROTIME 14 3 03/24/2018    INR 1 11 03/24/2018       Inhouse Medications       Current Facility-Administered Medications:     acetaminophen (TYLENOL) tablet 650 mg, 650 mg, Oral, Q6H PRN, 650 mg at 03/25/18 1636    ascorbic acid (VITAMIN C) tablet 500 mg, 500 mg, Oral, Daily, 500 mg at 03/26/18 0803    ferrous sulfate tablet 325 mg, 325 mg, Oral, Daily With Breakfast, 325 mg at 03/26/18 0803    pantoprazole (PROTONIX) injection 40 mg, 40 mg, Intravenous, Q12H VIPUL, 40 mg at 03/26/18 0803    sodium chloride 0 9 % infusion, 50 mL/hr, Intravenous, Continuous, 50 mL/hr at 03/26/18 0919    sucralfate (CARAFATE) oral suspension 1,000 mg, 1,000 mg, Oral, 4x Daily (AC & HS), 1,000 mg at 03/26/18 9906    thiamine (VITAMIN B1) tablet 100 mg, 100 mg, Oral, Daily, 100 mg at 03/26/18 0803      Assessment/Plan:       egd planned for today      Richard Yoo MD

## 2018-03-26 NOTE — OP NOTE
OPERATIVE REPORT  PATIENT NAME: Halley Walker  :  1955  MRN: [id number]  Pt Location: [facility] ROOM 01    SURGERY DATE: 3/26/2018    Surgeon(s) and Role:     * Teresa Spivey MD - Primary    Preop Diagnosis:  Symptomatic anemia [D64 9]    Post-Op Diagnosis Codes: * Symptomatic anemia [D64 9]    Procedure(s) (LRB):  ESOPHAGOGASTRODUODENOSCOPY (EGD) (N/A)    Specimen(s):  * No specimens in log *    Estimated Blood Loss:   Minimal    Drains:       Anesthesia Type:   Choice    Operative Indications:  Symptomatic anemia [D64 9]      Operative Findings:  Esophagitis versus Wills's     Complications:   None    Procedure and Technique: The endoscope was introduced without difficulty  The upper esophagus is normal but the distal esophagus is involved with moderately severe esophagitis versus  history of Wills's  There is no specific area suggesting malignancy  Esophagogastric junction is normal from below    There is slight food retention in the stomach but the pylorus and upper duodenum were normal    I was present for the entire procedure    Patient Disposition:  hemodynamically stable    SIGNATURE: Teresa Spivey MD  DATE: 2018  TIME: 10:55 AM

## 2018-03-26 NOTE — H&P (VIEW-ONLY)
Consultation - 126 [facility]  Halley Walker  58 y o  male MRN: [id number]  Unit/Bed#: E5 -01 Encounter: [id number]        Inpatient consult to gastroenterology  Consult performed by: Bernard Sargent  Consult ordered by: Isaias Cosme          Reason for Consult / Principal Problem: Iron deficiency anemia due to chronic blood loss    HPI: Halley Walker  is a 58y o  year old male who presents with fatigue and Weakness  He was found to have severe anemia  Patient denies nausea vomiting  He denies blood in stool  He denies melena  He denies abdominal pain  He denies it is weight loss  He has chronic microcytic anemia  He is  Alcohol dependent for a long time  His alcohol level was elevated upon admission  Patient underwent an EGD  2 months ago and was found to have severe esophagitis  Patient denies odynophagia or dysphagia currently  Patient has not been very compliant  He also had a colonoscopy in November with good prep and internal hemorrhoids  Review of Systems: as per HPI  Review of Systems   Constitutional: Positive for fatigue  HENT: Negative  Eyes: Negative  Respiratory: Negative  Cardiovascular: Negative  Gastrointestinal: Negative  Endocrine: Negative  Genitourinary: Negative  Musculoskeletal: Negative  Skin: Negative  Allergic/Immunologic: Negative  Neurological: Negative  Hematological: Negative  Psychiatric/Behavioral: Negative  Historical Information   Past Medical History:   Diagnosis Date    Anemia     [facility]) [facility]     Wills's esophagus     Cirilo ulcer     Esophagitis     History of blood transfusion     22 prior transfusions     Past Surgical History:   Procedure Laterality Date    APPENDECTOMY      COLONOSCOPY N/A 11/6/2017    Procedure: COLONOSCOPY;  Surgeon: Teresa Spivey MD;  Location: [facility];   Service: Gastroenterology    EGD AND COLONOSCOPY  07/18/2017    ESOPHAGOGASTRODUODENOSCOPY N/A 10/6/2017    Procedure: ESOPHAGOGASTRODUODENOSCOPY (EGD) with biopsy;  Surgeon: Anika Rubio MD;  Location: [facility]; Service: Gastroenterology    ESOPHAGOGASTRODUODENOSCOPY N/A 11/3/2017    Procedure: ESOPHAGOGASTRODUODENOSCOPY (EGD) with biopsy;  Surgeon: Louie Curtis MD;  Location: [facility]; Service: Gastroenterology    ESOPHAGOGASTRODUODENOSCOPY N/A 1/23/2018    Procedure: ESOPHAGOGASTRODUODENOSCOPY (EGD) with biopsy;  Surgeon: Anika Rubio MD;  Location: [facility]; Service: Gastroenterology     Social History   History   Alcohol Use    Yes     Comment: once or twice a week      History   Drug Use No     History   Smoking Status    Former Smoker    Quit date: 11/3/2014   Smokeless Tobacco    Never Used     Family History   Problem Relation Age of Onset    Hemangiomas Father        Meds/Allergies     Prescriptions Prior to Admission   Medication    ascorbic acid (VITAMIN C) 500 MG tablet    cyanocobalamin (VITAMIN B-12) 1,000 mcg tablet    ferrous sulfate 325 (65 Fe) mg tablet    pantoprazole (PROTONIX) 40 mg tablet    sucralfate (CARAFATE) 1 g/10 mL suspension    thiamine 100 MG tablet     Current Facility-Administered Medications   Medication Dose Route Frequency    ascorbic acid (VITAMIN C) tablet 500 mg  500 mg Oral Daily    ferrous sulfate tablet 325 mg  325 mg Oral Daily With Breakfast    pantoprazole (PROTONIX) injection 40 mg  40 mg Intravenous Q12H [address]    sucralfate (CARAFATE) oral suspension 1,000 mg  1,000 mg Oral 4x Daily (AC & HS)    thiamine (VITAMIN B1) tablet 100 mg  100 mg Oral Daily       Allergies   Allergen Reactions    Doxylamine GI Intolerance       Objective     Blood pressure 129/72, pulse 68, temperature 99 9 °F (37 7 °C), temperature source Temporal, resp  rate 19, weight 65 8 kg (145 lb), SpO2 97 %        Intake/Output Summary (Last 24 hours) at 03/25/18 1124  Last data filed at 03/25/18 0230   Gross per 24 hour   Intake              366 ml   Output                0 ml   Net              366 ml       PHYSICAL EXAM     Physical Exam   Constitutional: He is oriented to person, place, and time  No distress  HENT:   Head: Normocephalic  Neck: Normal range of motion  Cardiovascular: Normal rate  Pulmonary/Chest: Effort normal    Abdominal: Soft  Bowel sounds are normal  He exhibits no distension  There is no tenderness  Musculoskeletal: Normal range of motion  Neurological: He is alert and oriented to person, place, and time  Skin: Skin is warm  Rash noted  He is not diaphoretic  Rash on the face   Psychiatric: He has a normal mood and affect         Lab Results:   Admission on 03/24/2018   Component Date Value    Sodium 03/24/2018 145     Potassium 03/24/2018 3 7     Chloride 03/24/2018 107     CO2 03/24/2018 27     Anion Gap 03/24/2018 11     BUN 03/24/2018 11     Creatinine 03/24/2018 0 84     Glucose 03/24/2018 104     Calcium 03/24/2018 8 7     AST 03/24/2018 19     ALT 03/24/2018 14     Alkaline Phosphatase 03/24/2018 87     Total Protein 03/24/2018 7 2     Albumin 03/24/2018 3 6     Total Bilirubin 03/24/2018 0 21     eGFR 03/24/2018 94     WBC 03/24/2018 5 64     RBC 03/24/2018 3 70*    Hemoglobin 03/24/2018 7 0*    Hematocrit 03/24/2018 25 5*    MCV 03/24/2018 69*    MCH 03/24/2018 18 9*    MCHC 03/24/2018 27 5*    RDW 03/24/2018 19 7*    MPV 03/24/2018 9 6     Platelets 53/46/2079 385     nRBC 03/24/2018 0     Neutrophils Relative 03/24/2018 55     Lymphocytes Relative 03/24/2018 23     Monocytes Relative 03/24/2018 13*    Eosinophils Relative 03/24/2018 5     Basophils Relative 03/24/2018 4*    Neutrophils Absolute 03/24/2018 3 13     Lymphocytes Absolute 03/24/2018 1 31     Monocytes Absolute 03/24/2018 0 74     Eosinophils Absolute 03/24/2018 0 26     Basophils Absolute 03/24/2018 0 20*    Protime 03/24/2018 14 3     INR 03/24/2018 1 11     PTT 03/24/2018 32     ABO Grouping 03/24/2018 O     Rh Factor 03/24/2018 Positive     Antibody Screen 03/24/2018 Negative     Specimen Expiration Date 03/24/2018 [id number]     Unit Product Code 03/25/2018 [id number]     Unit Number 03/25/2018 [id number]     Unit ABO 03/25/2018 O     Unit RH 03/25/2018 POS     Unit Dispense Status 03/25/2018 Presumed Trans     Ethanol Lvl 03/24/2018 95*    WBC 03/25/2018 4 73     RBC 03/25/2018 3 59*    Hemoglobin 03/25/2018 7 2*    Hematocrit 03/25/2018 25 1*    MCV 03/25/2018 70*    MCH 03/25/2018 20 1*    MCHC 03/25/2018 28 7*    RDW 03/25/2018 20 1*    Platelets 28/41/0018 335     MPV 03/25/2018 9 5     POC Glucose 03/25/2018 88     Unit Product Code 03/25/2018 T9699R55     Unit Number 03/25/2018 [id number]     Unit ABO 03/25/2018 O     Unit RH 03/25/2018 POS     Unit Dispense Status 03/25/2018 Crossmatched      Imaging Studies: CXR: normal      ASSESSMENT:    Principal Problem:    Iron deficiency anemia due to chronic blood loss  Active Problems:    AVM (arteriovenous malformation)    Esophagitis    GI bleeding      57 yo M admitted for symptomatic anemia with recent EGD/colonosocpy workups showing severe esophagitis  Patient is not compliant in follow up as outpatient  PLAN:     Anemia:  - it was attributed to his severe esophagitis during last EGD  - patient is asymptomatic currently  Will repeat EGD to re-evaluate as inpatient  - PPI bid  - NPO after midnight  - follow up H/H after transfusion

## 2018-03-27 VITALS
OXYGEN SATURATION: 98 % | RESPIRATION RATE: 19 BRPM | DIASTOLIC BLOOD PRESSURE: 70 MMHG | BODY MASS INDEX: 19.13 KG/M2 | TEMPERATURE: 99.5 F | SYSTOLIC BLOOD PRESSURE: 146 MMHG | WEIGHT: 145 LBS | HEART RATE: 57 BPM

## 2018-03-27 LAB
BASOPHILS # BLD AUTO: 0.14 THOUSANDS/ΜL (ref 0–0.1)
BASOPHILS NFR BLD AUTO: 3 % (ref 0–1)
EOSINOPHIL # BLD AUTO: 0.32 THOUSAND/ΜL (ref 0–0.61)
EOSINOPHIL NFR BLD AUTO: 7 % (ref 0–6)
ERYTHROCYTE [DISTWIDTH] IN BLOOD BY AUTOMATED COUNT: 21.7 % (ref 11.6–15.1)
HCT VFR BLD AUTO: 30.7 % (ref 36.5–49.3)
HGB BLD-MCNC: 8.8 G/DL (ref 12–17)
LYMPHOCYTES # BLD AUTO: 1.23 THOUSANDS/ΜL (ref 0.6–4.47)
LYMPHOCYTES NFR BLD AUTO: 27 % (ref 14–44)
MCH RBC QN AUTO: 20.7 PG (ref 26.8–34.3)
MCHC RBC AUTO-ENTMCNC: 28.7 G/DL (ref 31.4–37.4)
MCV RBC AUTO: 72 FL (ref 82–98)
MONOCYTES # BLD AUTO: 0.63 THOUSAND/ΜL (ref 0.17–1.22)
MONOCYTES NFR BLD AUTO: 14 % (ref 4–12)
NEUTROPHILS # BLD AUTO: 2.17 THOUSANDS/ΜL (ref 1.85–7.62)
NEUTS SEG NFR BLD AUTO: 49 % (ref 43–75)
NRBC BLD AUTO-RTO: 0 /100 WBCS
PLATELET # BLD AUTO: 298 THOUSANDS/UL (ref 149–390)
PMV BLD AUTO: 10.1 FL (ref 8.9–12.7)
RBC # BLD AUTO: 4.25 MILLION/UL (ref 3.88–5.62)
WBC # BLD AUTO: 4.49 THOUSAND/UL (ref 4.31–10.16)

## 2018-03-27 PROCEDURE — 99239 HOSP IP/OBS DSCHRG MGMT >30: CPT | Performed by: INTERNAL MEDICINE

## 2018-03-27 PROCEDURE — 85025 COMPLETE CBC W/AUTO DIFF WBC: CPT | Performed by: INTERNAL MEDICINE

## 2018-03-27 PROCEDURE — C9113 INJ PANTOPRAZOLE SODIUM, VIA: HCPCS | Performed by: INTERNAL MEDICINE

## 2018-03-27 RX ORDER — FLUCONAZOLE 200 MG/1
400 TABLET ORAL DAILY
Qty: 60 TABLET | Refills: 0 | Status: SHIPPED | OUTPATIENT
Start: 2018-03-28 | End: 2018-03-27

## 2018-03-27 RX ORDER — FLUCONAZOLE 100 MG/1
400 TABLET ORAL DAILY
Status: DISCONTINUED | OUTPATIENT
Start: 2018-03-27 | End: 2018-03-27 | Stop reason: HOSPADM

## 2018-03-27 RX ORDER — PANTOPRAZOLE SODIUM 40 MG/1
40 TABLET, DELAYED RELEASE ORAL 2 TIMES DAILY
Qty: 60 TABLET | Refills: 0 | Status: ON HOLD | OUTPATIENT
Start: 2018-03-27 | End: 2018-09-19

## 2018-03-27 RX ORDER — FLUCONAZOLE 200 MG/1
400 TABLET ORAL DAILY
Qty: 60 TABLET | Refills: 0 | Status: SHIPPED | OUTPATIENT
Start: 2018-03-28 | End: 2018-04-27

## 2018-03-27 RX ORDER — SUCRALFATE ORAL 1 G/10ML
1000 SUSPENSION ORAL
Qty: 420 ML | Refills: 0 | Status: ON HOLD | OUTPATIENT
Start: 2018-03-27 | End: 2018-09-19

## 2018-03-27 RX ADMIN — SUCRALFATE 1000 MG: 1 SUSPENSION ORAL at 06:10

## 2018-03-27 RX ADMIN — FERROUS SULFATE TAB 325 MG (65 MG ELEMENTAL FE) 325 MG: 325 (65 FE) TAB at 08:02

## 2018-03-27 RX ADMIN — Medication 100 MG: at 08:01

## 2018-03-27 RX ADMIN — SODIUM CHLORIDE 50 ML/HR: 0.9 INJECTION, SOLUTION INTRAVENOUS at 04:32

## 2018-03-27 RX ADMIN — FLUCONAZOLE 400 MG: 100 TABLET ORAL at 10:35

## 2018-03-27 RX ADMIN — OXYCODONE HYDROCHLORIDE AND ACETAMINOPHEN 500 MG: 500 TABLET ORAL at 08:02

## 2018-03-27 RX ADMIN — PANTOPRAZOLE SODIUM 40 MG: 40 INJECTION, POWDER, FOR SOLUTION INTRAVENOUS at 08:01

## 2018-03-27 NOTE — PROGRESS NOTES
Patient Name: Alexis Cash  Patient MRN: [id number]  Date: 03/27/18  Service: [facility]    Subjective   Alexis Cash  is a 58 y o  male who was admitted with anemia  He is feeling well today and is ready for discharge  Has been tolerating clear liquid diet  Denies nausea, dysphagia, heartburn  Discussed the results of his EGD in January that showed candidiasis and the need for treatment  Our office has been trying to get in touch with him for treatment and he was a no-show for an office visit  Patient Denies:   Chest pain, nausea, heartburn, abdominal pain  All others negative except as noted in HPI  Objective     Vitals  /70 (BP Location: Right arm)   Pulse 57   Temp 99 5 °F (37 5 °C) (Temporal)   Resp 19   Wt 65 8 kg (145 lb)   SpO2 98%   BMI 19 13 kg/m²   General: Alert, no apparent distress  Eyes:  No scleral icterus  ENT:  Mucous membranes moist  Card:  Regular rhythm without murmur  Lungs: Clear to ascultation b/l  No wheezes, rales, rhonchi  Abdomen:  Soft  Nontender  Bowel sounds are present  Skin:  No jaundice    Petechial rash present cross face  Extremities:  No edema  Neuro: Alert and oriented x3    Laboratory Studies  Lab Results   Component Value Date    CREATININE 0 84 03/24/2018    BUN 11 03/24/2018     03/24/2018    K 3 7 03/24/2018     03/24/2018    CO2 27 03/24/2018    GLUCOSE 104 03/24/2018    CALCIUM 8 7 03/24/2018    PROT 7 2 03/24/2018    ALKPHOS 87 03/24/2018    BILITOT 0 21 03/24/2018    AST 19 03/24/2018    ALT 14 03/24/2018     Lab Results   Component Value Date    WBC 4 49 03/27/2018    HGB 8 8 (L) 03/27/2018    HCT 30 7 (L) 03/27/2018     03/27/2018    MCV 72 (L) 03/27/2018     Lab Results   Component Value Date    PROTIME 14 3 03/24/2018    INR 1 11 03/24/2018       Inhouse Medications       Current Facility-Administered Medications:     acetaminophen (TYLENOL) tablet 650 mg, 650 mg, Oral, Q6H PRN, 650 mg at 03/25/18 1636    ascorbic acid (VITAMIN C) tablet 500 mg, 500 mg, Oral, Daily, 500 mg at 03/27/18 0802    ferrous sulfate tablet 325 mg, 325 mg, Oral, Daily With Breakfast, 325 mg at 03/27/18 0802    fluconazole (DIFLUCAN) tablet 400 mg, 400 mg, Oral, Daily    pantoprazole (PROTONIX) injection 40 mg, 40 mg, Intravenous, Q12H VIPUL, 40 mg at 03/27/18 0801    sodium chloride 0 9 % infusion, 50 mL/hr, Intravenous, Continuous, 50 mL/hr at 03/27/18 0432    sucralfate (CARAFATE) oral suspension 1,000 mg, 1,000 mg, Oral, 4x Daily (AC & HS), 1,000 mg at 03/27/18 0610    thiamine (VITAMIN B1) tablet 100 mg, 100 mg, Oral, Daily, 100 mg at 03/27/18 0801      Assessment/Plan:  1  Microcytic anemia with severe esophagitis on EGD 3/26  Biopsy pending  EGD January 2018 shows candidiasis that was never treated  Will start fluconazole 400 mg daily, he will need a 21 day course  Continue pantoprazole 40 mg twice daily and Carafate 1 g 4 times daily    2   Alcohol dependence, complete abstinence stressed  Principal Problem:    Iron deficiency anemia due to chronic blood loss  Active Problems:    AVM (arteriovenous malformation)    Esophagitis    GI bleeding    ETOH abuse    Symptomatic anemia    Merle Briggs PA-C

## 2018-03-27 NOTE — DISCHARGE SUMMARY
Discharge Summary - [facility]    Patient Information: Lucinda Young  58 y o  male MRN: [id number]  Unit/Bed#: E5 -01 Encounter: [id number]    Discharging Physician / Practitioner: Alejandro Gracia MD  PCP: Radha Collins PA-C  Admission Date: 3/24/2018  Discharge Date: 03/27/18    Reason for Admission:  Symptomatic anemia with fatigue and weakness    Discharge Diagnoses:  Severe esophagitis    Principal Problem:    Iron deficiency anemia due to chronic blood loss  Active Problems:    AVM (arteriovenous malformation)    Esophagitis    GI bleeding    ETOH abuse    Symptomatic anemia  Resolved Problems:    * No resolved hospital problems  *      Consultations During Hospital Stay:  · Gastroenterology    Procedures Performed:     No results found  Significant Findings:     · 31-year-old male presents with fatigue and weakness and was noted to be severely anemic and also in a state of alcohol abuse as per history  He had only recently undergone a EGD 2 months ago showed Ng severe esophagitis but is admittedly noncompliant to his medication regime that included a PPI and sucralfate  A recent colonoscopy PICC evaluation in November showed only internal hemorrhoids  · Initial presentation of hemoglobin 7 0 received 2 units of packed red blood cells with stable hemodynamics and hemoglobin at time of discharge of 8 8 without symptoms  · Seen by the GI service who performed an EGD again showing severe esophagitis but no involvement of the EG junction and no evidence of stenosis or bleeding  · Patient was continued on thiamin and iron supplementation and PPI IV along with continuation of sucralfate  · At discharge he was given a prescription for sucralfate and Protonix 40 mg b i d  both of which he had states he ran out of it was reiterated to him the importance of alcohol cessation as no doubt contributing to his significant esophagitis    He should continue iron supplementation and ascorbic acid as prior  · Based on his previous results of EGD in January noting candidal esophagitis that was never treated will be placed on Diflucan 400 mg daily for next 30 days     Incidental Findings:   · Microcytic anemia with normal LFTs  · Petechial rash facial/platelets normal    Test Results Pending at Discharge (will require follow up):   · No     Outpatient Tests Requested:  · None    Complications:  None    Hospital Course:     Sinan Holman  is a 58 y o  male patient who originally presented to the hospital on 3/24/2018 due to weakness and fatigue  Please see above significant findings for hospital course and treatment plan    Condition at Discharge: good     Discharge Day Visit / Exam:     * Please refer to separate progress for these details *    Discharge instructions/Information to patient and family:   See after visit summary for information provided to patient and family  Provisions for Follow-Up Care:  See after visit summary for information related to follow-up care and any pertinent home health orders  Disposition:     Home    For Discharges to [facility] SNF:   · Not Applicable to this Patient - Not Applicable to this Patient      Discharge Statement:  I spent *45 minutes discharging the patient  This time was spent on the day of discharge  I had direct contact with the patient on the day of discharge  Greater than 50% of the total time was spent examining patient, answering all patient questions, arranging and discussing plan of care with patient as well as directly providing post-discharge instructions  Additional time then spent on discharge activities  Discharge Medications:  See after visit summary for reconciled discharge medications provided to patient and family  ** Please Note: Dragon 360 Dictation voice to text software may have been used in the creation of this document   **

## 2018-03-28 NOTE — CASE MANAGEMENT
Notification of Discharge  This is a Notification of Discharge from our facility [address]  Please be advised that this patient has been discharge from our facility  Below you will find the admission and discharge date and time including the patients disposition  PRESENTATION DATE: 3/24/2018  8:32 PM  IP ADMISSION DATE: 3/24/18 2121  DISCHARGE DATE: 3/27/2018 12:29 PM  DISPOSITION: Home/Self Care    [address] [facility] in the [facility] by Moe Harvey for 2017  Network Utilization Review Department  Phone: [phone]; Fax [phone]  ATTENTION: The Network Utilization Review Department is now centralized for our 7 Facilities  Make a note that we have a new phone and fax numbers for our Department  Please call with any questions or concerns to [phone] and carefully follow the prompts so that you are directed to the right person  All voicemails are confidential  Fax any determinations, approvals, denials, and requests for initial or continue stay review clinical to [phone]  Due to HIGH CALL volume, it would be easier if you could please send faxed requests to expedite your requests and in part, help us provide discharge notifications faster

## 2018-04-11 ENCOUNTER — APPOINTMENT (EMERGENCY)
Dept: RADIOLOGY | Facility: HOSPITAL | Age: 63
End: 2018-04-11
Payer: COMMERCIAL

## 2018-04-11 ENCOUNTER — HOSPITAL ENCOUNTER (EMERGENCY)
Facility: HOSPITAL | Age: 63
Discharge: HOME | End: 2018-04-11
Attending: EMERGENCY MEDICINE | Admitting: EMERGENCY MEDICINE
Payer: COMMERCIAL

## 2018-04-11 ENCOUNTER — APPOINTMENT (EMERGENCY)
Dept: NON INVASIVE DIAGNOSTICS | Facility: HOSPITAL | Age: 63
End: 2018-04-11
Payer: COMMERCIAL

## 2018-04-11 ENCOUNTER — APPOINTMENT (EMERGENCY)
Dept: CT IMAGING | Facility: HOSPITAL | Age: 63
End: 2018-04-11
Payer: COMMERCIAL

## 2018-04-11 VITALS
OXYGEN SATURATION: 99 % | SYSTOLIC BLOOD PRESSURE: 154 MMHG | TEMPERATURE: 97.8 F | HEART RATE: 61 BPM | BODY MASS INDEX: 19.13 KG/M2 | WEIGHT: 145 LBS | DIASTOLIC BLOOD PRESSURE: 78 MMHG | RESPIRATION RATE: 16 BRPM

## 2018-04-11 DIAGNOSIS — K21.00 CHRONIC REFLUX ESOPHAGITIS: Primary | ICD-10-CM

## 2018-04-11 DIAGNOSIS — I80.9 SUPERFICIAL THROMBOPHLEBITIS: ICD-10-CM

## 2018-04-11 LAB
ALBUMIN SERPL BCP-MCNC: 3.6 G/DL (ref 3.5–5)
ALP SERPL-CCNC: 84 U/L (ref 46–116)
ALT SERPL W P-5'-P-CCNC: 16 U/L (ref 12–78)
ANION GAP SERPL CALCULATED.3IONS-SCNC: 11 MMOL/L (ref 4–13)
AST SERPL W P-5'-P-CCNC: 49 U/L (ref 5–45)
BASOPHILS # BLD AUTO: 0.23 THOUSANDS/ΜL (ref 0–0.1)
BASOPHILS NFR BLD AUTO: 3 % (ref 0–1)
BILIRUB SERPL-MCNC: 0.27 MG/DL (ref 0.2–1)
BUN SERPL-MCNC: 19 MG/DL (ref 5–25)
CALCIUM SERPL-MCNC: 8.9 MG/DL
CHLORIDE SERPL-SCNC: 103 MMOL/L (ref 100–108)
CO2 SERPL-SCNC: 27 MMOL/L (ref 21–32)
CREAT SERPL-MCNC: 0.82 MG/DL (ref 0.6–1.3)
EOSINOPHIL # BLD AUTO: 0.26 THOUSAND/ΜL (ref 0–0.61)
EOSINOPHIL NFR BLD AUTO: 4 % (ref 0–6)
ERYTHROCYTE [DISTWIDTH] IN BLOOD BY AUTOMATED COUNT: 24.2 % (ref 11.6–15.1)
GFR SERPL CREATININE-BSD FRML MDRD: 95 ML/MIN/1.73SQ M
GLUCOSE SERPL-MCNC: 74 MG/DL (ref 65–140)
HCT VFR BLD AUTO: 31.5 % (ref 36.5–49.3)
HGB BLD-MCNC: 8.7 G/DL (ref 12–17)
LIPASE SERPL-CCNC: 199 U/L (ref 73–393)
LYMPHOCYTES # BLD AUTO: 1.16 THOUSANDS/ΜL (ref 0.6–4.47)
LYMPHOCYTES NFR BLD AUTO: 17 % (ref 14–44)
MCH RBC QN AUTO: 20.5 PG (ref 26.8–34.3)
MCHC RBC AUTO-ENTMCNC: 27.6 G/DL (ref 31.4–37.4)
MCV RBC AUTO: 74 FL (ref 82–98)
MONOCYTES # BLD AUTO: 0.78 THOUSAND/ΜL (ref 0.17–1.22)
MONOCYTES NFR BLD AUTO: 12 % (ref 4–12)
NEUTROPHILS # BLD AUTO: 4.35 THOUSANDS/ΜL (ref 1.85–7.62)
NEUTS SEG NFR BLD AUTO: 64 % (ref 43–75)
NRBC BLD AUTO-RTO: 0 /100 WBCS
PLATELET # BLD AUTO: 754 THOUSANDS/UL (ref 149–390)
PMV BLD AUTO: 10.5 FL (ref 8.9–12.7)
POTASSIUM SERPL-SCNC: 4.3 MMOL/L (ref 3.5–5.3)
PROT SERPL-MCNC: 7.4 G/DL (ref 6.4–8.2)
RBC # BLD AUTO: 4.24 MILLION/UL (ref 3.88–5.62)
SODIUM SERPL-SCNC: 141 MMOL/L (ref 136–145)
WBC # BLD AUTO: 6.78 THOUSAND/UL (ref 4.31–10.16)

## 2018-04-11 PROCEDURE — 70491 CT SOFT TISSUE NECK W/DYE: CPT

## 2018-04-11 PROCEDURE — 36415 COLL VENOUS BLD VENIPUNCTURE: CPT | Performed by: EMERGENCY MEDICINE

## 2018-04-11 PROCEDURE — 80053 COMPREHEN METABOLIC PANEL: CPT | Performed by: EMERGENCY MEDICINE

## 2018-04-11 PROCEDURE — 99284 EMERGENCY DEPT VISIT MOD MDM: CPT

## 2018-04-11 PROCEDURE — C9113 INJ PANTOPRAZOLE SODIUM, VIA: HCPCS | Performed by: EMERGENCY MEDICINE

## 2018-04-11 PROCEDURE — 96374 THER/PROPH/DIAG INJ IV PUSH: CPT

## 2018-04-11 PROCEDURE — 83690 ASSAY OF LIPASE: CPT | Performed by: EMERGENCY MEDICINE

## 2018-04-11 PROCEDURE — 93971 EXTREMITY STUDY: CPT

## 2018-04-11 PROCEDURE — 71046 X-RAY EXAM CHEST 2 VIEWS: CPT

## 2018-04-11 PROCEDURE — 85025 COMPLETE CBC W/AUTO DIFF WBC: CPT | Performed by: EMERGENCY MEDICINE

## 2018-04-11 RX ORDER — PANTOPRAZOLE SODIUM 40 MG/1
40 INJECTION, POWDER, FOR SOLUTION INTRAVENOUS ONCE
Status: COMPLETED | OUTPATIENT
Start: 2018-04-11 | End: 2018-04-11

## 2018-04-11 RX ADMIN — IOHEXOL 85 ML: 350 INJECTION, SOLUTION INTRAVENOUS at 17:55

## 2018-04-11 RX ADMIN — PANTOPRAZOLE SODIUM 40 MG: 40 INJECTION, POWDER, FOR SOLUTION INTRAVENOUS at 18:04

## 2018-04-11 NOTE — DISCHARGE INSTRUCTIONS
Your symptoms is being caused by your esophagitis  You should consider sleeping with your head propped up, and continue your medications your GI doctor orders  Follow up soon as planned  For the arm, just use a warm compress several times per day to soften the superficial blood clot

## 2018-04-11 NOTE — ED PROVIDER NOTES
History  Chief Complaint   Patient presents with    Breathing Problem     Patient reports Hx of esophagitis  Patient reports he feels like when he lies down/at night he is having trouble breathing  Patient denies fevers, denies pain when swallowing  Patient also reports redness to L inner arm area for past few days  57 yo male with h/o bryant's esophagitis, followed by Dr Claudia Chiang, with esophageal AVM requiring blood transfusion occasionally, c/o sensation of constriction of his throat at about the level of the sternal notch, which is most noticeable at night, trying to lay flat he feels short of breath, like it's hard to get a breath in  At the same time, he says he is having no trouble swallow, solids or liquids at this time  He denies that it has any pain  He has a secondary complaint of left arm pain, mostly localizing at the elbow, with some swelling that has developed on the left olecranon, and some redness posterior elbow and anterior elbow  History provided by:  Patient      Prior to Admission Medications   Prescriptions Last Dose Informant Patient Reported?  Taking?   ascorbic acid (VITAMIN C) 500 MG tablet   No Yes   Sig: Take 1 tablet by mouth daily   cyanocobalamin (VITAMIN B-12) 1,000 mcg tablet   Yes Yes   Sig: Take 1,000 mcg by mouth daily   ferrous sulfate 325 (65 Fe) mg tablet   No Yes   Sig: Take 1 tablet by mouth daily with breakfast   fluconazole (DIFLUCAN) 200 mg tablet   No No   Sig: Take 2 tablets (400 mg total) by mouth daily for 30 days   pantoprazole (PROTONIX) 40 mg tablet   No Yes   Sig: Take 1 tablet (40 mg total) by mouth 2 (two) times a day   sucralfate (CARAFATE) 1 g/10 mL suspension   No Yes   Sig: Take 10 mL (1,000 mg total) by mouth 4 (four) times a day (with meals and at bedtime)   thiamine 100 MG tablet   No Yes   Sig: Take 1 tablet by mouth daily      Facility-Administered Medications: None       Past Medical History:   Diagnosis Date    Anemia     AVM (arteriovenous malformation) of small bowel, acquired ([facility] [address] )     Wills's esophagus     Cirilo ulcer     Esophagitis     History of blood transfusion     22 prior transfusions       Past Surgical History:   Procedure Laterality Date    APPENDECTOMY      COLONOSCOPY N/A 11/6/2017    Procedure: COLONOSCOPY;  Surgeon: Felicia Carranza MD;  Location: [facility]; Service: Gastroenterology    EGD AND COLONOSCOPY  07/18/2017    ESOPHAGOGASTRODUODENOSCOPY N/A 10/6/2017    Procedure: ESOPHAGOGASTRODUODENOSCOPY (EGD) with biopsy;  Surgeon: Panda Collier MD;  Location: [facility]; Service: Gastroenterology    ESOPHAGOGASTRODUODENOSCOPY N/A 11/3/2017    Procedure: ESOPHAGOGASTRODUODENOSCOPY (EGD) with biopsy;  Surgeon: Lenise Severance, MD;  Location: [facility]; Service: Gastroenterology    ESOPHAGOGASTRODUODENOSCOPY N/A 1/23/2018    Procedure: ESOPHAGOGASTRODUODENOSCOPY (EGD) with biopsy;  Surgeon: Panda Collier MD;  Location: [facility]; Service: Gastroenterology    ESOPHAGOGASTRODUODENOSCOPY N/A 3/26/2018    Procedure: ESOPHAGOGASTRODUODENOSCOPY (EGD); Surgeon: Felicia Carranza MD;  Location: [facility]; Service: Gastroenterology       Family History   Problem Relation Age of Onset    Hemangiomas Father      I have reviewed and agree with the history as documented  Social History   Substance Use Topics    Smoking status: Former Smoker     Quit date: 11/3/2014    Smokeless tobacco: Never Used    Alcohol use Yes      Comment: once or twice a week         Review of Systems   Constitutional: Negative for appetite change, chills and fever  HENT: Negative for sore throat  Respiratory: Negative for cough, shortness of breath and wheezing  Cardiovascular: Negative for chest pain and palpitations  Gastrointestinal: Negative for abdominal pain, diarrhea, nausea and vomiting  Genitourinary: Negative for dysuria and hematuria  Musculoskeletal: Negative for neck pain     Skin: Negative for rash  Neurological: Negative for dizziness, weakness and headaches  Psychiatric/Behavioral: Negative for suicidal ideas  All other systems reviewed and are negative  Physical Exam  ED Triage Vitals [04/11/18 1548]   Temperature Pulse Respirations Blood Pressure SpO2   97 8 °F (36 6 °C) 69 16 152/85 97 %      Temp Source Heart Rate Source Patient Position - Orthostatic VS BP Location FiO2 (%)   Oral Monitor Sitting Right arm --      Pain Score       No Pain           Orthostatic Vital Signs  Vitals:    04/11/18 1548 04/11/18 1808   BP: 152/85 154/78   Pulse: 69 61   Patient Position - Orthostatic VS: Sitting Lying       Physical Exam   Constitutional: He is oriented to person, place, and time  Vital signs are normal  He appears well-developed and well-nourished  He appears cachectic  Non-toxic appearance  He does not have a sickly appearance  He does not appear ill  No distress  HENT:   Head: Normocephalic  Right Ear: Tympanic membrane and external ear normal    Left Ear: External ear normal    Nose: Nose normal    Mouth/Throat: Oropharynx is clear and moist    Eyes: Conjunctivae, EOM and lids are normal  Pupils are equal, round, and reactive to light  Neck: Normal range of motion  Neck supple  No JVD present  No Brudzinski's sign and no Kernig's sign noted  Cardiovascular: Normal rate, regular rhythm and normal heart sounds  No murmur heard  Pulmonary/Chest: Effort normal and breath sounds normal  No accessory muscle usage  No tachypnea  No respiratory distress  He has no wheezes  Abdominal: Soft  Normal appearance and bowel sounds are normal  He exhibits no distension and no mass  There is no tenderness  There is no rigidity, no rebound and no guarding  Musculoskeletal: Normal range of motion  Left elbow: He exhibits swelling  He exhibits normal range of motion          Arms:  Lymphadenopathy:        Head (right side): No submental, no submandibular, no preauricular and no posterior auricular adenopathy present  Head (left side): No submental, no submandibular, no preauricular and no posterior auricular adenopathy present  He has no cervical adenopathy  Neurological: He is alert and oriented to person, place, and time  He has normal strength and normal reflexes  No cranial nerve deficit or sensory deficit  Coordination normal  GCS eye subscore is 4  GCS verbal subscore is 5  GCS motor subscore is 6  Skin: Skin is warm and dry  No rash noted  He is not diaphoretic  Psychiatric: He has a normal mood and affect  His speech is normal and behavior is normal  Thought content normal  Cognition and memory are normal    Nursing note and vitals reviewed  ED Medications  Medications   pantoprazole (PROTONIX) injection 40 mg (40 mg Intravenous Given 4/11/18 1804)   iohexol (OMNIPAQUE) 350 MG/ML injection (MULTI-DOSE) 85 mL (85 mL Intravenous Given 4/11/18 1755)       Diagnostic Studies  Results Reviewed     Procedure Component Value Units Date/Time    Comprehensive metabolic panel [[id number]]  (Abnormal) Collected:  04/11/18 1608    Lab Status:  Final result Specimen:  Blood from Arm, Right Updated:  04/11/18 1735     Sodium 141 mmol/L      Potassium 4 3 mmol/L      Chloride 103 mmol/L      CO2 27 mmol/L      Anion Gap 11 mmol/L      BUN 19 mg/dL      Creatinine 0 82 mg/dL      Glucose 74 mg/dL      Calcium 8 9 mg/dL      AST 49 (H) U/L      ALT 16 U/L      Alkaline Phosphatase 84 U/L      Total Protein 7 4 g/dL      Albumin 3 6 g/dL      Total Bilirubin 0 27 mg/dL      eGFR 95 ml/min/1 73sq m     Narrative:         National Kidney Disease Education Program recommendations are as follows:  GFR calculation is accurate only with a steady state creatinine  Chronic Kidney disease less than 60 ml/min/1 73 sq  meters  Kidney failure less than 15 ml/min/1 73 sq  meters      Lipase [[id number]]  (Normal) Collected:  04/11/18 1608    Lab Status:  Final result Specimen:  Blood from Arm, Right Updated:  04/11/18 1735     Lipase 199 u/L     CBC and differential [[id number]]  (Abnormal) Collected:  04/11/18 1710    Lab Status:  Final result Specimen:  Blood from Arm, Right Updated:  04/11/18 1719     WBC 6 78 Thousand/uL      RBC 4 24 Million/uL      Hemoglobin 8 7 (L) g/dL      Hematocrit 31 5 (L) %      MCV 74 (L) fL      MCH 20 5 (L) pg      MCHC 27 6 (L) g/dL      RDW 24 2 (H) %      MPV 10 5 fL      Platelets 061 (H) Thousands/uL      nRBC 0 /100 WBCs      Neutrophils Relative 64 %      Lymphocytes Relative 17 %      Monocytes Relative 12 %      Eosinophils Relative 4 %      Basophils Relative 3 (H) %      Neutrophils Absolute 4 35 Thousands/µL      Lymphocytes Absolute 1 16 Thousands/µL      Monocytes Absolute 0 78 Thousand/µL      Eosinophils Absolute 0 26 Thousand/µL      Basophils Absolute 0 23 (H) Thousands/µL                  CT soft tissue neck   Final Result by Franklin Coleman MD (04/11 1819)   Addendum 1 of 1 by Franklin Coleman MD (04/11 1831)   ADDENDUM:   Polyp versus focal secretions in the left posterior aspect of the trachea,    3 cm proximal to the reina, measuring 7 x 9 x 8 mm  No evidence of    airway compromise  I personally discussed this study with [id number] [facility] on 4/11/2018 at    6:30 PM                   Final         1  No evidence of mass, inflammation, collection or lymphadenopathy  2   Patulous proximal esophagus with fluid level, suggesting reflux              Workstation performed: [id number]         XR chest 2 views   ED Interpretation by Maicol Villagran MD (04/11 1812)   No acute findings      VAS upper limb venous duplex scan, unilateral/limited    (Results Pending)              Procedures  Procedures       Phone Contacts  ED Phone Contact    ED Course  ED Course as of Apr 11 1841 Wed Apr 11, 2018   1751 Superficial thrombophlebitis, c/w exam VAS upper limb venous duplex scan, unilateral/limited   1823 Discussed CT findings with radiologist, c/w known h/o esophagitis, and explains the sensation of fullness, particularly when lying flat  No airway obstruction  Reviewed results with patient at bedside and updated on the plan  Basically he just needs to sleep with head elevated, which he had already figured out intuitively, and follow up with GI    CT soft tissue neck                               MDM  CritCare Time    Disposition  Final diagnoses:   Chronic reflux esophagitis   Superficial thrombophlebitis - upper extremity     Time reflects when diagnosis was documented in both MDM as applicable and the Disposition within this note     Time User Action Codes Description Comment    4/11/2018  6:37 PM Stef NELSON Add [K21 0] Chronic reflux esophagitis     4/11/2018  6:39 PM Florencio Sharma Add [I80 9] Superficial thrombophlebitis     4/11/2018  6:39 PM Florencio Sharma Modify [I80 9] Superficial thrombophlebitis upper extremity      ED Disposition     ED Disposition Condition Comment    Discharge  Daniel Perezs  discharge to home/self care  Condition at discharge: Good        Follow-up Information     Follow up With Specialties Details Why Dave Verdin MD Gastroenterology Call For followup [place] [id number]          Patient's Medications   Discharge Prescriptions    No medications on file     No discharge procedures on file      ED Provider  Electronically Signed by           Leonora Najera MD  04/11/18 2616

## 2018-04-12 PROCEDURE — 93971 EXTREMITY STUDY: CPT | Performed by: SURGERY

## 2018-04-24 NOTE — CASE MANAGEMENT
Thank you,  [id number] in the [facility] by Moe Harvey for 2017  Network Utilization Review Department  Phone: [phone]; Fax [phone]  ATTENTION: The Network Utilization Review Department is now centralized for our 7 Facilities  Make a note that we have a new phone and fax numbers for our Department  Please call with any questions or concerns to [phone] and carefully follow the prompts so that you are directed to the right person  All voicemails are confidential  Fax any determinations, approvals, denials, and requests for initial or continue stay review clinical to [phone]  Due to HIGH CALL volume, it would be easier if you could please send faxed requests to expedite your requests and in part, help us provide discharge notifications faster  RECEIVED FAXED REQUEST ON 4/24/18 REQUESTING UPDATED INFORMATION FOR 3/26/18  Continued Stay Review    Date: 3/26 & 3/27/18    inpatient    Vital Signs:  03/26/18 0751  99 5 °F (37 5 °C)  63  18  126/59  98 %  None (Room air)  Lying     03/27/18 0716  99 5 °F (37 5 °C)  57  19  146/70  98 %  None (Room air)  Lying       Medications:   PO vit c, iron, diflucan, carafate, thiamine  NS 50/hr  IV protonix q12h  Clear liquids, adv to hse diet  Cbc in am    3/26: 1U PRBC'S TRANSFUSED    Abnormal Labs/Diagnostic Results:   3/26: HGB 8 7  HCT 30 2  3/27: HGB 8 8  HCT 30 7    Age/Sex: 58 y o  male     Assessment/Plan:   3/26: taken for EGD: distal esophagus involved with moderately severe esophagitis vs  History of bryant's  Slight food retention in stomach       PER MEDICINE 3/26:  Still weak  Principal Problem:  Iron deficiency anemia due to chronic blood loss  Active Problems:    AVM (arteriovenous malformation)    Esophagitis    GI bleeding    ETOH abuse    Symptomatic anemia  · GI bleeding with symptomatic anemia and again found to have severe esophagitis similar to previous EGD done 2 months ago presume in exacerbation from alcohol intake  His receive 1 unit packed red blood cells and hemoglobin now at 8 7 from 7 2  · Severe esophagitis/Wills's will continue PPI and sucralfate will have to comply with cessation of alcohol intake and his meds  · Iron deficiency anemia continue iron replacement therapy    PER GI 3/27:  Assessment/Plan:  1  Microcytic anemia with severe esophagitis on EGD 3/26  Biopsy pending  EGD January 2018 shows candidiasis that was never treated  Will start fluconazole 400 mg daily, he will need a 21 day course  Continue pantoprazole 40 mg twice daily and Carafate 1 g 4 times daily  2   Alcohol dependence, complete abstinence stressed    Discharge Plan: DC to home 3/27/18  PER DC SUMMARY:  · 17-year-old male presents with fatigue and weakness and was noted to be severely anemic and also in a state of alcohol abuse as per history  He had only recently undergone a EGD 2 months ago showed severe esophagitis but is admittedly noncompliant to his medication regime that included a PPI and sucralfate  · Initial presentation of hemoglobin 7 0 received 2 units of packed red blood cells and hemoglobin at time of discharge of 8 8   · Seen by the GI service who performed an EGD again showing severe esophagitis   · Patient was continued on thiamin and iron supplementation and PPI IV along with continuation of sucralfate  · At discharge he was given a prescription for sucralfate and Protonix 40 mg b i d  both of which he had states he ran out of it was reiterated to him the importance of alcohol cessation as no doubt contributing to his significant esophagitis    He should continue iron supplementation and ascorbic acid as prior  · Based on his previous results of EGD in January noting candidal esophagitis that was never treated will be placed on Diflucan 400 mg daily for next 30 days      Incidental Findings:   · Microcytic anemia   · Petechial rash facial

## 2018-06-11 ENCOUNTER — HOSPITAL ENCOUNTER (EMERGENCY)
Facility: HOSPITAL | Age: 63
Discharge: HOME | End: 2018-06-11
Attending: EMERGENCY MEDICINE | Admitting: EMERGENCY MEDICINE
Payer: COMMERCIAL

## 2018-06-11 VITALS
BODY MASS INDEX: 19.26 KG/M2 | RESPIRATION RATE: 18 BRPM | SYSTOLIC BLOOD PRESSURE: 143 MMHG | HEART RATE: 66 BPM | WEIGHT: 146 LBS | DIASTOLIC BLOOD PRESSURE: 82 MMHG | OXYGEN SATURATION: 95 % | TEMPERATURE: 97.8 F

## 2018-06-11 DIAGNOSIS — R53.83 FATIGUE: Primary | ICD-10-CM

## 2018-06-11 LAB
ANION GAP SERPL CALCULATED.3IONS-SCNC: 8 MMOL/L (ref 4–13)
ATRIAL RATE: 66 BPM
BACTERIA UR QL AUTO: ABNORMAL /HPF
BASOPHILS # BLD AUTO: 0.17 THOUSANDS/ΜL (ref 0–0.1)
BASOPHILS NFR BLD AUTO: 3 % (ref 0–1)
BILIRUB UR QL STRIP: ABNORMAL
BUN SERPL-MCNC: 14 MG/DL (ref 5–25)
CALCIUM SERPL-MCNC: 9.1 MG/DL (ref 8.3–10.1)
CHLORIDE SERPL-SCNC: 105 MMOL/L (ref 100–108)
CLARITY UR: CLEAR
CO2 SERPL-SCNC: 27 MMOL/L (ref 21–32)
COLOR UR: YELLOW
CREAT SERPL-MCNC: 0.94 MG/DL (ref 0.6–1.3)
EOSINOPHIL # BLD AUTO: 0.14 THOUSAND/ΜL (ref 0–0.61)
EOSINOPHIL NFR BLD AUTO: 3 % (ref 0–6)
ERYTHROCYTE [DISTWIDTH] IN BLOOD BY AUTOMATED COUNT: 19.1 % (ref 11.6–15.1)
GFR SERPL CREATININE-BSD FRML MDRD: 86 ML/MIN/1.73SQ M
GLUCOSE SERPL-MCNC: 94 MG/DL (ref 65–140)
GLUCOSE UR STRIP-MCNC: NEGATIVE MG/DL
HCT VFR BLD AUTO: 31 % (ref 36.5–49.3)
HGB BLD-MCNC: 8.6 G/DL (ref 12–17)
HGB UR QL STRIP.AUTO: ABNORMAL
HOLD SPECIMEN: NORMAL
KETONES UR STRIP-MCNC: ABNORMAL MG/DL
LEUKOCYTE ESTERASE UR QL STRIP: NEGATIVE
LYMPHOCYTES # BLD AUTO: 1.04 THOUSANDS/ΜL (ref 0.6–4.47)
LYMPHOCYTES NFR BLD AUTO: 20 % (ref 14–44)
MAGNESIUM SERPL-MCNC: 1.7 MG/DL (ref 1.6–2.6)
MCH RBC QN AUTO: 19.3 PG (ref 26.8–34.3)
MCHC RBC AUTO-ENTMCNC: 27.7 G/DL (ref 31.4–37.4)
MCV RBC AUTO: 70 FL (ref 82–98)
MONOCYTES # BLD AUTO: 0.71 THOUSAND/ΜL (ref 0.17–1.22)
MONOCYTES NFR BLD AUTO: 14 % (ref 4–12)
MUCOUS THREADS UR QL AUTO: ABNORMAL
NEUTROPHILS # BLD AUTO: 3.19 THOUSANDS/ΜL (ref 1.85–7.62)
NEUTS SEG NFR BLD AUTO: 61 % (ref 43–75)
NITRITE UR QL STRIP: NEGATIVE
NON-SQ EPI CELLS URNS QL MICRO: ABNORMAL /HPF
NRBC BLD AUTO-RTO: 0 /100 WBCS
P AXIS: 68 DEGREES
PH UR STRIP.AUTO: 6 [PH] (ref 4.5–8)
PLATELET # BLD AUTO: 512 THOUSANDS/UL (ref 149–390)
PMV BLD AUTO: 9.3 FL (ref 8.9–12.7)
POTASSIUM SERPL-SCNC: 4.2 MMOL/L (ref 3.5–5.3)
PR INTERVAL: 184 MS
PROT UR STRIP-MCNC: ABNORMAL MG/DL
QRS AXIS: 107 DEGREES
QRSD INTERVAL: 90 MS
QT INTERVAL: 390 MS
QTC INTERVAL: 408 MS
RBC # BLD AUTO: 4.45 MILLION/UL (ref 3.88–5.62)
RBC #/AREA URNS AUTO: ABNORMAL /HPF
SODIUM SERPL-SCNC: 140 MMOL/L (ref 136–145)
SP GR UR STRIP.AUTO: >=1.03 (ref 1–1.03)
T WAVE AXIS: 79 DEGREES
TSH SERPL DL<=0.05 MIU/L-ACNC: 4.41 UIU/ML (ref 0.36–3.74)
UROBILINOGEN UR QL STRIP.AUTO: 1 E.U./DL
VENTRICULAR RATE: 66 BPM
WBC # BLD AUTO: 5.25 THOUSAND/UL (ref 4.31–10.16)
WBC #/AREA URNS AUTO: ABNORMAL /HPF

## 2018-06-11 PROCEDURE — 93005 ELECTROCARDIOGRAM TRACING: CPT

## 2018-06-11 PROCEDURE — 36415 COLL VENOUS BLD VENIPUNCTURE: CPT

## 2018-06-11 PROCEDURE — 80048 BASIC METABOLIC PNL TOTAL CA: CPT | Performed by: EMERGENCY MEDICINE

## 2018-06-11 PROCEDURE — 93010 ELECTROCARDIOGRAM REPORT: CPT | Performed by: INTERNAL MEDICINE

## 2018-06-11 PROCEDURE — 99283 EMERGENCY DEPT VISIT LOW MDM: CPT

## 2018-06-11 PROCEDURE — 85025 COMPLETE CBC W/AUTO DIFF WBC: CPT | Performed by: EMERGENCY MEDICINE

## 2018-06-11 PROCEDURE — 84443 ASSAY THYROID STIM HORMONE: CPT | Performed by: EMERGENCY MEDICINE

## 2018-06-11 PROCEDURE — 83735 ASSAY OF MAGNESIUM: CPT | Performed by: EMERGENCY MEDICINE

## 2018-06-11 PROCEDURE — 81001 URINALYSIS AUTO W/SCOPE: CPT

## 2018-06-11 NOTE — ED PROVIDER NOTES
History  Chief Complaint   Patient presents with    Fatigue     pt reports feeling fatigued over the last week or so and "that usually tells me to get in here because I have a history of low hemoglobin "  denies pain  60 YO male presents with fatigue for the last week  Pt states this has been intermittent, some days he feels well and others he doesn't feel like getting out of bed  He denies pain or physical weakness, no confusion  Daughter states she noticed the pt's fingers seem pale  Pt has a Hx of anemia 2/2 a chronic esophagitis (Wills's esophagus)  Notes every time he is checked his stool is positive for heme though it is not grossly noticeable unless he has more severe bleeding  Pt denies dark or tarry stools and no blood noted in stool  He states that this fatigue had him worried that his hemoglobin was low  Pt called his PCP and has an appointment for next week  He has followed with GI for this in the past  Pt denies CP/SOB/F/C/N/V/D/C, no dysuria, burning on urination or blood in urine  History provided by:  Spouse   used: No    Fatigue   Severity:  Moderate  Onset quality:  Gradual  Duration:  1 week  Timing:  Intermittent  Progression:  Waxing and waning  Chronicity:  Recurrent  Relieved by:  Nothing  Worsened by:  Nothing  Ineffective treatments:  None tried  Associated symptoms: no abdominal pain, no chest pain, no cough, no diarrhea, no difficulty walking, no dizziness, no dysuria, no falls, no fever, no frequency, no hematochezia, no lethargy, no melena, no myalgias, no nausea, no shortness of breath, no syncope and no vomiting    Risk factors: anemia        Prior to Admission Medications   Prescriptions Last Dose Informant Patient Reported?  Taking?   ascorbic acid (VITAMIN C) 500 MG tablet   No Yes   Sig: Take 1 tablet by mouth daily   cyanocobalamin (VITAMIN B-12) 1,000 mcg tablet   Yes Yes   Sig: Take 1,000 mcg by mouth daily   ferrous sulfate 325 (65 Fe) mg tablet   No Yes   Sig: Take 1 tablet by mouth daily with breakfast   pantoprazole (PROTONIX) 40 mg tablet   No Yes   Sig: Take 1 tablet (40 mg total) by mouth 2 (two) times a day   sucralfate (CARAFATE) 1 g/10 mL suspension   No Yes   Sig: Take 10 mL (1,000 mg total) by mouth 4 (four) times a day (with meals and at bedtime)   thiamine 100 MG tablet   No Yes   Sig: Take 1 tablet by mouth daily      Facility-Administered Medications: None       Past Medical History:   Diagnosis Date    Anemia     AVM (arteriovenous malformation) of small bowel, acquired ([facility] [address] )     Wills's esophagus     Cirilo ulcer     Esophagitis     History of blood transfusion     22 prior transfusions       Past Surgical History:   Procedure Laterality Date    APPENDECTOMY      COLONOSCOPY N/A 11/6/2017    Procedure: COLONOSCOPY;  Surgeon: Madison Mckenna MD;  Location: [facility]; Service: Gastroenterology    EGD AND COLONOSCOPY  07/18/2017    ESOPHAGOGASTRODUODENOSCOPY N/A 10/6/2017    Procedure: ESOPHAGOGASTRODUODENOSCOPY (EGD) with biopsy;  Surgeon: Jael Law MD;  Location: [facility]; Service: Gastroenterology    ESOPHAGOGASTRODUODENOSCOPY N/A 11/3/2017    Procedure: ESOPHAGOGASTRODUODENOSCOPY (EGD) with biopsy;  Surgeon: Susy Martinez MD;  Location: [facility]; Service: Gastroenterology    ESOPHAGOGASTRODUODENOSCOPY N/A 1/23/2018    Procedure: ESOPHAGOGASTRODUODENOSCOPY (EGD) with biopsy;  Surgeon: Jael Law MD;  Location: [facility]; Service: Gastroenterology    ESOPHAGOGASTRODUODENOSCOPY N/A 3/26/2018    Procedure: ESOPHAGOGASTRODUODENOSCOPY (EGD); Surgeon: Madison Mckenna MD;  Location: [facility]; Service: Gastroenterology       Family History   Problem Relation Age of Onset    Hemangiomas Father      I have reviewed and agree with the history as documented      Social History   Substance Use Topics    Smoking status: Former Smoker     Quit date: 11/3/2014    Smokeless tobacco: Never Used  Alcohol use Yes      Comment: once or twice a week         Review of Systems   Constitutional: Positive for fatigue  Negative for fever  HENT: Negative for dental problem  Eyes: Negative for visual disturbance  Respiratory: Negative for cough and shortness of breath  Cardiovascular: Negative for chest pain and syncope  Gastrointestinal: Negative for abdominal pain, diarrhea, hematochezia, melena, nausea and vomiting  Genitourinary: Negative for dysuria and frequency  Musculoskeletal: Negative for falls, myalgias, neck pain and neck stiffness  Skin: Negative for rash  Neurological: Negative for dizziness, weakness and light-headedness  Psychiatric/Behavioral: Negative for agitation, behavioral problems and confusion  All other systems reviewed and are negative  Physical Exam  Physical Exam   Constitutional: He is oriented to person, place, and time  He appears well-developed and well-nourished  HENT:   Head: Normocephalic and atraumatic  Eyes: EOM are normal    Neck: Normal range of motion  Cardiovascular: Normal rate, regular rhythm and normal heart sounds  Pulmonary/Chest: Effort normal and breath sounds normal    Abdominal: Soft  There is no tenderness  Musculoskeletal: Normal range of motion  Neurological: He is alert and oriented to person, place, and time  No sensory deficit  He exhibits normal muscle tone  Skin: Skin is warm and dry  Psychiatric: He has a normal mood and affect  His behavior is normal    Nursing note and vitals reviewed        Vital Signs  ED Triage Vitals   Temperature Pulse Respirations Blood Pressure SpO2   06/11/18 1236 06/11/18 1236 06/11/18 1236 06/11/18 1236 06/11/18 1236   97 8 °F (36 6 °C) 73 18 134/76 98 %      Temp Source Heart Rate Source Patient Position - Orthostatic VS BP Location FiO2 (%)   06/11/18 1236 -- 06/11/18 1434 06/11/18 1434 --   Oral  Sitting Right arm       Pain Score       06/11/18 1236       No Pain Vitals:    06/11/18 1236 06/11/18 1434   BP: 134/76 147/83   Pulse: 73 67   Patient Position - Orthostatic VS:  Sitting       Visual Acuity      ED Medications  Medications - No data to display    Diagnostic Studies  Results Reviewed     Procedure Component Value Units Date/Time    Urine Microscopic [[id number]]  (Abnormal) Collected:  06/11/18 1437    Lab Status:  Final result Specimen:  Urine from Urine, Clean Catch Updated:  06/11/18 1502     RBC, UA None Seen /hpf      WBC, UA 1-2 (A) /hpf      Epithelial Cells None Seen /hpf      Bacteria, UA Occasional /hpf      MUCOUS THREADS Occasional (A)    Basic metabolic panel [[id number]] Collected:  06/11/18 1250    Lab Status:  Final result Specimen:  Blood from Arm, Left Updated:  06/11/18 1445     Sodium 140 mmol/L      Potassium 4 2 mmol/L      Chloride 105 mmol/L      CO2 27 mmol/L      Anion Gap 8 mmol/L      BUN 14 mg/dL      Creatinine 0 94 mg/dL      Glucose 94 mg/dL      Calcium 9 1 mg/dL      eGFR 86 ml/min/1 73sq m     Narrative:         National Kidney Disease Education Program recommendations are as follows:  GFR calculation is accurate only with a steady state creatinine  Chronic Kidney disease less than 60 ml/min/1 73 sq  meters  Kidney failure less than 15 ml/min/1 73 sq  meters  Magnesium [[id number]]  (Normal) Collected:  06/11/18 1250    Lab Status:  Final result Specimen:  Blood from Arm, Left Updated:  06/11/18 1445     Magnesium 1 7 mg/dL     TSH [[id number]]  (Abnormal) Collected:  06/11/18 1250    Lab Status:  Final result Specimen:  Blood from Arm, Left Updated:  06/11/18 1445     TSH 3RD GENERATON 4 406 (H) uIU/mL     Narrative:         Patients undergoing fluorescein dye angiography may retain small amounts of fluorescein in the body for 48-72 hours post procedure  Samples containing fluorescein can produce falsely depressed TSH values  If the patient had this procedure,a specimen should be resubmitted post fluorescein clearance      POCT urinalysis dipstick [[id number]]  (Abnormal) Resulted:  06/11/18 1434    Lab Status:  Final result Specimen:  Urine Updated:  06/11/18 1434    ED Urine Macroscopic [[id number]]  (Abnormal) Collected:  06/11/18 1437    Lab Status:  Final result Specimen:  Urine Updated:  06/11/18 1433     Color, UA Yellow     Clarity, UA Clear     pH, UA 6 0     Leukocytes, UA Negative     Nitrite, UA Negative     Protein, UA 30 (1+) (A) mg/dl      Glucose, UA Negative mg/dl      Ketones, UA Trace (A) mg/dl      Urobilinogen, UA 1 0 E U /dl      Bilirubin, UA Interference- unable to analyze (A)     Blood, UA Trace (A)     Specific Gravity, UA >=1 030    Narrative:       CLINITEK RESULT    CBC and differential [[id number]]  (Abnormal) Collected:  06/11/18 1250    Lab Status:  Final result Specimen:  Blood from Arm, Left Updated:  06/11/18 1258     WBC 5 25 Thousand/uL      RBC 4 45 Million/uL      Hemoglobin 8 6 (L) g/dL      Hematocrit 31 0 (L) %      MCV 70 (L) fL      MCH 19 3 (L) pg      MCHC 27 7 (L) g/dL      RDW 19 1 (H) %      MPV 9 3 fL      Platelets 864 (H) Thousands/uL      nRBC 0 /100 WBCs      Neutrophils Relative 61 %      Lymphocytes Relative 20 %      Monocytes Relative 14 (H) %      Eosinophils Relative 3 %      Basophils Relative 3 (H) %      Neutrophils Absolute 3 19 Thousands/µL      Lymphocytes Absolute 1 04 Thousands/µL      Monocytes Absolute 0 71 Thousand/µL      Eosinophils Absolute 0 14 Thousand/µL      Basophils Absolute 0 17 (H) Thousands/µL                  No orders to display              Procedures  ECG 12 Lead Documentation  Date/Time: 6/11/2018 2:36 PM  Performed by: Willow Colunga, [address] [place] [address] by: Sergo ZHU     ECG reviewed by me, the ED Provider: yes    Patient location:  ED  Previous ECG:     Previous ECG:  Compared to current    Comparison ECG info:  1/21/2018    Similarity:  No change  Interpretation:     Interpretation: normal    Rate:     ECG rate:  66    ECG rate assessment: normal    Rhythm: Rhythm: sinus rhythm    QRS:     QRS axis:  Right    QRS intervals:  Normal  Conduction:     Conduction: normal    ST segments:     ST segments:  Normal  T waves:     T waves: normal             Phone Contacts  ED Phone Contact    ED Course  ED Course as of Jun 11 1526   Mon Jun 11, 2018   1520 At baseline from 2 months prior  Hemoglobin: (!) 8 6                               MDM  Number of Diagnoses or Management Options  Fatigue: new and requires workup  Diagnosis management comments: 1  Fatigue - Pt with symptoms for the last week, states similar to when his Hgb is low  Will check CBC for anemia, TSH to assess hypothyroid, electrolytes for dysfunction, urine for infection and an ECG  Amount and/or Complexity of Data Reviewed  Clinical lab tests: ordered and reviewed  Tests in the radiology section of CPT®: ordered and reviewed  Obtain history from someone other than the patient: yes  Review and summarize past medical records: yes  Independent visualization of images, tracings, or specimens: yes    Patient Progress  Patient progress: stable    CritCare Time    Disposition  Final diagnoses:   Fatigue     Time reflects when diagnosis was documented in both MDM as applicable and the Disposition within this note     Time User Action Codes Description Comment    6/11/2018  3:21 PM Mikhail ZHU Add [R53 83] Fatigue       ED Disposition     ED Disposition Condition Comment    Discharge  Leo Patel  discharge to home/self care  Condition at discharge: Stable        Follow-up Information     Follow up With Specialties Details Why Contact Info    Dayanara Miranda PA-C Family Medicine, Physician Assistant Schedule an appointment as soon as possible for a visit  Elisabet Finn [address] [place] [address]  [phone]            Patient's Medications   Discharge Prescriptions    No medications on file     No discharge procedures on file      ED Provider  Electronically Signed by           Winnie Caceres MD  06/11/18 1522

## 2018-06-11 NOTE — DISCHARGE INSTRUCTIONS
Call your family doctor, let them know you were in the ER, you should be seen in the office as soon as you can get an appointment  Let your family doctor know your TSH increased mildly from 4 months ago, this should be rechecked to make sure it is stable  Return to the ER if your symptoms continue to worsen or if you notice black and tarry stools or blood in your stool  Fatigue, Ambulatory Care   GENERAL INFORMATION:   Fatigue  is mental and physical exhaustion that does not get better with rest  It may interfere with your daily activities and can cause extreme sleepiness  Although it is normal to feel tired sometimes, long-term fatigue may be a sign of serious illness  Seek immediate care for the following symptoms:   · Difficulty breathing    · Chest pain  Management and treatment for fatigue includes the following:   · Keep a fatigue diary  to help you find out what makes you feel more or less tired  · Exercise as directed  Ask your healthcare provider about the best exercise plan for you  Even moderate exercise may decrease your fatigue  · Keep a regular schedule  Go to bed at the same time every night and limit naps  · Eat healthy foods  including fruits, vegetables, whole-grain breads, low-fat dairy products, beans, lean meats, and fish  Ask if you need to be on a special diet  · Limit caffeine and alcohol  because they can interfere with your sleep  Women should limit alcohol to 1 drink a day  Men should limit alcohol to 2 drinks a day  A drink of alcohol is 12 ounces of beer, 5 ounces of wine, or 1½ ounces of liquor  Ask your healthcare provider how much caffeine is safe for you  · Do not smoke  Smoking can cause health problems that make you tired  If you smoke, it is never too late to quit  Ask your healthcare provider for information if you need help quitting    Follow up with your healthcare provider as directed:  Write down your questions so you remember to ask them during your visits  CARE AGREEMENT:   You have the right to help plan your care  Learn about your health condition and how it may be treated  Discuss treatment options with your caregivers to decide what care you want to receive  You always have the right to refuse treatment  The above information is an  only  It is not intended as medical advice for individual conditions or treatments  Talk to your doctor, nurse or pharmacist before following any medical regimen to see if it is safe and effective for you  © 2014 [address] is for End User's use only and may not be sold, redistributed or otherwise used for commercial purposes  All illustrations and images included in CareNotes® are the copyrighted property of [employer]  or Moe Harvey

## 2018-07-22 ENCOUNTER — APPOINTMENT (EMERGENCY)
Dept: CT IMAGING | Facility: HOSPITAL | Age: 63
End: 2018-07-22
Payer: COMMERCIAL

## 2018-07-22 ENCOUNTER — HOSPITAL ENCOUNTER (EMERGENCY)
Facility: HOSPITAL | Age: 63
Discharge: HOME | End: 2018-07-22
Attending: EMERGENCY MEDICINE | Admitting: EMERGENCY MEDICINE
Payer: COMMERCIAL

## 2018-07-22 VITALS
BODY MASS INDEX: 19.26 KG/M2 | WEIGHT: 145.94 LBS | OXYGEN SATURATION: 97 % | RESPIRATION RATE: 18 BRPM | SYSTOLIC BLOOD PRESSURE: 147 MMHG | HEART RATE: 74 BPM | TEMPERATURE: 97.8 F | DIASTOLIC BLOOD PRESSURE: 86 MMHG

## 2018-07-22 DIAGNOSIS — R53.83 FATIGUE: Primary | ICD-10-CM

## 2018-07-22 LAB
ALBUMIN SERPL BCP-MCNC: 3.5 G/DL (ref 3.5–5)
ALP SERPL-CCNC: 96 U/L (ref 46–116)
ALT SERPL W P-5'-P-CCNC: 15 U/L (ref 12–78)
ANION GAP SERPL CALCULATED.3IONS-SCNC: 8 MMOL/L (ref 4–13)
APTT PPP: 32 SECONDS (ref 24–36)
AST SERPL W P-5'-P-CCNC: 18 U/L (ref 5–45)
ATRIAL RATE: 73 BPM
BACTERIA UR QL AUTO: ABNORMAL /HPF
BASOPHILS # BLD AUTO: 0.26 THOUSANDS/ΜL (ref 0–0.1)
BASOPHILS NFR BLD AUTO: 4 % (ref 0–1)
BILIRUB SERPL-MCNC: 0.6 MG/DL (ref 0.2–1)
BILIRUB UR QL STRIP: NEGATIVE
BUN SERPL-MCNC: 11 MG/DL (ref 5–25)
CALCIUM SERPL-MCNC: 9 MG/DL (ref 8.3–10.1)
CHLORIDE SERPL-SCNC: 104 MMOL/L (ref 100–108)
CLARITY UR: CLEAR
CO2 SERPL-SCNC: 27 MMOL/L (ref 21–32)
COLOR UR: YELLOW
COLOR, POC: YELLOW
CREAT SERPL-MCNC: 0.91 MG/DL (ref 0.6–1.3)
EOSINOPHIL # BLD AUTO: 0.29 THOUSAND/ΜL (ref 0–0.61)
EOSINOPHIL NFR BLD AUTO: 5 % (ref 0–6)
ERYTHROCYTE [DISTWIDTH] IN BLOOD BY AUTOMATED COUNT: 17.6 % (ref 11.6–15.1)
GFR SERPL CREATININE-BSD FRML MDRD: 89 ML/MIN/1.73SQ M
GLUCOSE SERPL-MCNC: 97 MG/DL (ref 65–140)
GLUCOSE UR STRIP-MCNC: NEGATIVE MG/DL
HCT VFR BLD AUTO: 29.3 % (ref 36.5–49.3)
HGB BLD-MCNC: 8 G/DL (ref 12–17)
HGB UR QL STRIP.AUTO: ABNORMAL
INR PPP: 1.09 (ref 0.86–1.17)
KETONES UR STRIP-MCNC: NEGATIVE MG/DL
LEUKOCYTE ESTERASE UR QL STRIP: NEGATIVE
LYMPHOCYTES # BLD AUTO: 1.28 THOUSANDS/ΜL (ref 0.6–4.47)
LYMPHOCYTES NFR BLD AUTO: 20 % (ref 14–44)
MAGNESIUM SERPL-MCNC: 1.7 MG/DL (ref 1.6–2.6)
MCH RBC QN AUTO: 18.4 PG (ref 26.8–34.3)
MCHC RBC AUTO-ENTMCNC: 27.3 G/DL (ref 31.4–37.4)
MCV RBC AUTO: 67 FL (ref 82–98)
MONOCYTES # BLD AUTO: 0.93 THOUSAND/ΜL (ref 0.17–1.22)
MONOCYTES NFR BLD AUTO: 14 % (ref 4–12)
MUCOUS THREADS UR QL AUTO: ABNORMAL
NEUTROPHILS # BLD AUTO: 3.72 THOUSANDS/ΜL (ref 1.85–7.62)
NEUTS SEG NFR BLD AUTO: 57 % (ref 43–75)
NITRITE UR QL STRIP: NEGATIVE
NON-SQ EPI CELLS URNS QL MICRO: ABNORMAL /HPF
NRBC BLD AUTO-RTO: 0 /100 WBCS
P AXIS: 78 DEGREES
PH UR STRIP.AUTO: 5.5 [PH] (ref 4.5–8)
PLATELET # BLD AUTO: 506 THOUSANDS/UL (ref 149–390)
PMV BLD AUTO: 10.1 FL (ref 8.9–12.7)
POTASSIUM SERPL-SCNC: 3.9 MMOL/L (ref 3.5–5.3)
PR INTERVAL: 212 MS
PROT SERPL-MCNC: 7 G/DL (ref 6.4–8.2)
PROT UR STRIP-MCNC: ABNORMAL MG/DL
PROTHROMBIN TIME: 14.2 SECONDS (ref 11.8–14.2)
QRS AXIS: 108 DEGREES
QRSD INTERVAL: 92 MS
QT INTERVAL: 380 MS
QTC INTERVAL: 418 MS
RBC # BLD AUTO: 4.35 MILLION/UL (ref 3.88–5.62)
RBC #/AREA URNS AUTO: ABNORMAL /HPF
SODIUM SERPL-SCNC: 139 MMOL/L (ref 136–145)
SP GR UR STRIP.AUTO: >=1.03 (ref 1–1.03)
T WAVE AXIS: 91 DEGREES
TROPONIN I SERPL-MCNC: <0.02 NG/ML
UROBILINOGEN UR QL STRIP.AUTO: 0.2 E.U./DL
VENTRICULAR RATE: 73 BPM
WBC # BLD AUTO: 6.48 THOUSAND/UL (ref 4.31–10.16)
WBC #/AREA URNS AUTO: ABNORMAL /HPF

## 2018-07-22 PROCEDURE — 99284 EMERGENCY DEPT VISIT MOD MDM: CPT

## 2018-07-22 PROCEDURE — 80053 COMPREHEN METABOLIC PANEL: CPT | Performed by: PHYSICIAN ASSISTANT

## 2018-07-22 PROCEDURE — 84484 ASSAY OF TROPONIN QUANT: CPT | Performed by: PHYSICIAN ASSISTANT

## 2018-07-22 PROCEDURE — 81001 URINALYSIS AUTO W/SCOPE: CPT

## 2018-07-22 PROCEDURE — 85610 PROTHROMBIN TIME: CPT | Performed by: PHYSICIAN ASSISTANT

## 2018-07-22 PROCEDURE — 36415 COLL VENOUS BLD VENIPUNCTURE: CPT | Performed by: PHYSICIAN ASSISTANT

## 2018-07-22 PROCEDURE — 85025 COMPLETE CBC W/AUTO DIFF WBC: CPT | Performed by: PHYSICIAN ASSISTANT

## 2018-07-22 PROCEDURE — 83735 ASSAY OF MAGNESIUM: CPT | Performed by: PHYSICIAN ASSISTANT

## 2018-07-22 PROCEDURE — 85730 THROMBOPLASTIN TIME PARTIAL: CPT | Performed by: PHYSICIAN ASSISTANT

## 2018-07-22 PROCEDURE — 93010 ELECTROCARDIOGRAM REPORT: CPT | Performed by: INTERNAL MEDICINE

## 2018-07-22 PROCEDURE — 93005 ELECTROCARDIOGRAM TRACING: CPT

## 2018-07-22 PROCEDURE — 70450 CT HEAD/BRAIN W/O DYE: CPT

## 2018-07-22 RX ORDER — DIPHENOXYLATE HYDROCHLORIDE AND ATROPINE SULFATE 2.5; .025 MG/1; MG/1
1 TABLET ORAL
Status: ON HOLD | COMMUNITY
End: 2019-05-17 | Stop reason: SDUPTHER

## 2018-07-22 NOTE — ED PROVIDER NOTES
History  Chief Complaint   Patient presents with    Fatigue     x 1 week, has hx of anemia from GI problems, denies fever, has pain in neck     63y  o male with PMH of anemia, AVM of small bowel, barretts esophagus, cirilo ulcer, esophagitis and history of transfusion presents to the ER for fatigue for 1 week  Patient states he has a history of being fatigued due to anemia  He was last transfused in March  He denies starting or stopping any medications  He feels fatigue with exertion  He denies fever, chills, chest pain, dyspnea, N/V/D, abdominal pain, hematochezia, weakness or paresthesias  History provided by:  Patient   used: No        Prior to Admission Medications   Prescriptions Last Dose Informant Patient Reported? Taking?   ascorbic acid (VITAMIN C) 500 MG tablet   No Yes   Sig: Take 1 tablet by mouth daily   cyanocobalamin (VITAMIN B-12) 1,000 mcg tablet   Yes Yes   Sig: Take 1,000 mcg by mouth daily   ferrous sulfate 325 (65 Fe) mg tablet   No Yes   Sig: Take 1 tablet by mouth daily with breakfast   multivitamin (THERAGRAN) TABS   Yes No   Sig: Take 1 tablet by mouth   pantoprazole (PROTONIX) 40 mg tablet   No Yes   Sig: Take 1 tablet (40 mg total) by mouth 2 (two) times a day   sucralfate (CARAFATE) 1 g/10 mL suspension   No Yes   Sig: Take 10 mL (1,000 mg total) by mouth 4 (four) times a day (with meals and at bedtime)      Facility-Administered Medications: None       Past Medical History:   Diagnosis Date    Anemia     AVM (arteriovenous malformation) of small bowel, acquired ([facility] [address] )     Wills's esophagus     Cirilo ulcer     Esophagitis     History of blood transfusion     22 prior transfusions       Past Surgical History:   Procedure Laterality Date    APPENDECTOMY      COLONOSCOPY N/A 11/6/2017    Procedure: COLONOSCOPY;  Surgeon: Bella Kendall MD;  Location: [facility];   Service: Gastroenterology    EGD AND COLONOSCOPY  07/18/2017    ESOPHAGOGASTRODUODENOSCOPY N/A 10/6/2017    Procedure: ESOPHAGOGASTRODUODENOSCOPY (EGD) with biopsy;  Surgeon: Rajesh De Santiago MD;  Location: [facility]; Service: Gastroenterology    ESOPHAGOGASTRODUODENOSCOPY N/A 11/3/2017    Procedure: ESOPHAGOGASTRODUODENOSCOPY (EGD) with biopsy;  Surgeon: Rahat Feng MD;  Location: [facility]; Service: Gastroenterology    ESOPHAGOGASTRODUODENOSCOPY N/A 1/23/2018    Procedure: ESOPHAGOGASTRODUODENOSCOPY (EGD) with biopsy;  Surgeon: Rajesh De Santiago MD;  Location: [facility]; Service: Gastroenterology    ESOPHAGOGASTRODUODENOSCOPY N/A 3/26/2018    Procedure: ESOPHAGOGASTRODUODENOSCOPY (EGD); Surgeon: Gunnar Godinez MD;  Location: [facility]; Service: Gastroenterology       Family History   Problem Relation Age of Onset    Hemangiomas Father      I have reviewed and agree with the history as documented  Social History   Substance Use Topics    Smoking status: Former Smoker     Quit date: 11/3/2014    Smokeless tobacco: Never Used    Alcohol use No      Comment: once or twice a week         Review of Systems   Constitutional: Positive for fatigue  Negative for chills and fever  HENT: Negative for facial swelling  Eyes: Negative for redness  Respiratory: Negative for shortness of breath  Cardiovascular: Negative for chest pain  Gastrointestinal: Negative for abdominal pain, blood in stool, diarrhea, nausea and vomiting  Musculoskeletal: Negative for neck stiffness  Skin: Negative for rash  Allergic/Immunologic: Negative for food allergies  Neurological: Negative for weakness and numbness  Physical Exam  Physical Exam   Constitutional:  Non-toxic appearance  No distress  HENT:   Head: Normocephalic and atraumatic  Right Ear: Tympanic membrane, external ear and ear canal normal  No drainage, swelling or tenderness  No foreign bodies  Tympanic membrane is not erythematous  No hemotympanum     Left Ear: Tympanic membrane, external ear and ear canal normal  No drainage, swelling or tenderness  No foreign bodies  Tympanic membrane is not erythematous  No hemotympanum  Nose: Nose normal    Mouth/Throat: Uvula is midline, oropharynx is clear and moist and mucous membranes are normal  No uvula swelling  No posterior oropharyngeal edema, posterior oropharyngeal erythema or tonsillar abscesses  No tonsillar exudate  Neck: Normal range of motion and phonation normal  Neck supple  No tracheal deviation present  Cardiovascular: Normal rate, regular rhythm, S1 normal, S2 normal and normal heart sounds  Exam reveals no gallop and no friction rub  No murmur heard  Pulmonary/Chest: Effort normal and breath sounds normal  No respiratory distress  He has no decreased breath sounds  He has no wheezes  He has no rhonchi  He has no rales  He exhibits no tenderness  Abdominal: Soft  Bowel sounds are normal  There is no tenderness  There is no rebound and no guarding  Neurological: He is alert  GCS eye subscore is 4  GCS verbal subscore is 5  GCS motor subscore is 6  Skin: Skin is warm and dry  No rash noted  Psychiatric: He has a normal mood and affect  Nursing note and vitals reviewed        Vital Signs  ED Triage Vitals [07/22/18 0740]   Temperature Pulse Respirations Blood Pressure SpO2   97 8 °F (36 6 °C) 79 18 150/85 95 %      Temp Source Heart Rate Source Patient Position - Orthostatic VS BP Location FiO2 (%)   Oral Monitor Sitting Right arm --      Pain Score       No Pain           Vitals:    07/22/18 0740 07/22/18 0909   BP: 150/85 160/85   Pulse: 79 66   Patient Position - Orthostatic VS: Sitting        Visual Acuity      ED Medications  Medications - No data to display    Diagnostic Studies  Results Reviewed     Procedure Component Value Units Date/Time    Urine Microscopic [[id number]]  (Abnormal) Collected:  07/22/18 0814    Lab Status:  Final result Specimen:  Urine from Urine, Clean Catch Updated:  07/22/18 0828     RBC, UA None Seen /hpf      WBC, UA 1-2 (A) /hpf      Epithelial Cells Occasional /hpf      Bacteria, UA None Seen /hpf      MUCOUS THREADS Occasional (A)    Troponin I [[id number]]  (Normal) Collected:  07/22/18 0801    Lab Status:  Final result Specimen:  Blood from Arm, Left Updated:  07/22/18 0824     Troponin I <0 02 ng/mL     Comprehensive metabolic panel [[id number]] Collected:  07/22/18 0801    Lab Status:  Final result Specimen:  Blood from Arm, Left Updated:  07/22/18 0820     Sodium 139 mmol/L      Potassium 3 9 mmol/L      Chloride 104 mmol/L      CO2 27 mmol/L      Anion Gap 8 mmol/L      BUN 11 mg/dL      Creatinine 0 91 mg/dL      Glucose 97 mg/dL      Calcium 9 0 mg/dL      AST 18 U/L      ALT 15 U/L      Alkaline Phosphatase 96 U/L      Total Protein 7 0 g/dL      Albumin 3 5 g/dL      Total Bilirubin 0 60 mg/dL      eGFR 89 ml/min/1 73sq m     Narrative:         National Kidney Disease Education Program recommendations are as follows:  GFR calculation is accurate only with a steady state creatinine  Chronic Kidney disease less than 60 ml/min/1 73 sq  meters  Kidney failure less than 15 ml/min/1 73 sq  meters      Magnesium [[id number]]  (Normal) Collected:  07/22/18 0801    Lab Status:  Final result Specimen:  Blood from Arm, Left Updated:  07/22/18 0820     Magnesium 1 7 mg/dL     Protime-INR [[id number]]  (Normal) Collected:  07/22/18 0801    Lab Status:  Final result Specimen:  Blood from Arm, Left Updated:  07/22/18 0815     Protime 14 2 seconds      INR 1 09    APTT [[id number]]  (Normal) Collected:  07/22/18 0801    Lab Status:  Final result Specimen:  Blood from Arm, Left Updated:  07/22/18 0815     PTT 32 seconds     CBC and differential [[id number]]  (Abnormal) Collected:  07/22/18 0801    Lab Status:  Final result Specimen:  Blood from Arm, Left Updated:  07/22/18 0809     WBC 6 48 Thousand/uL      RBC 4 35 Million/uL      Hemoglobin 8 0 (L) g/dL      Hematocrit 29 3 (L) %      MCV 67 (L) fL      MCH 18 4 (L) pg      MCHC 27 3 (L) g/dL      RDW 17 6 (H) %      MPV 10 1 fL      Platelets 318 (H) Thousands/uL      nRBC 0 /100 WBCs      Neutrophils Relative 57 %      Lymphocytes Relative 20 %      Monocytes Relative 14 (H) %      Eosinophils Relative 5 %      Basophils Relative 4 (H) %      Neutrophils Absolute 3 72 Thousands/µL      Lymphocytes Absolute 1 28 Thousands/µL      Monocytes Absolute 0 93 Thousand/µL      Eosinophils Absolute 0 29 Thousand/µL      Basophils Absolute 0 26 (H) Thousands/µL     POCT urinalysis dipstick [[id number]]  (Normal) Resulted:  07/22/18 0808    Lab Status:  Final result Specimen:  Urine Updated:  07/22/18 0808     Color, UA yellow    ED Urine Macroscopic [[id number]]  (Abnormal) Collected:  07/22/18 0814    Lab Status:  Final result Specimen:  Urine Updated:  07/22/18 0808     Color, UA Yellow     Clarity, UA Clear     pH, UA 5 5     Leukocytes, UA Negative     Nitrite, UA Negative     Protein, UA Trace (A) mg/dl      Glucose, UA Negative mg/dl      Ketones, UA Negative mg/dl      Urobilinogen, UA 0 2 E U /dl      Bilirubin, UA Negative     Blood, UA Trace (A)     Specific Gravity, UA >=1 030    Narrative:       CLINITEK RESULT                 CT head without contrast   Final Result by Ly Bledsoe MD (07/22 1310)      No acute intracranial abnormality  Suspected cerumen in both external auditory canals              Workstation performed: [id number]                    Procedures  ECG 12 Lead Documentation  Date/Time: 7/22/2018 8:08 AM  Performed by: Heather Chilel  Authorized by: Heather Chilel     Indications / Diagnosis:  Fatigue  ECG reviewed by me, the ED Provider: yes (Also reviewed by Dr José Luis Martinez)    Patient location:  ED  Previous ECG:     Previous ECG:  Compared to current    Comparison ECG info:  PVC now present    Similarity:  Changes noted  Interpretation:     Interpretation: abnormal    Rate:     ECG rate:  73    ECG rate assessment: normal    Rhythm:     Rhythm: sinus rhythm and A-V block    Ectopy:     Ectopy: PVCs      PVCs:  Infrequent  QRS:     QRS axis:  Right    QRS intervals:  Normal  Conduction:     Conduction: abnormal      Abnormal conduction: 1st degree    ST segments:     ST segments:  Normal  T waves:     T waves: inverted      Inverted:  AVR, aVL and V1           Phone Contacts  ED Phone Contact    ED Course  ED Course as of Jul 22 1001   Sun Jul 22, 2018   5124 Informed patient and family of lab and imaging findings  Patient resting comfortably on stretcher  Gave RTER precautions  MDM  Number of Diagnoses or Management Options  Fatigue: new and requires workup  Diagnosis management comments: DDX consists of but not limited to: anemia, UTI, electrolyte abnormality    Will check urine, CMP, magnesium, CBC, EKG, PT/INR, PTT and troponin  Labs do not show any acute abnormalities  Will CT head  Spoke with patient and guest  He has an appointment with his GI specialist in 2 weeks  Instructed patient to also follow up with pcp this week  At discharge, I instructed the patient to:  -follow up with pcp  -rest and drink plenty of fluids  -return to the ER if symptoms worsened or new symptoms arose  Patient agreed to this plan and was stable at time of discharge  Amount and/or Complexity of Data Reviewed  Clinical lab tests: ordered and reviewed  Tests in the radiology section of CPT®: ordered and reviewed  Review and summarize past medical records: yes    Patient Progress  Patient progress: stable    CritCare Time    Disposition  Final diagnoses:   Fatigue     Time reflects when diagnosis was documented in both MDM as applicable and the Disposition within this note     Time User Action Codes Description Comment    7/22/2018  9:58 AM Padmini FLEMING Add [J86 34] Fatigue       ED Disposition     ED Disposition Condition Comment    Discharge  Cesar Goss  discharge to home/self care      Condition at discharge: Stable        Follow-up Information     Follow up With Specialties Details Why Contact Info    Timothy Michel PA-C Family Medicine, Physician Assistant Schedule an appointment as soon as possible for a visit in 1 day  [facility] [phone] [facility] [address] [phone]            Patient's Medications   Discharge Prescriptions    No medications on file     No discharge procedures on file      ED Provider  Electronically Signed by           Mckay Barbour PA-C  07/22/18 3865

## 2018-07-22 NOTE — DISCHARGE INSTRUCTIONS
Fatigue   WHAT YOU NEED TO KNOW:   Fatigue is mental and physical exhaustion that does not get better with rest  Fatigue may make daily activities difficult or cause extreme sleepiness  It is normal to feel tired sometimes, but long-term fatigue may be a sign of serious illness  DISCHARGE INSTRUCTIONS:   Return to the emergency department if:   · You have chest pain  · You have difficulty breathing  Contact your healthcare provider if:   · You have a cough that gets worse, or does not go away  · You see blood in your urine or bowel movement  · You have numbness or tingling around your mouth or in an arm or leg  · You faint, feel dizzy, or have vision changes  · You have swelling in your lymph nodes  · You are a woman and have vaginal bleeding that is not normal for you, or is not expected  · You lose weight without trying, or you have trouble eating  · You feel weak or have muscle pain  · You have pain or swelling in your joints  · You have questions or concerns about your condition or care  Follow up with your healthcare provider as directed: You may need more tests  Your healthcare provider may also refer you to a specialist  Write down your questions so you remember to ask them during your visits  Manage fatigue:   · Keep a fatigue diary  Include anything that makes you feel more tired or less tired  Bring the diary with you to follow-up visits with your provider  · Exercise as directed  Exercise can help you feel more alert  Exercise can also help you manage stress or relieve depression  Try to get at least 30 minutes of exercise most days of the week  · Keep a regular sleep schedule  Go to bed and wake up at the same times every day  Limit naps to 1 hour each day  A nap can improve fatigue, but a long nap may make it harder to go to sleep at night  · Plan and limit your activities    Limit the number of activities such as shopping and cleaning you do each day  If possible, try to spread out your trips throughout the week  Plan ahead so you are not rushing to get something done  Only do activities that you have the energy to complete  Take breaks between activities  Ask for help if you need it  Another person may be able to drive you or help with daily activities  · Eat a variety of healthy foods  Healthy foods include fruits, vegetables, whole-grain breads, low-fat dairy products, beans, lean meats, and fish  Good nutrition can help manage fatigue  · Limit caffeine and alcohol  These can make it difficult to fall or stay asleep  Women should limit alcohol to 1 drink a day  Men should limit alcohol to 2 drinks a day  A drink of alcohol is 12 ounces of beer, 5 ounces of wine, or 1½ ounces of liquor  Ask our healthcare provider how much caffeine is safe for you  · Do not smoke  Nicotine and other chemicals in cigarettes and cigars can cause lung damage and increase fatigue  Ask your healthcare provider for information if you currently smoke and need help to quit  E-cigarettes or smokeless tobacco still contain nicotine  Talk to your healthcare provider before you use these products  © 2017 [address] [place] [address] Information is for End User's use only and may not be sold, redistributed or otherwise used for commercial purposes  All illustrations and images included in CareNotes® are the copyrighted property of A D A M , Inc  or Moe Harvey  The above information is an  only  It is not intended as medical advice for individual conditions or treatments  Talk to your doctor, nurse or pharmacist before following any medical regimen to see if it is safe and effective for you  DISCHARGE INSTRUCTIONS:    FOLLOW UP WITH YOUR PRIMARY CARE PROVIDER OR THE [address] [place] [address]  MAKE AN APPOINTMENT TO BE SEEN  REST AND DRINK PLENTY OF FLUIDS  IF SYMPTOMS WORSEN OR NEW SYMPTOMS ARISE, RETURN TO THE ER TO BE SEEN

## 2018-09-15 ENCOUNTER — HOSPITAL ENCOUNTER (INPATIENT)
Facility: HOSPITAL | Age: 63
LOS: 4 days | Discharge: HOME | DRG: 812 | End: 2018-09-19
Attending: EMERGENCY MEDICINE | Admitting: INTERNAL MEDICINE
Payer: COMMERCIAL

## 2018-09-15 ENCOUNTER — ANESTHESIA EVENT (INPATIENT)
Dept: GASTROENTEROLOGY | Facility: HOSPITAL | Age: 63
DRG: 812 | End: 2018-09-15
Payer: COMMERCIAL

## 2018-09-15 DIAGNOSIS — Q27.30 AVM (ARTERIOVENOUS MALFORMATION): ICD-10-CM

## 2018-09-15 DIAGNOSIS — K92.2 GI BLEEDING: Primary | ICD-10-CM

## 2018-09-15 DIAGNOSIS — K22.70 BARRETT'S ESOPHAGUS: ICD-10-CM

## 2018-09-15 DIAGNOSIS — K92.1 MELENA: ICD-10-CM

## 2018-09-15 DIAGNOSIS — R42 DIZZINESS: ICD-10-CM

## 2018-09-15 DIAGNOSIS — D64.9 ANEMIA: ICD-10-CM

## 2018-09-15 DIAGNOSIS — K22.70 BARRETT'S ESOPHAGUS WITHOUT DYSPLASIA: ICD-10-CM

## 2018-09-15 DIAGNOSIS — D64.9 SYMPTOMATIC ANEMIA: ICD-10-CM

## 2018-09-15 DIAGNOSIS — D50.0 IRON DEFICIENCY ANEMIA DUE TO CHRONIC BLOOD LOSS: ICD-10-CM

## 2018-09-15 DIAGNOSIS — R19.5 HEME POSITIVE STOOL: ICD-10-CM

## 2018-09-15 LAB
ABO GROUP BLD: NORMAL
ALBUMIN SERPL BCP-MCNC: 3.1 G/DL (ref 3.5–5)
ALP SERPL-CCNC: 105 U/L (ref 46–116)
ALT SERPL W P-5'-P-CCNC: 15 U/L (ref 12–78)
ANION GAP SERPL CALCULATED.3IONS-SCNC: 8 MMOL/L (ref 4–13)
APTT PPP: 35 SECONDS (ref 24–36)
AST SERPL W P-5'-P-CCNC: 14 U/L (ref 5–45)
BASOPHILS # BLD AUTO: 0.15 THOUSANDS/ΜL (ref 0–0.1)
BASOPHILS NFR BLD AUTO: 3 % (ref 0–1)
BILIRUB SERPL-MCNC: 0.45 MG/DL (ref 0.2–1)
BILIRUB UR QL STRIP: NEGATIVE
BLD GP AB SCN SERPL QL: NEGATIVE
BUN SERPL-MCNC: 9 MG/DL (ref 5–25)
CALCIUM SERPL-MCNC: 9.2 MG/DL (ref 8.3–10.1)
CHLORIDE SERPL-SCNC: 105 MMOL/L (ref 100–108)
CLARITY UR: CLEAR
CLARITY, POC: YELLOW
CO2 SERPL-SCNC: 27 MMOL/L (ref 21–32)
COLOR UR: YELLOW
COLOR, POC: CLEAR
CREAT SERPL-MCNC: 0.75 MG/DL (ref 0.6–1.3)
EOSINOPHIL # BLD AUTO: 0.35 THOUSAND/ΜL (ref 0–0.61)
EOSINOPHIL NFR BLD AUTO: 7 % (ref 0–6)
ERYTHROCYTE [DISTWIDTH] IN BLOOD BY AUTOMATED COUNT: 20.2 % (ref 11.6–15.1)
GFR SERPL CREATININE-BSD FRML MDRD: 98 ML/MIN/1.73SQ M
GLUCOSE SERPL-MCNC: 102 MG/DL (ref 65–140)
GLUCOSE UR STRIP-MCNC: NEGATIVE MG/DL
HCT VFR BLD AUTO: 25.3 % (ref 36.5–49.3)
HGB BLD-MCNC: 6.7 G/DL (ref 12–17)
HGB UR QL STRIP.AUTO: NEGATIVE
IMM GRANULOCYTES # BLD AUTO: 0.01 THOUSAND/UL (ref 0–0.2)
IMM GRANULOCYTES NFR BLD AUTO: 0 % (ref 0–2)
INR PPP: 1.12 (ref 0.86–1.17)
KETONES UR STRIP-MCNC: NEGATIVE MG/DL
LEUKOCYTE ESTERASE UR QL STRIP: NEGATIVE
LIPASE SERPL-CCNC: 173 U/L (ref 73–393)
LYMPHOCYTES # BLD AUTO: 0.84 THOUSANDS/ΜL (ref 0.6–4.47)
LYMPHOCYTES NFR BLD AUTO: 16 % (ref 14–44)
MCH RBC QN AUTO: 18 PG (ref 26.8–34.3)
MCHC RBC AUTO-ENTMCNC: 26.5 G/DL (ref 31.4–37.4)
MCV RBC AUTO: 68 FL (ref 82–98)
MONOCYTES # BLD AUTO: 0.9 THOUSAND/ΜL (ref 0.17–1.22)
MONOCYTES NFR BLD AUTO: 17 % (ref 4–12)
NEUTROPHILS # BLD AUTO: 3.16 THOUSANDS/ΜL (ref 1.85–7.62)
NEUTS SEG NFR BLD AUTO: 57 % (ref 43–75)
NITRITE UR QL STRIP: NEGATIVE
NRBC BLD AUTO-RTO: 0 /100 WBCS
PH UR STRIP.AUTO: 5.5 [PH] (ref 4.5–8)
PLATELET # BLD AUTO: 661 THOUSANDS/UL (ref 149–390)
PMV BLD AUTO: 9.4 FL (ref 8.9–12.7)
POTASSIUM SERPL-SCNC: 3.9 MMOL/L (ref 3.5–5.3)
PROT SERPL-MCNC: 6.7 G/DL (ref 6.4–8.2)
PROT UR STRIP-MCNC: NEGATIVE MG/DL
PROTHROMBIN TIME: 14.5 SECONDS (ref 11.8–14.2)
RBC # BLD AUTO: 3.73 MILLION/UL (ref 3.88–5.62)
RH BLD: POSITIVE
S PYO AG THROAT QL: NEGATIVE
SODIUM SERPL-SCNC: 140 MMOL/L (ref 136–145)
SP GR UR STRIP.AUTO: 1.02 (ref 1–1.03)
SPECIMEN EXPIRATION DATE: NORMAL
TROPONIN I SERPL-MCNC: <0.02 NG/ML
UROBILINOGEN UR QL STRIP.AUTO: 0.2 E.U./DL
WBC # BLD AUTO: 5.41 THOUSAND/UL (ref 4.31–10.16)

## 2018-09-15 PROCEDURE — 85025 COMPLETE CBC W/AUTO DIFF WBC: CPT | Performed by: EMERGENCY MEDICINE

## 2018-09-15 PROCEDURE — 86850 RBC ANTIBODY SCREEN: CPT | Performed by: EMERGENCY MEDICINE

## 2018-09-15 PROCEDURE — 30233N1 TRANSFUSION OF NONAUTOLOGOUS RED BLOOD CELLS INTO PERIPHERAL VEIN, PERCUTANEOUS APPROACH: ICD-10-PCS | Performed by: INTERNAL MEDICINE

## 2018-09-15 PROCEDURE — 85610 PROTHROMBIN TIME: CPT | Performed by: EMERGENCY MEDICINE

## 2018-09-15 PROCEDURE — 96376 TX/PRO/DX INJ SAME DRUG ADON: CPT

## 2018-09-15 PROCEDURE — 84484 ASSAY OF TROPONIN QUANT: CPT | Performed by: EMERGENCY MEDICINE

## 2018-09-15 PROCEDURE — P9016 RBC LEUKOCYTES REDUCED: HCPCS

## 2018-09-15 PROCEDURE — 36415 COLL VENOUS BLD VENIPUNCTURE: CPT | Performed by: EMERGENCY MEDICINE

## 2018-09-15 PROCEDURE — 99222 1ST HOSP IP/OBS MODERATE 55: CPT | Performed by: INTERNAL MEDICINE

## 2018-09-15 PROCEDURE — 80053 COMPREHEN METABOLIC PANEL: CPT | Performed by: EMERGENCY MEDICINE

## 2018-09-15 PROCEDURE — 87430 STREP A AG IA: CPT | Performed by: EMERGENCY MEDICINE

## 2018-09-15 PROCEDURE — C9113 INJ PANTOPRAZOLE SODIUM, VIA: HCPCS | Performed by: EMERGENCY MEDICINE

## 2018-09-15 PROCEDURE — 99255 IP/OBS CONSLTJ NEW/EST HI 80: CPT | Performed by: INTERNAL MEDICINE

## 2018-09-15 PROCEDURE — 99284 EMERGENCY DEPT VISIT MOD MDM: CPT

## 2018-09-15 PROCEDURE — 81003 URINALYSIS AUTO W/O SCOPE: CPT

## 2018-09-15 PROCEDURE — 83690 ASSAY OF LIPASE: CPT | Performed by: EMERGENCY MEDICINE

## 2018-09-15 PROCEDURE — 85730 THROMBOPLASTIN TIME PARTIAL: CPT | Performed by: EMERGENCY MEDICINE

## 2018-09-15 PROCEDURE — 86901 BLOOD TYPING SEROLOGIC RH(D): CPT | Performed by: EMERGENCY MEDICINE

## 2018-09-15 PROCEDURE — 96374 THER/PROPH/DIAG INJ IV PUSH: CPT

## 2018-09-15 PROCEDURE — 86923 COMPATIBILITY TEST ELECTRIC: CPT

## 2018-09-15 PROCEDURE — C9113 INJ PANTOPRAZOLE SODIUM, VIA: HCPCS | Performed by: PHYSICIAN ASSISTANT

## 2018-09-15 PROCEDURE — 93005 ELECTROCARDIOGRAM TRACING: CPT

## 2018-09-15 PROCEDURE — 86900 BLOOD TYPING SEROLOGIC ABO: CPT | Performed by: EMERGENCY MEDICINE

## 2018-09-15 RX ORDER — SODIUM CHLORIDE 9 MG/ML
100 INJECTION, SOLUTION INTRAVENOUS CONTINUOUS
Status: DISCONTINUED | OUTPATIENT
Start: 2018-09-15 | End: 2018-09-16

## 2018-09-15 RX ORDER — PANTOPRAZOLE SODIUM 40 MG/1
40 INJECTION, POWDER, FOR SOLUTION INTRAVENOUS EVERY 12 HOURS SCHEDULED
Status: DISCONTINUED | OUTPATIENT
Start: 2018-09-15 | End: 2018-09-19 | Stop reason: HOSPADM

## 2018-09-15 RX ORDER — PANTOPRAZOLE SODIUM 40 MG/1
40 INJECTION, POWDER, FOR SOLUTION INTRAVENOUS ONCE
Status: COMPLETED | OUTPATIENT
Start: 2018-09-15 | End: 2018-09-15

## 2018-09-15 RX ORDER — SUCRALFATE ORAL 1 G/10ML
1000 SUSPENSION ORAL EVERY 6 HOURS SCHEDULED
Status: DISCONTINUED | OUTPATIENT
Start: 2018-09-15 | End: 2018-09-19 | Stop reason: HOSPADM

## 2018-09-15 RX ADMIN — SODIUM CHLORIDE 8 MG/HR: 9 INJECTION, SOLUTION INTRAVENOUS at 10:12

## 2018-09-15 RX ADMIN — SUCRALFATE 1000 MG: 1 SUSPENSION ORAL at 23:54

## 2018-09-15 RX ADMIN — SODIUM CHLORIDE 100 ML/HR: 0.9 INJECTION, SOLUTION INTRAVENOUS at 13:40

## 2018-09-15 RX ADMIN — SUCRALFATE 1000 MG: 1 SUSPENSION ORAL at 17:37

## 2018-09-15 RX ADMIN — PANTOPRAZOLE SODIUM 40 MG: 40 INJECTION, POWDER, FOR SOLUTION INTRAVENOUS at 13:39

## 2018-09-15 RX ADMIN — PANTOPRAZOLE SODIUM 40 MG: 40 INJECTION, POWDER, FOR SOLUTION INTRAVENOUS at 20:22

## 2018-09-15 RX ADMIN — SUCRALFATE 1000 MG: 1 SUSPENSION ORAL at 13:39

## 2018-09-15 RX ADMIN — PANTOPRAZOLE SODIUM 40 MG: 40 INJECTION, POWDER, FOR SOLUTION INTRAVENOUS at 09:45

## 2018-09-15 NOTE — ED PROVIDER NOTES
History  Chief Complaint   Patient presents with    Fatigue     Pt c/o feeling fatigued x 2 weeks, reports sore throat, states he has a hx of anemia and esophagitis  c/o left elbow swelling x for a month   Elbow Swelling       History provided by:  Patient   used: No    Fatigue   Severity:  Moderate  Onset quality:  Gradual  Duration:  2 weeks  Timing:  Intermittent  Progression:  Waxing and waning  Chronicity:  Recurrent  Context comment:  History of esophagitis  Relieved by:  Nothing  Worsened by:  Nothing  Ineffective treatments: PPI and Carafate  Associated symptoms: no abdominal pain, no chest pain, no cough, no diarrhea, no difficulty walking, no dizziness, no dysuria, no numbness in extremities, no fever, no frequency, no headaches, no loss of consciousness, no nausea, no near-syncope, no shortness of breath, no stroke symptoms, no syncope, no vision change and no vomiting    Risk factors: anemia    Risk factors comment:  Esophagiti  Elbow Swelling   Location:  Elbow  Elbow location:  L elbow  Injury: no    Pain details:     Quality:  Aching    Radiates to:  Does not radiate    Severity:  No pain    Onset quality:  Gradual    Duration:  1 month    Timing:  Constant    Progression:  Unchanged  Dislocation: no    Foreign body present:  No foreign bodies  Prior injury to area:  Unable to specify  Relieved by:  Nothing  Worsened by:  Nothing  Ineffective treatments:  None tried  Associated symptoms: fatigue    Associated symptoms: no back pain, no fever, no neck pain and no numbness        Prior to Admission Medications   Prescriptions Last Dose Informant Patient Reported?  Taking?   ascorbic acid (VITAMIN C) 500 MG tablet 9/14/2018 at Unknown time  No Yes   Sig: Take 1 tablet by mouth daily   cyanocobalamin (VITAMIN B-12) 1,000 mcg tablet 9/14/2018 at Unknown time  Yes Yes   Sig: Take 1,000 mcg by mouth daily   ferrous sulfate 325 (65 Fe) mg tablet 9/14/2018 at Unknown time  No Yes Sig: Take 1 tablet by mouth daily with breakfast   multivitamin (THERAGRAN) TABS 9/14/2018 at Unknown time  Yes Yes   Sig: Take 1 tablet by mouth   pantoprazole (PROTONIX) 40 mg tablet 9/14/2018 at Unknown time  No Yes   Sig: Take 1 tablet (40 mg total) by mouth 2 (two) times a day   sucralfate (CARAFATE) 1 g/10 mL suspension 9/14/2018 at Unknown time  No Yes   Sig: Take 10 mL (1,000 mg total) by mouth 4 (four) times a day (with meals and at bedtime)      Facility-Administered Medications: None       Past Medical History:   Diagnosis Date    Anemia     AVM (arteriovenous malformation) of small bowel, acquired ([facility] [address] )     Wills's esophagus     Cirilo ulcer     Esophagitis     History of blood transfusion     22 prior transfusions       Past Surgical History:   Procedure Laterality Date    APPENDECTOMY      COLONOSCOPY N/A 11/6/2017    Procedure: COLONOSCOPY;  Surgeon: Sarahi Helm MD;  Location: [facility]; Service: Gastroenterology    EGD AND COLONOSCOPY  07/18/2017    ESOPHAGOGASTRODUODENOSCOPY N/A 10/6/2017    Procedure: ESOPHAGOGASTRODUODENOSCOPY (EGD) with biopsy;  Surgeon: Remington Lee MD;  Location: [facility]; Service: Gastroenterology    ESOPHAGOGASTRODUODENOSCOPY N/A 11/3/2017    Procedure: ESOPHAGOGASTRODUODENOSCOPY (EGD) with biopsy;  Surgeon: Pooja Johnson MD;  Location: [facility]; Service: Gastroenterology    ESOPHAGOGASTRODUODENOSCOPY N/A 1/23/2018    Procedure: ESOPHAGOGASTRODUODENOSCOPY (EGD) with biopsy;  Surgeon: Remington Lee MD;  Location: [facility]; Service: Gastroenterology    ESOPHAGOGASTRODUODENOSCOPY N/A 3/26/2018    Procedure: ESOPHAGOGASTRODUODENOSCOPY (EGD); Surgeon: Sarahi Helm MD;  Location: [facility]; Service: Gastroenterology       Family History   Problem Relation Age of Onset    Hemangiomas Father      I have reviewed and agree with the history as documented      Social History   Substance Use Topics    Smoking status: Former Smoker     Quit date: 11/3/2014    Smokeless tobacco: Never Used    Alcohol use No      Comment: once or twice a week         Review of Systems   Constitutional: Positive for fatigue  Negative for chills and fever  HENT: Negative for facial swelling, sore throat and trouble swallowing  Eyes: Negative for pain and visual disturbance  Respiratory: Negative for cough, chest tightness and shortness of breath  Cardiovascular: Negative for chest pain, leg swelling, syncope and near-syncope  Gastrointestinal: Negative for abdominal pain, blood in stool, diarrhea, nausea and vomiting  Genitourinary: Negative for dysuria, flank pain and frequency  Musculoskeletal: Negative for back pain, neck pain and neck stiffness  Skin: Negative for pallor and rash  Allergic/Immunologic: Negative for environmental allergies and immunocompromised state  Neurological: Negative for dizziness, loss of consciousness and headaches  Hematological: Negative for adenopathy  Does not bruise/bleed easily  Psychiatric/Behavioral: Negative for agitation and behavioral problems  All other systems reviewed and are negative  Physical Exam  Physical Exam   Constitutional: He is oriented to person, place, and time  He appears well-developed and well-nourished  No distress  HENT:   Head: Normocephalic and atraumatic  Eyes: EOM are normal  Pupils are equal, round, and reactive to light  Neck: Normal range of motion  Neck supple  Cardiovascular: Normal rate, regular rhythm, normal heart sounds and intact distal pulses  Pulmonary/Chest: Effort normal and breath sounds normal    Abdominal: Soft  Bowel sounds are normal  There is no tenderness  There is no rebound and no guarding  Genitourinary: Rectal exam shows guaiac positive stool  Musculoskeletal: Normal range of motion  Neurological: He is alert and oriented to person, place, and time  No cranial nerve deficit   Coordination normal    Skin: Skin is warm and dry  Psychiatric: He has a normal mood and affect  Nursing note and vitals reviewed        Vital Signs  ED Triage Vitals [09/15/18 0903]   Temperature Pulse Respirations Blood Pressure SpO2   (!) 97 2 °F (36 2 °C) 68 18 (!) 179/92 98 %      Temp Source Heart Rate Source Patient Position - Orthostatic VS BP Location FiO2 (%)   Temporal Monitor Sitting Right arm --      Pain Score       1           Vitals:    09/15/18 1418 09/15/18 1650 09/15/18 1723 09/15/18 1815   BP: 121/66 133/74 144/81 141/75   Pulse: 73 69 77 72   Patient Position - Orthostatic VS: Sitting Sitting Lying Sitting       Visual Acuity      ED Medications  Medications   sodium chloride 0 9 % infusion (100 mL/hr Intravenous New Bag 9/15/18 1340)   polyethylene glycol (GOLYTELY) bowel prep 4,000 mL (not administered)   pantoprazole (PROTONIX) injection 40 mg (40 mg Intravenous Given 9/15/18 1339)   sucralfate (CARAFATE) oral suspension 1,000 mg (1,000 mg Oral Given 9/15/18 1737)   pantoprazole (PROTONIX) injection 40 mg (40 mg Intravenous Given 9/15/18 0945)       Diagnostic Studies  Results Reviewed     Procedure Component Value Units Date/Time    Troponin I [[id number]]  (Normal) Collected:  09/15/18 0958    Lab Status:  Final result Specimen:  Blood from Arm, Left Updated:  09/15/18 1030     Troponin I <0 02 ng/mL     Comprehensive metabolic panel [[id number]]  (Abnormal) Collected:  09/15/18 0929    Lab Status:  Final result Specimen:  Blood from Arm, Left Updated:  09/15/18 1020     Sodium 140 mmol/L      Potassium 3 9 mmol/L      Chloride 105 mmol/L      CO2 27 mmol/L      ANION GAP 8 mmol/L      BUN 9 mg/dL      Creatinine 0 75 mg/dL      Glucose 102 mg/dL      Calcium 9 2 mg/dL      AST 14 U/L      ALT 15 U/L      Alkaline Phosphatase 105 U/L      Total Protein 6 7 g/dL      Albumin 3 1 (L) g/dL      Total Bilirubin 0 45 mg/dL      eGFR 98 ml/min/1 73sq m     Narrative:         National Kidney Disease Education Program recommendations are as follows:  GFR calculation is accurate only with a steady state creatinine  Chronic Kidney disease less than 60 ml/min/1 73 sq  meters  Kidney failure less than 15 ml/min/1 73 sq  meters      Protime-INR [[id number]]  (Abnormal) Collected:  09/15/18 0929    Lab Status:  Final result Specimen:  Blood from Arm, Left Updated:  09/15/18 0955     Protime 14 5 (H) seconds      INR 1 12    APTT [[id number]]  (Normal) Collected:  09/15/18 0929    Lab Status:  Final result Specimen:  Blood from Arm, Left Updated:  09/15/18 0955     PTT 35 seconds     Lipase [[id number]]  (Normal) Collected:  09/15/18 0929    Lab Status:  Final result Specimen:  Blood from Arm, Left Updated:  09/15/18 0954     Lipase 173 u/L     CBC and differential [[id number]]  (Abnormal) Collected:  09/15/18 0929    Lab Status:  Final result Specimen:  Blood from Arm, Left Updated:  09/15/18 0950     WBC 5 41 Thousand/uL      RBC 3 73 (L) Million/uL      Hemoglobin 6 7 (LL) g/dL      Hematocrit 25 3 (L) %      MCV 68 (L) fL      MCH 18 0 (L) pg      MCHC 26 5 (L) g/dL      RDW 20 2 (H) %      MPV 9 4 fL      Platelets 214 (H) Thousands/uL      nRBC 0 /100 WBCs      Neutrophils Relative 57 %      Immat GRANS % 0 %      Lymphocytes Relative 16 %      Monocytes Relative 17 (H) %      Eosinophils Relative 7 (H) %      Basophils Relative 3 (H) %      Neutrophils Absolute 3 16 Thousands/µL      Immature Grans Absolute 0 01 Thousand/uL      Lymphocytes Absolute 0 84 Thousands/µL      Monocytes Absolute 0 90 Thousand/µL      Eosinophils Absolute 0 35 Thousand/µL      Basophils Absolute 0 15 (H) Thousands/µL     Rapid Strep A Screen Only, Adults [[id number]]  (Normal) Collected:  09/15/18 0929    Lab Status:  Final result Specimen:  Throat from Throat Updated:  09/15/18 0948     Rapid Strep A Screen Negative    POCT urinalysis dipstick [[id number]]  (Normal) Resulted:  09/15/18 0941    Lab Status:  Final result Specimen:  Urine Updated:  09/15/18 0942     Color, UA clear Clarity, UA yellow    ED Urine Macroscopic [[id number]] Collected:  09/15/18 0944    Lab Status:  Final result Specimen:  Urine Updated:  09/15/18 0935     Color, UA Yellow     Clarity, UA Clear     pH, UA 5 5     Leukocytes, UA Negative     Nitrite, UA Negative     Protein, UA Negative mg/dl      Glucose, UA Negative mg/dl      Ketones, UA Negative mg/dl      Urobilinogen, UA 0 2 E U /dl      Bilirubin, UA Negative     Blood, UA Negative     Specific Gravity, UA 1 020    Narrative:       [facility] RESULT                 No orders to display              Procedures  CriticalCare Time  Performed by: Dereck Mar  Authorized by: Dereck Mar     Critical care provider statement:     Critical care time (minutes):  30    Critical care time was exclusive of:  Separately billable procedures and treating other patients and teaching time    Critical care was necessary to treat or prevent imminent or life-threatening deterioration of the following conditions:  Circulatory failure (GI bleeding, Anemia, requiring Blood transfusion)    Critical care was time spent personally by me on the following activities:  Blood draw for specimens, obtaining history from patient or surrogate, development of treatment plan with patient or surrogate, discussions with consultants, evaluation of patient's response to treatment, examination of patient, interpretation of cardiac output measurements, ordering and performing treatments and interventions, ordering and review of laboratory studies, ordering and review of radiographic studies, re-evaluation of patient's condition and review of old charts    I assumed direction of critical care for this patient from another provider in my specialty: no             Phone Contacts  ED Phone Contact    ED Course  ED Course as of Sep 15 1818   Sat Sep 15, 2018   0955 Hemoglobin 6 7, noted consistent with patient's history of esophagitis and GI bleeding, pantoprazole IV 40 mg bolus given, will start the drip at 8 mg per an hour, consent taken will crossmatch and transfuse 2 U of blood, call GI, admit to Medicine  Hemoglobin: (!!) 6 7   1004 Case discussed with DANTE Henderson with GI, will evaluate the patient  1005 Patient sent out to Medicine for admission  MDM  Number of Diagnoses or Management Options  Anemia: new and requires workup  Dizziness: new and requires workup  GI bleeding: new and requires workup  Heme positive stool: new and requires workup  Diagnosis management comments: Patient is a 22-year-old male, history of esophagitis, comes in with multiple complaints of sore throat, generalized weakness, left elbow swelling; states that he has been feeling weak for the past couple of weeks, reflux symptoms, denies chest pain, dyspnea, does report 'red stool'in the past few days; also reports sore throat for the past couple of weeks, and left elbow bursa swelling for the past 1 month; denies fever, chills, vomiting, diarrhea  On exam no acute distress:  Vital signs are stable, afebrile, no pharyngeal exudates, uvular swelling or peritonsillar swelling noted, no trismus, no stridor, lungs clear, cardiovascular normal heart sounds, abdomen soft nontender  Impression:  Generalized weakness, rule out electrolyte imbalance, dehydration, anemia, due to patient's previous history of esophagitis; will check labs including CBC CMP, lipase, EKG, give him Protonix IV  Amount and/or Complexity of Data Reviewed  Clinical lab tests: reviewed and ordered  Tests in the medicine section of CPT®: ordered and reviewed  Review and summarize past medical records: yes  Discuss the patient with other providers: yes      The patient presented with a condition in which there was a high probability of imminent or life-threatening deterioration, and critical care services (excluding separately billable procedures) totalled 30-74 minutes          Disposition  Final diagnoses:   GI bleeding   Anemia Dizziness   Heme positive stool     Time reflects when diagnosis was documented in both MDM as applicable and the Disposition within this note     Time User Action Codes Description Comment    9/15/2018 10:16 AM Halina Butt Add [K92 2] GI bleeding     9/15/2018 10:16 AM Rody [place] [D64 9] Anemia     9/15/2018 10:16 AM Halina Butt Add [R42] Dizziness     9/15/2018 10:16 AM Halina Butt Add [R19 5] Heme positive stool     9/15/2018 11:10 AM Billie Duty Add [D50 0] Iron deficiency anemia due to chronic blood loss     9/15/2018 11:10 AM Billie Duty Add [Q27 30] AVM (arteriovenous malformation)     9/15/2018 11:10 AM Billie Duty Add [K92 1] Melena     9/15/2018 11:10 AM Billie Duty Add [K22 70] Wills's esophagus without dysplasia     9/15/2018 11:10 AM Olga Lidia Campos [K22 70] Wills's esophagus without dysplasia       ED Disposition     ED Disposition Condition Comment    Admit  Case was discussed with Dr Alison Simental and the patient's admission status was agreed to be Admission Status: inpatient status to the service of Dr Alison Simental  Follow-up Information    None         Current Discharge Medication List      CONTINUE these medications which have NOT CHANGED    Details   ascorbic acid (VITAMIN C) 500 MG tablet Take 1 tablet by mouth daily  Qty: 30 tablet, Refills: 0    Associated Diagnoses: Iron deficiency anemia due to chronic blood loss      cyanocobalamin (VITAMIN B-12) 1,000 mcg tablet Take 1,000 mcg by mouth daily      ferrous sulfate 325 (65 Fe) mg tablet Take 1 tablet by mouth daily with breakfast  Qty: 30 tablet, Refills: 0    Associated Diagnoses: Iron deficiency anemia due to chronic blood loss      multivitamin (THERAGRAN) TABS Take 1 tablet by mouth      pantoprazole (PROTONIX) 40 mg tablet Take 1 tablet (40 mg total) by mouth 2 (two) times a day  Qty: 60 tablet, Refills: 0    Associated Diagnoses: Symptomatic anemia;  Iron deficiency anemia due to chronic blood loss; AVM (arteriovenous malformation); Wills's esophagus without dysplasia      sucralfate (CARAFATE) 1 g/10 mL suspension Take 10 mL (1,000 mg total) by mouth 4 (four) times a day (with meals and at bedtime)  Qty: 420 mL, Refills: 0    Associated Diagnoses: Symptomatic anemia; Iron deficiency anemia due to chronic blood loss; AVM (arteriovenous malformation); Wills's esophagus without dysplasia           No discharge procedures on file      ED Provider  Electronically Signed by           Sarah Niño MD  09/15/18 5865

## 2018-09-15 NOTE — CONSULTS
Consultation -  [facility]  Leo Patel  61 y o  male MRN: [id number]  Unit/Bed#: E5 -01 Encounter: [id number]        Consults    ASSESSMENT/ PLAN:    68-year-old male with a past medical history of Wills's esophagus, small bowel AVMs, esophagitis, hiatal hernia with Cirilo's lesions presented to the emergency room for dizziness and fatigue found to have anemia    1  Microcytic anemia:   Patient with history of GI bleeding including esophagitis, small bowel AVMs, hiatal hernia with Cirilo's lesions presented to the emergency room for lightheadedness, dizziness and fatigue  On presentation he was found have a hemoglobin of 6 7 with a baseline around 8  No signs of active bleeding on rectal exam   -agree with giving 2 units of PRBCs  -monitor H&H  -continue to transfuse as needed  -IV PPI BID  -monitor color of stool  -clear liquid diet Sunday  -bowel prep Mohan evening  -npo midnight Monday   -plan for EGD and colonoscopy on Monday, if develops signs of active bleeding or his hemoglobin continues to decrease may require more urgent procedures    Reason for Consult / Principal Problem:   Anemia    HPI: Elana Mao is a 68-year-old male with a past medical history of Wills's esophagus, small bowel AVMs, esophagitis, hiatal hernia with Cirilo's lesions who presented the emergency room with lightheadedness and dizziness and fatigue  On presentation use found to have a hemoglobin of 6 7  His baseline is around 8  He has had multiple EGDs and colonoscopies in the past secondary to GI bleeding  His last EGD was in March of 2018, which revealed moderate/severe esophagitis in the distal esophagus as well as slight food retention in the stomach  His last colonoscopy was in November of 2017 with the good prep  There was slight tortuosity without any evidence of bleeding or source of anemia  There were internal hemorrhoids  He denies tobacco use    He admits to drinking alcohol once a month  Surgical history includes appendectomy  REVIEW OF SYSTEMS:     CONSTITUTIONAL: Denies any fever, chills, or rigors  Good appetite, and no recent weight loss  HEENT: No earache or tinnitus  Denies hearing loss or visual disturbances  CARDIOVASCULAR: No chest pain or palpitations  RESPIRATORY: Denies any cough, hemoptysis, shortness of breath or dyspnea on exertion  GASTROINTESTINAL: As noted in the History of Present Illness  GENITOURINARY: No problems with urination  Denies any hematuria or dysuria  NEUROLOGIC: + lightheadedness, dizziness  MUSCULOSKELETAL: Denies any muscle or joint pain  SKIN: Denies skin rashes or itching  ENDOCRINE: Denies excessive thirst  Denies intolerance to heat or cold  PSYCHOSOCIAL: Denies depression or anxiety  Denies any recent memory loss  Historical Information   Past Medical History:   Diagnosis Date    Anemia     AVM (arteriovenous malformation) of small bowel, acquired ([facility] [address] )     Wills's esophagus     Cirilo ulcer     Esophagitis     History of blood transfusion     22 prior transfusions     Past Surgical History:   Procedure Laterality Date    APPENDECTOMY      COLONOSCOPY N/A 11/6/2017    Procedure: COLONOSCOPY;  Surgeon: Gage Jain MD;  [facility]; Service: Gastroenterology    EGD AND COLONOSCOPY  07/18/2017    ESOPHAGOGASTRODUODENOSCOPY N/A 10/6/2017    Procedure: ESOPHAGOGASTRODUODENOSCOPY (EGD) with biopsy;  Surgeon: Luisana Doshi MD;  [facility]; Service: Gastroenterology    ESOPHAGOGASTRODUODENOSCOPY N/A 11/3/2017    Procedure: ESOPHAGOGASTRODUODENOSCOPY (EGD) with biopsy;  Surgeon: Yu Freeman MD;  [facility]; Service: Gastroenterology    ESOPHAGOGASTRODUODENOSCOPY N/A 1/23/2018    Procedure: ESOPHAGOGASTRODUODENOSCOPY (EGD) with biopsy;  Surgeon: Luisana Doshi MD;  [facility];   Service: Gastroenterology    ESOPHAGOGASTRODUODENOSCOPY N/A 3/26/2018    Procedure: ESOPHAGOGASTRODUODENOSCOPY (EGD); Surgeon: Iliana Hassan MD;  [facility]; Service: Gastroenterology     Social History   History   Alcohol Use No     Comment: once or twice a week      History   Drug Use No     History   Smoking Status    Former Smoker    Quit date: 11/3/2014   Smokeless Tobacco    Never Used     Family History   Problem Relation Age of Onset    Hemangiomas Father        Meds/Allergies     Prescriptions Prior to Admission   Medication    ascorbic acid (VITAMIN C) 500 MG tablet    cyanocobalamin (VITAMIN B-12) 1,000 mcg tablet    ferrous sulfate 325 (65 Fe) mg tablet    multivitamin (THERAGRAN) TABS    pantoprazole (PROTONIX) 40 mg tablet    sucralfate (CARAFATE) 1 g/10 mL suspension     Current Facility-Administered Medications   Medication Dose Route Frequency    pantoprazole (PROTONIX) 80 mg in sodium chloride 0 9 % 100 mL infusion  8 mg/hr Intravenous Continuous    sodium chloride 0 9 % infusion  100 mL/hr Intravenous Continuous       Allergies   Allergen Reactions    Doxylamine GI Intolerance           Objective     Blood pressure 156/75, pulse 75, temperature (!) 97 3 °F (36 3 °C), temperature source Temporal, resp  rate 16, weight 63 5 kg (140 lb), SpO2 99 %  No intake or output data in the 24 hours ending 09/15/18 1117      PHYSICAL EXAM:      General Appearance:   A&Ox3, cooperative, no distress, appears stated age    HEENT:   Normocephalic, atraumatic    Right eye exhibits no discharge  Left eye exhibits no discharge  No scleral icterus  Neck:  Supple, symmetrical, trachea midline no stridor   Lungs:   Clear to auscultation bilaterally; no rales, rhonchi or wheezing    Heart[de-identified]   S1 and S2 normal; regular rate and rhythm; no murmur, rub, or gallop     Abdomen:   Soft, non-tender, non-distended; normal bowel sounds; no masses, no organomegaly    Genitalia:   Deferred    Rectal:   No stool in rectal    Extremities:  No cyanosis, clubbing or edema        Skin:  Skin color, texture, turgor normal, no rashes or lesions          Lab Results:   Admission on 09/15/2018   Component Date Value    WBC 09/15/2018 5 41     RBC 09/15/2018 3 73*    Hemoglobin 09/15/2018 6 7*    Hematocrit 09/15/2018 25 3*    MCV 09/15/2018 68*    MCH 09/15/2018 18 0*    MCHC 09/15/2018 26 5*    RDW 09/15/2018 20 2*    MPV 09/15/2018 9 4     Platelets 39/36/4508 661*    nRBC 09/15/2018 0     Neutrophils Relative 09/15/2018 57     Immat GRANS % 09/15/2018 0     Lymphocytes Relative 09/15/2018 16     Monocytes Relative 09/15/2018 17*    Eosinophils Relative 09/15/2018 7*    Basophils Relative 09/15/2018 3*    Neutrophils Absolute 09/15/2018 3 16     Immature Grans Absolute 09/15/2018 0 01     Lymphocytes Absolute 09/15/2018 0 84     Monocytes Absolute 09/15/2018 0 90     Eosinophils Absolute 09/15/2018 0 35     Basophils Absolute 09/15/2018 0 15*    Protime 09/15/2018 14 5*    INR 09/15/2018 1 12     PTT 09/15/2018 35     Sodium 09/15/2018 140     Potassium 09/15/2018 3 9     Chloride 09/15/2018 105     CO2 09/15/2018 27     ANION GAP 09/15/2018 8     BUN 09/15/2018 9     Creatinine 09/15/2018 0 75     Glucose 09/15/2018 102     Calcium 09/15/2018 9 2     AST 09/15/2018 14     ALT 09/15/2018 15     Alkaline Phosphatase 09/15/2018 105     Total Protein 09/15/2018 6 7     Albumin 09/15/2018 3 1*    Total Bilirubin 09/15/2018 0 45     eGFR 09/15/2018 98     Color, UA 09/15/2018 clear     Clarity, UA 09/15/2018 yellow     Rapid Strep A Screen 09/15/2018 Negative     Lipase 09/15/2018 173     Color, UA 09/15/2018 Yellow     Clarity, UA 09/15/2018 Clear     pH, UA 09/15/2018 5 5     Leukocytes, UA 09/15/2018 Negative     Nitrite, UA 09/15/2018 Negative     Protein, UA 09/15/2018 Negative     Glucose, UA 09/15/2018 Negative     Ketones, UA 09/15/2018 Negative     Urobilinogen, UA 09/15/2018 0 2     Bilirubin, UA 09/15/2018 Negative     Blood, UA 09/15/2018 Negative     Specific Gravity, UA 09/15/2018 1 020     ABO Grouping 09/15/2018 O     Rh Factor 09/15/2018 Positive     Antibody Screen 09/15/2018 Negative     Specimen Expiration Date 09/15/2018 [id number]     Unit Product Code 09/15/2018 J4864Q11     Unit Number 09/15/2018 [id number]     Unit ABO 09/15/2018 O     Unit RH 09/15/2018 POS     Unit Dispense Status 09/15/2018 Crossmatched     Unit Product Code 09/15/2018 F2637O71     Unit Number 09/15/2018 [id number]     Unit ABO 09/15/2018 O     Unit RH 09/15/2018 POS     Unit Dispense Status 09/15/2018 Crossmatched     Troponin I 09/15/2018 <0 02        Imaging Studies: I have personally reviewed pertinent imaging studies  No results found  This patient was seen and examined by Dr Anel Egan  All yousif medical decisions were made by Dr Frances Cancer  Thank you for allowing us to participate in the care of this patient  We will follow up closely with you

## 2018-09-15 NOTE — LETTER
[address]  Dept: [phone]    September 19, 2018     Patient: Beatriz Alex  YOB: 1955   Date of Visit: 9/15/2018       To Whom it May Concern:    Erinn Gilllorin is under my professional care  He was seen in the hospital from 9/15/2018   to 09/19/18  He may return to work on 09/20/2018 without restrictions       If you have any questions or concerns, please don't hesitate to call           Sincerely,          Ildefonso Gleason PA-C

## 2018-09-15 NOTE — H&P
History and Physical - [facility] Medicine    Patient Information: Jayson Solders  61 y o  male MRN: [id number]  Unit/Bed#: E5 -01 Encounter: [id number]  Admitting Physician: Clyde Burns MD  PCP: Claudine Rubalcava PA-C  Date of Admission:  09/15/18    Assessment/Plan:    Hospital Problem List:     Principal Problem:    GI bleeding  Active Problems:    Iron deficiency anemia due to chronic blood loss    [facility]    Wills's esophagus    ETOH abuse    Symptomatic anemia      Plan for the Primary Problem(s):  · GI bleeding presumably from his previous history of either esophagitis or AV malformations of the stomach and Johnna Clement ulcer will place on PPI IV and await GI input for EGD now scheduled for Monday the 17th of September placed on clear liquids till then NPO after midnight of the 16th  · Symptomatic anemia with hemoglobin 6 7 will replete 2 units packed red blood cells of already started ED will monitor H&H serially  · Alcohol abuse by history see no signs of active withdrawal will monitor for same give benzodiazepine p r n  for symptoms  · Iron deficiency anemia due to chronic blood loss on iron replacement therapy as outpatient will be continued at time of discharge hold at present may benefit from IV Venofer also    Plan for Additional Problems:   · Cardiac murmur that he says he has never addict 2D echocardiogram 4 will obtain here otherwise no cardiac history he had a Holter monitor by his description sometime ago that was negative for abnormality    VTE Prophylaxis: Pharmacologic VTE Prophylaxis contraindicated due to Active bleeding  / sequential compression device   Code Status:   Full  POLST: POLST form is not discussed and not completed at this time  Anticipated Length of Stay:  Patient will be admitted on an Inpatient basis with an anticipated length of stay of  * more than 2 midnights     Justification for Hospital Stay: GI bleed symptomatic anemia    Total Time for Visit, including Counseling / Coordination of Care: 30 minutes  Greater than 50% of this total time spent on direct patient counseling and coordination of care  Chief Complaint:     Rectal bleeding    History of Present Illness:    Ankur Maradiaga  is a 61 y o  male who presents with weakness dizziness lightheadedness and fatigue  Patient has a longstanding history of recurrent GI blood loss in relation to a history of Wills's esophagus small-bowel AVMs and esophagitis with most recent EGD in March of this year noting moderate to severe esophagitis in the distal esophagus as well as some fluid of food retention then  Last colonoscopy in 2017 noted no evidence of any bleeding  Although is a documented history of alcohol abuse he only admits to this once or twice a week presently he does not use NSAIDs or aspirin products  He seems to be compliant to his per PPI twice a week since last assessment by GI  Is also on sucralfate    Review of Systems:    Review of Systems   Constitutional: Negative  HENT: Negative  Eyes: Negative  Respiratory: Negative  Cardiovascular: Positive for palpitations  Gastrointestinal: Positive for blood in stool  Endocrine: Negative  Negative for polydipsia  Genitourinary: Negative  Musculoskeletal: Negative  Skin: Negative  Allergic/Immunologic: Negative  Neurological: Negative  Hematological: Negative  Psychiatric/Behavioral: Negative          Past Medical and Surgical History:     Past Medical History:   Diagnosis Date    Anemia     AVM (arteriovenous malformation) of small bowel, acquired ([facility] [address]     Wills's esophagus     Cirilo ulcer     Esophagitis     History of blood transfusion     22 prior transfusions       Past Surgical History:   Procedure Laterality Date    APPENDECTOMY      COLONOSCOPY N/A 11/6/2017    Procedure: COLONOSCOPY;  Surgeon: Diana Hsu MD;  [facility];  Service: Gastroenterology    EGD AND COLONOSCOPY  07/18/2017    ESOPHAGOGASTRODUODENOSCOPY N/A 10/6/2017    Procedure: ESOPHAGOGASTRODUODENOSCOPY (EGD) with biopsy;  Surgeon: Rajesh De Santiago MD;  [facility]; Service: Gastroenterology    ESOPHAGOGASTRODUODENOSCOPY N/A 11/3/2017    Procedure: ESOPHAGOGASTRODUODENOSCOPY (EGD) with biopsy;  Surgeon: Rahat Feng MD;  [facility]; Service: Gastroenterology    ESOPHAGOGASTRODUODENOSCOPY N/A 1/23/2018    Procedure: ESOPHAGOGASTRODUODENOSCOPY (EGD) with biopsy;  Surgeon: Rajesh De Santiago MD;  [facility]; Service: Gastroenterology    ESOPHAGOGASTRODUODENOSCOPY N/A 3/26/2018    Procedure: ESOPHAGOGASTRODUODENOSCOPY (EGD); Surgeon: Gunnar Godinez MD;  [facility]; Service: Gastroenterology       Meds/Allergies:    Prior to Admission medications    Medication Sig Start Date End Date Taking? Authorizing Provider   ascorbic acid (VITAMIN C) 500 MG tablet Take 1 tablet by mouth daily 1/25/18  Yes Ernesto Amaya MD   cyanocobalamin (VITAMIN B-12) 1,000 mcg tablet Take 1,000 mcg by mouth daily   Yes Historical Provider, MD   ferrous sulfate 325 (65 Fe) mg tablet Take 1 tablet by mouth daily with breakfast 1/25/18  Yes Ernesto Amaya MD   multivitamin (THERAGRAN) TABS Take 1 tablet by mouth   Yes Historical Provider, MD   pantoprazole (PROTONIX) 40 mg tablet Take 1 tablet (40 mg total) by mouth 2 (two) times a day 3/27/18  Yes Thierno Solis MD   sucralfate (CARAFATE) 1 g/10 mL suspension Take 10 mL (1,000 mg total) by mouth 4 (four) times a day (with meals and at bedtime) 3/27/18  Yes Thierno Solis MD     I have reviewed home medications with patient personally  Allergies:    Allergies   Allergen Reactions    Doxylamine GI Intolerance       Social History:     Marital Status:    Occupation:   Patient Pre-hospital Living Situation:   Lives alone  Patient Pre-hospital Level of Mobility: Ambulatory  Patient Pre-hospital Diet Restrictions:   None  Substance Use History:   History   Alcohol Use No     Comment: once or twice a week      History   Smoking Status    Former Smoker    Quit date: 11/3/2014   Smokeless Tobacco    Never Used     History   Drug Use No       Family History:    non-contributory    Physical Exam:     Vitals:   Blood Pressure: 156/75 (09/15/18 1106)  Pulse: 75 (09/15/18 1106)  Temperature: (!) 97 3 °F (36 3 °C) (09/15/18 1106)  Temp Source: Temporal (09/15/18 1106)  Respirations: 16 (09/15/18 1106)  Weight - Scale: 63 5 kg (140 lb) (09/15/18 0903)  SpO2: 99 % (09/15/18 1030)       General appearance: alert, appears stated age and cooperative  Head: Normocephalic, without obvious abnormality, atraumatic  Lungs: clear to auscultation bilaterally  Heart: regular rate and rhythm  Abdomen: soft, non-tender; bowel sounds normal; no masses,  no organomegaly  Back: negative  Extremities: extremities normal, atraumatic, no cyanosis or edema  Neurologic: Grossly normal        Additional Data:     Lab Results: I have personally reviewed pertinent reports  Results from last 7 days  Lab Units 09/15/18  0929   WBC Thousand/uL 5 41   HEMOGLOBIN g/dL 6 7*   HEMATOCRIT % 25 3*   PLATELETS Thousands/uL 661*   NEUTROS PCT % 57   LYMPHS PCT % 16   MONOS PCT % 17*   EOS PCT % 7*       Results from last 7 days  Lab Units 09/15/18  0929   SODIUM mmol/L 140   POTASSIUM mmol/L 3 9   CHLORIDE mmol/L 105   CO2 mmol/L 27   BUN mg/dL 9   CREATININE mg/dL 0 75   CALCIUM mg/dL 9 2   ALK PHOS U/L 105   ALT U/L 15   AST U/L 14       Results from last 7 days  Lab Units 09/15/18  0929   INR  1 12       Imaging: I have personally reviewed pertinent reports  No results found  EKG, Pathology, and Other Studies Reviewed on Admission:   · EKG:     Allscripts Records Reviewed: Yes     ** Please Note: Dragon 360 Dictation voice to text software may have been used in the creation of this document   **

## 2018-09-15 NOTE — ANESTHESIA PREPROCEDURE EVALUATION
Review of Systems/Medical History  Patient summary reviewed  Chart reviewed      Cardiovascular  Negative cardio ROS    Pulmonary  Negative pulmonary ROS        GI/Hepatic    GI bleeding , Esophageal disease bryant esophagus,   Comment: Bryant's esophagus     Negative  ROS        Endo/Other  Negative endo/other ROS      GYN       Hematology  Anemia chronic blood loss anemia and iron deficiency anemia,    Comment: History of multiple blood transfusions  Musculoskeletal    Comment: Pt unable to use right hand due to previous injury      Neurology      Comment: Right hand injury Psychology       Comment: Significant etoh use in recent past         Physical Exam    Airway    Mallampati score: II  TM Distance: <3 FB  Neck ROM: full     Dental   upper dentures,     Cardiovascular  Comment: Negative ROS, Rhythm: regular, Rate: normal, Cardiovascular exam normal    Pulmonary  Pulmonary exam normal Breath sounds clear to auscultation,     Other Findings        Anesthesia Plan  ASA Score- 3     Anesthesia Type- IV sedation with anesthesia with ASA Monitors  Additional Monitors:   Airway Plan:     Comment: 9/15/18  13:29     Anesthesia consent obtained from patient and placed in chart   Plan Factors- Patient instructed to abstain from smoking on day of procedure  Patient did not smoke on day of surgery  Induction- intravenous  Postoperative Plan-     Informed Consent- Anesthetic plan and risks discussed with patient

## 2018-09-16 PROBLEM — R01.1 CARDIAC MURMUR: Status: ACTIVE | Noted: 2018-09-16

## 2018-09-16 LAB
ABO GROUP BLD BPU: NORMAL
ABO GROUP BLD BPU: NORMAL
ALBUMIN SERPL BCP-MCNC: 2.8 G/DL (ref 3.5–5)
ALP SERPL-CCNC: 90 U/L (ref 46–116)
ALT SERPL W P-5'-P-CCNC: 14 U/L (ref 12–78)
ANION GAP SERPL CALCULATED.3IONS-SCNC: 7 MMOL/L (ref 4–13)
AST SERPL W P-5'-P-CCNC: 28 U/L (ref 5–45)
ATRIAL RATE: 55 BPM
BILIRUB SERPL-MCNC: 0.63 MG/DL (ref 0.2–1)
BPU ID: NORMAL
BPU ID: NORMAL
BUN SERPL-MCNC: 7 MG/DL (ref 5–25)
CALCIUM SERPL-MCNC: 8.4 MG/DL (ref 8.3–10.1)
CHLORIDE SERPL-SCNC: 104 MMOL/L (ref 100–108)
CO2 SERPL-SCNC: 26 MMOL/L (ref 21–32)
CREAT SERPL-MCNC: 0.74 MG/DL (ref 0.6–1.3)
ERYTHROCYTE [DISTWIDTH] IN BLOOD BY AUTOMATED COUNT: 22.1 % (ref 11.6–15.1)
GFR SERPL CREATININE-BSD FRML MDRD: 98 ML/MIN/1.73SQ M
GLUCOSE SERPL-MCNC: 88 MG/DL (ref 65–140)
HCT VFR BLD AUTO: 28.3 % (ref 36.5–49.3)
HGB BLD-MCNC: 8.1 G/DL (ref 12–17)
MAGNESIUM SERPL-MCNC: 1.6 MG/DL (ref 1.6–2.6)
MCH RBC QN AUTO: 20.4 PG (ref 26.8–34.3)
MCHC RBC AUTO-ENTMCNC: 28.6 G/DL (ref 31.4–37.4)
MCV RBC AUTO: 71 FL (ref 82–98)
P AXIS: 77 DEGREES
PLATELET # BLD AUTO: 585 THOUSANDS/UL (ref 149–390)
PMV BLD AUTO: 10.1 FL (ref 8.9–12.7)
POTASSIUM SERPL-SCNC: 4.4 MMOL/L (ref 3.5–5.3)
PR INTERVAL: 192 MS
PROT SERPL-MCNC: 6.3 G/DL (ref 6.4–8.2)
QRS AXIS: 103 DEGREES
QRSD INTERVAL: 94 MS
QT INTERVAL: 406 MS
QTC INTERVAL: 388 MS
RBC # BLD AUTO: 3.98 MILLION/UL (ref 3.88–5.62)
SODIUM SERPL-SCNC: 137 MMOL/L (ref 136–145)
T WAVE AXIS: 92 DEGREES
UNIT DISPENSE STATUS: NORMAL
UNIT DISPENSE STATUS: NORMAL
UNIT PRODUCT CODE: NORMAL
UNIT PRODUCT CODE: NORMAL
UNIT RH: NORMAL
UNIT RH: NORMAL
VENTRICULAR RATE: 55 BPM
WBC # BLD AUTO: 6.71 THOUSAND/UL (ref 4.31–10.16)

## 2018-09-16 PROCEDURE — 99232 SBSQ HOSP IP/OBS MODERATE 35: CPT | Performed by: PHYSICIAN ASSISTANT

## 2018-09-16 PROCEDURE — 85027 COMPLETE CBC AUTOMATED: CPT | Performed by: INTERNAL MEDICINE

## 2018-09-16 PROCEDURE — 83735 ASSAY OF MAGNESIUM: CPT | Performed by: INTERNAL MEDICINE

## 2018-09-16 PROCEDURE — C9113 INJ PANTOPRAZOLE SODIUM, VIA: HCPCS | Performed by: PHYSICIAN ASSISTANT

## 2018-09-16 PROCEDURE — 93010 ELECTROCARDIOGRAM REPORT: CPT | Performed by: INTERNAL MEDICINE

## 2018-09-16 PROCEDURE — 80053 COMPREHEN METABOLIC PANEL: CPT | Performed by: INTERNAL MEDICINE

## 2018-09-16 PROCEDURE — 99232 SBSQ HOSP IP/OBS MODERATE 35: CPT | Performed by: INTERNAL MEDICINE

## 2018-09-16 RX ORDER — SODIUM CHLORIDE 9 MG/ML
100 INJECTION, SOLUTION INTRAVENOUS CONTINUOUS
Status: DISCONTINUED | OUTPATIENT
Start: 2018-09-17 | End: 2018-09-17

## 2018-09-16 RX ADMIN — SUCRALFATE 1000 MG: 1 SUSPENSION ORAL at 11:53

## 2018-09-16 RX ADMIN — POLYETHYLENE GLYCOL 3350, SODIUM SULFATE ANHYDROUS, SODIUM BICARBONATE, SODIUM CHLORIDE, POTASSIUM CHLORIDE 4000 ML: 236; 22.74; 6.74; 5.86; 2.97 POWDER, FOR SOLUTION ORAL at 16:32

## 2018-09-16 RX ADMIN — PANTOPRAZOLE SODIUM 40 MG: 40 INJECTION, POWDER, FOR SOLUTION INTRAVENOUS at 21:38

## 2018-09-16 RX ADMIN — PANTOPRAZOLE SODIUM 40 MG: 40 INJECTION, POWDER, FOR SOLUTION INTRAVENOUS at 08:50

## 2018-09-16 RX ADMIN — SUCRALFATE 1000 MG: 1 SUSPENSION ORAL at 05:29

## 2018-09-16 RX ADMIN — SODIUM CHLORIDE 100 ML/HR: 0.9 INJECTION, SOLUTION INTRAVENOUS at 03:31

## 2018-09-16 NOTE — PROGRESS NOTES
Progress Note - Hernan Leigh 1955, 61 y o  male MRN: [id number]  Unit/Bed#: E5 -01 Encounter: [id number] DOS: 9/16/18  Primary Care Provider: Keira Porras PA-C   Date and time admitted to hospital: 9/15/2018  9:11 AM    Symptomatic anemia   Assessment & Plan    · Patient presented with dizziness, lightheadedness and fatigue found to have hemoglobin of 6 7  · Hemoglobin 7 9 after 2 units PRBCs  · History of iron deficiency anemia due to chronic blood loss secondary to Wills's esophagus, small bowel AVMs, esophagitis, hiatal hernia with Cirilo's lesions  No obvious signs of active GIB  · Appreciate Gastroenterology input  · Plan for EGD colonoscopy on Monday with bowel prep this evening  · IV Protonix b i d  · Clear liquid diet, NPO at midnight        ETOH abuse   Assessment & Plan    · Patient states he does not drink every day  Monitor for signs and symptoms of withdrawal  · P r n  Ativan          VTE Pharmacologic Prophylaxis:   Pharmacologic: Pharmacologic VTE Prophylaxis contraindicated due to GI bleed  Mechanical VTE Prophylaxis in Place: Yes    Patient Centered Rounds: I have performed bedside rounds with nursing staff today  Discussions with Specialists or Other Care Team Provider: lj    Education and Discussions with Family / Patient: patient, declined     Time Spent for Care: 30 minutes  More than 50% of total time spent on counseling and coordination of care as described above  Current Length of Stay: 1 day(s)    Current Patient Status: Inpatient   Certification Statement: The patient will continue to require additional inpatient hospital stay due to pending EGD c-scope tomorrow, ongoing GI workup     Discharge Plan / Estimated Discharge Date: pending, not medically stable  Likely in next 24-48 hrs     Code Status: Level 1 - Full Code    Subjective:   Patient seen and examined at bedside  States his dizziness and lightheadedness has improved    No abdominal pain     Objective:     Vitals:   Temp (24hrs), Av 9 °F (37 2 °C), Min:98 2 °F (36 8 °C), Max:99 7 °F (37 6 °C)    HR:  [68-77] 68  Resp:  [16-18] 16  BP: (121-147)/(66-81) 128/69  SpO2:  [97 %-98 %] 97 %  Body mass index is 18 47 kg/m²  Input and Output Summary (last 24 hours): Intake/Output Summary (Last 24 hours) at 18 1240  Last data filed at 18 0330   Gross per 24 hour   Intake             1350 ml   Output                0 ml   Net             1350 ml       Physical Exam:     Physical Exam   Constitutional: He is oriented to person, place, and time  He appears well-developed and well-nourished  HENT:   Head: Normocephalic and atraumatic  Eyes: EOM are normal  No scleral icterus  Neck: Normal range of motion  Neck supple  Cardiovascular: Normal rate, regular rhythm and intact distal pulses  Murmur heard  Pulmonary/Chest: Effort normal and breath sounds normal    Abdominal: Soft  Bowel sounds are normal    Musculoskeletal: Normal range of motion  He exhibits no edema  Neurological: He is alert and oriented to person, place, and time  Skin: Rash noted  There is pallor  hemangiomas scattered on face of various sizes and shapes   Psychiatric:   Flat affect     Additional Data:     Labs:      Results from last 7 days  Lab Units 18  0521 09/15/18  0929   WBC Thousand/uL 6 71 5 41   HEMOGLOBIN g/dL 8 1* 6 7*   HEMATOCRIT % 28 3* 25 3*   PLATELETS Thousands/uL 585* 661*   NEUTROS PCT %  --  57   LYMPHS PCT %  --  16   MONOS PCT %  --  17*   EOS PCT %  --  7*       Results from last 7 days  Lab Units 18  0521   SODIUM mmol/L 137   POTASSIUM mmol/L 4 4   CHLORIDE mmol/L 104   CO2 mmol/L 26   BUN mg/dL 7   CREATININE mg/dL 0 74   CALCIUM mg/dL 8 4   ALK PHOS U/L 90   ALT U/L 14   AST U/L 28       Results from last 7 days  Lab Units 09/15/18  0929   INR  1 12       * I Have Reviewed All Lab Data Listed Above  * Additional Pertinent Lab Tests Reviewed:  All Labs For Current Hospital Admission Reviewed    Imaging:    Imaging Reports Reviewed Today Include: all  Imaging Personally Reviewed by Myself Includes:  none    Recent Cultures (last 7 days):           Last 24 Hours Medication List:     Current Facility-Administered Medications:  pantoprazole 40 mg Intravenous Q12H [address] Carlo Barreto PA-C   polyethylene glycol 4,000 mL Oral Once Ramy Arnold, [place]   [START ON 9/17/2018] sodium chloride 100 mL/hr Intravenous Continuous Kj Fraser PA-C   sucralfate 1,000 mg Oral Q6H [address] Ramy Arnold PA-C        Today, Patient Was Seen By: Rayo Gusman PA-C    ** Please Note: This note has been constructed using a voice recognition system   **

## 2018-09-16 NOTE — ASSESSMENT & PLAN NOTE
· Patient states he does not drink every day  Monitor for signs and symptoms of withdrawal  · P r n   Ativan

## 2018-09-16 NOTE — ASSESSMENT & PLAN NOTE
· Patient presented with dizziness, lightheadedness and fatigue found to have hemoglobin of 6 7  · Hemoglobin 7 9 after 2 units PRBCs  · History of iron deficiency anemia due to chronic blood loss secondary to Wills's esophagus, small bowel AVMs, esophagitis, hiatal hernia with Cirilo's lesions  No obvious signs of active GIB  · Appreciate Gastroenterology input  · Plan for EGD colonoscopy on Monday with bowel prep this evening  · IV Protonix b i d    · Clear liquid diet, NPO at midnight

## 2018-09-16 NOTE — CASE MANAGEMENT
Thank you,  [address]  Utilization Review Department  Phone: [phone]; Fax [phone]  ATTENTION: Please call with any questions or concerns to [phone]  and carefully follow the prompts so that you are directed to the right person  Send all requests for admission clinical reviews, approved or denied determinations and any other requests to fax [phone]  All voicemails are confidential      Initial Clinical Review    Admission: Date/Time/Statement: inpatient  9/15/18 @ 1018     Orders Placed This Encounter   Procedures    Inpatient Admission (expected length of stay for this patient is greater than two midnights)     Standing Status:   Standing     Number of Occurrences:   1     Order Specific Question:   Admitting Physician     Answer:   Irma Hylton [[id number]]     Order Specific Question:   Level of Care     Answer:   Med Surg [[id number]]     Order Specific Question:   Estimated length of stay     Answer:   More than 2 Midnights     Order Specific Question:   Certification     Answer:   I certify that inpatient services are medically necessary for this patient for a duration of greater than two midnights  See H&P and MD Progress Notes for additional information about the patient's course of treatment  ED Arrival Information     Expected Arrival Acuity Means of Arrival Escorted By Service Admission Type    - 9/15/2018 08:52 Urgent Walk-In Family Member General Medicine Urgent    Arrival Complaint    Farmer Fret throat,swollen elbow        Chief Complaint   Patient presents with    Fatigue     Pt c/o feeling fatigued x 2 weeks, reports sore throat, states he has a hx of anemia and esophagitis  c/o left elbow swelling x for a month   Elbow Swelling     History of Illness: 60 yo m to ED from home c/o weakness, dizziness, lightheadedness and fatigue  Hx recurrent GI blood loss due to bryant's esophagus, small bowel avm's and espihagitis  Recent EGD March 2018   Last colonoscopy 2017, no bleeding  No nsaids or asa  Using PPI 2x week per GI  On carafate  ED Vital Signs:   ED Triage Vitals [09/15/18 0903]   Temperature Pulse Respirations Blood Pressure SpO2   (!) 97 2 °F (36 2 °C) 68 18 (!) 179/92 98 %      Temp Source Heart Rate Source Patient Position - Orthostatic VS BP Location FiO2 (%)   Temporal Monitor Sitting Right arm --      Pain Score       1        Wt Readings from Last 1 Encounters:   09/15/18 63 5 kg (140 lb)     Abnormal Labs/Diagnostic Test Results:   9/15: hgb 6 7   hct 25 3  Plate 365   Alb 3 1   Pt 14 5     UA negative  EKG: Sinus bradycardia with sinus arrhythmia   Rightward axis    9/16: hgb 8 1  h ct 28 3   Plate 706   Alb 2 8  t prot 6 3    ED Treatment:   Medication Administration from 09/15/2018 0852 to 09/15/2018 1059       Date/Time Order Dose Route Action Action by Comments     09/15/2018 0945 pantoprazole (PROTONIX) injection 40 mg 40 mg Intravenous Given Zen Leone RN      09/15/2018 1012 pantoprazole (PROTONIX) 80 mg in sodium chloride 0 9 % 100 mL infusion 8 mg/hr Intravenous New Bag Evy Wang RN         Past Medical/Surgical History:    Active Ambulatory Problems     Diagnosis Date Noted    Iron deficiency anemia due to chronic blood loss 10/06/2017   Madelon Yuli ulcer 10/06/2017    AVM (arteriovenous malformation) 10/06/2017    AVM (arteriovenous malformation) of small bowel, acquired ([facility] )     History of blood transfusion     Cirilo ulcer     Wills's esophagus     Melena 10/05/2017    Esophagitis     GI bleeding 11/03/2017    Anemia 01/21/2018    Alcohol use disorder ([facility] ) 01/22/2018    ETOH abuse 03/25/2018    Symptomatic anemia 03/24/2018     Past Medical History:   Diagnosis Date    Anemia     AVM (arteriovenous malformation) of small bowel, acquired ([facility] )     Wills's esophagus     Cirilo ulcer     Esophagitis     History of blood transfusion      Admitting Diagnosis: Dizziness [R42]  GI bleeding [K92 2]  Sore throat [J02 9]  Anemia [D64 9]  Heme positive stool [R19 5]    Age/Sex: 61 y o  male    Assessment/Plan: 60 yo m to ED from home admitted due to symptomatic anemia / GI bleeding presumable from esophagitis or AV malformations of stomach and caio ulcer  Start IV ppi, cons GI, EGD scheduled for 9/17  Clear liquids, then NPO after midnight  Replete hgb 6 7 with 2u prbc's, benzos prn if etoh withdrawal  Consider IV venofer prn  Admission Orders:  Scheduled Meds:   Current Facility-Administered Medications:  pantoprazole 40 mg Intravenous Q12H [address]   polyethylene glycol 4,000 mL Oral Once   [START ON 9/17/2018] sodium chloride 100 mL/hr Intravenous Continuous   sucralfate 1,000 mg Oral Q6H [address]     scd's  I/O  Orthostatics  Up w/assist  Clear liquids, NPO at midnight  GI scopes on 9/17  Cons GI 9/15 @1111    Per GI 9/16: EGD and colonoscopy on Monday  PPI, monitor h&h, transfuse prn, bowel prep

## 2018-09-16 NOTE — PROGRESS NOTES
Progress Note - Tunde Arredondo  61 y o  male MRN: [id number]    Unit/Bed#: E5 -01 Encounter: [id number]      ASSESSMENT/ PLAN:   70-year-old male with a past medical history of tobacco abuse, Wills's esophagus, small bowel AVMs, esophagitis, hiatal hernia with Cirilo's lesions presented to the emergency room for dizziness and fatigue found to have anemia     1  Microcytic anemia:   Patient with history of GI bleeding including esophagitis, small bowel AVMs, hiatal hernia with Cirilo's lesions presented to the emergency room for lightheadedness, dizziness and fatigue  On presentation he was found have a hemoglobin of 6 7 with a baseline around 8  No signs of active bleeding on rectal exam  S/p 2 units PRBC and Hgb responded appropriately to 8 1  Had BM today and denies hematochezia and melena  Plan for EGD and colonoscopy Monday  If negative, plan for outpatient capsule endoscopy   -monitor H&H  -continue to transfuse as needed  -IV PPI BID  -monitor color of stool  -clear liquid diet Sunday  -bowel prep Sunday evening  -npo midnight Monday   -plan for EGD and colonoscopy on Monday  -If EGD/colonosocpy unrevealing, recommend capsule endoscopy as outpatient     Subjective:     Patient seen examined    Objective:     Vitals: Blood pressure 128/69, pulse 68, temperature 99 5 °F (37 5 °C), temperature source Temporal, resp  rate 16, weight 63 5 kg (140 lb), SpO2 97 %  ,Body mass index is 18 47 kg/m²  Intake/Output Summary (Last 24 hours) at 09/16/18 1013  Last data filed at 09/16/18 0330   Gross per 24 hour   Intake             1350 ml   Output                0 ml   Net             1350 ml       Physical Exam:   Physical Exam   Constitutional: He is oriented to person, place, and time  He appears well-developed and well-nourished  No distress  HENT:   Head: Normocephalic and atraumatic  Nose: Nose normal    Eyes: Right eye exhibits no discharge  Left eye exhibits no discharge   No scleral icterus  Neck: Normal range of motion  Neck supple  No tracheal deviation present  Cardiovascular: Normal rate, regular rhythm and normal heart sounds  Exam reveals no gallop and no friction rub  No murmur heard  Pulmonary/Chest: Effort normal and breath sounds normal  No stridor  No respiratory distress  He has no wheezes  He has no rales  Abdominal: Soft  Bowel sounds are normal  He exhibits no distension  There is no tenderness  There is no rebound and no guarding  Musculoskeletal: Normal range of motion  He exhibits no edema or tenderness  Neurological: He is alert and oriented to person, place, and time  Skin: Skin is warm and dry  No rash noted  He is not diaphoretic  Psychiatric: He has a normal mood and affect  His behavior is normal          Invasive Devices     Peripheral Intravenous Line            Peripheral IV 09/15/18 Left Antecubital 1 day                Lab Results:      Results from last 7 days  Lab Units 09/16/18  0521 09/15/18  0929   WBC Thousand/uL 6 71 5 41   HEMOGLOBIN g/dL 8 1* 6 7*   HEMATOCRIT % 28 3* 25 3*   PLATELETS Thousands/uL 585* 661*   NEUTROS PCT %  --  57   LYMPHS PCT %  --  16   MONOS PCT %  --  17*   EOS PCT %  --  7*       Results from last 7 days  Lab Units 09/16/18  0521   SODIUM mmol/L 137   POTASSIUM mmol/L 4 4   CHLORIDE mmol/L 104   CO2 mmol/L 26   BUN mg/dL 7   CREATININE mg/dL 0 74   CALCIUM mg/dL 8 4   ALK PHOS U/L 90   ALT U/L 14   AST U/L 28       Results from last 7 days  Lab Units 09/15/18  0929   INR  1 12       Results from last 7 days  Lab Units 09/15/18  0929   LIPASE u/L 173       Imaging Studies: I have personally reviewed pertinent imaging studies  No results found

## 2018-09-17 ENCOUNTER — APPOINTMENT (INPATIENT)
Dept: NON INVASIVE DIAGNOSTICS | Facility: HOSPITAL | Age: 63
DRG: 812 | End: 2018-09-17
Payer: COMMERCIAL

## 2018-09-17 ENCOUNTER — ANESTHESIA (INPATIENT)
Dept: GASTROENTEROLOGY | Facility: HOSPITAL | Age: 63
DRG: 812 | End: 2018-09-17
Payer: COMMERCIAL

## 2018-09-17 LAB
ERYTHROCYTE [DISTWIDTH] IN BLOOD BY AUTOMATED COUNT: 23.1 % (ref 11.6–15.1)
HCT VFR BLD AUTO: 29.2 % (ref 36.5–49.3)
HGB BLD-MCNC: 8.2 G/DL (ref 12–17)
MCH RBC QN AUTO: 20 PG (ref 26.8–34.3)
MCHC RBC AUTO-ENTMCNC: 28.1 G/DL (ref 31.4–37.4)
MCV RBC AUTO: 71 FL (ref 82–98)
PLATELET # BLD AUTO: 535 THOUSANDS/UL (ref 149–390)
PMV BLD AUTO: 9 FL (ref 8.9–12.7)
RBC # BLD AUTO: 4.11 MILLION/UL (ref 3.88–5.62)
WBC # BLD AUTO: 5 THOUSAND/UL (ref 4.31–10.16)

## 2018-09-17 PROCEDURE — C9113 INJ PANTOPRAZOLE SODIUM, VIA: HCPCS | Performed by: PHYSICIAN ASSISTANT

## 2018-09-17 PROCEDURE — 0DJD8ZZ INSPECTION OF LOWER INTESTINAL TRACT, VIA NATURAL OR ARTIFICIAL OPENING ENDOSCOPIC: ICD-10-PCS | Performed by: INTERNAL MEDICINE

## 2018-09-17 PROCEDURE — 88305 TISSUE EXAM BY PATHOLOGIST: CPT | Performed by: PATHOLOGY

## 2018-09-17 PROCEDURE — 43239 EGD BIOPSY SINGLE/MULTIPLE: CPT | Performed by: INTERNAL MEDICINE

## 2018-09-17 PROCEDURE — 99232 SBSQ HOSP IP/OBS MODERATE 35: CPT | Performed by: HOSPITALIST

## 2018-09-17 PROCEDURE — 99232 SBSQ HOSP IP/OBS MODERATE 35: CPT | Performed by: INTERNAL MEDICINE

## 2018-09-17 PROCEDURE — 0DB98ZX EXCISION OF DUODENUM, VIA NATURAL OR ARTIFICIAL OPENING ENDOSCOPIC, DIAGNOSTIC: ICD-10-PCS | Performed by: INTERNAL MEDICINE

## 2018-09-17 PROCEDURE — 85027 COMPLETE CBC AUTOMATED: CPT | Performed by: PHYSICIAN ASSISTANT

## 2018-09-17 RX ORDER — PROPOFOL 10 MG/ML
INJECTION, EMULSION INTRAVENOUS AS NEEDED
Status: DISCONTINUED | OUTPATIENT
Start: 2018-09-17 | End: 2018-09-17 | Stop reason: SURG

## 2018-09-17 RX ORDER — SODIUM CHLORIDE 9 MG/ML
50 INJECTION, SOLUTION INTRAVENOUS CONTINUOUS
Status: DISCONTINUED | OUTPATIENT
Start: 2018-09-17 | End: 2018-09-17

## 2018-09-17 RX ORDER — LIDOCAINE HYDROCHLORIDE 10 MG/ML
INJECTION, SOLUTION INFILTRATION; PERINEURAL AS NEEDED
Status: DISCONTINUED | OUTPATIENT
Start: 2018-09-17 | End: 2018-09-17 | Stop reason: SURG

## 2018-09-17 RX ORDER — SODIUM CHLORIDE 9 MG/ML
125 INJECTION, SOLUTION INTRAVENOUS CONTINUOUS
Status: DISCONTINUED | OUTPATIENT
Start: 2018-09-17 | End: 2018-09-17

## 2018-09-17 RX ADMIN — SODIUM CHLORIDE 100 ML/HR: 0.9 INJECTION, SOLUTION INTRAVENOUS at 00:04

## 2018-09-17 RX ADMIN — PROPOFOL 50 MG: 10 INJECTION, EMULSION INTRAVENOUS at 14:06

## 2018-09-17 RX ADMIN — PROPOFOL 100 MG: 10 INJECTION, EMULSION INTRAVENOUS at 14:03

## 2018-09-17 RX ADMIN — PANTOPRAZOLE SODIUM 40 MG: 40 INJECTION, POWDER, FOR SOLUTION INTRAVENOUS at 09:38

## 2018-09-17 RX ADMIN — PANTOPRAZOLE SODIUM 40 MG: 40 INJECTION, POWDER, FOR SOLUTION INTRAVENOUS at 20:01

## 2018-09-17 RX ADMIN — LIDOCAINE HYDROCHLORIDE 100 MG: 10 INJECTION, SOLUTION INFILTRATION; PERINEURAL at 14:03

## 2018-09-17 RX ADMIN — SODIUM CHLORIDE 50 ML/HR: 0.9 INJECTION, SOLUTION INTRAVENOUS at 13:27

## 2018-09-17 RX ADMIN — PROPOFOL 60 MG: 10 INJECTION, EMULSION INTRAVENOUS at 14:12

## 2018-09-17 RX ADMIN — SUCRALFATE 1000 MG: 1 SUSPENSION ORAL at 18:23

## 2018-09-17 RX ADMIN — SUCRALFATE 1000 MG: 1 SUSPENSION ORAL at 23:12

## 2018-09-17 RX ADMIN — SODIUM CHLORIDE 125 ML/HR: 0.9 INJECTION, SOLUTION INTRAVENOUS at 18:19

## 2018-09-17 NOTE — ASSESSMENT & PLAN NOTE
· Patient states he does not drink every day  Monitor for signs and symptoms of withdrawal  Mild tremor on exam    · P r n   Ativan

## 2018-09-17 NOTE — PROGRESS NOTES
Progress Note- Humberto Garcia  61 y o  male MRN: [id number]    Unit/Bed#: ENDO POOL Encounter: [id number]      Assessment and Plan:    61year old male with known Wills's esophagus, small bowel AVM's admitted with    1  Symptomatic anemia with a Hb of 6 which responded to 2 units of PRBC's;   Differential includes upper vs lower source vs small bowel AVM's as well  -for EGD/colonoscopy today to assess for a cause; if negative will need capsule endoscopy  ______________________________________________________________________    Subjective:     Pt tolerated bowel prep overnight  H and H stable  Medication Administration - last 24 hours from 09/16/2018 1408 to 09/17/2018 1408       Date/Time Order Dose Route Action Action by     09/16/2018 1632 polyethylene glycol (GOLYTELY) bowel prep 4,000 mL 4,000 mL Oral Given Brittni Macias RN     09/17/2018 0938 pantoprazole (PROTONIX) injection 40 mg 40 mg Intravenous Given Rolf Chaney RN     09/16/2018 2138 pantoprazole (PROTONIX) injection 40 mg 40 mg Intravenous Given Anika Gregorio RN     09/17/2018 1313 sucralfate (CARAFATE) oral suspension 1,000 mg 1,000 mg Oral Not Given Arianne Neri     09/17/2018 0545 sucralfate (CARAFATE) oral suspension 1,000 mg 1,000 mg Oral Not Given Anika Gregorio RN     09/17/2018 0004 sucralfate (CARAFATE) oral suspension 1,000 mg 1,000 mg Oral Not Given Tabitha Centeno RN     09/16/2018 1726 sucralfate (CARAFATE) oral suspension 1,000 mg 1,000 mg Oral Not Given Brittni Macias RN     09/17/2018 0004 sodium chloride 0 9 % infusion 100 mL/hr Intravenous [facility]     09/17/2018 1327 sodium chloride 0 9 % infusion 50 mL/hr Intravenous New Bag Amira Singh RN          Objective:     Vitals: Blood pressure 157/81, pulse 65, temperature 99 8 °F (37 7 °C), temperature source Temporal, resp  rate 16, weight 63 5 kg (140 lb), SpO2 97 %  ,Body mass index is 18 47 kg/m²      No intake or output data in the 24 hours ending 09/17/18 1408    Physical Exam:   General Appearance: Awake and alert, in no acute distress  Abdomen: Soft, non-tender, non-distended; bowel sounds normal; no masses or no organomegaly    Invasive Devices     Peripheral Intravenous Line            Peripheral IV 09/15/18 Left Antecubital 2 days                Lab Results:  Admission on 09/15/2018   Component Date Value    WBC 09/15/2018 5 41     RBC 09/15/2018 3 73*    Hemoglobin 09/15/2018 6 7*    Hematocrit 09/15/2018 25 3*    MCV 09/15/2018 68*    MCH 09/15/2018 18 0*    MCHC 09/15/2018 26 5*    RDW 09/15/2018 20 2*    MPV 09/15/2018 9 4     Platelets 78/57/4086 661*    nRBC 09/15/2018 0     Neutrophils Relative 09/15/2018 57     Immat GRANS % 09/15/2018 0     Lymphocytes Relative 09/15/2018 16     Monocytes Relative 09/15/2018 17*    Eosinophils Relative 09/15/2018 7*    Basophils Relative 09/15/2018 3*    Neutrophils Absolute 09/15/2018 3 16     Immature Grans Absolute 09/15/2018 0 01     Lymphocytes Absolute 09/15/2018 0 84     Monocytes Absolute 09/15/2018 0 90     Eosinophils Absolute 09/15/2018 0 35     Basophils Absolute 09/15/2018 0 15*    Protime 09/15/2018 14 5*    INR 09/15/2018 1 12     PTT 09/15/2018 35     Sodium 09/15/2018 140     Potassium 09/15/2018 3 9     Chloride 09/15/2018 105     CO2 09/15/2018 27     ANION GAP 09/15/2018 8     BUN 09/15/2018 9     Creatinine 09/15/2018 0 75     Glucose 09/15/2018 102     Calcium 09/15/2018 9 2     AST 09/15/2018 14     ALT 09/15/2018 15     Alkaline Phosphatase 09/15/2018 105     Total Protein 09/15/2018 6 7     Albumin 09/15/2018 3 1*    Total Bilirubin 09/15/2018 0 45     eGFR 09/15/2018 98     Color, UA 09/15/2018 clear     Clarity, UA 09/15/2018 yellow     Rapid Strep A Screen 09/15/2018 Negative     Lipase 09/15/2018 173     Color, UA 09/15/2018 Yellow     Clarity, UA 09/15/2018 Clear     pH, UA 09/15/2018 5 5     Leukocytes, UA 09/15/2018 Negative     Nitrite, UA 09/15/2018 Negative     Protein, UA 09/15/2018 Negative     Glucose, UA 09/15/2018 Negative     Ketones, UA 09/15/2018 Negative     Urobilinogen, UA 09/15/2018 0 2     Bilirubin, UA 09/15/2018 Negative     Blood, UA 09/15/2018 Negative     Specific Gravity, UA 09/15/2018 1 020     ABO Grouping 09/15/2018 O     Rh Factor 09/15/2018 Positive     Antibody Screen 09/15/2018 Negative     Specimen Expiration Date 09/15/2018 [id number]     Unit Product Code 09/16/2018 C6800A63     Unit Number 09/16/2018 [id number]     Unit ABO 09/16/2018 O     Unit RH 09/16/2018 POS     Unit Dispense Status 09/16/2018 Presumed Trans     Unit Product Code 09/16/2018 H4682A36     Unit Number 09/16/2018 [id number]     Unit ABO 09/16/2018 O     Unit RH 09/16/2018 POS     Unit Dispense Status 09/16/2018 Presumed Trans     Troponin I 09/15/2018 <0 02     Sodium 09/16/2018 137     Potassium 09/16/2018 4 4     Chloride 09/16/2018 104     CO2 09/16/2018 26     ANION GAP 09/16/2018 7     BUN 09/16/2018 7     Creatinine 09/16/2018 0 74     Glucose 09/16/2018 88     Calcium 09/16/2018 8 4     AST 09/16/2018 28     ALT 09/16/2018 14     Alkaline Phosphatase 09/16/2018 90     Total Protein 09/16/2018 6 3*    Albumin 09/16/2018 2 8*    Total Bilirubin 09/16/2018 0 63     eGFR 09/16/2018 98     Magnesium 09/16/2018 1 6     WBC 09/16/2018 6 71     RBC 09/16/2018 3 98     Hemoglobin 09/16/2018 8 1*    Hematocrit 09/16/2018 28 3*    MCV 09/16/2018 71*    MCH 09/16/2018 20 4*    MCHC 09/16/2018 28 6*    RDW 09/16/2018 22 1*    Platelets 96/27/2989 585*    MPV 09/16/2018 10 1     Ventricular Rate 09/15/2018 55     Atrial Rate 09/15/2018 55     IA Interval 09/15/2018 192     QRSD Interval 09/15/2018 94     QT Interval 09/15/2018 406     QTC Interval 09/15/2018 388     P Axis 09/15/2018 77     QRS Axis 09/15/2018 103     T Wave Axis 09/15/2018 92     WBC 09/17/2018 5 00     RBC 09/17/2018 4 11     Hemoglobin 09/17/2018 8 2*    Hematocrit 09/17/2018 29 2*    MCV 09/17/2018 71*    MCH 09/17/2018 20 0*    MCHC 09/17/2018 28 1*    RDW 09/17/2018 23 1*    Platelets 03/89/7171 535*    MPV 09/17/2018 9 0        Imaging Studies: I have personally reviewed pertinent imaging studies

## 2018-09-17 NOTE — SOCIAL WORK
CM met with patient to complete a general SW assessment and discuss discharge planning  Patient resides alone in a second floor apartment; no difficulty with steps reported  Patient identifies his daughters as their primary support system  Patient is independent with ADLs and functional mobility  Patient denies using any DMEs and declined needing any at discharge  Patient drives; reports his daughteris available to transport at discharge  Patient uses [email or web address]; [place] informed patient of their access to [facility] at discharge, however patient prefers to use his pharmacy  Patient denied history with VNA services; declined the need for services at discharge  Patient reports not having a PCP, however prefers to use the PCP in [place] that is affiliated with [facility]; CM provided patient with that info  POA identified as his daughter Zayra Murillo [phone]  No needs reported at present  CM to follow as needed

## 2018-09-17 NOTE — ANESTHESIA POSTPROCEDURE EVALUATION
Post-Op Assessment Note      CV Status:  Stable    Mental Status:  Alert and awake    Hydration Status:  Euvolemic    PONV Controlled:  Controlled    Airway Patency:  Patent    Post Op Vitals Reviewed: Yes          Staff: Anesthesiologist, CRNA           /78 (09/17/18 1426)    Temp      Pulse 62 (09/17/18 1426)   Resp 20 (09/17/18 1426)    SpO2 97 % (09/17/18 1426)

## 2018-09-17 NOTE — OP NOTE
OPERATIVE REPORT  PATIENT NAME: Wagner Stinson  :  1955  MRN: [id number]  Pt Location: [facility] ROOM 01    SURGERY DATE: 2018    Surgeon(s) and Role:     * Yudy Nguyen DO - Primary    Preop Diagnosis:  Anemia [D64 9]    Post-Op Diagnosis Codes:     * Anemia [D64 9]     * Erosive esophagitis [K22 10]     * Wills's esophagus [K22 70]     * Hiatal hernia [K44 9]    Procedure(s) (LRB):  EGD with biopsy AND COLONOSCOPY (N/A)    Specimen(s):  ID Type Source Tests Collected by Time Destination   1 : duodenal bx-r/o celiac disease Tissue Duodenum TISSUE EXAM Yudy Nguyen DO 2018 1405        Estimated Blood Loss:   Minimal    Drains:     Anesthesia Type:   IV Sedation with Anesthesia    Operative Indications:  Anemia [D64 9]      Operative Findings:  ESOPHAGOGASTRODUODENOSCOPY    PROCEDURE: EGD    SEDATION: Monitored anesthesia care, check anesthesia records    ASA Class: 3    INDICATIONS: anemia    CONSENT:  Informed consent was obtained for the procedure, including sedation after explaining the risks and benefits of the procedure  Risks including but not limited to bleeding, perforation, infection, and missed lesion  PREPARATION:   Telemetry, pulse oximetry, blood pressure were monitored throughout the procedure  Patient was identified by myself both verbally and by visual inspection of ID band  DESCRIPTION:   Patient was placed in the left lateral decubitus position and was sedated with the above medication  The gastroscope was introduced in to the oropharynx and the esophagus was intubated under direct visualization  Scope was passed down the esophagus up to 2nd part of the duodenum  A careful inspection was made as the gastroscope was withdrawn, including a retroflexed view of the stomach; findings and interventions are described below       FINDINGS:    #1  Esophagus- erosive esophagitis in underlying Wills's esophagus, long segment from 25 to 40 cm from the incisors, hiatal hernia 5 cm in size    #2  Stomach- bile reflux in the stomach    #3  Duodenum- normal first and second part of the duodenum, biopsied with cold biopsy forceps         IMPRESSIONS:      Erosive esophagitis  Wills's esophagus  Hiatal hernia  Normal duodenum  Biopsied  RECOMMENDATIONS:     PPI BID and will need repeat EGD in 8 weeks to document healing and to do Wills's biopsies  Proceed with colonoscopy  COMPLICATIONS:  None; patient tolerated the procedure well  DISPOSITION: PACU           CONDITION: Stable    Colonoscopy Procedure Note    Procedure: Colonoscopy    Sedation: Monitored anesthesia care, check anesthesia records      ASA Class: 3    INDICATIONS: anemia    POST-OP DIAGNOSIS: See the impression below    Procedure Details     Prior colonoscopy: No prior colonoscopy  Informed consent was obtained for the procedure, including sedation  Risks of perforation, hemorrhage, adverse drug reaction and aspiration were discussed  The patient was placed in the left lateral decubitus position  Based on the pre-procedure assessment, including review of the patient's medical history, medications, allergies, and review of systems, he had been deemed to be an appropriate candidate for conscious sedation; he was therefore sedated with the medications listed below  The patient was monitored continuously with telemetry, pulse oximetry, blood pressure monitoring, and direct observations  A rectal examination was performed  The colonoscope was inserted into the rectum and advanced under direct vision to the sigmoid colon  The quality of the colonic preparation was good  A careful inspection was made as the colonoscope was withdrawn, including a retroflexed view of the rectum; findings and interventions are described below  Findings:  Normal rectum but severe tortuosity within the sigmoid colon and with even minimal air insufflation the colon would become distended    Despite manual pressure and changing patient position, unable to complete the colonoscopy  Complications: None; patient tolerated the procedure well  Impression:    Severely tortuous sigmoid colon  Procedure aborted  Recommendations: Will need repeat colonoscopy as an outpt at the [facility] in order to use CO2 for insufflation as this is not available yet at the [facility]  If colonoscopy negative, then will need capsule endoscopy  Given no overt bleeding, this can be done as an outpt  Resume diet        SIGNATURE: Neftaly Morton DO  DATE: September 17, 2018  TIME: 2:22 PM

## 2018-09-17 NOTE — ASSESSMENT & PLAN NOTE
· Patient presented with dizziness, lightheadedness and fatigue found to have hemoglobin of 6 7  · Hemoglobin 7 9 after 2 units PRBCs  Follow up AM CBC  · History of iron deficiency anemia due to chronic blood loss secondary to Wills's esophagus, small bowel AVMs, esophagitis, hiatal hernia with Cirilo's lesions  No obvious signs of active GIB    · Appreciate Gastroenterology input  · Plan for EGD colonoscopy today   · IV Protonix b i d   · Currently NPO

## 2018-09-17 NOTE — PROGRESS NOTES
Progress Note - Cari Cage 1955, 61 y o  male MRN: [id number]  Unit/Bed#: E5 -01 Encounter: [id number] DOS: 9/17/18   Primary Care Provider: Abhijeet Castañeda PA-C   Date and time admitted to hospital: 9/15/2018  9:11 AM    Symptomatic anemia   Assessment & Plan    · Patient presented with dizziness, lightheadedness and fatigue found to have hemoglobin of 6 7  · Hemoglobin 7 9 after 2 units PRBCs  Follow up AM CBC  · History of iron deficiency anemia due to chronic blood loss secondary to Wills's esophagus, small bowel AVMs, esophagitis, hiatal hernia with Cirilo's lesions  No obvious signs of active GIB  · Appreciate Gastroenterology input  · Plan for EGD colonoscopy today   · IV Protonix b i d   · Currently NPO         ETOH abuse   Assessment & Plan    · Patient states he does not drink every day  Monitor for signs and symptoms of withdrawal  Mild tremor on exam    · P r n  Ativan        Cardiac murmur   Assessment & Plan    · Check echocardiogram           VTE Pharmacologic Prophylaxis:   Pharmacologic: Pharmacologic VTE Prophylaxis contraindicated due to acute anemia, GIB  Mechanical VTE Prophylaxis in Place: Yes    Patient Centered Rounds: I have performed bedside rounds with nursing staff today  Discussions with Specialists or Other Care Team Provider: none    Education and Discussions with Family / Patient: patient, declined call to family     Time Spent for Care: 30 minutes  More than 50% of total time spent on counseling and coordination of care as described above  Current Length of Stay: 2 day(s)    Current Patient Status: Inpatient   Certification Statement: The patient will continue to require additional inpatient hospital stay due to plan for EGD colonoscopy today, workup for acute anemia     Discharge Plan / Estimated Discharge Date: pending, not yet medically stable     Code Status: Level 1 - Full Code    Subjective:   Pt seen and examined at bedside   No new complaints  Tolerated bowel prep  Objective:     Vitals:   Temp (24hrs), Av 6 °F (37 °C), Min:98 5 °F (36 9 °C), Max:98 8 °F (37 1 °C)    HR:  [61-67] 67  Resp:  [16-18] 16  BP: (125-145)/(69-80) 125/80  SpO2:  [96 %-98 %] 96 %  Body mass index is 18 47 kg/m²  Input and Output Summary (last 24 hours):     No intake or output data in the 24 hours ending 18 0755    Physical Exam:     Physical Exam   Constitutional: He is oriented to person, place, and time  He appears well-developed  Thin and frail appearing    HENT:   Head: Normocephalic and atraumatic  Mouth/Throat: No oropharyngeal exudate  Eyes: EOM are normal  No scleral icterus  Neck: Normal range of motion  Neck supple  Cardiovascular: Normal rate, regular rhythm and normal heart sounds  No murmur heard  Pulmonary/Chest: Effort normal and breath sounds normal    Abdominal: Soft  Bowel sounds are normal    Musculoskeletal: Normal range of motion  He exhibits no edema  Mild tremor b/l hands   Neurological: He is alert and oriented to person, place, and time  Skin: Skin is warm and dry  There is pallor  Petechial rash noted on face    Psychiatric: He has a normal mood and affect  Additional Data:    Labs:      Results from last 7 days  Lab Units 18  0521 09/15/18  0929   WBC Thousand/uL 6 71 5 41   HEMOGLOBIN g/dL 8 1* 6 7*   HEMATOCRIT % 28 3* 25 3*   PLATELETS Thousands/uL 585* 661*   NEUTROS PCT %  --  57   LYMPHS PCT %  --  16   MONOS PCT %  --  17*   EOS PCT %  --  7*       Results from last 7 days  Lab Units 18  0521   SODIUM mmol/L 137   POTASSIUM mmol/L 4 4   CHLORIDE mmol/L 104   CO2 mmol/L 26   BUN mg/dL 7   CREATININE mg/dL 0 74   CALCIUM mg/dL 8 4   ALK PHOS U/L 90   ALT U/L 14   AST U/L 28       Results from last 7 days  Lab Units 09/15/18  0929   INR  1 12       * I Have Reviewed All Lab Data Listed Above  * Additional Pertinent Lab Tests Reviewed:  Dasha 66 Admission Reviewed    Imaging:    Imaging Reports Reviewed Today Include: all  Imaging Personally Reviewed by Myself Includes:  none    Recent Cultures (last 7 days):           Last 24 Hours Medication List:     Current Facility-Administered Medications:  pantoprazole 40 mg Intravenous Q12H [address] Carlo Barreto PA-C    sodium chloride 100 mL/hr Intravenous Continuous Rossana Fraser PA-C Last Rate: 100 mL/hr (09/17/18 0004)   sucralfate 1,000 mg Oral Q6H [address] Mary Crews PA-C         Today, Patient Was Seen By: Darian Morrell PA-C    ** Please Note: This note has been constructed using a voice recognition system   **

## 2018-09-17 NOTE — PLAN OF CARE
Problem: DISCHARGE PLANNING - CARE MANAGEMENT  Goal: Discharge to post-acute care or home with appropriate resources  INTERVENTIONS:  - Conduct assessment to determine patient/family and health care team treatment goals, and need for post-acute services based on payer coverage, community resources, and patient preferences, and barriers to discharge  - Address psychosocial, clinical, and financial barriers to discharge as identified in assessment in conjunction with the patient/family and health care team  - Arrange appropriate level of post-acute services according to patients   needs and preference and payer coverage in collaboration with the physician and health care team  - Communicate with and update the patient/family, physician, and health care team regarding progress on the discharge plan  - Arrange appropriate transportation to post-acute venues  Outcome: Completed Date Met: 09/17/18  Patient to be discharged with appropriate services when medically cleared by MD  No additional needs reported at present  CM to follow as needed

## 2018-09-18 ENCOUNTER — APPOINTMENT (INPATIENT)
Dept: NON INVASIVE DIAGNOSTICS | Facility: HOSPITAL | Age: 63
DRG: 812 | End: 2018-09-18
Payer: COMMERCIAL

## 2018-09-18 LAB
ERYTHROCYTE [DISTWIDTH] IN BLOOD BY AUTOMATED COUNT: 23.7 % (ref 11.6–15.1)
HCT VFR BLD AUTO: 29.9 % (ref 36.5–49.3)
HGB BLD-MCNC: 8.1 G/DL (ref 12–17)
MCH RBC QN AUTO: 19.6 PG (ref 26.8–34.3)
MCHC RBC AUTO-ENTMCNC: 27.1 G/DL (ref 31.4–37.4)
MCV RBC AUTO: 72 FL (ref 82–98)
PLATELET # BLD AUTO: 513 THOUSANDS/UL (ref 149–390)
PMV BLD AUTO: 9.4 FL (ref 8.9–12.7)
RBC # BLD AUTO: 4.14 MILLION/UL (ref 3.88–5.62)
WBC # BLD AUTO: 5.55 THOUSAND/UL (ref 4.31–10.16)

## 2018-09-18 PROCEDURE — 85027 COMPLETE CBC AUTOMATED: CPT | Performed by: PHYSICIAN ASSISTANT

## 2018-09-18 PROCEDURE — 93306 TTE W/DOPPLER COMPLETE: CPT | Performed by: INTERNAL MEDICINE

## 2018-09-18 PROCEDURE — 93306 TTE W/DOPPLER COMPLETE: CPT

## 2018-09-18 PROCEDURE — 99232 SBSQ HOSP IP/OBS MODERATE 35: CPT | Performed by: HOSPITALIST

## 2018-09-18 PROCEDURE — C9113 INJ PANTOPRAZOLE SODIUM, VIA: HCPCS | Performed by: PHYSICIAN ASSISTANT

## 2018-09-18 RX ADMIN — SUCRALFATE 1000 MG: 1 SUSPENSION ORAL at 12:09

## 2018-09-18 RX ADMIN — PANTOPRAZOLE SODIUM 40 MG: 40 INJECTION, POWDER, FOR SOLUTION INTRAVENOUS at 09:15

## 2018-09-18 RX ADMIN — SUCRALFATE 1000 MG: 1 SUSPENSION ORAL at 06:33

## 2018-09-18 RX ADMIN — SUCRALFATE 1000 MG: 1 SUSPENSION ORAL at 17:38

## 2018-09-18 RX ADMIN — PANTOPRAZOLE SODIUM 40 MG: 40 INJECTION, POWDER, FOR SOLUTION INTRAVENOUS at 20:13

## 2018-09-18 NOTE — ASSESSMENT & PLAN NOTE
· S/p EGD yesterday with erosive esophagitis in underlying Wills's esophagus  · Continue pantoprazole 40 mg twice a day  · Continue Carafate 4 times a day  · Repeat EGD in 8 weeks to assess for healing and obtain biopsies

## 2018-09-18 NOTE — ASSESSMENT & PLAN NOTE
· Patient presented with dizziness, lightheadedness and fatigue found to have hemoglobin of 6 7  · Hemoglobin 7 9 after 2 units PRBCs  · Hemoglobin today:  8 1  · History of iron deficiency anemia due to chronic blood loss secondary to Wills's esophagus, small bowel AVMs, esophagitis, hiatal hernia with Cirilo's lesions  No obvious signs of active GIB   · S/p EGD (see results under Wills's Esophagus) and colonoscopy  · Unfortunately colonoscopy had to be aborted secondary to tortuous sigmoid colon  · GI recommending repeat colonoscopy at [facility]

## 2018-09-18 NOTE — CASE MANAGEMENT
Continued Stay Review    Date   9/18/2018    Vital Signs: /74   Pulse 63   Temp 99 1 °F (37 3 °C) (Temporal)   Resp 20   Wt 63 5 kg (140 lb)   SpO2 97%   BMI 18 47 kg/m²     Medication:   Scheduled Meds:   Current Facility-Administered Medications:  pantoprazole 40 mg Intravenous Q12H [address] DANTE Amin-ELKE   sucralfate 1,000 mg Oral Q6H [address] Carlo Barreto PA-ELKE     Continuous Infusions:    PRN Meds:     Abnormal Labs/Diagnostic Results:   H/H   8 1/29 9  Platelets   069    Age/Sex: 61 y o  male     Assessment/Plan:   Symptomatic anemia   Assessment & Plan     · Patient presented with dizziness, lightheadedness and fatigue found to have hemoglobin of 6 7  · Hemoglobin 7 9 after 2 units PRBCs  · Hemoglobin today:  8 1  · History of iron deficiency anemia due to chronic blood loss secondary to Wills's esophagus, small bowel AVMs, esophagitis, hiatal hernia with Cirilo's lesions  No obvious signs of active GIB   · S/p EGD (see results under Wills's Esophagus) and colonoscopy  ? Unfortunately colonoscopy had to be aborted secondary to tortuous sigmoid colon  ? GI recommending repeat colonoscopy at [facility]           Cardiac murmur   Assessment & Plan     · Echocardiogram pending  ETOH abuse   Assessment & Plan     · Patient states he does not drink every day  Monitor for signs and symptoms of withdrawal  Mild tremor on exam    · P r n  Ativan          Wills's esophagus   Assessment & Plan     · S/p EGD yesterday with erosive esophagitis in underlying Wills's esophagus  · Continue pantoprazole 40 mg twice a day  · Continue Carafate 4 times a day  · Repeat EGD in 8 weeks to assess for healing and obtain biopsies  Discharge Plan:    Home      Thank you,  [address]  Utilization Review Department  Phone: [phone];  Fax [phone]  ATTENTION: Please call with any questions or concerns to [phone]  and carefully follow the prompts so that you are directed to the right person  Send all requests for admission clinical reviews, approved or denied determinations and any other requests to fax [phone]   All voicemails are confidential

## 2018-09-18 NOTE — PROGRESS NOTES
Progress Note - Bonnie Scott 1955, 61 y o  male MRN: [id number]  Unit/Bed#: E5 -01 Encounter: [id number]  Primary Care Provider: Humera Dominguez PA-C   Date and time admitted to hospital: 9/15/2018  9:11 AM    * Symptomatic anemia   Assessment & Plan    · Patient presented with dizziness, lightheadedness and fatigue found to have hemoglobin of 6 7  · Hemoglobin 7 9 after 2 units PRBCs  · Hemoglobin today:  8 1  · History of iron deficiency anemia due to chronic blood loss secondary to Wills's esophagus, small bowel AVMs, esophagitis, hiatal hernia with Cirilo's lesions  No obvious signs of active GIB   · S/p EGD (see results under Wills's Esophagus) and colonoscopy  · Unfortunately colonoscopy had to be aborted secondary to tortuous sigmoid colon  · GI recommending repeat colonoscopy at [facility]  Cardiac murmur   Assessment & Plan    · Echocardiogram pending  ETOH abuse   Assessment & Plan    · Patient states he does not drink every day  Monitor for signs and symptoms of withdrawal  Mild tremor on exam    · P r n  Ativan        Wills's esophagus   Assessment & Plan    · S/p EGD yesterday with erosive esophagitis in underlying Wills's esophagus  · Continue pantoprazole 40 mg twice a day  · Continue Carafate 4 times a day  · Repeat EGD in 8 weeks to assess for healing and obtain biopsies  VTE Pharmacologic Prophylaxis:   Pharmacologic: Pharmacologic VTE Prophylaxis contraindicated due to potential GIB  Mechanical: Mechanical VTE prophylaxis in place  Patient Centered Rounds: I have performed bedside rounds with nursing staff today  Discussions with Specialists or Other Care Team Provider: None  Education and Discussions with Family / Patient:  All patient questions answered to the best of my ability  Spoke with patient's daughter, Dave Souza on the phone  Time Spent for Care: 20 minutes    More than 50% of total time spent on counseling and coordination of care as described above  Current Length of Stay: 3 day(s)  Current Patient Status: Inpatient   Certification Statement: The patient will continue to require additional inpatient hospital stay due to No available transportation home  Discharge Plan:  Patient is medically stable for discharge today however he does not have a ride  I confirmed this with his daughter  Will discuss with case management but otherwise will discharge patient home tomorrow morning  Code Status: Level 1 - Full Code    Subjective:   Patient complains of severe heartburn overnight  Otherwise, he denies any nausea or vomiting  He denies any abdominal pain  Objective:   Vitals:   Temp (24hrs), Av 5 °F (37 5 °C), Min:99 °F (37 2 °C), Max:99 8 °F (37 7 °C)    HR:  [59-70] 65  Resp:  [16-20] 18  BP: (124-157)/(67-84) 139/77  SpO2:  [92 %-97 %] 95 %  Body mass index is 18 47 kg/m²  Input and Output Summary (last 24 hours): Intake/Output Summary (Last 24 hours) at 18 1047  Last data filed at 18 1422   Gross per 24 hour   Intake              700 ml   Output                0 ml   Net              700 ml       Physical Exam:     Physical Exam   HENT:   Head: Normocephalic and atraumatic  Mouth/Throat: Oropharynx is clear and moist and mucous membranes are normal    Eyes: No scleral icterus  Cardiovascular: Normal rate and regular rhythm  Murmur heard  Pulmonary/Chest: Breath sounds normal  He has no wheezes  He has no rales  He exhibits no tenderness  Abdominal: Soft  Bowel sounds are normal  He exhibits no distension  There is no tenderness  Musculoskeletal: Normal range of motion  He exhibits no edema  Skin: Skin is warm and dry  No rash noted  There is pallor  Psychiatric: He has a normal mood and affect  Vitals reviewed      Additional Data:   Labs:    Results from last 7 days  Lab Units 18  0519  09/15/18  0929   WBC Thousand/uL 5 55  < > 5 41   HEMOGLOBIN g/dL 8 1*  < > 6 7*   HEMATOCRIT % 29 9*  < > 25 3*   PLATELETS Thousands/uL 513*  < > 661*   NEUTROS PCT %  --   --  57   LYMPHS PCT %  --   --  16   MONOS PCT %  --   --  17*   EOS PCT %  --   --  7*   < > = values in this interval not displayed  Results from last 7 days  Lab Units 09/16/18  0521   SODIUM mmol/L 137   POTASSIUM mmol/L 4 4   CHLORIDE mmol/L 104   CO2 mmol/L 26   BUN mg/dL 7   CREATININE mg/dL 0 74   CALCIUM mg/dL 8 4   ALK PHOS U/L 90   ALT U/L 14   AST U/L 28       Results from last 7 days  Lab Units 09/15/18  0929   INR  1 12       * I Have Reviewed All Lab Data Listed Above  * Additional Pertinent Lab Tests Reviewed: All Labs Within Last 24 Hours Reviewed    Imaging:    Imaging Reports Reviewed Today Include:  EGD    Cultures:   Blood Culture: No results found for: BLOODCX  Urine Culture: No results found for: URINECX  Sputum Culture: No components found for: SPUTUMCX  Wound Culture: No results found for: WOUNDCULT    Last 24 Hours Medication List:     Current Facility-Administered Medications:  pantoprazole 40 mg Intravenous Q12H [address] Carlo Barreto PA-C   sucralfate 1,000 mg Oral Q6H [address] Britni Rodriguez PA-C        Today, Patient Was Seen By: Joyce Smith PA-C    ** Please Note: Dragon 360 Dictation voice to text software may have been used in the creation of this document   **

## 2018-09-19 VITALS
BODY MASS INDEX: 18.47 KG/M2 | HEART RATE: 59 BPM | DIASTOLIC BLOOD PRESSURE: 68 MMHG | RESPIRATION RATE: 18 BRPM | OXYGEN SATURATION: 94 % | TEMPERATURE: 99.1 F | WEIGHT: 140 LBS | SYSTOLIC BLOOD PRESSURE: 142 MMHG

## 2018-09-19 PROBLEM — F10.10 ETOH ABUSE: Chronic | Status: RESOLVED | Noted: 2018-03-25 | Resolved: 2018-09-19

## 2018-09-19 PROBLEM — K92.2 GI BLEEDING: Status: RESOLVED | Noted: 2017-11-03 | Resolved: 2018-09-19

## 2018-09-19 PROBLEM — D64.9 SYMPTOMATIC ANEMIA: Status: RESOLVED | Noted: 2018-03-24 | Resolved: 2018-09-19

## 2018-09-19 LAB
ANION GAP SERPL CALCULATED.3IONS-SCNC: 5 MMOL/L (ref 4–13)
BASOPHILS # BLD AUTO: 0.1 THOUSANDS/ΜL (ref 0–0.1)
BASOPHILS NFR BLD AUTO: 2 % (ref 0–1)
BUN SERPL-MCNC: 7 MG/DL (ref 5–25)
CALCIUM SERPL-MCNC: 8.5 MG/DL (ref 8.3–10.1)
CHLORIDE SERPL-SCNC: 105 MMOL/L (ref 100–108)
CO2 SERPL-SCNC: 28 MMOL/L (ref 21–32)
CREAT SERPL-MCNC: 0.68 MG/DL (ref 0.6–1.3)
EOSINOPHIL # BLD AUTO: 0.43 THOUSAND/ΜL (ref 0–0.61)
EOSINOPHIL NFR BLD AUTO: 8 % (ref 0–6)
ERYTHROCYTE [DISTWIDTH] IN BLOOD BY AUTOMATED COUNT: 24.4 % (ref 11.6–15.1)
GFR SERPL CREATININE-BSD FRML MDRD: 102 ML/MIN/1.73SQ M
GLUCOSE SERPL-MCNC: 95 MG/DL (ref 65–140)
HCT VFR BLD AUTO: 28.8 % (ref 36.5–49.3)
HGB BLD-MCNC: 8 G/DL (ref 12–17)
IMM GRANULOCYTES # BLD AUTO: 0.02 THOUSAND/UL (ref 0–0.2)
IMM GRANULOCYTES NFR BLD AUTO: 0 % (ref 0–2)
LYMPHOCYTES # BLD AUTO: 1.03 THOUSANDS/ΜL (ref 0.6–4.47)
LYMPHOCYTES NFR BLD AUTO: 18 % (ref 14–44)
MCH RBC QN AUTO: 19.9 PG (ref 26.8–34.3)
MCHC RBC AUTO-ENTMCNC: 27.8 G/DL (ref 31.4–37.4)
MCV RBC AUTO: 72 FL (ref 82–98)
MONOCYTES # BLD AUTO: 0.94 THOUSAND/ΜL (ref 0.17–1.22)
MONOCYTES NFR BLD AUTO: 17 % (ref 4–12)
NEUTROPHILS # BLD AUTO: 3.13 THOUSANDS/ΜL (ref 1.85–7.62)
NEUTS SEG NFR BLD AUTO: 55 % (ref 43–75)
NRBC BLD AUTO-RTO: 0 /100 WBCS
PLATELET # BLD AUTO: 516 THOUSANDS/UL (ref 149–390)
PMV BLD AUTO: 9.5 FL (ref 8.9–12.7)
POTASSIUM SERPL-SCNC: 3.7 MMOL/L (ref 3.5–5.3)
RBC # BLD AUTO: 4.02 MILLION/UL (ref 3.88–5.62)
SODIUM SERPL-SCNC: 138 MMOL/L (ref 136–145)
WBC # BLD AUTO: 5.65 THOUSAND/UL (ref 4.31–10.16)

## 2018-09-19 PROCEDURE — 85025 COMPLETE CBC W/AUTO DIFF WBC: CPT | Performed by: PHYSICIAN ASSISTANT

## 2018-09-19 PROCEDURE — C9113 INJ PANTOPRAZOLE SODIUM, VIA: HCPCS | Performed by: PHYSICIAN ASSISTANT

## 2018-09-19 PROCEDURE — 80048 BASIC METABOLIC PNL TOTAL CA: CPT | Performed by: PHYSICIAN ASSISTANT

## 2018-09-19 PROCEDURE — 99239 HOSP IP/OBS DSCHRG MGMT >30: CPT | Performed by: HOSPITALIST

## 2018-09-19 RX ORDER — SUCRALFATE 1 G/1
1 TABLET ORAL 4 TIMES DAILY
Status: ON HOLD | COMMUNITY
End: 2018-09-19

## 2018-09-19 RX ORDER — PANTOPRAZOLE SODIUM 40 MG/1
40 TABLET, DELAYED RELEASE ORAL 2 TIMES DAILY
Qty: 28 TABLET | Refills: 0 | Status: SHIPPED | OUTPATIENT
Start: 2018-09-19 | End: 2019-06-27 | Stop reason: HOSPADM

## 2018-09-19 RX ORDER — SUCRALFATE 1 G/1
1 TABLET ORAL 4 TIMES DAILY
Qty: 56 TABLET | Refills: 0 | Status: SHIPPED | OUTPATIENT
Start: 2018-09-19 | End: 2019-06-27 | Stop reason: HOSPADM

## 2018-09-19 RX ADMIN — SUCRALFATE 1000 MG: 1 SUSPENSION ORAL at 00:00

## 2018-09-19 RX ADMIN — PANTOPRAZOLE SODIUM 40 MG: 40 INJECTION, POWDER, FOR SOLUTION INTRAVENOUS at 08:04

## 2018-09-19 RX ADMIN — SUCRALFATE 1000 MG: 1 SUSPENSION ORAL at 05:37

## 2018-09-19 NOTE — ASSESSMENT & PLAN NOTE
· S/p EGD with erosive esophagitis in underlying Wills's esophagus  · Continue pantoprazole 40 mg twice a day  · Continue Carafate 4 times a day  · Repeat EGD in 8 weeks to assess for healing and obtain biopsies

## 2018-09-19 NOTE — ASSESSMENT & PLAN NOTE
· Patient presented with dizziness, lightheadedness and fatigue found to have hemoglobin of 6 7  · Hemoglobin 7 9 after 2 units PRBCs  · Hemoglobin today:  8 0  · History of iron deficiency anemia due to chronic blood loss secondary to Wills's esophagus, small bowel AVMs, esophagitis, hiatal hernia with Cirilo's lesions  No obvious signs of active GIB   · S/p EGD (see results under Wills's Esophagus) and colonoscopy  · Unfortunately colonoscopy had to be aborted secondary to tortuous sigmoid colon  · GI recommending repeat colonoscopy at [facility]

## 2018-09-19 NOTE — DISCHARGE SUMMARY
Discharge- Maggy Pat 1955, 61 y o  male MRN: [id number]  Unit/Bed#: E5 -01 Encounter: [id number]  Primary Care Provider: Dagoberto Patel PA-C   Date and time admitted to hospital: 9/15/2018  9:11 AM    * Symptomatic anemiaresolved as of 9/19/2018   Assessment & Plan    · Patient presented with dizziness, lightheadedness and fatigue found to have hemoglobin of 6 7  · Hemoglobin 7 9 after 2 units PRBCs  · Hemoglobin today:  8 0  · History of iron deficiency anemia due to chronic blood loss secondary to Wills's esophagus, small bowel AVMs, esophagitis, hiatal hernia with Cirilo's lesions  No obvious signs of active GIB   · S/p EGD (see results under Wills's Esophagus) and colonoscopy  · Unfortunately colonoscopy had to be aborted secondary to tortuous sigmoid colon  · GI recommending repeat colonoscopy at [facility]  Cardiac murmur   Assessment & Plan    · Echocardiogram:   EF 55%  No evidence of valvular heart disease  Wills's esophagus   Assessment & Plan    · S/p EGD with erosive esophagitis in underlying Wills's esophagus  · Continue pantoprazole 40 mg twice a day  · Continue Carafate 4 times a day  · Repeat EGD in 8 weeks to assess for healing and obtain biopsies  Iron deficiency anemia due to chronic blood loss   Assessment & Plan    · Continue iron supplementation            Discharging Physician / Practitioner: Matthew Taylor PA-C  PCP: Dagoberto Patel PA-C  Admission Date:   Admission Orders     Ordered        09/15/18 1018  Inpatient Admission (expected length of stay for this patient is greater than two midnights)  Once             Discharge Date: 09/19/18    Resolved Problems  Date Reviewed: 9/19/2018          Resolved    GI bleeding 9/19/2018     Resolved by  Matthew Taylor PA-C    Overview Signed 11/3/2017 11:16 AM by ROMA Meyers     Added automatically from request for surgery [id number]         ETOH abuse 9/19/2018     Resolved by Joyce Smith PA-C    * (Principal)Symptomatic anemia 2018     Resolved by  Joyce Smith PA-C    Overview Signed 3/25/2018  5:46 PM by Isela Yoon MD     Added automatically from request for surgery [id number]             Consultations During Hospital Stay:  · Gastroenterology    Procedures Performed:   · EGD (2018):  Erosive esophagitis in underlying Wills's esophagus, long segment from 25-40 cm from the incisors, hiatal hernia 5 cm in size  Bile reflux noted in the stomach  Normal 1st and 2nd part of the duodenum with cold biopsy forceps obtained  · Colonoscopy (2018): Severely tortuous sigmoid colon with  of procedure  · PRBCs transfusion x2 units    Significant Findings / Test Results:   · Symptomatic anemia with hemoglobin 6 7 upon admission    Incidental Findings:   · None     Test Results Pending at Discharge (will require follow up): · None     Outpatient Tests Requested:  · Repeat colonoscopy and EGD    Complications:  None    Reason for Admission:     Fatigue, dizziness, lightheadedness    Hospital Course:     Caron Luna  is a 61 y o  male patient who originally presented to the hospital on 9/15/2018 due to weakness, dizziness, lightheadedness and fatigue  Patient has a longstanding history of Wills's esophagus with small bowel AVMs and esophagitis  Patient was found to have a hemoglobin of 6 7  He was transfused 2 units of PRBCs with recovery of his hemoglobin  His hemoglobin remained stable throughout hospitalization at 8 0  He underwent an EGD and colonoscopy results of which are mentioned above  Unfortunately, the colonoscopy was aborted because of the severely tortuous sigmoid colon and inability to inflate it  GI is recommendations is for him to have a colonoscopy done at [place] where they can utilize CO2 for insufflation  He also needs to have a repeat EGD performed in 8 weeks  He needs to continue on PPI twice daily and Carafate    He should follow up with his primary care doctor in 1 week to have a repeat CBC  He should follow up with his primary gastroenterologist     Please see above list of diagnoses and related plan for additional information  Condition at Discharge: stable     Discharge Day Visit / Exam:     Subjective: On the day of discharge, the patient is feeling well with no complaints of dizziness, lightheadedness, shortness of breath  He has not noted any additional rectal bleeding  He had some heartburn last night but it was less intense than previously  Vitals: Blood Pressure: 142/68 (09/19/18 0710)  Pulse: 59 (09/19/18 0710)  Temperature: 99 1 °F (37 3 °C) (09/19/18 0710)  Temp Source: Temporal (09/19/18 0710)  Respirations: 18 (09/19/18 0710)  Weight - Scale: 63 5 kg (140 lb) (09/15/18 0903)  SpO2: 94 % (09/19/18 0710)  Exam:   Physical Exam   HENT:   Head: Normocephalic and atraumatic  Mouth/Throat: Oropharynx is clear and moist and mucous membranes are normal    Eyes: No scleral icterus  Cardiovascular: Normal rate and regular rhythm  Murmur heard  Pulmonary/Chest: Breath sounds normal  He has no wheezes  He has no rales  He exhibits no tenderness  Abdominal: Soft  Bowel sounds are normal  He exhibits no distension  There is no tenderness  Musculoskeletal: Normal range of motion  He exhibits no edema  Skin: Skin is warm and dry  No rash noted  Psychiatric: He has a normal mood and affect  Vitals reviewed  Discussion with Family:   Spoke with patient's daughter to provide updates    Discharge instructions/Information to patient and family:   See after visit summary for information provided to patient and family  Provisions for Follow-Up Care:  See after visit summary for information related to follow-up care and any pertinent home health orders        Disposition:     Home    For Discharges to [facility] SNF:   · Not Applicable to this Patient - Not Applicable to this Patient    Planned Readmission:   No     Discharge Statement:  I spent 45 minutes discharging the patient  This time was spent on the day of discharge  I had direct contact with the patient on the day of discharge  Greater than 50% of the total time was spent examining patient, answering all patient questions, arranging and discussing plan of care with patient as well as directly providing post-discharge instructions  Additional time then spent on discharge activities  Discharge Medications:  See after visit summary for reconciled discharge medications provided to patient and family        ** Please Note: This note has been constructed using a voice recognition system **

## 2018-10-09 ENCOUNTER — TELEPHONE (OUTPATIENT)
Dept: GASTROENTEROLOGY | Facility: CLINIC | Age: 63
End: 2018-10-09

## 2018-10-09 NOTE — TELEPHONE ENCOUNTER
----- Message from HERMINIO Gusman sent at 9/20/2018  1:53 PM EDT -----  Please schedule repeat EGD and colonoscopy at [facility] in 2-3 months, thank you

## 2018-12-02 ENCOUNTER — HOSPITAL ENCOUNTER (EMERGENCY)
Facility: HOSPITAL | Age: 63
Discharge: HOME | End: 2018-12-02
Attending: EMERGENCY MEDICINE | Admitting: EMERGENCY MEDICINE
Payer: COMMERCIAL

## 2018-12-02 VITALS
TEMPERATURE: 98.3 F | BODY MASS INDEX: 19.88 KG/M2 | DIASTOLIC BLOOD PRESSURE: 76 MMHG | HEART RATE: 76 BPM | HEIGHT: 73 IN | SYSTOLIC BLOOD PRESSURE: 143 MMHG | RESPIRATION RATE: 17 BRPM | OXYGEN SATURATION: 99 % | WEIGHT: 150 LBS

## 2018-12-02 DIAGNOSIS — Z86.39 HX OF IRON DEFICIENCY: ICD-10-CM

## 2018-12-02 DIAGNOSIS — D64.9 ACUTE ON CHRONIC ANEMIA: Primary | ICD-10-CM

## 2018-12-02 LAB
ABO GROUP BLD BPU: NORMAL
ABO GROUP BLD BPU: NORMAL
ABO GROUP BLD: NORMAL
ALBUMIN SERPL BCP-MCNC: 3.4 G/DL (ref 3.5–5)
ALP SERPL-CCNC: 92 U/L (ref 46–116)
ALT SERPL W P-5'-P-CCNC: 16 U/L (ref 12–78)
ANION GAP SERPL CALCULATED.3IONS-SCNC: 11 MMOL/L (ref 4–13)
AST SERPL W P-5'-P-CCNC: 16 U/L (ref 5–45)
BASOPHILS # BLD AUTO: 0.12 THOUSANDS/ΜL (ref 0–0.1)
BASOPHILS NFR BLD AUTO: 1 % (ref 0–1)
BILIRUB SERPL-MCNC: 0.5 MG/DL (ref 0.2–1)
BLD GP AB SCN SERPL QL: NEGATIVE
BPU ID: NORMAL
BPU ID: NORMAL
BUN SERPL-MCNC: 12 MG/DL (ref 5–25)
CALCIUM SERPL-MCNC: 8.5 MG/DL (ref 8.3–10.1)
CHLORIDE SERPL-SCNC: 105 MMOL/L (ref 100–108)
CO2 SERPL-SCNC: 24 MMOL/L (ref 21–32)
CREAT SERPL-MCNC: 0.94 MG/DL (ref 0.6–1.3)
CROSSMATCH: NORMAL
CROSSMATCH: NORMAL
EOSINOPHIL # BLD AUTO: 0.17 THOUSAND/ΜL (ref 0–0.61)
EOSINOPHIL NFR BLD AUTO: 1 % (ref 0–6)
ERYTHROCYTE [DISTWIDTH] IN BLOOD BY AUTOMATED COUNT: 18.2 % (ref 11.6–15.1)
GFR SERPL CREATININE-BSD FRML MDRD: 86 ML/MIN/1.73SQ M
GLUCOSE SERPL-MCNC: 91 MG/DL (ref 65–140)
HCT VFR BLD AUTO: 23.9 % (ref 36.5–49.3)
HCT VFR BLD AUTO: 27.9 % (ref 36.5–49.3)
HGB BLD-MCNC: 6.3 G/DL (ref 12–17)
HGB BLD-MCNC: 7.7 G/DL (ref 12–17)
IMM GRANULOCYTES # BLD AUTO: 0.06 THOUSAND/UL (ref 0–0.2)
IMM GRANULOCYTES NFR BLD AUTO: 1 % (ref 0–2)
LYMPHOCYTES # BLD AUTO: 0.64 THOUSANDS/ΜL (ref 0.6–4.47)
LYMPHOCYTES NFR BLD AUTO: 5 % (ref 14–44)
MCH RBC QN AUTO: 18.3 PG (ref 26.8–34.3)
MCHC RBC AUTO-ENTMCNC: 26.4 G/DL (ref 31.4–37.4)
MCV RBC AUTO: 69 FL (ref 82–98)
MONOCYTES # BLD AUTO: 1.1 THOUSAND/ΜL (ref 0.17–1.22)
MONOCYTES NFR BLD AUTO: 9 % (ref 4–12)
NEUTROPHILS # BLD AUTO: 10.32 THOUSANDS/ΜL (ref 1.85–7.62)
NEUTS SEG NFR BLD AUTO: 83 % (ref 43–75)
NRBC BLD AUTO-RTO: 0 /100 WBCS
PLATELET # BLD AUTO: 660 THOUSANDS/UL (ref 149–390)
PMV BLD AUTO: 10.4 FL (ref 8.9–12.7)
POTASSIUM SERPL-SCNC: 4.1 MMOL/L (ref 3.5–5.3)
PROT SERPL-MCNC: 7 G/DL (ref 6.4–8.2)
RBC # BLD AUTO: 3.45 MILLION/UL (ref 3.88–5.62)
RH BLD: POSITIVE
SODIUM SERPL-SCNC: 140 MMOL/L (ref 136–145)
SPECIMEN EXPIRATION DATE: NORMAL
TROPONIN I SERPL-MCNC: 0.02 NG/ML
UNIT DISPENSE STATUS: NORMAL
UNIT DISPENSE STATUS: NORMAL
UNIT PRODUCT CODE: NORMAL
UNIT PRODUCT CODE: NORMAL
UNIT RH: NORMAL
UNIT RH: NORMAL
WBC # BLD AUTO: 12.41 THOUSAND/UL (ref 4.31–10.16)

## 2018-12-02 PROCEDURE — 93005 ELECTROCARDIOGRAM TRACING: CPT

## 2018-12-02 PROCEDURE — 80053 COMPREHEN METABOLIC PANEL: CPT | Performed by: EMERGENCY MEDICINE

## 2018-12-02 PROCEDURE — 84484 ASSAY OF TROPONIN QUANT: CPT | Performed by: EMERGENCY MEDICINE

## 2018-12-02 PROCEDURE — 36430 TRANSFUSION BLD/BLD COMPNT: CPT

## 2018-12-02 PROCEDURE — 86901 BLOOD TYPING SEROLOGIC RH(D): CPT | Performed by: EMERGENCY MEDICINE

## 2018-12-02 PROCEDURE — 86850 RBC ANTIBODY SCREEN: CPT | Performed by: EMERGENCY MEDICINE

## 2018-12-02 PROCEDURE — 86900 BLOOD TYPING SEROLOGIC ABO: CPT | Performed by: EMERGENCY MEDICINE

## 2018-12-02 PROCEDURE — 86920 COMPATIBILITY TEST SPIN: CPT

## 2018-12-02 PROCEDURE — P9016 RBC LEUKOCYTES REDUCED: HCPCS

## 2018-12-02 PROCEDURE — 85025 COMPLETE CBC W/AUTO DIFF WBC: CPT | Performed by: EMERGENCY MEDICINE

## 2018-12-02 PROCEDURE — 85018 HEMOGLOBIN: CPT | Performed by: EMERGENCY MEDICINE

## 2018-12-02 PROCEDURE — 85014 HEMATOCRIT: CPT | Performed by: EMERGENCY MEDICINE

## 2018-12-02 PROCEDURE — P9021 RED BLOOD CELLS UNIT: HCPCS

## 2018-12-02 PROCEDURE — 99283 EMERGENCY DEPT VISIT LOW MDM: CPT

## 2018-12-02 PROCEDURE — 36415 COLL VENOUS BLD VENIPUNCTURE: CPT | Performed by: EMERGENCY MEDICINE

## 2018-12-02 NOTE — ED NOTES
Patient family at bedside, patient reports having blood transfusions in past, denies any previous reaction        Celestine Quevedo RN  12/02/18 [phone]

## 2018-12-02 NOTE — ED NOTES
Assumed care to call BB regarding patient needing 2 units to transfuse    Spoke with Joseline Coates, ADRIANNE  12/02/18 [id number]

## 2018-12-02 NOTE — ED PROVIDER NOTES
History  Chief Complaint   Patient presents with    Fatigue     Patient states he has been feeling fatigue for approx one week  Patient has a history of GI bleed  Patient denies any pain, N/V/D  No dark stools     Hx of recurrent iron deficiency anemia - has required transfusions in the past and at one time needed iron infusions  Last transfusion a few months ago  Hasn't follows w/ hematology 'for a while '  Has been feeling tired and fatigued - the same way he felt when he needed a transfusion before  Denies f/c/s, no cp/pressure, no sob/ramesh, no abd pain, no n /v/d, no melena/hematochezia, no easy bruising  No changes in meds  No other co        History provided by:  Patient, relative and medical records   used: No    Fatigue   Severity:  Severe  Onset quality:  Gradual  Timing:  Constant  Progression:  Worsening  Chronicity:  Recurrent  Context: not change in medication, not recent infection and not stress    Relieved by:  None tried  Worsened by: Activity  Ineffective treatments:  None tried  Associated symptoms: no abdominal pain, no anorexia, no chest pain, no cough, no difficulty walking, no numbness in extremities, no falls, no fever, no frequency, no melena, no nausea, no near-syncope, no shortness of breath and no vomiting    Risk factors: no new medications        Prior to Admission Medications   Prescriptions Last Dose Informant Patient Reported?  Taking?   ascorbic acid (VITAMIN C) 500 MG tablet Not Taking at Unknown time  No No   Sig: Take 1 tablet by mouth daily   Patient not taking: Reported on 12/2/2018    cyanocobalamin (VITAMIN B-12) 1,000 mcg tablet Not Taking at Unknown time  Yes No   Sig: Take 1,000 mcg by mouth daily   ferrous sulfate 325 (65 Fe) mg tablet Not Taking at Unknown time  No No   Sig: Take 1 tablet by mouth daily with breakfast   Patient not taking: Reported on 12/2/2018    multivitamin (Ruthie [place]) TABS Not Taking at Unknown time  Yes No   Sig: Take 1 tablet by mouth   pantoprazole (PROTONIX) 40 mg tablet   No Yes   Sig: Take 1 tablet (40 mg total) by mouth 2 (two) times a day for 14 days   sucralfate (CARAFATE) 1 g tablet   No Yes   Sig: Take 1 tablet (1 g total) by mouth 4 (four) times a day for 14 days      Facility-Administered Medications: None       Past Medical History:   Diagnosis Date    Anemia     AVM (arteriovenous malformation) of small bowel, acquired ([facility] [address] )     Wills's esophagus     Cirilo ulcer     Esophagitis     History of blood transfusion     22 prior transfusions       Past Surgical History:   Procedure Laterality Date    APPENDECTOMY      COLONOSCOPY N/A 11/6/2017    Procedure: COLONOSCOPY;  Surgeon: Sakshi Weber MD;  Location: [facility]; Service: Gastroenterology    EGD AND COLONOSCOPY  07/18/2017    EGD AND COLONOSCOPY N/A 9/17/2018    Procedure: EGD with biopsy AND COLONOSCOPY-incomplete to sigmoid colon;  Surgeon: Lee Damon DO;  [facility]: [facility]; Service: Gastroenterology    ESOPHAGOGASTRODUODENOSCOPY N/A 10/6/2017    Procedure: ESOPHAGOGASTRODUODENOSCOPY (EGD) with biopsy;  Surgeon: Bo Galarza MD;  [facility]: [facility]; Service: Gastroenterology    ESOPHAGOGASTRODUODENOSCOPY N/A 11/3/2017    Procedure: ESOPHAGOGASTRODUODENOSCOPY (EGD) with biopsy;  Surgeon: Chiara Baker MD;  [facility]: [facility]; Service: Gastroenterology    ESOPHAGOGASTRODUODENOSCOPY N/A 1/23/2018    Procedure: ESOPHAGOGASTRODUODENOSCOPY (EGD) with biopsy;  Surgeon: Bo Galarza MD;  [facility]; Service: Gastroenterology    ESOPHAGOGASTRODUODENOSCOPY N/A 3/26/2018    Procedure: ESOPHAGOGASTRODUODENOSCOPY (EGD); Surgeon: Sakshi Weber MD;  Location: [facility]; Service: Gastroenterology       Family History   Problem Relation Age of Onset    Hemangiomas Father      I have reviewed and agree with the history as documented      Social History   Substance Use Topics    Smoking status: Former Smoker Quit date: 11/3/2014    Smokeless tobacco: Never Used    Alcohol use No      Comment: once or twice a week         Review of Systems   Constitutional: Positive for fatigue  Negative for fever  Respiratory: Negative for cough and shortness of breath  Cardiovascular: Negative for chest pain and near-syncope  Gastrointestinal: Negative for abdominal pain, anorexia, melena, nausea and vomiting  Genitourinary: Negative for frequency  Musculoskeletal: Negative for falls  All other systems reviewed and are negative  Physical Exam  Physical Exam   Constitutional: He appears well-developed and well-nourished  HENT:   Nose: Nose normal    Eyes: Conjunctivae are normal    Neck: Neck supple  Cardiovascular: Normal rate and regular rhythm  Pulmonary/Chest: Effort normal and breath sounds normal    Abdominal: Soft  There is no tenderness  Musculoskeletal: He exhibits edema  He exhibits no deformity  Neurological: He is alert  Skin: Skin is warm  Capillary refill takes less than 2 seconds  There is pallor  Psychiatric: He has a normal mood and affect  Nursing note and vitals reviewed        Vital Signs  ED Triage Vitals   Temperature Pulse Respirations Blood Pressure SpO2   12/02/18 1126 12/02/18 1126 12/02/18 1126 12/02/18 1126 12/02/18 1126   (!) 96 8 °F (36 °C) 56 20 145/67 97 %      Temp Source Heart Rate Source Patient Position - Orthostatic VS BP Location FiO2 (%)   12/02/18 1405 12/02/18 2000 12/02/18 2000 12/02/18 2000 --   Tympanic Monitor Lying Right arm       Pain Score       12/02/18 1126       No Pain           Vitals:    12/02/18 1900 12/02/18 1915 12/02/18 1930 12/02/18 2000   BP: 137/83 135/78 132/74 143/76   Pulse: 73 74 75 76   Patient Position - Orthostatic VS:    Lying       Visual Acuity      ED Medications  Medications - No data to display    Diagnostic Studies  Results Reviewed     Procedure Component Value Units Date/Time    Hemoglobin and hematocrit, blood [[id number]] (Abnormal) Collected:  12/02/18 1938    Lab Status:  Final result Specimen:  Blood from Arm, Left Updated:  12/02/18 1952     Hemoglobin 7 7 (L) g/dL      Hematocrit 27 9 (L) %     Troponin I [[id number]]  (Normal) Collected:  12/02/18 1154    Lab Status:  Final result Specimen:  Blood from Arm, Left Updated:  12/02/18 1229     Troponin I 0 02 ng/mL     Comprehensive metabolic panel [[id number]]  (Abnormal) Collected:  12/02/18 1154    Lab Status:  Final result Specimen:  Blood from Arm, Left Updated:  12/02/18 1228     Sodium 140 mmol/L      Potassium 4 1 mmol/L      Chloride 105 mmol/L      CO2 24 mmol/L      ANION GAP 11 mmol/L      BUN 12 mg/dL      Creatinine 0 94 mg/dL      Glucose 91 mg/dL      Calcium 8 5 mg/dL      AST 16 U/L      ALT 16 U/L      Alkaline Phosphatase 92 U/L      Total Protein 7 0 g/dL      Albumin 3 4 (L) g/dL      Total Bilirubin 0 50 mg/dL      eGFR 86 ml/min/1 73sq m     Narrative:         National Kidney Disease Education Program recommendations are as follows:  GFR calculation is accurate only with a steady state creatinine  Chronic Kidney disease less than 60 ml/min/1 73 sq  meters  Kidney failure less than 15 ml/min/1 73 sq  meters      CBC and differential [[id number]]  (Abnormal) Collected:  12/02/18 1154    Lab Status:  Final result Specimen:  Blood from Arm, Left Updated:  12/02/18 1217     WBC 12 41 (H) Thousand/uL      RBC 3 45 (L) Million/uL      Hemoglobin 6 3 (LL) g/dL      Hematocrit 23 9 (L) %      MCV 69 (L) fL      MCH 18 3 (L) pg      MCHC 26 4 (L) g/dL      RDW 18 2 (H) %      MPV 10 4 fL      Platelets 950 (H) Thousands/uL      nRBC 0 /100 WBCs      Neutrophils Relative 83 (H) %      Immat GRANS % 1 %      Lymphocytes Relative 5 (L) %      Monocytes Relative 9 %      Eosinophils Relative 1 %      Basophils Relative 1 %      Neutrophils Absolute 10 32 (H) Thousands/µL      Immature Grans Absolute 0 06 Thousand/uL      Lymphocytes Absolute 0 64 Thousands/µL      Monocytes Absolute 1 10 Thousand/µL      Eosinophils Absolute 0 17 Thousand/µL      Basophils Absolute 0 12 (H) Thousands/µL     Narrative:        No Clots                 No orders to display              Procedures  ECG 12 Lead Documentation  Date/Time: 12/2/2018 12:15 PM  Performed by: Radha Weaver  Authorized by: Radha Weaver     ECG reviewed by me, the ED Provider: yes    Patient location:  ED  Previous ECG:     Previous ECG:  Unavailable  Interpretation:     Interpretation: non-specific    Rate:     ECG rate:  80    ECG rate assessment: normal    Rhythm:     Rhythm: sinus rhythm    Ectopy:     Ectopy: PVCs      PVCs:  Infrequent  QRS:     QRS axis:  Right  Conduction:     Conduction: normal    ST segments:     ST segments:  Normal  T waves:     T waves: normal             Phone Contacts  ED Phone Contact    ED Course  ED Course as of Dec 04 0712   Sun Dec 02, 2018   1243 D/w pt lab result  Will not need admission at this time (normal trop, no evidence of demand ischemia) and known issue w/ anemia  Will give 2u PRBC in ED and refer to heme and gi as outpt    1711 Getting 2nd unit  Doing well    1839 Transfusions complete  Will get H&H at 1930        repeat h/h noted  Pt states he feels fine and wants to f/u as outpt                          MDM  Number of Diagnoses or Management Options  Acute on chronic anemia: new and requires workup  Hx of iron deficiency: new and requires workup     Amount and/or Complexity of Data Reviewed  Clinical lab tests: reviewed and ordered  Obtain history from someone other than the patient: yes      CritCare Time    Disposition  Final diagnoses:   Acute on chronic anemia   Hx of iron deficiency     Time reflects when diagnosis was documented in both MDM as applicable and the Disposition within this note     Time User Action Codes Description Comment    12/2/2018  6:50 PM Piter Cheek [D64 9] Chronic anemia     12/2/2018  6:50 PM Terra Moreno [D64 9] Acute on chronic anemia     12/2/2018  6:50 PM RobmadelineTerra harris Remove [D64 9] Acute on chronic anemia     12/2/2018  6:50 PM RobmadelineTerra harris Remove [D64 9] Chronic anemia     12/2/2018  6:50 PM Terra Moreno LESLIE Add [D64 9] Acute on chronic anemia     12/2/2018  6:51 PM HyunkyCarloskimberly NELSON Add [Z86 39] Hx of iron deficiency       ED Disposition     ED Disposition Condition Comment    Discharge  Omelia Flow  discharge to home/self care  Condition at discharge: Good        Follow-up Information     Follow up With Specialties Details Why Contact Info Additional [facility] On[facility] Schedule an appointment as soon as possible for a visit  [address] [place] [phone]  [facility] [place] [address], [place], [place], [address] [facility], [facility] Family [facility], Physician Assistant  follow up next week for repeat labs  [address] [place] [address]  [phone]             Discharge Medication List as of 12/2/2018  8:07 PM      CONTINUE these medications which have NOT CHANGED    Details   pantoprazole (PROTONIX) 40 mg tablet Take 1 tablet (40 mg total) by mouth 2 (two) times a day for 14 days, Starting Wed 9/19/2018, Until Sun 12/2/2018, Print      sucralfate (CARAFATE) 1 g tablet Take 1 tablet (1 g total) by mouth 4 (four) times a day for 14 days, Starting Wed 9/19/2018, Until Sun 12/2/2018, Print      ascorbic acid (VITAMIN C) 500 MG tablet Take 1 tablet by mouth daily, Starting Thu 1/25/2018, Normal      cyanocobalamin (VITAMIN B-12) 1,000 mcg tablet Take 1,000 mcg by mouth daily, Historical Med      ferrous sulfate 325 (65 Fe) mg tablet Take 1 tablet by mouth daily with breakfast, Starting Thu 1/25/2018, Normal      multivitamin (THERAGRAN) TABS Take 1 tablet by mouth, Historical Med           No discharge procedures on file      ED Provider  Electronically Signed by           Kayleen David DO  12/04/18 8703

## 2018-12-03 LAB
ATRIAL RATE: 80 BPM
P AXIS: 70 DEGREES
PR INTERVAL: 168 MS
QRS AXIS: 104 DEGREES
QRSD INTERVAL: 92 MS
QT INTERVAL: 388 MS
QTC INTERVAL: 447 MS
T WAVE AXIS: 91 DEGREES
VENTRICULAR RATE: 80 BPM

## 2018-12-03 PROCEDURE — 93010 ELECTROCARDIOGRAM REPORT: CPT | Performed by: INTERNAL MEDICINE

## 2018-12-03 NOTE — DISCHARGE INSTRUCTIONS
Anemia   WHAT YOU NEED TO KNOW:   Anemia is a low number of red blood cells or a low amount of hemoglobin in your red blood cells  Hemoglobin is a protein that helps carry oxygen throughout your body  Red blood cells use iron to create hemoglobin  Anemia may develop if your body does not have enough iron  It may also develop if your body does not make enough red blood cells or they die faster than your body can make them  DISCHARGE INSTRUCTIONS:   Call 911 or have someone call 911 for any of the following:   · You lose consciousness  · You have severe chest pain  Return to the emergency department if:   · You have dark or bloody bowel movements  Contact your healthcare provider if:   · Your symptoms are worse, even after treatment  · You have questions or concerns about your condition or care  Medicines:   · Iron or folic acid supplements  help increase your red blood cell and hemoglobin levels  · Vitamin B12 injections  may help boost your red blood cell level and decrease your symptoms  Ask your healthcare provider how to inject B12  · Take your medicine as directed  Contact your healthcare provider if you think your medicine is not helping or if you have side effects  Tell him of her if you are allergic to any medicine  Keep a list of the medicines, vitamins, and herbs you take  Include the amounts, and when and why you take them  Bring the list or the pill bottles to follow-up visits  Carry your medicine list with you in case of an emergency  Prevent anemia:  Eat healthy foods rich in iron and vitamin C  Nuts, meat, dark leafy green vegetables, and beans are high in iron and protein  Vitamin C helps your body absorb iron  Foods rich in vitamin C include oranges and other citrus fruits  Ask your healthcare provider for a list of other foods that are high in iron or vitamin C  Ask if you need to be on a special diet     Follow up with your healthcare provider as directed:  Write down your questions so you remember to ask them during your visits  © 2017 [address] [employer] Information is for End User's use only and may not be sold, redistributed or otherwise used for commercial purposes  All illustrations and images included in CareNotes® are the copyrighted property of [employer] M [employer]  or Moe Harvey  The above information is an  only  It is not intended as medical advice for individual conditions or treatments  Talk to your doctor, nurse or pharmacist before following any medical regimen to see if it is safe and effective for you  Iron Rich Diet   WHAT YOU NEED TO KNOW:   An iron-rich diet includes foods that are good sources of iron  People need extra iron during childhood, adolescence (teenage years), and pregnancy  Iron is a mineral that your body needs to make hemoglobin  Hemoglobin is part of your blood and helps carry oxygen from your lungs to the rest of your body  You may need to follow this diet to treat or prevent a low blood iron level or iron deficiency anemia  DISCHARGE INSTRUCTIONS:   Daily iron needs:   · Males:      ¨ 3to 1years old: 7 mg    ¨ 3to 6years old: 10 mg    ¨ 5to 15years old: 8 mg    ¨ 15to 25years old: 11 mg    ¨ 19 years and older: 8 mg    · Females:      ¨ 3to 1years old: 7 mg    ¨ 3to 6years old: 10 mg    ¨ 5to 15years old: 8 mg    ¨ 15to 25years old: 15 mg    ¨ 19 to 50 years: 18 mg    ¨ Over 46years old: 8 mg    ¨ Pregnant women:  27 mg  Foods that contain iron:   · Meat, fish, and poultry are good sources of iron  They contain heme iron, a form of iron that your body absorbs very well  Fruit, vegetables, eggs, and grains such as pasta, rice, and cereal also contain iron  They contain nonheme iron, a form of iron that is not absorbed as well as heme iron  You can absorb more iron from these foods by eating a food that is high in vitamin C at the same time   You can also absorb more nonheme iron by eating a food from the meat, fish, and poultry group at the same time  · Fish and shellfish contain some mercury, a metal that can be harmful to the body  Children and unborn babies are at higher risk for harm caused by mercury  Children and pregnant women should avoid eating fish high in mercury, such as shark and swordfish  They should also eat only fish that are lower in mercury, such as salmon, canned light tuna, and catfish  Limit the amount of low-mercury fish and shellfish you eat to less than 12 ounces per week  Iron-rich foods:   · Foods that contain 2 mg or more per serving:      ¨ 3 ounces of cooked beef (phil, eye of round) or cooked turkey (dark meat)    ¨ ½ cup of beans (black, kidney, or lentil, or soybeans)    ¨ ½ cup of tofu    ¨ 1 medium baked potato    ¨ 1 cup of cooked artichoke or cooked spinach    ¨ ¾ cup of instant oatmeal    ¨ 1 cup of corn flakes    · Foods that contain 1 to 2 mg per serving:      ¨ 3 ounces of chicken    ¨ 3 ounces of pork    ¨ 3 ounces of turkey (light meat)    ¨ 3 ounces of light tuna    ¨ ½ cup of seedless, packed raisins    ¨ 1 slice of whole-wheat or white bread  Good sources of vitamin C:  Eat a serving of vitamin C with any iron-rich food to help your body absorb more iron  The following fruits and vegetables are good sources of vitamin C:  · 1 cup of fresh orange juice (124 mg) or pink grapefruit juice (83 mg)    · 1 cup of strawberries (106 mg)    · 1 cup of diced cantaloupe (68 mg)     · 1 cup of sweet yellow pepper (283 mg)    · 1 cup of fresh, boiled broccoli (116 mg) or cooked brussels sprouts (97 mg)    · 1 cup of kale (53 mg)    · 1 cup of tomato juice (45 mg)  Other guidelines to follow:   · Tea and coffee can decrease the amount of iron that your body absorbs from iron-rich foods  Drink coffee and tea separately from meals that contain iron-rich foods  · Children over the age of 1 year only need about 24 ounces of cow's milk each day   When children drink too much milk, they may eat fewer iron-rich foods  This may cause them to have a low level of iron in their blood  Risks: If you do not eat iron-rich foods and vitamin C every day, you may have low blood iron levels  This may lead to iron deficiency anemia, especially during periods when your body needs extra iron  Iron deficiency anemia may cause problems with your child's growth and development  If you have iron deficiency anemia, you may develop other health problems  © 2017 [employer] Information is for End User's use only and may not be sold, redistributed or otherwise used for commercial purposes  All illustrations and images included in CareNotes® are the copyrighted property of A D A M , Inc  or Moe Harvey  The above information is an  only  It is not intended as medical advice for individual conditions or treatments  Talk to your doctor, nurse or pharmacist before following any medical regimen to see if it is safe and effective for you

## 2019-05-14 ENCOUNTER — HOSPITAL ENCOUNTER (INPATIENT)
Facility: HOSPITAL | Age: 64
LOS: 3 days | Discharge: HOME HEALTH | DRG: 378 | End: 2019-05-17
Attending: EMERGENCY MEDICINE | Admitting: HOSPITALIST
Payer: COMMERCIAL

## 2019-05-14 DIAGNOSIS — D64.9 SYMPTOMATIC ANEMIA: ICD-10-CM

## 2019-05-14 DIAGNOSIS — D50.0 IRON DEFICIENCY ANEMIA DUE TO CHRONIC BLOOD LOSS: ICD-10-CM

## 2019-05-14 DIAGNOSIS — K22.70 BARRETT'S ESOPHAGUS WITHOUT DYSPLASIA: ICD-10-CM

## 2019-05-14 DIAGNOSIS — K22.70 BARRETT'S ESOPHAGUS: ICD-10-CM

## 2019-05-14 DIAGNOSIS — D64.9 ANEMIA, UNSPECIFIED TYPE: Primary | ICD-10-CM

## 2019-05-14 DIAGNOSIS — Q27.30 AVM (ARTERIOVENOUS MALFORMATION): ICD-10-CM

## 2019-05-14 LAB
ABO GROUP BLD: NORMAL
ALBUMIN SERPL BCP-MCNC: 3.6 G/DL (ref 3.5–5)
ALP SERPL-CCNC: 117 U/L (ref 46–116)
ALT SERPL W P-5'-P-CCNC: 15 U/L (ref 12–78)
ANION GAP SERPL CALCULATED.3IONS-SCNC: 7 MMOL/L (ref 4–13)
AST SERPL W P-5'-P-CCNC: 17 U/L (ref 5–45)
ATRIAL RATE: 67 BPM
BASOPHILS # BLD AUTO: 0.2 THOUSANDS/ΜL (ref 0–0.1)
BASOPHILS NFR BLD AUTO: 3 % (ref 0–1)
BILIRUB SERPL-MCNC: 0.39 MG/DL (ref 0.2–1)
BLD GP AB SCN SERPL QL: NEGATIVE
BUN SERPL-MCNC: 16 MG/DL (ref 5–25)
CALCIUM SERPL-MCNC: 9.3 MG/DL (ref 8.3–10.1)
CHLORIDE SERPL-SCNC: 103 MMOL/L (ref 100–108)
CO2 SERPL-SCNC: 27 MMOL/L (ref 21–32)
CREAT SERPL-MCNC: 0.88 MG/DL (ref 0.6–1.3)
EOSINOPHIL # BLD AUTO: 0.27 THOUSAND/ΜL (ref 0–0.61)
EOSINOPHIL NFR BLD AUTO: 4 % (ref 0–6)
ERYTHROCYTE [DISTWIDTH] IN BLOOD BY AUTOMATED COUNT: 23.9 % (ref 11.6–15.1)
GFR SERPL CREATININE-BSD FRML MDRD: 91 ML/MIN/1.73SQ M
GLUCOSE SERPL-MCNC: 101 MG/DL (ref 65–140)
HCT VFR BLD AUTO: 21.9 % (ref 36.5–49.3)
HCT VFR BLD AUTO: 26.3 % (ref 36.5–49.3)
HGB BLD-MCNC: 5.5 G/DL (ref 12–17)
HGB BLD-MCNC: 7.5 G/DL (ref 12–17)
IMM GRANULOCYTES # BLD AUTO: 0.03 THOUSAND/UL (ref 0–0.2)
IMM GRANULOCYTES NFR BLD AUTO: 1 % (ref 0–2)
LYMPHOCYTES # BLD AUTO: 0.8 THOUSANDS/ΜL (ref 0.6–4.47)
LYMPHOCYTES NFR BLD AUTO: 13 % (ref 14–44)
MCH RBC QN AUTO: 18.6 PG (ref 26.8–34.3)
MCHC RBC AUTO-ENTMCNC: 25.1 G/DL (ref 31.4–37.4)
MCV RBC AUTO: 74 FL (ref 82–98)
MONOCYTES # BLD AUTO: 0.67 THOUSAND/ΜL (ref 0.17–1.22)
MONOCYTES NFR BLD AUTO: 11 % (ref 4–12)
NEUTROPHILS # BLD AUTO: 4.12 THOUSANDS/ΜL (ref 1.85–7.62)
NEUTS SEG NFR BLD AUTO: 68 % (ref 43–75)
NRBC BLD AUTO-RTO: 1 /100 WBCS
P AXIS: 65 DEGREES
PLATELET # BLD AUTO: 994 THOUSANDS/UL (ref 149–390)
PMV BLD AUTO: 9.5 FL (ref 8.9–12.7)
POTASSIUM SERPL-SCNC: 4.3 MMOL/L (ref 3.5–5.3)
PR INTERVAL: 192 MS
PROT SERPL-MCNC: 7.4 G/DL (ref 6.4–8.2)
QRS AXIS: 104 DEGREES
QRSD INTERVAL: 88 MS
QT INTERVAL: 384 MS
QTC INTERVAL: 405 MS
RBC # BLD AUTO: 2.96 MILLION/UL (ref 3.88–5.62)
RH BLD: POSITIVE
SODIUM SERPL-SCNC: 137 MMOL/L (ref 136–145)
SPECIMEN EXPIRATION DATE: NORMAL
T WAVE AXIS: 86 DEGREES
TROPONIN I SERPL-MCNC: <0.02 NG/ML
VENTRICULAR RATE: 67 BPM
WBC # BLD AUTO: 6.09 THOUSAND/UL (ref 4.31–10.16)

## 2019-05-14 PROCEDURE — 93005 ELECTROCARDIOGRAM TRACING: CPT

## 2019-05-14 PROCEDURE — 99285 EMERGENCY DEPT VISIT HI MDM: CPT | Performed by: PHYSICIAN ASSISTANT

## 2019-05-14 PROCEDURE — C9113 INJ PANTOPRAZOLE SODIUM, VIA: HCPCS | Performed by: HOSPITALIST

## 2019-05-14 PROCEDURE — 84484 ASSAY OF TROPONIN QUANT: CPT | Performed by: PHYSICIAN ASSISTANT

## 2019-05-14 PROCEDURE — 99284 EMERGENCY DEPT VISIT MOD MDM: CPT

## 2019-05-14 PROCEDURE — P9016 RBC LEUKOCYTES REDUCED: HCPCS

## 2019-05-14 PROCEDURE — 86850 RBC ANTIBODY SCREEN: CPT | Performed by: PHYSICIAN ASSISTANT

## 2019-05-14 PROCEDURE — 86901 BLOOD TYPING SEROLOGIC RH(D): CPT | Performed by: PHYSICIAN ASSISTANT

## 2019-05-14 PROCEDURE — 85014 HEMATOCRIT: CPT | Performed by: PHYSICIAN ASSISTANT

## 2019-05-14 PROCEDURE — 85018 HEMOGLOBIN: CPT | Performed by: PHYSICIAN ASSISTANT

## 2019-05-14 PROCEDURE — 80053 COMPREHEN METABOLIC PANEL: CPT | Performed by: PHYSICIAN ASSISTANT

## 2019-05-14 PROCEDURE — 36415 COLL VENOUS BLD VENIPUNCTURE: CPT | Performed by: PHYSICIAN ASSISTANT

## 2019-05-14 PROCEDURE — 99223 1ST HOSP IP/OBS HIGH 75: CPT | Performed by: HOSPITALIST

## 2019-05-14 PROCEDURE — 93010 ELECTROCARDIOGRAM REPORT: CPT | Performed by: INTERNAL MEDICINE

## 2019-05-14 PROCEDURE — 99254 IP/OBS CNSLTJ NEW/EST MOD 60: CPT | Performed by: INTERNAL MEDICINE

## 2019-05-14 PROCEDURE — 86923 COMPATIBILITY TEST ELECTRIC: CPT

## 2019-05-14 PROCEDURE — 85025 COMPLETE CBC W/AUTO DIFF WBC: CPT | Performed by: PHYSICIAN ASSISTANT

## 2019-05-14 PROCEDURE — 30233N1 TRANSFUSION OF NONAUTOLOGOUS RED BLOOD CELLS INTO PERIPHERAL VEIN, PERCUTANEOUS APPROACH: ICD-10-PCS | Performed by: HOSPITALIST

## 2019-05-14 PROCEDURE — 86900 BLOOD TYPING SEROLOGIC ABO: CPT | Performed by: PHYSICIAN ASSISTANT

## 2019-05-14 RX ORDER — CHOLECALCIFEROL (VITAMIN D3) 125 MCG
3000 CAPSULE ORAL
Status: ON HOLD | COMMUNITY
End: 2019-05-17 | Stop reason: SDUPTHER

## 2019-05-14 RX ORDER — ASCORBIC ACID 500 MG
500 TABLET ORAL DAILY
Status: DISCONTINUED | OUTPATIENT
Start: 2019-05-14 | End: 2019-05-17 | Stop reason: HOSPADM

## 2019-05-14 RX ORDER — SUCRALFATE 1 G/1
1 TABLET ORAL 4 TIMES DAILY
Status: DISCONTINUED | OUTPATIENT
Start: 2019-05-14 | End: 2019-05-17 | Stop reason: HOSPADM

## 2019-05-14 RX ORDER — CHOLECALCIFEROL (VITAMIN D3) 125 MCG
3000 CAPSULE ORAL
Status: DISCONTINUED | OUTPATIENT
Start: 2019-05-14 | End: 2019-05-17 | Stop reason: HOSPADM

## 2019-05-14 RX ORDER — ONDANSETRON 2 MG/ML
4 INJECTION INTRAMUSCULAR; INTRAVENOUS EVERY 6 HOURS PRN
Status: DISCONTINUED | OUTPATIENT
Start: 2019-05-14 | End: 2019-05-17 | Stop reason: HOSPADM

## 2019-05-14 RX ORDER — PANTOPRAZOLE SODIUM 40 MG/1
40 INJECTION, POWDER, FOR SOLUTION INTRAVENOUS EVERY 12 HOURS SCHEDULED
Status: DISCONTINUED | OUTPATIENT
Start: 2019-05-14 | End: 2019-05-17 | Stop reason: HOSPADM

## 2019-05-14 RX ORDER — ACETAMINOPHEN 325 MG/1
650 TABLET ORAL EVERY 6 HOURS PRN
Status: DISCONTINUED | OUTPATIENT
Start: 2019-05-14 | End: 2019-05-17 | Stop reason: HOSPADM

## 2019-05-14 RX ORDER — CHOLECALCIFEROL (VITAMIN D3) 125 MCG
1000 CAPSULE ORAL DAILY
Status: DISCONTINUED | OUTPATIENT
Start: 2019-05-14 | End: 2019-05-17 | Stop reason: HOSPADM

## 2019-05-14 RX ADMIN — LACTASE TAB 3000 UNIT 3000 UNITS: 3000 TAB at 16:57

## 2019-05-14 RX ADMIN — PANTOPRAZOLE SODIUM 40 MG: 40 INJECTION, POWDER, FOR SOLUTION INTRAVENOUS at 21:11

## 2019-05-14 RX ADMIN — SUCRALFATE 1 G: 1 TABLET ORAL at 21:11

## 2019-05-14 RX ADMIN — SUCRALFATE 1 G: 1 TABLET ORAL at 14:00

## 2019-05-14 RX ADMIN — PANTOPRAZOLE SODIUM 40 MG: 40 INJECTION, POWDER, FOR SOLUTION INTRAVENOUS at 14:00

## 2019-05-14 RX ADMIN — CYANOCOBALAMIN TAB 500 MCG 1000 MCG: 500 TAB at 14:00

## 2019-05-14 RX ADMIN — SUCRALFATE 1 G: 1 TABLET ORAL at 17:00

## 2019-05-14 RX ADMIN — OXYCODONE HYDROCHLORIDE AND ACETAMINOPHEN 500 MG: 500 TABLET ORAL at 14:00

## 2019-05-14 RX ADMIN — IRON SUCROSE 200 MG: 20 INJECTION, SOLUTION INTRAVENOUS at 20:59

## 2019-05-14 RX ADMIN — LACTASE TAB 3000 UNIT 3000 UNITS: 3000 TAB at 14:49

## 2019-05-15 ENCOUNTER — ANESTHESIA EVENT (INPATIENT)
Dept: GASTROENTEROLOGY | Facility: HOSPITAL | Age: 64
DRG: 378 | End: 2019-05-15
Payer: COMMERCIAL

## 2019-05-15 PROBLEM — D75.839 THROMBOCYTOSIS: Status: ACTIVE | Noted: 2019-05-15

## 2019-05-15 LAB
ABO GROUP BLD BPU: NORMAL
ABO GROUP BLD BPU: NORMAL
BPU ID: NORMAL
BPU ID: NORMAL
CROSSMATCH: NORMAL
CROSSMATCH: NORMAL
ERYTHROCYTE [DISTWIDTH] IN BLOOD BY AUTOMATED COUNT: 23.7 % (ref 11.6–15.1)
HCT VFR BLD AUTO: 24.6 % (ref 36.5–49.3)
HCT VFR BLD AUTO: 30.9 % (ref 36.5–49.3)
HGB BLD-MCNC: 7 G/DL (ref 12–17)
HGB BLD-MCNC: 8.8 G/DL (ref 12–17)
MCH RBC QN AUTO: 22 PG (ref 26.8–34.3)
MCHC RBC AUTO-ENTMCNC: 28.5 G/DL (ref 31.4–37.4)
MCV RBC AUTO: 77 FL (ref 82–98)
PLATELET # BLD AUTO: 701 THOUSANDS/UL (ref 149–390)
PMV BLD AUTO: 9.3 FL (ref 8.9–12.7)
RBC # BLD AUTO: 3.18 MILLION/UL (ref 3.88–5.62)
UNIT DISPENSE STATUS: NORMAL
UNIT DISPENSE STATUS: NORMAL
UNIT PRODUCT CODE: NORMAL
UNIT PRODUCT CODE: NORMAL
UNIT RH: NORMAL
UNIT RH: NORMAL
WBC # BLD AUTO: 4.9 THOUSAND/UL (ref 4.31–10.16)

## 2019-05-15 PROCEDURE — 99232 SBSQ HOSP IP/OBS MODERATE 35: CPT | Performed by: HOSPITALIST

## 2019-05-15 PROCEDURE — C9113 INJ PANTOPRAZOLE SODIUM, VIA: HCPCS | Performed by: HOSPITALIST

## 2019-05-15 PROCEDURE — P9016 RBC LEUKOCYTES REDUCED: HCPCS

## 2019-05-15 PROCEDURE — 85018 HEMOGLOBIN: CPT | Performed by: NURSE PRACTITIONER

## 2019-05-15 PROCEDURE — 85027 COMPLETE CBC AUTOMATED: CPT | Performed by: HOSPITALIST

## 2019-05-15 PROCEDURE — 99232 SBSQ HOSP IP/OBS MODERATE 35: CPT | Performed by: PHYSICIAN ASSISTANT

## 2019-05-15 PROCEDURE — 85014 HEMATOCRIT: CPT | Performed by: NURSE PRACTITIONER

## 2019-05-15 RX ORDER — ACETAMINOPHEN 325 MG/1
650 TABLET ORAL ONCE
Status: COMPLETED | OUTPATIENT
Start: 2019-05-15 | End: 2019-05-15

## 2019-05-15 RX ORDER — DIPHENHYDRAMINE HCL 25 MG
25 TABLET ORAL ONCE
Status: COMPLETED | OUTPATIENT
Start: 2019-05-15 | End: 2019-05-15

## 2019-05-15 RX ADMIN — PANTOPRAZOLE SODIUM 40 MG: 40 INJECTION, POWDER, FOR SOLUTION INTRAVENOUS at 11:14

## 2019-05-15 RX ADMIN — CYANOCOBALAMIN TAB 500 MCG 1000 MCG: 500 TAB at 11:13

## 2019-05-15 RX ADMIN — PANTOPRAZOLE SODIUM 40 MG: 40 INJECTION, POWDER, FOR SOLUTION INTRAVENOUS at 21:02

## 2019-05-15 RX ADMIN — DIPHENHYDRAMINE HCL 25 MG: 25 TABLET, FILM COATED ORAL at 11:56

## 2019-05-15 RX ADMIN — SUCRALFATE 1 G: 1 TABLET ORAL at 11:14

## 2019-05-15 RX ADMIN — Medication 1 TABLET: at 11:58

## 2019-05-15 RX ADMIN — OXYCODONE HYDROCHLORIDE AND ACETAMINOPHEN 500 MG: 500 TABLET ORAL at 11:12

## 2019-05-15 RX ADMIN — LACTASE TAB 3000 UNIT 3000 UNITS: 3000 TAB at 17:44

## 2019-05-15 RX ADMIN — ACETAMINOPHEN 650 MG: 325 TABLET ORAL at 11:56

## 2019-05-15 RX ADMIN — SUCRALFATE 1 G: 1 TABLET ORAL at 21:02

## 2019-05-15 RX ADMIN — SUCRALFATE 1 G: 1 TABLET ORAL at 17:45

## 2019-05-15 RX ADMIN — IRON SUCROSE 200 MG: 20 INJECTION, SOLUTION INTRAVENOUS at 11:54

## 2019-05-15 RX ADMIN — POLYETHYLENE GLYCOL 3350, SODIUM SULFATE ANHYDROUS, SODIUM BICARBONATE, SODIUM CHLORIDE, POTASSIUM CHLORIDE 2000 ML: 236; 22.74; 6.74; 5.86; 2.97 POWDER, FOR SOLUTION ORAL at 17:44

## 2019-05-15 RX ADMIN — LACTASE TAB 3000 UNIT 3000 UNITS: 3000 TAB at 11:16

## 2019-05-16 ENCOUNTER — APPOINTMENT (INPATIENT)
Dept: GASTROENTEROLOGY | Facility: HOSPITAL | Age: 64
DRG: 378 | End: 2019-05-16
Payer: COMMERCIAL

## 2019-05-16 ENCOUNTER — ANESTHESIA (INPATIENT)
Dept: GASTROENTEROLOGY | Facility: HOSPITAL | Age: 64
DRG: 378 | End: 2019-05-16
Payer: COMMERCIAL

## 2019-05-16 PROBLEM — E44.0 MODERATE PROTEIN-CALORIE MALNUTRITION (HCC): Status: ACTIVE | Noted: 2019-05-16

## 2019-05-16 LAB
ABO GROUP BLD BPU: NORMAL
ANION GAP SERPL CALCULATED.3IONS-SCNC: 9 MMOL/L (ref 4–13)
BPU ID: NORMAL
BUN SERPL-MCNC: 9 MG/DL (ref 5–25)
CALCIUM SERPL-MCNC: 9.2 MG/DL (ref 8.3–10.1)
CHLORIDE SERPL-SCNC: 103 MMOL/L (ref 100–108)
CO2 SERPL-SCNC: 25 MMOL/L (ref 21–32)
CREAT SERPL-MCNC: 0.76 MG/DL (ref 0.6–1.3)
CROSSMATCH: NORMAL
ERYTHROCYTE [DISTWIDTH] IN BLOOD BY AUTOMATED COUNT: 25 % (ref 11.6–15.1)
GFR SERPL CREATININE-BSD FRML MDRD: 97 ML/MIN/1.73SQ M
GLUCOSE SERPL-MCNC: 86 MG/DL (ref 65–140)
HCT VFR BLD AUTO: 30.8 % (ref 36.5–49.3)
HCT VFR BLD AUTO: 34.1 % (ref 36.5–49.3)
HGB BLD-MCNC: 8.7 G/DL (ref 12–17)
HGB BLD-MCNC: 9.7 G/DL (ref 12–17)
MAGNESIUM SERPL-MCNC: 1.8 MG/DL (ref 1.6–2.6)
MCH RBC QN AUTO: 21.9 PG (ref 26.8–34.3)
MCHC RBC AUTO-ENTMCNC: 28.2 G/DL (ref 31.4–37.4)
MCV RBC AUTO: 78 FL (ref 82–98)
PLATELET # BLD AUTO: 643 THOUSANDS/UL (ref 149–390)
PMV BLD AUTO: 9.1 FL (ref 8.9–12.7)
POTASSIUM SERPL-SCNC: 3.9 MMOL/L (ref 3.5–5.3)
RBC # BLD AUTO: 3.97 MILLION/UL (ref 3.88–5.62)
SODIUM SERPL-SCNC: 137 MMOL/L (ref 136–145)
UNIT DISPENSE STATUS: NORMAL
UNIT PRODUCT CODE: NORMAL
UNIT RH: NORMAL
WBC # BLD AUTO: 4.41 THOUSAND/UL (ref 4.31–10.16)

## 2019-05-16 PROCEDURE — C9113 INJ PANTOPRAZOLE SODIUM, VIA: HCPCS | Performed by: HOSPITALIST

## 2019-05-16 PROCEDURE — 85018 HEMOGLOBIN: CPT | Performed by: NURSE PRACTITIONER

## 2019-05-16 PROCEDURE — 85027 COMPLETE CBC AUTOMATED: CPT | Performed by: NURSE PRACTITIONER

## 2019-05-16 PROCEDURE — 99232 SBSQ HOSP IP/OBS MODERATE 35: CPT | Performed by: HOSPITALIST

## 2019-05-16 PROCEDURE — 43270 EGD LESION ABLATION: CPT | Performed by: INTERNAL MEDICINE

## 2019-05-16 PROCEDURE — 0DJD8ZZ INSPECTION OF LOWER INTESTINAL TRACT, VIA NATURAL OR ARTIFICIAL OPENING ENDOSCOPIC: ICD-10-PCS | Performed by: INTERNAL MEDICINE

## 2019-05-16 PROCEDURE — 45378 DIAGNOSTIC COLONOSCOPY: CPT | Performed by: INTERNAL MEDICINE

## 2019-05-16 PROCEDURE — 85014 HEMATOCRIT: CPT | Performed by: NURSE PRACTITIONER

## 2019-05-16 PROCEDURE — 80048 BASIC METABOLIC PNL TOTAL CA: CPT | Performed by: NURSE PRACTITIONER

## 2019-05-16 PROCEDURE — 83735 ASSAY OF MAGNESIUM: CPT | Performed by: NURSE PRACTITIONER

## 2019-05-16 PROCEDURE — 0W3P8ZZ CONTROL BLEEDING IN GASTROINTESTINAL TRACT, VIA NATURAL OR ARTIFICIAL OPENING ENDOSCOPIC: ICD-10-PCS | Performed by: INTERNAL MEDICINE

## 2019-05-16 RX ORDER — PROPOFOL 10 MG/ML
INJECTION, EMULSION INTRAVENOUS AS NEEDED
Status: DISCONTINUED | OUTPATIENT
Start: 2019-05-16 | End: 2019-05-16 | Stop reason: SURG

## 2019-05-16 RX ORDER — SODIUM CHLORIDE 9 MG/ML
125 INJECTION, SOLUTION INTRAVENOUS CONTINUOUS
Status: DISCONTINUED | OUTPATIENT
Start: 2019-05-16 | End: 2019-05-17 | Stop reason: HOSPADM

## 2019-05-16 RX ADMIN — PANTOPRAZOLE SODIUM 40 MG: 40 INJECTION, POWDER, FOR SOLUTION INTRAVENOUS at 09:02

## 2019-05-16 RX ADMIN — OXYCODONE HYDROCHLORIDE AND ACETAMINOPHEN 500 MG: 500 TABLET ORAL at 08:56

## 2019-05-16 RX ADMIN — PROPOFOL 50 MG: 10 INJECTION, EMULSION INTRAVENOUS at 13:53

## 2019-05-16 RX ADMIN — PROPOFOL 50 MG: 10 INJECTION, EMULSION INTRAVENOUS at 13:58

## 2019-05-16 RX ADMIN — PROPOFOL 50 MG: 10 INJECTION, EMULSION INTRAVENOUS at 13:41

## 2019-05-16 RX ADMIN — PROPOFOL 50 MG: 10 INJECTION, EMULSION INTRAVENOUS at 13:47

## 2019-05-16 RX ADMIN — LACTASE TAB 3000 UNIT 3000 UNITS: 3000 TAB at 16:17

## 2019-05-16 RX ADMIN — PROPOFOL 200 MG: 10 INJECTION, EMULSION INTRAVENOUS at 13:34

## 2019-05-16 RX ADMIN — SUCRALFATE 1 G: 1 TABLET ORAL at 16:19

## 2019-05-16 RX ADMIN — SODIUM CHLORIDE 125 ML/HR: 0.9 INJECTION, SOLUTION INTRAVENOUS at 13:26

## 2019-05-16 RX ADMIN — Medication 1 TABLET: at 08:56

## 2019-05-16 RX ADMIN — PROPOFOL 50 MG: 10 INJECTION, EMULSION INTRAVENOUS at 13:37

## 2019-05-16 RX ADMIN — PROPOFOL 50 MG: 10 INJECTION, EMULSION INTRAVENOUS at 13:39

## 2019-05-16 RX ADMIN — LACTASE TAB 3000 UNIT 3000 UNITS: 3000 TAB at 12:35

## 2019-05-16 RX ADMIN — SUCRALFATE 1 G: 1 TABLET ORAL at 08:54

## 2019-05-16 RX ADMIN — SUCRALFATE 1 G: 1 TABLET ORAL at 12:35

## 2019-05-16 RX ADMIN — IRON SUCROSE 200 MG: 20 INJECTION, SOLUTION INTRAVENOUS at 09:02

## 2019-05-16 RX ADMIN — SUCRALFATE 1 G: 1 TABLET ORAL at 21:29

## 2019-05-16 RX ADMIN — PANTOPRAZOLE SODIUM 40 MG: 40 INJECTION, POWDER, FOR SOLUTION INTRAVENOUS at 21:29

## 2019-05-16 RX ADMIN — CYANOCOBALAMIN TAB 500 MCG 1000 MCG: 500 TAB at 08:54

## 2019-05-16 RX ADMIN — LACTASE TAB 3000 UNIT 3000 UNITS: 3000 TAB at 08:54

## 2019-05-16 RX ADMIN — PROPOFOL 50 MG: 10 INJECTION, EMULSION INTRAVENOUS at 13:50

## 2019-05-17 ENCOUNTER — TELEPHONE (OUTPATIENT)
Dept: GASTROENTEROLOGY | Facility: CLINIC | Age: 64
End: 2019-05-17

## 2019-05-17 ENCOUNTER — TRANSITIONAL CARE MANAGEMENT (OUTPATIENT)
Dept: FAMILY MEDICINE CLINIC | Facility: CLINIC | Age: 64
End: 2019-05-17

## 2019-05-17 VITALS
BODY MASS INDEX: 17.95 KG/M2 | RESPIRATION RATE: 18 BRPM | OXYGEN SATURATION: 100 % | SYSTOLIC BLOOD PRESSURE: 146 MMHG | TEMPERATURE: 98.3 F | WEIGHT: 135.4 LBS | HEART RATE: 84 BPM | HEIGHT: 73 IN | DIASTOLIC BLOOD PRESSURE: 90 MMHG

## 2019-05-17 LAB
HCT VFR BLD AUTO: 29.3 % (ref 36.5–49.3)
HGB BLD-MCNC: 8.3 G/DL (ref 12–17)

## 2019-05-17 PROCEDURE — 85018 HEMOGLOBIN: CPT | Performed by: NURSE PRACTITIONER

## 2019-05-17 PROCEDURE — 85014 HEMATOCRIT: CPT | Performed by: NURSE PRACTITIONER

## 2019-05-17 PROCEDURE — 99239 HOSP IP/OBS DSCHRG MGMT >30: CPT | Performed by: HOSPITALIST

## 2019-05-17 PROCEDURE — C9113 INJ PANTOPRAZOLE SODIUM, VIA: HCPCS | Performed by: HOSPITALIST

## 2019-05-17 RX ORDER — PANTOPRAZOLE SODIUM 40 MG/1
40 TABLET, DELAYED RELEASE ORAL 2 TIMES DAILY
Qty: 28 TABLET | Refills: 0 | Status: ON HOLD | OUTPATIENT
Start: 2019-05-17 | End: 2020-01-07 | Stop reason: SDUPTHER

## 2019-05-17 RX ORDER — DIPHENOXYLATE HYDROCHLORIDE AND ATROPINE SULFATE 2.5; .025 MG/1; MG/1
1 TABLET ORAL DAILY
Qty: 15 TABLET | Refills: 0 | Status: SHIPPED | OUTPATIENT
Start: 2019-05-17 | End: 2019-06-27 | Stop reason: HOSPADM

## 2019-05-17 RX ORDER — CHOLECALCIFEROL (VITAMIN D3) 125 MCG
3000 CAPSULE ORAL
Qty: 15 TABLET | Refills: 0 | Status: SHIPPED | OUTPATIENT
Start: 2019-05-17

## 2019-05-17 RX ORDER — LANOLIN ALCOHOL/MO/W.PET/CERES
1000 CREAM (GRAM) TOPICAL DAILY
Qty: 15 TABLET | Refills: 0 | Status: SHIPPED | OUTPATIENT
Start: 2019-05-17 | End: 2019-06-27 | Stop reason: HOSPADM

## 2019-05-17 RX ORDER — SUCRALFATE 1 G/1
1 TABLET ORAL 4 TIMES DAILY
Qty: 60 TABLET | Refills: 0 | Status: SHIPPED | OUTPATIENT
Start: 2019-05-17 | End: 2019-06-27 | Stop reason: HOSPADM

## 2019-05-17 RX ORDER — ASCORBIC ACID 500 MG
500 TABLET ORAL DAILY
Qty: 30 TABLET | Refills: 0 | Status: SHIPPED | OUTPATIENT
Start: 2019-05-17 | End: 2019-06-27 | Stop reason: HOSPADM

## 2019-05-17 RX ADMIN — OXYCODONE HYDROCHLORIDE AND ACETAMINOPHEN 500 MG: 500 TABLET ORAL at 08:36

## 2019-05-17 RX ADMIN — SUCRALFATE 1 G: 1 TABLET ORAL at 08:36

## 2019-05-17 RX ADMIN — IRON SUCROSE 200 MG: 20 INJECTION, SOLUTION INTRAVENOUS at 08:35

## 2019-05-17 RX ADMIN — LACTASE TAB 3000 UNIT 3000 UNITS: 3000 TAB at 08:37

## 2019-05-17 RX ADMIN — CYANOCOBALAMIN TAB 500 MCG 1000 MCG: 500 TAB at 08:36

## 2019-05-17 RX ADMIN — Medication 1 TABLET: at 08:36

## 2019-05-17 RX ADMIN — PANTOPRAZOLE SODIUM 40 MG: 40 INJECTION, POWDER, FOR SOLUTION INTRAVENOUS at 08:39

## 2019-06-24 ENCOUNTER — APPOINTMENT (EMERGENCY)
Dept: CT IMAGING | Facility: HOSPITAL | Age: 64
DRG: 392 | End: 2019-06-24
Payer: COMMERCIAL

## 2019-06-24 ENCOUNTER — APPOINTMENT (INPATIENT)
Dept: ULTRASOUND IMAGING | Facility: HOSPITAL | Age: 64
DRG: 392 | End: 2019-06-24
Payer: COMMERCIAL

## 2019-06-24 ENCOUNTER — HOSPITAL ENCOUNTER (INPATIENT)
Facility: HOSPITAL | Age: 64
LOS: 3 days | Discharge: HOME | DRG: 392 | End: 2019-06-27
Attending: EMERGENCY MEDICINE | Admitting: INTERNAL MEDICINE
Payer: COMMERCIAL

## 2019-06-24 DIAGNOSIS — R13.10 DYSPHAGIA: ICD-10-CM

## 2019-06-24 DIAGNOSIS — D64.9 SYMPTOMATIC ANEMIA: ICD-10-CM

## 2019-06-24 DIAGNOSIS — N17.9 AKI (ACUTE KIDNEY INJURY) (HCC): ICD-10-CM

## 2019-06-24 DIAGNOSIS — K22.70 BARRETT'S ESOPHAGUS WITHOUT DYSPLASIA: ICD-10-CM

## 2019-06-24 DIAGNOSIS — Q27.30 AVM (ARTERIOVENOUS MALFORMATION): ICD-10-CM

## 2019-06-24 DIAGNOSIS — R13.19 ESOPHAGEAL DYSPHAGIA: Primary | ICD-10-CM

## 2019-06-24 DIAGNOSIS — D50.0 IRON DEFICIENCY ANEMIA DUE TO CHRONIC BLOOD LOSS: ICD-10-CM

## 2019-06-24 PROBLEM — N32.89 BLADDER WALL THICKENING: Status: ACTIVE | Noted: 2019-06-24

## 2019-06-24 PROBLEM — K83.8 COMMON BILE DUCT DILATATION: Status: ACTIVE | Noted: 2019-06-24

## 2019-06-24 PROBLEM — R21 GENERALIZED MACULOPAPULAR RASH: Status: ACTIVE | Noted: 2019-06-24

## 2019-06-24 LAB
ALBUMIN SERPL BCP-MCNC: 3.9 G/DL (ref 3.5–5)
ALP SERPL-CCNC: 128 U/L (ref 46–116)
ALT SERPL W P-5'-P-CCNC: 20 U/L (ref 12–78)
ANION GAP SERPL CALCULATED.3IONS-SCNC: 13 MMOL/L (ref 4–13)
AST SERPL W P-5'-P-CCNC: 25 U/L (ref 5–45)
BASOPHILS # BLD AUTO: 0.1 THOUSANDS/ΜL (ref 0–0.1)
BASOPHILS NFR BLD AUTO: 2 % (ref 0–1)
BILIRUB SERPL-MCNC: 0.63 MG/DL (ref 0.2–1)
BUN SERPL-MCNC: 14 MG/DL (ref 5–25)
CALCIUM SERPL-MCNC: 9.7 MG/DL (ref 8.3–10.1)
CHLORIDE SERPL-SCNC: 102 MMOL/L (ref 100–108)
CO2 SERPL-SCNC: 26 MMOL/L (ref 21–32)
CREAT SERPL-MCNC: 1.45 MG/DL (ref 0.6–1.3)
EOSINOPHIL # BLD AUTO: 0.15 THOUSAND/ΜL (ref 0–0.61)
EOSINOPHIL NFR BLD AUTO: 3 % (ref 0–6)
ERYTHROCYTE [DISTWIDTH] IN BLOOD BY AUTOMATED COUNT: 22.6 % (ref 11.6–15.1)
GFR SERPL CREATININE-BSD FRML MDRD: 51 ML/MIN/1.73SQ M
GLUCOSE SERPL-MCNC: 91 MG/DL (ref 65–140)
HCT VFR BLD AUTO: 40.5 % (ref 36.5–49.3)
HGB BLD-MCNC: 11.9 G/DL (ref 12–17)
IMM GRANULOCYTES # BLD AUTO: 0.01 THOUSAND/UL (ref 0–0.2)
IMM GRANULOCYTES NFR BLD AUTO: 0 % (ref 0–2)
LIPASE SERPL-CCNC: 123 U/L (ref 73–393)
LYMPHOCYTES # BLD AUTO: 0.89 THOUSANDS/ΜL (ref 0.6–4.47)
LYMPHOCYTES NFR BLD AUTO: 18 % (ref 14–44)
MCH RBC QN AUTO: 25.8 PG (ref 26.8–34.3)
MCHC RBC AUTO-ENTMCNC: 29.4 G/DL (ref 31.4–37.4)
MCV RBC AUTO: 88 FL (ref 82–98)
MONOCYTES # BLD AUTO: 0.61 THOUSAND/ΜL (ref 0.17–1.22)
MONOCYTES NFR BLD AUTO: 12 % (ref 4–12)
NEUTROPHILS # BLD AUTO: 3.24 THOUSANDS/ΜL (ref 1.85–7.62)
NEUTS SEG NFR BLD AUTO: 65 % (ref 43–75)
NRBC BLD AUTO-RTO: 0 /100 WBCS
PLATELET # BLD AUTO: 283 THOUSANDS/UL (ref 149–390)
PMV BLD AUTO: 9.3 FL (ref 8.9–12.7)
POTASSIUM SERPL-SCNC: 3.8 MMOL/L (ref 3.5–5.3)
PROT SERPL-MCNC: 7.9 G/DL (ref 6.4–8.2)
RBC # BLD AUTO: 4.61 MILLION/UL (ref 3.88–5.62)
SODIUM SERPL-SCNC: 141 MMOL/L (ref 136–145)
WBC # BLD AUTO: 5 THOUSAND/UL (ref 4.31–10.16)

## 2019-06-24 PROCEDURE — 74177 CT ABD & PELVIS W/CONTRAST: CPT

## 2019-06-24 PROCEDURE — 76705 ECHO EXAM OF ABDOMEN: CPT

## 2019-06-24 PROCEDURE — 83690 ASSAY OF LIPASE: CPT | Performed by: EMERGENCY MEDICINE

## 2019-06-24 PROCEDURE — 99285 EMERGENCY DEPT VISIT HI MDM: CPT

## 2019-06-24 PROCEDURE — C9113 INJ PANTOPRAZOLE SODIUM, VIA: HCPCS | Performed by: NURSE PRACTITIONER

## 2019-06-24 PROCEDURE — 80053 COMPREHEN METABOLIC PANEL: CPT | Performed by: EMERGENCY MEDICINE

## 2019-06-24 PROCEDURE — 99223 1ST HOSP IP/OBS HIGH 75: CPT | Performed by: NURSE PRACTITIONER

## 2019-06-24 PROCEDURE — 36415 COLL VENOUS BLD VENIPUNCTURE: CPT | Performed by: EMERGENCY MEDICINE

## 2019-06-24 PROCEDURE — 71260 CT THORAX DX C+: CPT

## 2019-06-24 PROCEDURE — 99284 EMERGENCY DEPT VISIT MOD MDM: CPT | Performed by: EMERGENCY MEDICINE

## 2019-06-24 PROCEDURE — 85025 COMPLETE CBC W/AUTO DIFF WBC: CPT | Performed by: EMERGENCY MEDICINE

## 2019-06-24 RX ORDER — CHOLECALCIFEROL (VITAMIN D3) 125 MCG
1000 CAPSULE ORAL DAILY
Status: DISCONTINUED | OUTPATIENT
Start: 2019-06-25 | End: 2019-06-25

## 2019-06-24 RX ORDER — ASCORBIC ACID 500 MG
500 TABLET ORAL DAILY
Status: DISCONTINUED | OUTPATIENT
Start: 2019-06-25 | End: 2019-06-25

## 2019-06-24 RX ORDER — PANTOPRAZOLE SODIUM 40 MG/1
40 INJECTION, POWDER, FOR SOLUTION INTRAVENOUS EVERY 12 HOURS SCHEDULED
Status: DISCONTINUED | OUTPATIENT
Start: 2019-06-24 | End: 2019-06-27 | Stop reason: HOSPADM

## 2019-06-24 RX ORDER — ACETAMINOPHEN 325 MG/1
650 TABLET ORAL EVERY 6 HOURS PRN
Status: DISCONTINUED | OUTPATIENT
Start: 2019-06-24 | End: 2019-06-25

## 2019-06-24 RX ORDER — ONDANSETRON 2 MG/ML
4 INJECTION INTRAMUSCULAR; INTRAVENOUS EVERY 6 HOURS PRN
Status: DISCONTINUED | OUTPATIENT
Start: 2019-06-24 | End: 2019-06-27 | Stop reason: HOSPADM

## 2019-06-24 RX ORDER — CALCIUM CARBONATE 200(500)MG
1000 TABLET,CHEWABLE ORAL DAILY PRN
Status: DISCONTINUED | OUTPATIENT
Start: 2019-06-24 | End: 2019-06-25

## 2019-06-24 RX ORDER — SUCRALFATE ORAL 1 G/10ML
1000 SUSPENSION ORAL EVERY 6 HOURS SCHEDULED
Status: DISCONTINUED | OUTPATIENT
Start: 2019-06-25 | End: 2019-06-25

## 2019-06-24 RX ORDER — SODIUM CHLORIDE, SODIUM LACTATE, POTASSIUM CHLORIDE, CALCIUM CHLORIDE 600; 310; 30; 20 MG/100ML; MG/100ML; MG/100ML; MG/100ML
100 INJECTION, SOLUTION INTRAVENOUS CONTINUOUS
Status: DISCONTINUED | OUTPATIENT
Start: 2019-06-24 | End: 2019-06-25

## 2019-06-24 RX ORDER — DIAPER,BRIEF,INFANT-TODD,DISP
EACH MISCELLANEOUS 4 TIMES DAILY PRN
Status: DISCONTINUED | OUTPATIENT
Start: 2019-06-24 | End: 2019-06-27 | Stop reason: HOSPADM

## 2019-06-24 RX ORDER — FERROUS SULFATE 325(65) MG
325 TABLET ORAL
Status: DISCONTINUED | OUTPATIENT
Start: 2019-06-25 | End: 2019-06-25

## 2019-06-24 RX ORDER — DIPHENHYDRAMINE HCL 25 MG
25 TABLET ORAL EVERY 6 HOURS PRN
Status: DISCONTINUED | OUTPATIENT
Start: 2019-06-24 | End: 2019-06-27 | Stop reason: HOSPADM

## 2019-06-24 RX ADMIN — SODIUM CHLORIDE 1000 ML: 0.9 INJECTION, SOLUTION INTRAVENOUS at 19:55

## 2019-06-24 RX ADMIN — SODIUM CHLORIDE, SODIUM LACTATE, POTASSIUM CHLORIDE, AND CALCIUM CHLORIDE 100 ML/HR: .6; .31; .03; .02 INJECTION, SOLUTION INTRAVENOUS at 22:49

## 2019-06-24 RX ADMIN — PANTOPRAZOLE SODIUM 40 MG: 40 INJECTION, POWDER, FOR SOLUTION INTRAVENOUS at 22:41

## 2019-06-24 RX ADMIN — SUCRALFATE 1000 MG: 1 SUSPENSION ORAL at 23:00

## 2019-06-24 RX ADMIN — IODIXANOL 100 ML: 320 INJECTION, SOLUTION INTRAVASCULAR at 19:11

## 2019-06-25 ENCOUNTER — ANESTHESIA (INPATIENT)
Dept: GASTROENTEROLOGY | Facility: HOSPITAL | Age: 64
DRG: 392 | End: 2019-06-25
Payer: COMMERCIAL

## 2019-06-25 ENCOUNTER — APPOINTMENT (OUTPATIENT)
Dept: GASTROENTEROLOGY | Facility: HOSPITAL | Age: 64
DRG: 392 | End: 2019-06-25
Payer: COMMERCIAL

## 2019-06-25 ENCOUNTER — ANESTHESIA EVENT (INPATIENT)
Dept: GASTROENTEROLOGY | Facility: HOSPITAL | Age: 64
DRG: 392 | End: 2019-06-25
Payer: COMMERCIAL

## 2019-06-25 LAB
ANION GAP SERPL CALCULATED.3IONS-SCNC: 9 MMOL/L (ref 4–13)
BACTERIA UR QL AUTO: ABNORMAL /HPF
BASOPHILS # BLD AUTO: 0.11 THOUSANDS/ΜL (ref 0–0.1)
BASOPHILS NFR BLD AUTO: 3 % (ref 0–1)
BILIRUB UR QL STRIP: NEGATIVE
BUN SERPL-MCNC: 15 MG/DL (ref 5–25)
CALCIUM SERPL-MCNC: 8.9 MG/DL (ref 8.3–10.1)
CHLORIDE SERPL-SCNC: 106 MMOL/L (ref 100–108)
CLARITY UR: CLEAR
CO2 SERPL-SCNC: 26 MMOL/L (ref 21–32)
COLOR UR: YELLOW
CREAT SERPL-MCNC: 0.88 MG/DL (ref 0.6–1.3)
EOSINOPHIL # BLD AUTO: 0.45 THOUSAND/ΜL (ref 0–0.61)
EOSINOPHIL NFR BLD AUTO: 12 % (ref 0–6)
ERYTHROCYTE [DISTWIDTH] IN BLOOD BY AUTOMATED COUNT: 22.1 % (ref 11.6–15.1)
GFR SERPL CREATININE-BSD FRML MDRD: 91 ML/MIN/1.73SQ M
GLUCOSE SERPL-MCNC: 83 MG/DL (ref 65–140)
GLUCOSE UR STRIP-MCNC: NEGATIVE MG/DL
HCT VFR BLD AUTO: 35.7 % (ref 36.5–49.3)
HGB BLD-MCNC: 10.5 G/DL (ref 12–17)
HGB UR QL STRIP.AUTO: ABNORMAL
IMM GRANULOCYTES # BLD AUTO: 0.01 THOUSAND/UL (ref 0–0.2)
IMM GRANULOCYTES NFR BLD AUTO: 0 % (ref 0–2)
KETONES UR STRIP-MCNC: ABNORMAL MG/DL
LEUKOCYTE ESTERASE UR QL STRIP: NEGATIVE
LYMPHOCYTES # BLD AUTO: 0.93 THOUSANDS/ΜL (ref 0.6–4.47)
LYMPHOCYTES NFR BLD AUTO: 24 % (ref 14–44)
MCH RBC QN AUTO: 25.9 PG (ref 26.8–34.3)
MCHC RBC AUTO-ENTMCNC: 29.4 G/DL (ref 31.4–37.4)
MCV RBC AUTO: 88 FL (ref 82–98)
MONOCYTES # BLD AUTO: 0.66 THOUSAND/ΜL (ref 0.17–1.22)
MONOCYTES NFR BLD AUTO: 17 % (ref 4–12)
NEUTROPHILS # BLD AUTO: 1.75 THOUSANDS/ΜL (ref 1.85–7.62)
NEUTS SEG NFR BLD AUTO: 44 % (ref 43–75)
NITRITE UR QL STRIP: NEGATIVE
NON-SQ EPI CELLS URNS QL MICRO: ABNORMAL /HPF
NRBC BLD AUTO-RTO: 0 /100 WBCS
PH UR STRIP.AUTO: 6 [PH]
PLATELET # BLD AUTO: 246 THOUSANDS/UL (ref 149–390)
PMV BLD AUTO: 9.8 FL (ref 8.9–12.7)
POTASSIUM SERPL-SCNC: 3.6 MMOL/L (ref 3.5–5.3)
PROT UR STRIP-MCNC: NEGATIVE MG/DL
RBC # BLD AUTO: 4.05 MILLION/UL (ref 3.88–5.62)
RBC #/AREA URNS AUTO: ABNORMAL /HPF
SODIUM SERPL-SCNC: 141 MMOL/L (ref 136–145)
SP GR UR STRIP.AUTO: 1.01 (ref 1–1.03)
UROBILINOGEN UR QL STRIP.AUTO: 0.2 E.U./DL
WBC # BLD AUTO: 3.91 THOUSAND/UL (ref 4.31–10.16)
WBC #/AREA URNS AUTO: ABNORMAL /HPF

## 2019-06-25 PROCEDURE — 88312 SPECIAL STAINS GROUP 1: CPT | Performed by: PATHOLOGY

## 2019-06-25 PROCEDURE — 88342 IMHCHEM/IMCYTCHM 1ST ANTB: CPT | Performed by: PATHOLOGY

## 2019-06-25 PROCEDURE — 88305 TISSUE EXAM BY PATHOLOGIST: CPT | Performed by: PATHOLOGY

## 2019-06-25 PROCEDURE — 88341 IMHCHEM/IMCYTCHM EA ADD ANTB: CPT | Performed by: PATHOLOGY

## 2019-06-25 PROCEDURE — 80048 BASIC METABOLIC PNL TOTAL CA: CPT | Performed by: NURSE PRACTITIONER

## 2019-06-25 PROCEDURE — 85025 COMPLETE CBC W/AUTO DIFF WBC: CPT | Performed by: NURSE PRACTITIONER

## 2019-06-25 PROCEDURE — 0DB28ZX EXCISION OF MIDDLE ESOPHAGUS, VIA NATURAL OR ARTIFICIAL OPENING ENDOSCOPIC, DIAGNOSTIC: ICD-10-PCS | Performed by: INTERNAL MEDICINE

## 2019-06-25 PROCEDURE — 81001 URINALYSIS AUTO W/SCOPE: CPT | Performed by: NURSE PRACTITIONER

## 2019-06-25 PROCEDURE — 99232 SBSQ HOSP IP/OBS MODERATE 35: CPT | Performed by: INTERNAL MEDICINE

## 2019-06-25 PROCEDURE — 43202 ESOPHAGOSCOPY FLEX BIOPSY: CPT | Performed by: INTERNAL MEDICINE

## 2019-06-25 PROCEDURE — C9113 INJ PANTOPRAZOLE SODIUM, VIA: HCPCS | Performed by: NURSE PRACTITIONER

## 2019-06-25 PROCEDURE — 99222 1ST HOSP IP/OBS MODERATE 55: CPT | Performed by: INTERNAL MEDICINE

## 2019-06-25 RX ORDER — LIDOCAINE HYDROCHLORIDE 10 MG/ML
INJECTION, SOLUTION INFILTRATION; PERINEURAL AS NEEDED
Status: DISCONTINUED | OUTPATIENT
Start: 2019-06-25 | End: 2019-06-25 | Stop reason: SURG

## 2019-06-25 RX ORDER — SODIUM CHLORIDE 9 MG/ML
75 INJECTION, SOLUTION INTRAVENOUS CONTINUOUS
Status: DISCONTINUED | OUTPATIENT
Start: 2019-06-25 | End: 2019-06-26

## 2019-06-25 RX ORDER — PROPOFOL 10 MG/ML
INJECTION, EMULSION INTRAVENOUS AS NEEDED
Status: DISCONTINUED | OUTPATIENT
Start: 2019-06-25 | End: 2019-06-25 | Stop reason: SURG

## 2019-06-25 RX ADMIN — FERROUS SULFATE TAB 325 MG (65 MG ELEMENTAL FE) 325 MG: 325 (65 FE) TAB at 08:35

## 2019-06-25 RX ADMIN — PROPOFOL 50 MG: 10 INJECTION, EMULSION INTRAVENOUS at 12:57

## 2019-06-25 RX ADMIN — PANTOPRAZOLE SODIUM 40 MG: 40 INJECTION, POWDER, FOR SOLUTION INTRAVENOUS at 08:35

## 2019-06-25 RX ADMIN — PANTOPRAZOLE SODIUM 40 MG: 40 INJECTION, POWDER, FOR SOLUTION INTRAVENOUS at 21:27

## 2019-06-25 RX ADMIN — SODIUM CHLORIDE 125 ML/HR: 0.9 INJECTION, SOLUTION INTRAVENOUS at 12:25

## 2019-06-25 RX ADMIN — Medication 5 DROP: at 18:20

## 2019-06-25 RX ADMIN — PROPOFOL 50 MG: 10 INJECTION, EMULSION INTRAVENOUS at 12:55

## 2019-06-25 RX ADMIN — PROPOFOL 100 MG: 10 INJECTION, EMULSION INTRAVENOUS at 12:51

## 2019-06-25 RX ADMIN — CYANOCOBALAMIN TAB 500 MCG 1000 MCG: 500 TAB at 08:36

## 2019-06-25 RX ADMIN — LIDOCAINE HYDROCHLORIDE 100 MG: 10 INJECTION, SOLUTION INFILTRATION; PERINEURAL at 12:51

## 2019-06-25 RX ADMIN — SUCRALFATE 1000 MG: 1 SUSPENSION ORAL at 05:37

## 2019-06-26 ENCOUNTER — TELEPHONE (OUTPATIENT)
Dept: GASTROENTEROLOGY | Facility: MEDICAL CENTER | Age: 64
End: 2019-06-26

## 2019-06-26 PROBLEM — K82.4 GALLBLADDER POLYP: Status: ACTIVE | Noted: 2019-06-26

## 2019-06-26 LAB
ALBUMIN SERPL BCP-MCNC: 2.8 G/DL (ref 3.5–5)
ALP SERPL-CCNC: 90 U/L (ref 46–116)
ALT SERPL W P-5'-P-CCNC: 16 U/L (ref 12–78)
ANION GAP SERPL CALCULATED.3IONS-SCNC: 9 MMOL/L (ref 4–13)
AST SERPL W P-5'-P-CCNC: 30 U/L (ref 5–45)
BASOPHILS # BLD AUTO: 0.13 THOUSANDS/ΜL (ref 0–0.1)
BASOPHILS NFR BLD AUTO: 3 % (ref 0–1)
BILIRUB SERPL-MCNC: 0.54 MG/DL (ref 0.2–1)
BUN SERPL-MCNC: 10 MG/DL (ref 5–25)
CALCIUM SERPL-MCNC: 8.4 MG/DL (ref 8.3–10.1)
CHLORIDE SERPL-SCNC: 104 MMOL/L (ref 100–108)
CO2 SERPL-SCNC: 26 MMOL/L (ref 21–32)
CREAT SERPL-MCNC: 0.67 MG/DL (ref 0.6–1.3)
EOSINOPHIL # BLD AUTO: 0.67 THOUSAND/ΜL (ref 0–0.61)
EOSINOPHIL NFR BLD AUTO: 15 % (ref 0–6)
ERYTHROCYTE [DISTWIDTH] IN BLOOD BY AUTOMATED COUNT: 22.3 % (ref 11.6–15.1)
GFR SERPL CREATININE-BSD FRML MDRD: 102 ML/MIN/1.73SQ M
GLUCOSE SERPL-MCNC: 81 MG/DL (ref 65–140)
HCT VFR BLD AUTO: 37.5 % (ref 36.5–49.3)
HGB BLD-MCNC: 10.9 G/DL (ref 12–17)
IMM GRANULOCYTES # BLD AUTO: 0.01 THOUSAND/UL (ref 0–0.2)
IMM GRANULOCYTES NFR BLD AUTO: 0 % (ref 0–2)
INR PPP: 1.19 (ref 0.84–1.19)
LYMPHOCYTES # BLD AUTO: 1.01 THOUSANDS/ΜL (ref 0.6–4.47)
LYMPHOCYTES NFR BLD AUTO: 23 % (ref 14–44)
MAGNESIUM SERPL-MCNC: 1.4 MG/DL (ref 1.6–2.6)
MCH RBC QN AUTO: 25.9 PG (ref 26.8–34.3)
MCHC RBC AUTO-ENTMCNC: 29.1 G/DL (ref 31.4–37.4)
MCV RBC AUTO: 89 FL (ref 82–98)
MONOCYTES # BLD AUTO: 0.47 THOUSAND/ΜL (ref 0.17–1.22)
MONOCYTES NFR BLD AUTO: 11 % (ref 4–12)
NEUTROPHILS # BLD AUTO: 2.13 THOUSANDS/ΜL (ref 1.85–7.62)
NEUTS SEG NFR BLD AUTO: 48 % (ref 43–75)
NRBC BLD AUTO-RTO: 0 /100 WBCS
PHOSPHATE SERPL-MCNC: 2.5 MG/DL (ref 2.3–4.1)
PLATELET # BLD AUTO: 212 THOUSANDS/UL (ref 149–390)
PMV BLD AUTO: 9.2 FL (ref 8.9–12.7)
POTASSIUM SERPL-SCNC: 3.8 MMOL/L (ref 3.5–5.3)
PROT SERPL-MCNC: 6.2 G/DL (ref 6.4–8.2)
PROTHROMBIN TIME: 15.3 SECONDS (ref 11.6–14.5)
RBC # BLD AUTO: 4.21 MILLION/UL (ref 3.88–5.62)
SODIUM SERPL-SCNC: 139 MMOL/L (ref 136–145)
WBC # BLD AUTO: 4.42 THOUSAND/UL (ref 4.31–10.16)

## 2019-06-26 PROCEDURE — 80053 COMPREHEN METABOLIC PANEL: CPT | Performed by: INTERNAL MEDICINE

## 2019-06-26 PROCEDURE — 99232 SBSQ HOSP IP/OBS MODERATE 35: CPT | Performed by: PHYSICIAN ASSISTANT

## 2019-06-26 PROCEDURE — 99232 SBSQ HOSP IP/OBS MODERATE 35: CPT | Performed by: INTERNAL MEDICINE

## 2019-06-26 PROCEDURE — 83735 ASSAY OF MAGNESIUM: CPT | Performed by: INTERNAL MEDICINE

## 2019-06-26 PROCEDURE — C9113 INJ PANTOPRAZOLE SODIUM, VIA: HCPCS | Performed by: NURSE PRACTITIONER

## 2019-06-26 PROCEDURE — 85025 COMPLETE CBC W/AUTO DIFF WBC: CPT | Performed by: INTERNAL MEDICINE

## 2019-06-26 PROCEDURE — 85610 PROTHROMBIN TIME: CPT | Performed by: INTERNAL MEDICINE

## 2019-06-26 PROCEDURE — 84100 ASSAY OF PHOSPHORUS: CPT | Performed by: INTERNAL MEDICINE

## 2019-06-26 RX ORDER — MAGNESIUM SULFATE HEPTAHYDRATE 40 MG/ML
2 INJECTION, SOLUTION INTRAVENOUS ONCE
Status: COMPLETED | OUTPATIENT
Start: 2019-06-26 | End: 2019-06-26

## 2019-06-26 RX ADMIN — PANTOPRAZOLE SODIUM 40 MG: 40 INJECTION, POWDER, FOR SOLUTION INTRAVENOUS at 20:36

## 2019-06-26 RX ADMIN — MAGNESIUM SULFATE HEPTAHYDRATE 2 G: 40 INJECTION, SOLUTION INTRAVENOUS at 11:56

## 2019-06-26 RX ADMIN — ENOXAPARIN SODIUM 40 MG: 40 INJECTION SUBCUTANEOUS at 11:57

## 2019-06-26 RX ADMIN — PANTOPRAZOLE SODIUM 40 MG: 40 INJECTION, POWDER, FOR SOLUTION INTRAVENOUS at 08:57

## 2019-06-26 RX ADMIN — Medication 5 DROP: at 09:02

## 2019-06-27 VITALS
RESPIRATION RATE: 18 BRPM | HEIGHT: 73 IN | BODY MASS INDEX: 18.29 KG/M2 | DIASTOLIC BLOOD PRESSURE: 86 MMHG | HEART RATE: 57 BPM | WEIGHT: 138 LBS | TEMPERATURE: 97.8 F | SYSTOLIC BLOOD PRESSURE: 148 MMHG | OXYGEN SATURATION: 94 %

## 2019-06-27 PROCEDURE — C9113 INJ PANTOPRAZOLE SODIUM, VIA: HCPCS | Performed by: NURSE PRACTITIONER

## 2019-06-27 PROCEDURE — 99239 HOSP IP/OBS DSCHRG MGMT >30: CPT | Performed by: INTERNAL MEDICINE

## 2019-06-27 RX ADMIN — PANTOPRAZOLE SODIUM 40 MG: 40 INJECTION, POWDER, FOR SOLUTION INTRAVENOUS at 08:22

## 2019-06-27 RX ADMIN — ENOXAPARIN SODIUM 40 MG: 40 INJECTION SUBCUTANEOUS at 08:22

## 2019-06-27 RX ADMIN — Medication 5 DROP: at 08:21

## 2019-06-28 ENCOUNTER — TRANSITIONAL CARE MANAGEMENT (OUTPATIENT)
Dept: FAMILY MEDICINE CLINIC | Facility: CLINIC | Age: 64
End: 2019-06-28

## 2019-07-01 ENCOUNTER — TELEPHONE (OUTPATIENT)
Dept: GASTROENTEROLOGY | Facility: MEDICAL CENTER | Age: 64
End: 2019-07-01

## 2019-07-02 ENCOUNTER — TELEPHONE (OUTPATIENT)
Dept: GASTROENTEROLOGY | Facility: MEDICAL CENTER | Age: 64
End: 2019-07-02

## 2019-07-02 NOTE — TELEPHONE ENCOUNTER
Pt was called several times by Ashwini Valdez and myself  Messages were left and calls have not been return to us  Letter will be mailed out today

## 2019-07-03 ENCOUNTER — TELEPHONE (OUTPATIENT)
Dept: GASTROENTEROLOGY | Facility: MEDICAL CENTER | Age: 64
End: 2019-07-03

## 2019-07-03 NOTE — TELEPHONE ENCOUNTER
----- Message from Patrice Castro sent at 7/2/2019  8:46 AM EDT -----  Called pt, gave results  Surgery coordinators,  Please call patient to schedule repeat EGD in the next 1-2 weeks in the hospital   Thank you

## 2019-10-11 ENCOUNTER — HOSPITAL ENCOUNTER (INPATIENT)
Facility: HOSPITAL | Age: 64
LOS: 3 days | Discharge: HOME | DRG: 812 | End: 2019-10-14
Attending: EMERGENCY MEDICINE | Admitting: FAMILY MEDICINE
Payer: COMMERCIAL

## 2019-10-11 DIAGNOSIS — K25.9 CAMERON ULCER: ICD-10-CM

## 2019-10-11 DIAGNOSIS — R00.1 BRADYCARDIA: ICD-10-CM

## 2019-10-11 DIAGNOSIS — K92.2 GI BLEED: ICD-10-CM

## 2019-10-11 DIAGNOSIS — K92.1 MELENA: ICD-10-CM

## 2019-10-11 DIAGNOSIS — D64.9 ANEMIA, UNSPECIFIED TYPE: ICD-10-CM

## 2019-10-11 DIAGNOSIS — D64.9 SYMPTOMATIC ANEMIA: Primary | ICD-10-CM

## 2019-10-11 DIAGNOSIS — K55.20 AVM (ARTERIOVENOUS MALFORMATION) OF SMALL BOWEL, ACQUIRED: ICD-10-CM

## 2019-10-11 DIAGNOSIS — K22.70 BARRETT'S ESOPHAGUS WITHOUT DYSPLASIA: ICD-10-CM

## 2019-10-11 DIAGNOSIS — D50.0 IRON DEFICIENCY ANEMIA DUE TO CHRONIC BLOOD LOSS: ICD-10-CM

## 2019-10-11 LAB
ABO GROUP BLD: NORMAL
ANION GAP SERPL CALCULATED.3IONS-SCNC: 8 MMOL/L (ref 4–13)
BASOPHILS # BLD AUTO: 0.1 THOUSANDS/ΜL (ref 0–0.1)
BASOPHILS NFR BLD AUTO: 2 % (ref 0–1)
BLD GP AB SCN SERPL QL: NEGATIVE
BUN SERPL-MCNC: 13 MG/DL (ref 5–25)
CALCIUM SERPL-MCNC: 8.8 MG/DL (ref 8.3–10.1)
CHLORIDE SERPL-SCNC: 107 MMOL/L (ref 100–108)
CO2 SERPL-SCNC: 27 MMOL/L (ref 21–32)
CREAT SERPL-MCNC: 0.85 MG/DL (ref 0.6–1.3)
EOSINOPHIL # BLD AUTO: 0.22 THOUSAND/ΜL (ref 0–0.61)
EOSINOPHIL NFR BLD AUTO: 5 % (ref 0–6)
ERYTHROCYTE [DISTWIDTH] IN BLOOD BY AUTOMATED COUNT: 17.6 % (ref 11.6–15.1)
GFR SERPL CREATININE-BSD FRML MDRD: 92 ML/MIN/1.73SQ M
GLUCOSE SERPL-MCNC: 95 MG/DL (ref 65–140)
HCT VFR BLD AUTO: 23.4 % (ref 36.5–49.3)
HGB BLD-MCNC: 6.1 G/DL (ref 12–17)
IMM GRANULOCYTES # BLD AUTO: 0.06 THOUSAND/UL (ref 0–0.2)
IMM GRANULOCYTES NFR BLD AUTO: 1 % (ref 0–2)
LYMPHOCYTES # BLD AUTO: 0.84 THOUSANDS/ΜL (ref 0.6–4.47)
LYMPHOCYTES NFR BLD AUTO: 20 % (ref 14–44)
MCH RBC QN AUTO: 18.5 PG (ref 26.8–34.3)
MCHC RBC AUTO-ENTMCNC: 26.1 G/DL (ref 31.4–37.4)
MCV RBC AUTO: 71 FL (ref 82–98)
MONOCYTES # BLD AUTO: 0.67 THOUSAND/ΜL (ref 0.17–1.22)
MONOCYTES NFR BLD AUTO: 16 % (ref 4–12)
NEUTROPHILS # BLD AUTO: 2.28 THOUSANDS/ΜL (ref 1.85–7.62)
NEUTS SEG NFR BLD AUTO: 56 % (ref 43–75)
NRBC BLD AUTO-RTO: 0 /100 WBCS
PLATELET # BLD AUTO: 316 THOUSANDS/UL (ref 149–390)
PMV BLD AUTO: 9.5 FL (ref 8.9–12.7)
POTASSIUM SERPL-SCNC: 4.3 MMOL/L (ref 3.5–5.3)
RBC # BLD AUTO: 3.3 MILLION/UL (ref 3.88–5.62)
RH BLD: POSITIVE
SODIUM SERPL-SCNC: 142 MMOL/L (ref 136–145)
SPECIMEN EXPIRATION DATE: NORMAL
TROPONIN I SERPL-MCNC: <0.02 NG/ML
WBC # BLD AUTO: 4.17 THOUSAND/UL (ref 4.31–10.16)

## 2019-10-11 PROCEDURE — 84484 ASSAY OF TROPONIN QUANT: CPT | Performed by: EMERGENCY MEDICINE

## 2019-10-11 PROCEDURE — 99284 EMERGENCY DEPT VISIT MOD MDM: CPT

## 2019-10-11 PROCEDURE — 99291 CRITICAL CARE FIRST HOUR: CPT | Performed by: EMERGENCY MEDICINE

## 2019-10-11 PROCEDURE — 85025 COMPLETE CBC W/AUTO DIFF WBC: CPT | Performed by: EMERGENCY MEDICINE

## 2019-10-11 PROCEDURE — 82272 OCCULT BLD FECES 1-3 TESTS: CPT

## 2019-10-11 PROCEDURE — 86901 BLOOD TYPING SEROLOGIC RH(D): CPT | Performed by: EMERGENCY MEDICINE

## 2019-10-11 PROCEDURE — 86900 BLOOD TYPING SEROLOGIC ABO: CPT | Performed by: EMERGENCY MEDICINE

## 2019-10-11 PROCEDURE — 36430 TRANSFUSION BLD/BLD COMPNT: CPT

## 2019-10-11 PROCEDURE — 36415 COLL VENOUS BLD VENIPUNCTURE: CPT | Performed by: EMERGENCY MEDICINE

## 2019-10-11 PROCEDURE — 86923 COMPATIBILITY TEST ELECTRIC: CPT

## 2019-10-11 PROCEDURE — C9113 INJ PANTOPRAZOLE SODIUM, VIA: HCPCS | Performed by: PHYSICIAN ASSISTANT

## 2019-10-11 PROCEDURE — P9016 RBC LEUKOCYTES REDUCED: HCPCS

## 2019-10-11 PROCEDURE — 30233N1 TRANSFUSION OF NONAUTOLOGOUS RED BLOOD CELLS INTO PERIPHERAL VEIN, PERCUTANEOUS APPROACH: ICD-10-PCS | Performed by: INTERNAL MEDICINE

## 2019-10-11 PROCEDURE — 80048 BASIC METABOLIC PNL TOTAL CA: CPT | Performed by: EMERGENCY MEDICINE

## 2019-10-11 PROCEDURE — 86850 RBC ANTIBODY SCREEN: CPT | Performed by: EMERGENCY MEDICINE

## 2019-10-11 PROCEDURE — 99223 1ST HOSP IP/OBS HIGH 75: CPT | Performed by: PHYSICIAN ASSISTANT

## 2019-10-11 PROCEDURE — 93005 ELECTROCARDIOGRAM TRACING: CPT

## 2019-10-11 RX ORDER — PANTOPRAZOLE SODIUM 40 MG/1
40 INJECTION, POWDER, FOR SOLUTION INTRAVENOUS EVERY 12 HOURS SCHEDULED
Status: DISCONTINUED | OUTPATIENT
Start: 2019-10-11 | End: 2019-10-14

## 2019-10-11 RX ORDER — SUCRALFATE 1 G/1
TABLET ORAL
Status: ON HOLD | COMMUNITY
Start: 2019-05-28 | End: 2020-01-07 | Stop reason: SDUPTHER

## 2019-10-11 RX ORDER — ACETAMINOPHEN 325 MG/1
650 TABLET ORAL EVERY 6 HOURS PRN
Status: DISCONTINUED | OUTPATIENT
Start: 2019-10-11 | End: 2019-10-14 | Stop reason: HOSPADM

## 2019-10-11 RX ORDER — FERROUS SULFATE TAB EC 324 MG (65 MG FE EQUIVALENT) 324 (65 FE) MG
325 TABLET DELAYED RESPONSE ORAL
COMMUNITY
Start: 2018-01-25

## 2019-10-11 RX ORDER — ONDANSETRON 2 MG/ML
4 INJECTION INTRAMUSCULAR; INTRAVENOUS EVERY 6 HOURS PRN
Status: DISCONTINUED | OUTPATIENT
Start: 2019-10-11 | End: 2019-10-14 | Stop reason: HOSPADM

## 2019-10-11 RX ADMIN — PANTOPRAZOLE SODIUM 40 MG: 40 INJECTION, POWDER, FOR SOLUTION INTRAVENOUS at 21:04

## 2019-10-11 NOTE — ED PROVIDER NOTES
History  Chief Complaint   Patient presents with    Fatigue     Started 2 weeks ago  Has history of low hemoglobin  A 27-year-old male with past medical history of Wills's esophagus, esophagitis, anemia and small bowel AVM s/p ablation; presents with fatigue for the past 2 weeks  Patient reports having intermittent melena over that same time frame, last noting melena was 2 days ago  Patient denies bright red blood per rectum  Patient has also complained of intermittent shortness of breath  Patient otherwise denies fever, chills, dizziness, lightheadedness, chest pain, abdominal pain, nausea, vomiting, peripheral edema and rashes  Patient has a longstanding history of similar symptoms where he has required blood transfusions  Patient reports last transfusion was in June 2019  Pt did not follow up with GI after discharge  A/P: Fatigue, with a history of anemia secondary to esophagitis  Patient does appear pale  Stool guaiac positive  Will check lab work and transfuse as appropriate  History provided by:  Patient and medical records  Fatigue       Prior to Admission Medications   Prescriptions Last Dose Informant Patient Reported? Taking?   ferrous sulfate 324 (65 Fe) mg Past Week at Unknown time  Yes Yes   Sig: Take 325 mg by mouth   lactase (LACTAID) 3,000 units tablet 10/11/2019 at Unknown time  No Yes   Sig: Take 1 tablet (3,000 Units total) by mouth 3 (three) times a day with meals   pantoprazole (PROTONIX) 40 mg tablet 10/11/2019 at Unknown time  No Yes   Sig: Take 1 tablet (40 mg total) by mouth 2 (two) times a day   sucralfate (CARAFATE) 1 g tablet 10/11/2019 at Unknown time  Yes Yes   Sig: Take 1 tablet by mouth 4 times per day        Facility-Administered Medications: None       Past Medical History:   Diagnosis Date    Anemia     AVM (arteriovenous malformation) of small bowel, acquired     Wills's esophagus     Cirilo ulcer     Esophagitis     History of blood transfusion 22 prior transfusions       Past Surgical History:   Procedure Laterality Date    APPENDECTOMY      COLONOSCOPY N/A 2017    Procedure: COLONOSCOPY;  Surgeon: Jeremy Woody MD;  Location: [facility]; Service: Gastroenterology    EGD AND COLONOSCOPY  2017    EGD AND COLONOSCOPY N/A 2018    Procedure: EGD with biopsy AND COLONOSCOPY-incomplete to sigmoid colon;  Surgeon: Allen Forbes DO;  Location: [facility]; Service: Gastroenterology    ESOPHAGOGASTRODUODENOSCOPY N/A 10/6/2017    Procedure: ESOPHAGOGASTRODUODENOSCOPY (EGD) with biopsy;  Surgeon: Yomi Lombardi MD;  Location: [facility]; Service: Gastroenterology    ESOPHAGOGASTRODUODENOSCOPY N/A 11/3/2017    Procedure: ESOPHAGOGASTRODUODENOSCOPY (EGD) with biopsy;  Surgeon: Jhony Enciso MD;  Location: [facility]; Service: Gastroenterology    ESOPHAGOGASTRODUODENOSCOPY N/A 2018    Procedure: ESOPHAGOGASTRODUODENOSCOPY (EGD) with biopsy;  Surgeon: Yomi Lombardi MD;  Location: [facility]; Service: Gastroenterology    ESOPHAGOGASTRODUODENOSCOPY N/A 3/26/2018    Procedure: ESOPHAGOGASTRODUODENOSCOPY (EGD); Surgeon: Jeremy Woody MD;  Location: [facility]; Service: Gastroenterology       Family History   Problem Relation Age of Onset    Hemangiomas Father      I have reviewed and agree with the history as documented  Social History     Tobacco Use    Smoking status: Former Smoker     Last attempt to quit: 11/3/2014     Years since quittin 9    Smokeless tobacco: Never Used   Substance Use Topics    Alcohol use: Not Currently     Comment: once or twice a week     Drug use: No        Review of Systems   Constitutional: Positive for fatigue  Gastrointestinal: Positive for blood in stool  All other systems reviewed and are negative  Physical Exam  Physical Exam  General Appearance: alert and oriented, nad, non toxic appearing  Skin:  Warm, dry, intact    Pale appearing  HEENT: atraumatic, normocephalic  Neck: Supple, trachea midline  Cardiac: RRR; no murmurs, rub, gallops  Pulmonary: lungs CTAB; no wheezes, rales, rhonchi  Gastrointestinal: abdomen soft, nontender, nondistended; no guarding or rebound tenderness; good bowel sounds, no mass or bruits  Rectal exam chaperoned by Luiz Martinez RN  Stool guaiac positive    Extremities:  no pedal edema, 2+ pulses; no calf tenderness, no clubbing, no cyanosis  Neuro:  no focal motor or sensory deficits, CN 2-12 grossly intact  Psych:  Normal mood and affect, normal judgement and insight      Vital Signs  ED Triage Vitals   Temperature Pulse Respirations Blood Pressure SpO2   10/11/19 1455 10/11/19 1455 10/11/19 1455 10/11/19 1455 10/11/19 1455   97 7 °F (36 5 °C) 80 16 132/63 100 %      Temp Source Heart Rate Source Patient Position - Orthostatic VS BP Location FiO2 (%)   10/11/19 1455 10/11/19 1557 10/11/19 1455 10/11/19 1455 --   Oral Monitor Sitting Right arm       Pain Score       10/11/19 1455       No Pain           Vitals:    10/11/19 1455 10/11/19 1557   BP: 132/63 137/75   Pulse: 80 70   Patient Position - Orthostatic VS: Sitting Lying         Visual Acuity      ED Medications  Medications - No data to display    Diagnostic Studies  Results Reviewed     Procedure Component Value Units Date/Time    Basic metabolic panel [[id number]] Collected:  10/11/19 1524    Lab Status:  Final result Specimen:  Blood from Arm, Right Updated:  10/11/19 1624     Sodium 142 mmol/L      Potassium 4 3 mmol/L      Chloride 107 mmol/L      CO2 27 mmol/L      ANION GAP 8 mmol/L      BUN 13 mg/dL      Creatinine 0 85 mg/dL      Glucose 95 mg/dL      Calcium 8 8 mg/dL      eGFR 92 ml/min/1 73sq m     Narrative:       [place] guidelines for Chronic Kidney Disease (CKD):     Stage 1 with normal or high GFR (GFR > 90 mL/min/1 73 square meters)    Stage 2 Mild CKD (GFR = 60-89 mL/min/1 73 square meters)    Stage 3A Moderate CKD (GFR = 45-59 mL/min/1 73 square meters)    Stage 3B Moderate CKD (GFR = 30-44 mL/min/1 73 square meters)    Stage 4 Severe CKD (GFR = 15-29 mL/min/1 73 square meters)    Stage 5 End Stage CKD (GFR <15 mL/min/1 73 square meters)  Note: GFR calculation is accurate only with a steady state creatinine    CBC and differential [[id number]]  (Abnormal) Collected:  10/11/19 1524    Lab Status:  Final result Specimen:  Blood from Arm, Right Updated:  10/11/19 1555     WBC 4 17 Thousand/uL      RBC 3 30 Million/uL      Hemoglobin 6 1 g/dL      Hematocrit 23 4 %      MCV 71 fL      MCH 18 5 pg      MCHC 26 1 g/dL      RDW 17 6 %      MPV 9 5 fL      Platelets 813 Thousands/uL      nRBC 0 /100 WBCs      Neutrophils Relative 56 %      Immat GRANS % 1 %      Lymphocytes Relative 20 %      Monocytes Relative 16 %      Eosinophils Relative 5 %      Basophils Relative 2 %      Neutrophils Absolute 2 28 Thousands/µL      Immature Grans Absolute 0 06 Thousand/uL      Lymphocytes Absolute 0 84 Thousands/µL      Monocytes Absolute 0 67 Thousand/µL      Eosinophils Absolute 0 22 Thousand/µL      Basophils Absolute 0 10 Thousands/µL     Troponin I [[id number]]  (Normal) Collected:  10/11/19 1524    Lab Status:  Final result Specimen:  Blood from Arm, Right Updated:  10/11/19 1547     Troponin I <0 02 ng/mL                  No orders to display              Procedures  CriticalCare Time  Performed by: Linsey Avila DO  Authorized by: Linsey Avila DO     Critical care provider statement:     Critical care time (minutes):  30    Critical care time was exclusive of:  Separately billable procedures and treating other patients and teaching time    Critical care was necessary to treat or prevent imminent or life-threatening deterioration of the following conditions:  Circulatory failure    Critical care was time spent personally by me on the following activities:  Obtaining history from patient or surrogate, development of treatment plan with patient or surrogate, examination of patient, evaluation of patient's response to treatment, re-evaluation of patient's condition, ordering and review of radiographic studies, ordering and performing treatments and interventions, ordering and review of laboratory studies and review of old charts    I assumed direction of critical care for this patient from another provider in my specialty: no         ECG 12 Lead Documentation  Date/Time: today/date: 10/11/2019  Performed by: William Alvarado    ECG reviewed by me, the ED Provider: yes    Patient location:  ED   Previous ECG:  Compared to current, no change   Rate:  70  ECG rate assessment: normal    Rhythm: sinus rhythm    Ectopy:  none    QRS axis:  Normal  Intervals: normal   Q waves: None   ST segments:  Normal  T waves: Inverted in aVL and V2      Impression: NSR with nonspecific T wave changes        ED Course  ED Course as of Oct 11 1659   Fri Oct 11, 2019   1600 Hemoglobin 10 three months ago, will transfuse 2u PRBC's and proceed with admission  Pt updated, consent completed and on the chart  Hemoglobin(!!): 6 1                               MDM    Disposition  Final diagnoses:   Symptomatic anemia   GI bleed     Time reflects when diagnosis was documented in both MDM as applicable and the Disposition within this note     Time User Action Codes Description Comment    10/11/2019  4:33 PM [address] [D64 9] Symptomatic anemia     10/11/2019  4:33 PM William Alvarado Add [K92 2] GI bleed       ED Disposition     ED Disposition Condition Date/Time Comment    Admit Stable Fri Oct 11, 2019  4:33 PM Case was discussed with JOANN and the patient's admission status was agreed to be Admission Status: inpatient status to the service of Dr Bjorn Wise   Follow-up Information    None         Patient's Medications   Discharge Prescriptions    No medications on file     No discharge procedures on file      ED Provider  Electronically Signed by           Phineas Romberg, DO  10/11/19 1656

## 2019-10-11 NOTE — ED NOTES
Informed S2 nurse, Ashley, of initiating blood transfusion in ED prior to transfer to S2       Aleksey De RN  10/11/19 [id number]

## 2019-10-12 PROBLEM — R00.1 BRADYCARDIA: Status: ACTIVE | Noted: 2019-10-12

## 2019-10-12 LAB
ABO GROUP BLD BPU: NORMAL
ABO GROUP BLD BPU: NORMAL
ALBUMIN SERPL BCP-MCNC: 2.8 G/DL (ref 3.5–5)
ALP SERPL-CCNC: 102 U/L (ref 46–116)
ALT SERPL W P-5'-P-CCNC: 13 U/L (ref 12–78)
ANION GAP SERPL CALCULATED.3IONS-SCNC: 10 MMOL/L (ref 4–13)
AST SERPL W P-5'-P-CCNC: 22 U/L (ref 5–45)
ATRIAL RATE: 45 BPM
ATRIAL RATE: 70 BPM
BILIRUB SERPL-MCNC: 0.77 MG/DL (ref 0.2–1)
BPU ID: NORMAL
BPU ID: NORMAL
BUN SERPL-MCNC: 13 MG/DL (ref 5–25)
CALCIUM SERPL-MCNC: 8.5 MG/DL (ref 8.3–10.1)
CHLORIDE SERPL-SCNC: 108 MMOL/L (ref 100–108)
CO2 SERPL-SCNC: 25 MMOL/L (ref 21–32)
CREAT SERPL-MCNC: 0.75 MG/DL (ref 0.6–1.3)
CROSSMATCH: NORMAL
CROSSMATCH: NORMAL
GFR SERPL CREATININE-BSD FRML MDRD: 97 ML/MIN/1.73SQ M
GLUCOSE SERPL-MCNC: 86 MG/DL (ref 65–140)
HCT VFR BLD AUTO: 27.4 % (ref 36.5–49.3)
HCT VFR BLD AUTO: 27.4 % (ref 36.5–49.3)
HCT VFR BLD AUTO: 28.3 % (ref 36.5–49.3)
HCT VFR BLD AUTO: 29.9 % (ref 36.5–49.3)
HGB BLD-MCNC: 7.6 G/DL (ref 12–17)
HGB BLD-MCNC: 7.7 G/DL (ref 12–17)
HGB BLD-MCNC: 7.8 G/DL (ref 12–17)
HGB BLD-MCNC: 8.3 G/DL (ref 12–17)
P AXIS: 58 DEGREES
P AXIS: 68 DEGREES
POTASSIUM SERPL-SCNC: 3.9 MMOL/L (ref 3.5–5.3)
PR INTERVAL: 188 MS
PR INTERVAL: 192 MS
PROT SERPL-MCNC: 5.5 G/DL (ref 6.4–8.2)
QRS AXIS: 102 DEGREES
QRS AXIS: 105 DEGREES
QRSD INTERVAL: 92 MS
QRSD INTERVAL: 96 MS
QT INTERVAL: 376 MS
QT INTERVAL: 480 MS
QTC INTERVAL: 406 MS
QTC INTERVAL: 415 MS
SODIUM SERPL-SCNC: 143 MMOL/L (ref 136–145)
T WAVE AXIS: 90 DEGREES
T WAVE AXIS: 98 DEGREES
UNIT DISPENSE STATUS: NORMAL
UNIT DISPENSE STATUS: NORMAL
UNIT PRODUCT CODE: NORMAL
UNIT PRODUCT CODE: NORMAL
UNIT RH: NORMAL
UNIT RH: NORMAL
VENTRICULAR RATE: 45 BPM
VENTRICULAR RATE: 70 BPM

## 2019-10-12 PROCEDURE — 85018 HEMOGLOBIN: CPT | Performed by: PHYSICIAN ASSISTANT

## 2019-10-12 PROCEDURE — 93010 ELECTROCARDIOGRAM REPORT: CPT | Performed by: INTERNAL MEDICINE

## 2019-10-12 PROCEDURE — 93005 ELECTROCARDIOGRAM TRACING: CPT

## 2019-10-12 PROCEDURE — 85014 HEMATOCRIT: CPT | Performed by: PHYSICIAN ASSISTANT

## 2019-10-12 PROCEDURE — 99223 1ST HOSP IP/OBS HIGH 75: CPT | Performed by: INTERNAL MEDICINE

## 2019-10-12 PROCEDURE — C9113 INJ PANTOPRAZOLE SODIUM, VIA: HCPCS | Performed by: PHYSICIAN ASSISTANT

## 2019-10-12 PROCEDURE — 80053 COMPREHEN METABOLIC PANEL: CPT | Performed by: PHYSICIAN ASSISTANT

## 2019-10-12 PROCEDURE — 99255 IP/OBS CONSLTJ NEW/EST HI 80: CPT | Performed by: INTERNAL MEDICINE

## 2019-10-12 PROCEDURE — 99232 SBSQ HOSP IP/OBS MODERATE 35: CPT | Performed by: INTERNAL MEDICINE

## 2019-10-12 RX ADMIN — PANTOPRAZOLE SODIUM 40 MG: 40 INJECTION, POWDER, FOR SOLUTION INTRAVENOUS at 08:59

## 2019-10-12 RX ADMIN — PANTOPRAZOLE SODIUM 40 MG: 40 INJECTION, POWDER, FOR SOLUTION INTRAVENOUS at 21:17

## 2019-10-12 NOTE — PLAN OF CARE
Problem: Potential for Falls  Goal: Patient will remain free of falls  Description  INTERVENTIONS:  - Assess patient frequently for physical needs  -  Identify cognitive and physical deficits and behaviors that affect risk of falls    -  [place] fall precautions as indicated by assessment   - Educate patient/family on patient safety including physical limitations  - Instruct patient to call for assistance with activity based on assessment  - Modify environment to reduce risk of injury  - Consider OT/PT consult to assist with strengthening/mobility  Outcome: Progressing     Problem: PAIN - ADULT  Goal: Verbalizes/displays adequate comfort level or baseline comfort level  Description  Interventions:  - Encourage patient to monitor pain and request assistance  - Assess pain using appropriate pain scale  - Administer analgesics based on type and severity of pain and evaluate response  - Implement non-pharmacological measures as appropriate and evaluate response  - Consider cultural and social influences on pain and pain management  - Notify physician/advanced practitioner if interventions unsuccessful or patient reports new pain  Outcome: Progressing     Problem: INFECTION - ADULT  Goal: Absence of fever/infection during neutropenic period  Description  INTERVENTIONS:  - Monitor WBC    Outcome: Progressing     Problem: SAFETY ADULT  Goal: Maintain or return to baseline ADL function  Description  INTERVENTIONS:  -  Assess patient's ability to carry out ADLs; assess patient's baseline for ADL function and identify physical deficits which impact ability to perform ADLs (bathing, care of mouth/teeth, toileting, grooming, dressing, etc )  - Assess/evaluate cause of self-care deficits   - Assess range of motion  - Assess patient's mobility; develop plan if impaired  - Assess patient's need for assistive devices and provide as appropriate  - Encourage maximum independence but intervene and supervise when necessary  - Involve family in performance of ADLs  - Assess for home care needs following discharge   - Consider OT consult to assist with ADL evaluation and planning for discharge  - Provide patient education as appropriate  Outcome: Progressing  Goal: Maintain or return mobility status to optimal level  Description  INTERVENTIONS:  - Assess patient's baseline mobility status (ambulation, transfers, stairs, etc )    - Identify cognitive and physical deficits and behaviors that affect mobility  - Identify mobility aids required to assist with transfers and/or ambulation (gait belt, sit-to-stand, lift, walker, cane, etc )  - [place] fall precautions as indicated by assessment  - Record patient progress and toleration of activity level on Mobility SBAR; progress patient to next Phase/Stage  - Instruct patient to call for assistance with activity based on assessment  - Consider rehabilitation consult to assist with strengthening/weightbearing, etc   Outcome: Progressing     Problem: DISCHARGE PLANNING  Goal: Discharge to home or other facility with appropriate resources  Description  INTERVENTIONS:  - Identify barriers to discharge w/patient and caregiver  - Arrange for needed discharge resources and transportation as appropriate  - Identify discharge learning needs (meds, wound care, etc )  - Arrange for interpretive services to assist at discharge as needed  - Refer to Case Management Department for coordinating discharge planning if the patient needs post-hospital services based on physician/advanced practitioner order or complex needs related to functional status, cognitive ability, or social support system  Outcome: Progressing     Problem: Knowledge Deficit  Goal: Patient/family/caregiver demonstrates understanding of disease process, treatment plan, medications, and discharge instructions  Description  Complete learning assessment and assess knowledge base    Interventions:  - Provide teaching at level of understanding  - Provide teaching via preferred learning methods  Outcome: Progressing

## 2019-10-12 NOTE — ASSESSMENT & PLAN NOTE
In patient w/on iron supplementations    Given nsaid use and significant drop in anemia from 06/2019 from 10--> 6 need to consider up gi bleed  Start iv protonix 40mg bid  Npo at midnight  Consult gi

## 2019-10-12 NOTE — ASSESSMENT & PLAN NOTE
Symptomatic anemia in patient w/melanotic stools on FeSO4 and 4 point drop in hgb to 6 1 from 10 9 in 06/2019  -Pt has hx of caio lesion, severe esophagitis in the mid and distal esophagus with ulceration  Mild esophageal stricture in the mid esophagus   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    Had undergone repeat egd in 06/2019 at this facility for dysphagia significant for significantly edematous and erythematous mucosa and inability to traverse upper esophagus to significant internal stricture  -s/p 2 IU PRBC in ed  -hemoglobin today 7 7, will monitor H&H, transfuse if less than 7  Will continue to monitor h/h q8h

## 2019-10-12 NOTE — ASSESSMENT & PLAN NOTE
-patient noted to having episodes of bradycardia in the 40s  -EKG reveals bradycardia with likely sinus arrhythmia  -continue telemetry monitoring  -denies any dizziness, headaches  -cardiology consultation will be appreciated

## 2019-10-12 NOTE — ASSESSMENT & PLAN NOTE
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

## 2019-10-12 NOTE — CONSULTS
Consultation -  [facility]  Ludmila Black  59 y o  male MRN: [id number]  Unit/Bed#: [place] 2 -01 Encounter: [id number]        ASSESSMENT/PLAN:    59y o  year old male with a PMH of Wills's esophagus, Elana Brome lesion and duodenal AVM presented to the hospital for fatigue, dyspnea on exertion and pallor and was found to have anemia  1  Acute on chronic anemia  2  History of AVM  3  Hiatal hernia with Elana Brome lesion  4  Wills's esophagus  5  Esophageal stricture   -on presentation to the hospital, his hemoglobin was 6 1 and he was given 2 units packed red blood cells, this came up appropriately to 7 7    -he is anemic at baseline, his baseline over the past year peer to be around 8 however his hemoglobin was as high as 10 9 3 months ago    -he is hemodynamically stable and in no acute distress   -he denies any hematochezia, he states he sees black stool intermittently but he is also on oral iron it every other day  He denies "sticky, tarry" stool and denies diarrhea    -he has had several upper endoscopies and colonoscopies in the past, he had an AVM in his duodenum in May 2019 and then he had an upper endoscopy in June showing an internal stricture in the upper esophagus which could not be traversed, fortunately biopsies of this area were benign  Repeat upper endoscopy was recommended however he did not follow up for this     -he reports his previous dysphagia has resolved and he is tolerating a regular diet   -he is taking Motrin 2 tablets twice daily, strongly recommend all NSAID cessation, we discussed that this puts him at increased risk for peptic ulcer disease    -agree with IV PPI b i d    -would recommend repeating the upper endoscopy, this can be planned for Monday however if he were to become unstable, have overt bleeding or have a decrease in his hemoglobin we will certainly reevaluate    -advanced to clear liquid for now, if his hgb remains stable his diet can be advanced further    -He will need to be NPO after midnight on Sunday for EGD on Monday  -Recommend outpatient colonoscopy, he had a colonoscopy in September 2018 which was unable completed due to a tortuous colon, repeat was recommended with CO2 insufflation at [place]  Inpatient consult to gastroenterology  Consult performed by: Kendra Adams PA-C  Consult ordered by: Andrea Willis PA-C          Reason for Consult / Principal Problem: Anemia    HPI: Jose De Leon  is a 59y o  year old male with a PMH of Wills's esophagus, [place] lesion and duodenal AVM presented to the hospital for fatigue, dyspnea on exertion and pallor and was found to have anemia  He has been admitted for anemia earlier this year as well, initially he had an upper endoscopy in May with a duodenal AVM seen, this was treated with APC  He then had an upper endoscopy in June 2016 which was unsuccessful due to an internal stricture in the upper esophagus  Biopsies of this were benign and repeat upper endoscopy was recommended but he did not follow up  At that time, he was having difficulty swallowing but was tolerating a pureed diet  Now he states he no longer requires appear a diet and is able to eat any food  He denies any dysphagia  He denies any nausea, vomiting, abdominal pain, hematochezia, hematemesis, change in appetite, unintended weight loss, early satiety or jaundice  He states he has gained weight  He states he has black stool about once per week, he takes oral iron supplementation every other day  He has been taking NSAIDs on a daily basis, 2 tablets twice daily  Review of Systems: as per HPI  Review of Systems   Constitutional: Positive for fatigue  Negative for activity change, appetite change, chills, fever and unexpected weight change  HENT: Negative for mouth sores, sore throat and trouble swallowing  Respiratory: Negative for shortness of breath      Cardiovascular: Negative for chest pain  Gastrointestinal: Negative for abdominal distention, abdominal pain, blood in stool, constipation, diarrhea, nausea and vomiting  Skin: Positive for pallor  Negative for color change, rash and wound  Neurological: Negative for tremors and syncope  All other systems reviewed and are negative  Historical Information   Past Medical History:   Diagnosis Date    Anemia     AVM (arteriovenous malformation) of small bowel, acquired     Wills's esophagus     Cirilo ulcer     Esophagitis     History of blood transfusion     22 prior transfusions     Past Surgical History:   Procedure Laterality Date    APPENDECTOMY      COLONOSCOPY N/A 11/6/2017    Procedure: COLONOSCOPY;  Surgeon: Stanislaw Marks MD;  Location: [facility]; Service: Gastroenterology    EGD AND COLONOSCOPY  07/18/2017    EGD AND COLONOSCOPY N/A 9/17/2018    Procedure: EGD with biopsy AND COLONOSCOPY-incomplete to sigmoid colon;  Surgeon: Elia Oliva DO;  Location: [facility]; Service: Gastroenterology    ESOPHAGOGASTRODUODENOSCOPY N/A 10/6/2017    Procedure: ESOPHAGOGASTRODUODENOSCOPY (EGD) with biopsy;  Surgeon: Monica Michel MD;  Location: [facility]; Service: Gastroenterology    ESOPHAGOGASTRODUODENOSCOPY N/A 11/3/2017    Procedure: ESOPHAGOGASTRODUODENOSCOPY (EGD) with biopsy;  Surgeon: Chu Pyle MD;  Location: [facility]; Service: Gastroenterology    ESOPHAGOGASTRODUODENOSCOPY N/A 1/23/2018    Procedure: ESOPHAGOGASTRODUODENOSCOPY (EGD) with biopsy;  Surgeon: Monica Michel MD;  Location: [facility]; Service: Gastroenterology    ESOPHAGOGASTRODUODENOSCOPY N/A 3/26/2018    Procedure: ESOPHAGOGASTRODUODENOSCOPY (EGD); Surgeon: Stanislaw Marks MD;  Location: [facility];   Service: Gastroenterology     Social History   Social History     Substance and Sexual Activity   Alcohol Use Not Currently    Comment: once or twice a week      Social History     Substance and Sexual Activity   Drug Use No     Social History     Tobacco Use   Smoking Status Former Smoker    Last attempt to quit: 11/3/2014    Years since quittin 9   Smokeless Tobacco Never Used     Family History   Problem Relation Age of Onset    Hemangiomas Father        Meds/Allergies     Medications Prior to Admission   Medication    ferrous sulfate 324 (65 Fe) mg    lactase (LACTAID) 3,000 units tablet    pantoprazole (PROTONIX) 40 mg tablet    sucralfate (CARAFATE) 1 g tablet     Current Facility-Administered Medications   Medication Dose Route Frequency    acetaminophen (TYLENOL) tablet 650 mg  650 mg Oral Q6H PRN    ondansetron (ZOFRAN) injection 4 mg  4 mg Intravenous Q6H PRN    pantoprazole (PROTONIX) injection 40 mg  40 mg Intravenous Q12H [address]       Allergies   Allergen Reactions    Doxylamine GI Intolerance    Lactose      Pt lactose intolerant         Objective     Blood pressure 123/67, pulse (!) 44, temperature 98 4 °F (36 9 °C), resp  rate 16, height 6' (1 829 m), weight 65 5 kg (144 lb 6 4 oz), SpO2 99 %  Intake/Output Summary (Last 24 hours) at 10/12/2019 1155  Last data filed at 10/11/2019 2110  Gross per 24 hour   Intake 690 42 ml   Output    Net 690 42 ml       PHYSICAL EXAM     Physical Exam   Constitutional: He is oriented to person, place, and time  He appears well-developed  No distress  HENT:   Head: Normocephalic and atraumatic  Dentures in place   Eyes: Right eye exhibits no discharge  Left eye exhibits no discharge  No scleral icterus  Neck: Neck supple  No tracheal deviation present  Cardiovascular: Regular rhythm and intact distal pulses  Exam reveals no gallop and no friction rub  Murmur heard  Bradycardia   Pulmonary/Chest: Effort normal and breath sounds normal  No respiratory distress  He has no wheezes  He has no rales  He exhibits no tenderness  Abdominal: Soft  Bowel sounds are normal  He exhibits no distension and no mass  There is no tenderness   There is no rebound and no guarding  Neurological: He is alert and oriented to person, place, and time  Skin: Skin is warm and dry  Psychiatric: He has a normal mood and affect  Lab Results:   CBC:   Lab Results   Component Value Date    WBC 4 17 (L) 10/11/2019    HGB 7 7 (L) 10/12/2019    HCT 27 4 (L) 10/12/2019    MCV 71 (L) 10/11/2019     10/11/2019    MCH 18 5 (L) 10/11/2019    MCHC 26 1 (L) 10/11/2019    RDW 17 6 (H) 10/11/2019    MPV 9 5 10/11/2019    NRBC 0 10/11/2019   ,   CMP:   Lab Results   Component Value Date    K 3 9 10/12/2019     10/12/2019    CO2 25 10/12/2019    BUN 13 10/12/2019    CREATININE 0 75 10/12/2019    CALCIUM 8 5 10/12/2019    AST 22 10/12/2019    ALT 13 10/12/2019    ALKPHOS 102 10/12/2019    EGFR 97 10/12/2019   ,   Lipase: No results found for: LIPASE,  PT/INR: No results found for: PT, INR,   Troponin:   Lab Results   Component Value Date    TROPONINI <0 02 10/11/2019   ,   Magnesium: No components found for: MAG,   Phosphorous: No results found for: PHOS  Imaging Studies: I have personally reviewed pertinent reports  Patient was seen and examined by Dr Leslye Jefferson  All yousif medical decisions were made by Dr Lesley Jefferson  Thank you for allowing us to participate in the care of this present patient  We will follow-up with you closely

## 2019-10-12 NOTE — ASSESSMENT & PLAN NOTE
In patient w/on iron supplementations    Given nsaid use and significant drop in anemia from 06/2019 from 10--> 6 need to consider up gi bleed  Start iv protonix 40mg bid  Clear liquid diet, NPO on Sunday night for EGD on Monday  Consult gi appreciated

## 2019-10-12 NOTE — CONSULTS
EPS Consultation/New Patient Evaluation - Andria Marie  59 y o  male MRN: [id number]       Reason for consult:  Bradycardia     CC/HPI:   Andria Marie  is a 59 y o  Man with history of anemia, AVM, bryant's esophagus, esophagitis, history of blood transfusion for GI bleeding presents with shortness of breath, fatigue and pallor  He was found to have anemia with Hgb of 6 1 and currently being worked up for suspected GI bleeding given prior history  He has prior history of cirilo lesion, severe esophaglitis in the mid and distal esophagus with ulceration and mild esophageal stricuture in the mid esophagus  ECG revealed bradycardia and therefore cardiology is consulted  Review of the prior ECGs show sinus rhythm with rates > 60 bpm      He had similar bradycardia in the past during one of the admission for GI bleed in 2016  He saw [facility] cardiology in Nov 2016  [facility] patient having sinus bradycardia and wenckebach during hospitalization in 2016  He had event monitor placed, results of which are not available  However patient reports it did not show any significant arrhythmias  He received two units of PRBC since admission  He does not have dizziness anymore  He also denies any chronic chest pain, palpitations, swelling in lower extremities, orthopnea, PND, or dyspnea on exertion  Recently he has developed fatigue and dizziness which resolved after two units of PRBC       ECG on 10/11/19 showed sinus rhythm with HR of 70 bpm        Past Medical History:  Past Medical History:   Diagnosis Date    Anemia     AVM (arteriovenous malformation) of small bowel, acquired     Bryant's esophagus     Cirilo ulcer     Esophagitis     History of blood transfusion     22 prior transfusions       Medications:      Current Facility-Administered Medications:     acetaminophen (TYLENOL) tablet 650 mg, 650 mg, Oral, Q6H PRN, [place] PA-C   ondansetron (ZOFRAN) injection 4 mg, 4 mg, Intravenous, Q6H PRN, Nakia Travis PA-C    pantoprazole (PROTONIX) injection 40 mg, 40 mg, Intravenous, Q12H [address], Nakia Travis PA-C, 40 mg at 10/12/19 0859     Family History   Problem Relation Age of Onset    Hemangiomas Father      Social History     Socioeconomic History    Marital status:      Spouse name: Not on file    Number of children: Not on file    Years of education: Not on file    Highest education level: Not on file   Occupational History     Employer:  [employer] Financial resource strain: Not on file    Food insecurity:     Worry: Not on file     Inability: Not on file    Transportation needs:     Medical: Not on file     Non-medical: Not on file   Tobacco Use    Smoking status: Former Smoker     Last attempt to quit: 11/3/2014     Years since quittin 9    Smokeless tobacco: Never Used   Substance and Sexual Activity    Alcohol use: Not Currently     Comment: once or twice a week     Drug use: No    Sexual activity: Not on file   Lifestyle    Physical activity:     Days per week: Not on file     Minutes per session: Not on file    Stress: Not on file   Relationships    Social connections:     Talks on phone: Not on file     Gets together: Not on file     Attends Synagogue service: Not on file     Active member of club or organization: Not on file     Attends meetings of clubs or organizations: Not on file     Relationship status: Not on file    Intimate partner violence:     Fear of current or ex partner: Not on file     Emotionally abused: Not on file     Physically abused: Not on file     Forced sexual activity: Not on file   Other Topics Concern    Not on file   Social History Narrative    Not on file     Social History     Tobacco Use   Smoking Status Former Smoker    Last attempt to quit: 11/3/2014    Years since quittin 9   Smokeless Tobacco Never Used     Social History     Substance and Sexual Activity   Alcohol Use Not Currently    Comment: once or twice a week        Review of Systems -   +fatigue, tiredness  +pallor  +lightheadedness  Denies chest pain, palpitations, syncope  ROS is otherwise negative except for mentioned in the HPI      Objective:     Vitals: Blood pressure 123/67, pulse (!) 44, temperature 98 4 °F (36 9 °C), resp  rate 16, height 6' (1 829 m), weight 65 5 kg (144 lb 6 4 oz), SpO2 99 %  , Body mass index is 19 58 kg/m² ,   Orthostatic Blood Pressures      Most Recent Value   Blood Pressure  123/67 filed at 10/12/2019 0820   Patient Position - Orthostatic VS  Lying filed at 10/11/2019 5321             Physical Exam:    GEN: Andria Stager  appears well, alert and oriented x 3, pleasant and cooperative   HEENT: pupils equal, round, and reactive to light; extraocular muscles intact  NECK: supple, no carotid bruits   HEART: regular rhythm but bradycadic, normal S1 and S2, no murmurs, clicks, gallops or rubs   LUNGS: clear to auscultation bilaterally; no wheezes, rales, or rhonchi   ABDOMEN: normal bowel sounds, soft, no tenderness, no distention  EXTREMITIES: peripheral pulses normal; no clubbing, cyanosis, or edema  NEURO: no focal findings   SKIN: normal without suspicious lesions on exposed skin      Labs & Results:  Below is the patient's most recent value for Albumin, ALT, AST, BUN, Calcium, Chloride, Cholesterol, CO2, Creatinine, GFR, Glucose, HDL, Hematocrit, Hemoglobin, Hemoglobin A1C, LDL, Magnesium, Phosphorus, Platelets, Potassium, PSA, Sodium, Triglycerides, and WBC     Lab Results   Component Value Date    ALT 13 10/12/2019    AST 22 10/12/2019    BUN 13 10/12/2019    CALCIUM 8 5 10/12/2019     10/12/2019    CO2 25 10/12/2019    CREATININE 0 75 10/12/2019    HDL 39 (L) 01/22/2018    HCT 27 4 (L) 10/12/2019    HGB 7 7 (L) 10/12/2019    HGBA1C 4 8 01/22/2018    MG 1 4 (L) 06/26/2019    PHOS 2 5 06/26/2019     10/11/2019    K 3 9 10/12/2019    TRIG 103 2018    WBC 4 17 (L) 10/11/2019     Note: for a comprehensive list of the patient's lab results, access the Results Review activity  Cardiac testing:     I personally reviewed the ECG performed on 10/12/19 revealing sinus bradycardia, wenckebach and PACs  Echocardiograms:  Results for orders placed during the hospital encounter of 09/15/18   Echo complete with contrast if indicated    N[address]  [place], [address]  [phone]    Transthoracic Echocardiogram  2D, M-mode, Doppler, and Color Doppler    Study date:  18-Sep-2018    Patient: Kesha Gavin  MR number: [id number]  Account number: [de-identified]  : 1955  Age: 61 years  Gender: Male  Status: Inpatient  Location: Bedside  Height: 73 in  Weight: 140 lb  BP: 150/ 78 mmHg    Indications: Murmur  Diagnoses: R01 1 - Cardiac murmur, unspecified    Sonographer:  Cheryle Madura, RCS  Primary Physician:  Joannie Babinski, PA-C  Referring Physician:  Mariela Cartagena MD  Group:  [facility]  Interpreting Physician:  Nancy Clinton DO    SUMMARY    LEFT VENTRICLE:  Systolic function was normal  Ejection fraction was estimated to be 55 %  There were no regional wall motion abnormalities  MITRAL VALVE:  There was trace regurgitation  PULMONIC VALVE:  There was mild regurgitation  HISTORY: PRIOR HISTORY: ETOH abuse; Tobacco abuse; Anemia    PROCEDURE: The procedure was performed at the bedside  This was a routine study  The transthoracic approach was used  The study included complete 2D imaging, M-mode, complete spectral Doppler, and color Doppler  The heart rate was 74 bpm,  at the start of the study  Images were obtained from the parasternal, apical, subcostal, and suprasternal notch acoustic windows  Image quality was adequate  LEFT VENTRICLE: Size was normal  Systolic function was normal  Ejection fraction was estimated to be 55 %   There were no regional wall motion abnormalities  Wall thickness was normal  No evidence of apical thrombus  DOPPLER: Left  ventricular diastolic function parameters were normal     RIGHT VENTRICLE: The size was normal  Systolic function was normal  Wall thickness was normal     LEFT ATRIUM: Size was normal     RIGHT ATRIUM: Size was normal     MITRAL VALVE: Valve structure was normal  There was normal leaflet separation  DOPPLER: The transmitral velocity was within the normal range  There was no evidence for stenosis  There was trace regurgitation  AORTIC VALVE: The valve was trileaflet  Leaflets exhibited normal thickness and normal cuspal separation  DOPPLER: Transaortic velocity was within the normal range  There was no evidence for stenosis  There was no significant  regurgitation  TRICUSPID VALVE: The valve structure was normal  There was normal leaflet separation  DOPPLER: The transtricuspid velocity was within the normal range  There was no evidence for stenosis  There was no significant regurgitation  PULMONIC VALVE: Leaflets exhibited normal thickness, no calcification, and normal cuspal separation  DOPPLER: The transpulmonic velocity was within the normal range  There was mild regurgitation  PERICARDIUM: There was no pericardial effusion  The pericardium was normal in appearance  AORTA: The root exhibited normal size  SYSTEMIC VEINS: IVC: The inferior vena cava was normal in size and course  The inferior vena cava was normal in size  Respirophasic changes were normal     PULMONARY ARTERY: DOPPLER: The tricuspid jet envelope definition was inadequate for estimation of RV systolic pressure  There are no indirect findings (abnormal RV volume or geometry, altered pulmonary flow velocity profile, or leftward  septal displacement) which would suggest moderate or severe pulmonary hypertension      SYSTEM MEASUREMENT TABLES    2D  %FS: 27 7 %  Ao Diam: 3 5 cm  EDV(Teich): 149 8 ml  EF(Teich): 53 2 %  ESV(Teich): 70 1 ml  IVSd: 0 9 cm  LA Area: 24 1 cm2  LA Diam: 3 8 cm  LVEDV MOD A4C: 160 6 ml  LVEF MOD A4C: 53 8 %  LVESV MOD A4C: 74 1 ml  LVIDd: 5 5 cm  LVIDs: 4 cm  LVLd A4C: 8 8 cm  LVLs A4C: 7 cm  LVPWd: 1 2 cm  RA Area: 24 cm2  RVIDd: 3 8 cm  SV MOD A4C: 86 5 ml  SV(Teich): 79 6 ml    MM  TAPSE: 3 cm    PW  E': 0 1 m/s  E/E': 11  MV A Buck: 0 9 m/s  MV Dec Whitley: 8 4 m/s2  MV DecT: 109 7 ms  MV E Buck: 0 9 m/s  MV E/A Ratio: 1 1  MV PHT: 31 8 ms  MVA By PHT: 6 9 cm2    [employer] Accredited [facility]ch[facility]    Prepared and electronically signed by    Angelia Maldonado DO  Signed 18-Sep-2018 15:05:47       ASSESSMENT:  1  Symptomatic anemia  Inpatient consult to gastroenterology    Inpatient consult to gastroenterology   2  GI bleed     3  [facility] ulcer  Inpatient consult to gastroenterology    Inpatient consult to gastroenterology   4  Bradycardia  Inpatient consult to Cardiology    Inpatient consult to Cardiology       SUMMARY:    Stef Soriano  is a 59 y o  man with history of anemia, AVM, bryant's esophagus, esophagitis, history of blood transfusion for GI bleeding presents with shortness of breath, fatigue and pallor due to GI bleeding and noted to have SB and wenckebach on telemetry  He had asymptomatic wenckebach that occurred at night  His symptoms resolved after receiving 2 units of blood transfusion  He has bradycardia in the 50s but he is asymptomatic from it  He had evaluation 3 years ago by cardiologist and had event monitor placed for the same reason  Although the report of the event monitor is not available patient states that it did not show any significant arrhythmias  He does not need any pacemaker at present time  PLAN:  1  Continue to observe on telemetry  2  He is stable enough to go undergo EGD    An during the procedure he can have atropine, epinephrine or Isoprel

## 2019-10-12 NOTE — H&P
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  Had undergone repeat egd in 06/2019 at this facility for dysphagia significant for significantly edematous and erythematous mucosa and inability to traverse upper esophagus to significant internal stricture  -s/p 2 IU PRBC in ed  Will continue to monitor h/h q8h     Melena  Assessment & Plan  In patient w/on iron supplementations  Given nsaid use and significant drop in anemia from 06/2019 from 10--> 6 need to consider up gi bleed  Start iv protonix 40mg bid  Npo at midnight  Consult gi    Wills's esophagus  Assessment & Plan  History of  Encourage op f/u with gi    Cirilo ulcer  Assessment & Plan  History of  Encouraged complete cessations of nsaids given multiple episodes of bleeding        VTE Prophylaxis: Pharmacologic VTE Prophylaxis contraindicated due to bleed  / sequential compression device   Code Status: fc  POLST: There is no POLST form on file for this patient (pre-hospital)  Discussion with family:     Anticipated Length of Stay:  Patient will be admitted on an Inpatient basis with an anticipated length of stay of  Greater than 2 midnights  Justification for Hospital Stay: anemia    Total Time for Visit, including Counseling / Coordination of Care: 45 minutes  Greater than 50% of this total time spent on direct patient counseling and coordination of care      Chief Complaint:   Fatigue ramesh pallor x 2 weeks    History of Present Illness:    Belem Buckner  is a 59 y o  male who presents with pmh of anemia, gi bleed, cirilo lesion, bryant's esophagus, avm of duodenal bulb coming to hospital for shortness of breath and fatigue and pallor  Patient has had multiple admissions hospital for GI bleed stones as above  He was last recently in June of 2019 due to esophageal dysphagia  She had undergone EGD which demonstrated and transfixed esophageal stricture at that time was recommended to be on pureed diet with thin liquids and outpatient follow-up GI  Reports that since then his appetite and dysphagia have improved  He has not had any further problems with oral intake w/globus sensation regurgitation or difficulty swallowing  He does take motrin 2 tabs twice daily for 'aches and pains '  He notes over the last 2 weeks progressive fatigue, pallor in hands, and ramesh  He has no cp/sore throat n/v/f/c  No abd pain or diarrhea  Has black stools but this is unchanged as patient is on iron supplementation  In ed he was found to have hgb of 6 1 and was ordered 2 IU of PRBC  We are asked to admit patient for symptomatic anemia and suspected GI bleed  Review of Systems:    Review of Systems   Constitutional: Positive for fatigue  Negative for appetite change, chills and fever  Respiratory: Positive for shortness of breath  Cardiovascular: Negative for chest pain  All other systems reviewed and are negative  Past Medical and Surgical History:     Past Medical History:   Diagnosis Date    Anemia     AVM (arteriovenous malformation) of small bowel, acquired     Bryant's esophagus     Cirilo ulcer     Esophagitis     History of blood transfusion     22 prior transfusions       Past Surgical History:   Procedure Laterality Date    APPENDECTOMY      COLONOSCOPY N/A 11/6/2017    Procedure: COLONOSCOPY;  Surgeon: Chago Almanzar MD;  Location: [facility];   Service: Gastroenterology    EGD AND COLONOSCOPY  07/18/2017    EGD AND COLONOSCOPY N/A 9/17/2018    Procedure: EGD with biopsy AND COLONOSCOPY-incomplete to sigmoid colon;  Surgeon: Daren Metcalf DO;  Location: [facility]; Service: Gastroenterology    ESOPHAGOGASTRODUODENOSCOPY N/A 10/6/2017    Procedure: ESOPHAGOGASTRODUODENOSCOPY (EGD) with biopsy;  Surgeon: Ermelinda Reed MD;  Location: [facility]; Service: Gastroenterology    ESOPHAGOGASTRODUODENOSCOPY N/A 11/3/2017    Procedure: ESOPHAGOGASTRODUODENOSCOPY (EGD) with biopsy;  Surgeon: Jonathan Mooney MD;  Location: [facility]; Service: Gastroenterology    ESOPHAGOGASTRODUODENOSCOPY N/A 1/23/2018    Procedure: ESOPHAGOGASTRODUODENOSCOPY (EGD) with biopsy;  Surgeon: Ermelinda Reed MD;  Location: [facility]; Service: Gastroenterology    ESOPHAGOGASTRODUODENOSCOPY N/A 3/26/2018    Procedure: ESOPHAGOGASTRODUODENOSCOPY (EGD); Surgeon: Abdi Nicholas MD;  Location: [facility]; Service: Gastroenterology       Meds/Allergies:    Prior to Admission medications    Medication Sig Start Date End Date Taking? Authorizing Provider   ferrous sulfate 324 (65 Fe) mg Take 325 mg by mouth 1/25/18  Yes Historical Provider, MD   lactase (LACTAID) 3,000 units tablet Take 1 tablet (3,000 Units total) by mouth 3 (three) times a day with meals 5/17/19  Yes Susi Paz MD   pantoprazole (PROTONIX) 40 mg tablet Take 1 tablet (40 mg total) by mouth 2 (two) times a day 5/17/19  Yes Susi Paz MD   sucralfate (CARAFATE) 1 g tablet Take 1 tablet by mouth 4 times per day  5/28/19  Yes Historical Provider, MD     I have reviewed home medications with patient personally  Allergies:    Allergies   Allergen Reactions    Doxylamine GI Intolerance    Lactose      Pt lactose intolerant         Social History:     Marital Status:    Occupation:   Patient Pre-hospital Living Situation:   Patient Pre-hospital Level of Mobility:   Patient Pre-hospital Diet Restrictions:   Substance Use History:   Social History Substance and Sexual Activity   Alcohol Use Not Currently    Comment: once or twice a week      Social History     Tobacco Use   Smoking Status Former Smoker    Last attempt to quit: 11/3/2014    Years since quittin 9   Smokeless Tobacco Never Used     Social History     Substance and Sexual Activity   Drug Use No       Family History:    Family History   Problem Relation Age of Onset    Hemangiomas Father        Physical Exam:     Vitals:   Blood Pressure: 148/85 (10/11/19 1814)  Pulse: 68 (10/11/19 1757)  Temperature: 99 °F (37 2 °C) (10/11/19 1814)  Temp Source: Oral (10/11/19 1757)  Respirations: 16 (10/11/19 1814)  Height: 6' (182 9 cm) (10/11/19 1827)  Weight - Scale: 65 5 kg (144 lb 6 4 oz) (10/11/19 1827)  SpO2: 93 % (10/11/19 1757)    Physical Exam   Constitutional: He appears well-developed and well-nourished  No distress  HENT:   Head: Normocephalic and atraumatic  Right Ear: External ear normal    Left Ear: External ear normal    Eyes: Conjunctivae are normal    Neck: Normal range of motion  Cardiovascular: Normal rate, regular rhythm and normal heart sounds  Exam reveals no gallop and no friction rub  No murmur heard  Pulmonary/Chest: Effort normal  No respiratory distress  He has no wheezes  He has no rales  Abdominal: Soft  He exhibits no distension and no mass  There is no tenderness  There is no rebound and no guarding  Musculoskeletal: He exhibits no edema  Neurological: He is alert  Skin: Skin is warm and dry  He is not diaphoretic  Psychiatric: He has a normal mood and affect  Vitals reviewed  (  Be Sure to Include Physical Exam: Delete this entire line when you have entered your exam)    Additional Data:     Lab Results:   I have reviewed lab results from this admission      Results from last 7 days   Lab Units 10/11/19  1524   WBC Thousand/uL 4 17*   HEMOGLOBIN g/dL 6 1*   HEMATOCRIT % 23 4*   PLATELETS Thousands/uL 316   NEUTROS PCT % 56   LYMPHS PCT % 20 MONOS PCT % 16*   EOS PCT % 5     Results from last 7 days   Lab Units 10/11/19  1524   SODIUM mmol/L 142   POTASSIUM mmol/L 4 3   CHLORIDE mmol/L 107   CO2 mmol/L 27   BUN mg/dL 13   CREATININE mg/dL 0 85   ANION GAP mmol/L 8   CALCIUM mg/dL 8 8   GLUCOSE RANDOM mg/dL 95                       Imaging: I have personally reviewed pertinent reports  No orders to display       EKG, Pathology, and Other Studies Reviewed on Admission:   · EKG:     Allscripts / Epic Records Reviewed: Yes     ** Please Note: This note has been constructed using a voice recognition system   **

## 2019-10-12 NOTE — PLAN OF CARE
Problem: Potential for Falls  Goal: Patient will remain free of falls  Description  INTERVENTIONS:  - Assess patient frequently for physical needs  -  Identify cognitive and physical deficits and behaviors that affect risk of falls    -  [place] fall precautions as indicated by assessment   - Educate patient/family on patient safety including physical limitations  - Instruct patient to call for assistance with activity based on assessment  - Modify environment to reduce risk of injury  - Consider OT/PT consult to assist with strengthening/mobility  Outcome: Progressing     Problem: PAIN - ADULT  Goal: Verbalizes/displays adequate comfort level or baseline comfort level  Description  Interventions:  - Encourage patient to monitor pain and request assistance  - Assess pain using appropriate pain scale  - Administer analgesics based on type and severity of pain and evaluate response  - Implement non-pharmacological measures as appropriate and evaluate response  - Consider cultural and social influences on pain and pain management  - Notify physician/advanced practitioner if interventions unsuccessful or patient reports new pain  Outcome: Progressing     Problem: INFECTION - ADULT  Goal: Absence of fever/infection during neutropenic period  Description  INTERVENTIONS:  - Monitor WBC    Outcome: Progressing     Problem: SAFETY ADULT  Goal: Maintain or return to baseline ADL function  Description  INTERVENTIONS:  -  Assess patient's ability to carry out ADLs; assess patient's baseline for ADL function and identify physical deficits which impact ability to perform ADLs (bathing, care of mouth/teeth, toileting, grooming, dressing, etc )  - Assess/evaluate cause of self-care deficits   - Assess range of motion  - Assess patient's mobility; develop plan if impaired  - Assess patient's need for assistive devices and provide as appropriate  - Encourage maximum independence but intervene and supervise when necessary  - Involve family in performance of ADLs  - Assess for home care needs following discharge   - Consider OT consult to assist with ADL evaluation and planning for discharge  - Provide patient education as appropriate  Outcome: Progressing  Goal: Maintain or return mobility status to optimal level  Description  INTERVENTIONS:  - Assess patient's baseline mobility status (ambulation, transfers, stairs, etc )    - Identify cognitive and physical deficits and behaviors that affect mobility  - Identify mobility aids required to assist with transfers and/or ambulation (gait belt, sit-to-stand, lift, walker, cane, etc )  - [place] fall precautions as indicated by assessment  - Record patient progress and toleration of activity level on Mobility SBAR; progress patient to next Phase/Stage  - Instruct patient to call for assistance with activity based on assessment  - Consider rehabilitation consult to assist with strengthening/weightbearing, etc   Outcome: Progressing     Problem: DISCHARGE PLANNING  Goal: Discharge to home or other facility with appropriate resources  Description  INTERVENTIONS:  - Identify barriers to discharge w/patient and caregiver  - Arrange for needed discharge resources and transportation as appropriate  - Identify discharge learning needs (meds, wound care, etc )  - Arrange for interpretive services to assist at discharge as needed  - Refer to Case Management Department for coordinating discharge planning if the patient needs post-hospital services based on physician/advanced practitioner order or complex needs related to functional status, cognitive ability, or social support system  Outcome: Progressing     Problem: Knowledge Deficit  Goal: Patient/family/caregiver demonstrates understanding of disease process, treatment plan, medications, and discharge instructions  Description  Complete learning assessment and assess knowledge base    Interventions:  - Provide teaching at level of understanding  - Provide teaching via preferred learning methods  Outcome: Progressing

## 2019-10-12 NOTE — ASSESSMENT & PLAN NOTE
Patient has history of Wills's esophagus  Follows with GI outpatient  Denies any hematochezia, states he usually has black stools  Has had several upper endoscopies and colonoscopies in the past, had an AVM in his duodenum in May 2019, upper endoscopy in June revealing internal stricture in upper esophagus which could not be traversed, biopsies benign  Repeat upper endoscopy recommend however patient had not followed up  Continue with IV PPI b i d    Plan for upper endoscopy likely on Monday, unless patient would become unstable, with overt bleeding or decrease in hemoglobin   GI consultation appreciated

## 2019-10-12 NOTE — PROGRESS NOTES
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  Had undergone repeat egd in 06/2019 at this facility for dysphagia significant for significantly edematous and erythematous mucosa and inability to traverse upper esophagus to significant internal stricture  -s/p 2 IU PRBC in ed  -hemoglobin today 7 7, will monitor H&H, transfuse if less than 7  Will continue to monitor h/h q8h     Wills's esophagus  Assessment & Plan  Patient has history of Wills's esophagus  Follows with GI outpatient  Denies any hematochezia, states he usually has black stools  Has had several upper endoscopies and colonoscopies in the past, had an AVM in his duodenum in May 2019, upper endoscopy in June revealing internal stricture in upper esophagus which could not be traversed, biopsies benign  Repeat upper endoscopy recommend however patient had not followed up  Continue with IV PPI b i d  Plan for upper endoscopy likely on Monday, unless patient would become unstable, with overt bleeding or decrease in hemoglobin   GI consultation appreciated    Melena  Assessment & Plan  In patient w/on iron supplementations    Given nsaid use and significant drop in anemia from 06/2019 from 10--> 6 need to consider up gi bleed  Start iv protonix 40mg bid  Clear liquid diet, NPO on Sunday night for EGD on Monday  Consult gi appreciated    Bradycardia  Assessment & Plan  -patient noted to having episodes of bradycardia in the 40s  -EKG reveals bradycardia with possible mobits type 2 versus arrythmia? ?  -continue telemetry monitoring  -denies any dizziness, headaches  -cardiology consultation will be appreciated    Generalized maculopapular rash  Assessment & Plan  -patient has chronic rash present for over 30 years    Cirilo ulcer  Assessment & Plan  History of Cholo Ringer lesion         VTE Pharmacologic Prophylaxis:   Pharmacologic:  Contraindicated due to GI bleed    Patient Centered Rounds: I have performed bedside rounds with nursing staff today  Discussions with Specialists or Other Care Team Provider: GI    Time Spent for Care: 20 minutes  More than 50% of total time spent on counseling and coordination of care as described above  Current Length of Stay: 1 day(s)    Current Patient Status: Inpatient   Certification Statement: The patient will continue to require additional inpatient hospital stay due to Anemia, GI bleed    Discharge Plan / Estimated Discharge Date: 2-3 days    Code Status: Level 1 - Full Code      Subjective:   Patient seen and examined at bedside, denies any abdominal pain, nausea, vomiting, dizziness, headaches    Objective:     Vitals:   Temp (24hrs), Av 5 °F (36 9 °C), Min:97 7 °F (36 5 °C), Max:99 °F (37 2 °C)    Temp:  [97 7 °F (36 5 °C)-99 °F (37 2 °C)] 98 4 °F (36 9 °C)  HR:  [44-80] 44  Resp:  [16-18] 16  BP: (118-170)/(63-85) 123/67  SpO2:  [93 %-100 %] 99 %  Body mass index is 19 58 kg/m²  Input and Output Summary (last 24 hours): Intake/Output Summary (Last 24 hours) at 10/12/2019 1341  Last data filed at 10/11/2019 2110  Gross per 24 hour   Intake 690 42 ml   Output    Net 690 42 ml       Physical Exam:    Constitutional: Patient is oriented to person, place and time, no acute distress  HEENT:  Normocephalic, atraumatic, EOMI, PERRLA, no scleral icterus, no pallor, moist oral mucosa  Neck:  Supple, no masses, no thyromegaly, no bruits Normal range of motion     Lymph nodes:  No lymphadenopathy  Cardiovascular: Normal S1S2, RRR, No murmurs/rubs/gallops appreciated  Pulmonary:  Bilateral air entry, No rhonchi/rales/wheezing appreciated  Abdominal: Soft, Bowel sounds present, Non-tender, Non-distended, No rebound/guarding, no hepatomegaly   Musculoskeletal: No tenderness/abnormality   Extremities:  No cyanosis, clubbing or edema  Peripheral pulses palpable and equal bilaterally  Neurological: Cranial nerves II-XII grossly intact, sensation intact, otherwise no focal neurological symptoms  Skin:  Generalized maculopapular particular rash    Additional Data:     Labs:    Results from last 7 days   Lab Units 10/12/19  0831  10/11/19  1524   WBC Thousand/uL  --   --  4 17*   HEMOGLOBIN g/dL 7 7*   < > 6 1*   HEMATOCRIT % 27 4*   < > 23 4*   PLATELETS Thousands/uL  --   --  316   NEUTROS PCT %  --   --  56   LYMPHS PCT %  --   --  20   MONOS PCT %  --   --  16*   EOS PCT %  --   --  5    < > = values in this interval not displayed  Results from last 7 days   Lab Units 10/12/19  0831   POTASSIUM mmol/L 3 9   CHLORIDE mmol/L 108   CO2 mmol/L 25   BUN mg/dL 13   CREATININE mg/dL 0 75   CALCIUM mg/dL 8 5   ALK PHOS U/L 102   ALT U/L 13   AST U/L 22            I Have Reviewed All Lab Data Listed Above          Recent Cultures (last 7 days):           Last 24 Hours Medication List:     Current Facility-Administered Medications:  acetaminophen 650 mg Oral Q6H PRN Nakia Travis PA-C   ondansetron 4 mg Intravenous Q6H PRN Nakia Travis PA-C   pantoprazole 40 mg Intravenous Q12H [facility] Nakia Cavanaugh PA-C        Today, Patient Was Seen By: Balaji Bolaños MD

## 2019-10-13 LAB
ANION GAP SERPL CALCULATED.3IONS-SCNC: 8 MMOL/L (ref 4–13)
BASOPHILS # BLD AUTO: 0.12 THOUSANDS/ΜL (ref 0–0.1)
BASOPHILS NFR BLD AUTO: 2 % (ref 0–1)
BUN SERPL-MCNC: 10 MG/DL (ref 5–25)
CALCIUM SERPL-MCNC: 8.5 MG/DL (ref 8.3–10.1)
CHLORIDE SERPL-SCNC: 106 MMOL/L (ref 100–108)
CO2 SERPL-SCNC: 26 MMOL/L (ref 21–32)
CREAT SERPL-MCNC: 0.68 MG/DL (ref 0.6–1.3)
EOSINOPHIL # BLD AUTO: 0.8 THOUSAND/ΜL (ref 0–0.61)
EOSINOPHIL NFR BLD AUTO: 16 % (ref 0–6)
ERYTHROCYTE [DISTWIDTH] IN BLOOD BY AUTOMATED COUNT: 21.7 % (ref 11.6–15.1)
GFR SERPL CREATININE-BSD FRML MDRD: 101 ML/MIN/1.73SQ M
GLUCOSE SERPL-MCNC: 85 MG/DL (ref 65–140)
HCT VFR BLD AUTO: 30.2 % (ref 36.5–49.3)
HGB BLD-MCNC: 8.3 G/DL (ref 12–17)
IMM GRANULOCYTES # BLD AUTO: 0.02 THOUSAND/UL (ref 0–0.2)
IMM GRANULOCYTES NFR BLD AUTO: 0 % (ref 0–2)
LYMPHOCYTES # BLD AUTO: 0.84 THOUSANDS/ΜL (ref 0.6–4.47)
LYMPHOCYTES NFR BLD AUTO: 17 % (ref 14–44)
MCH RBC QN AUTO: 21 PG (ref 26.8–34.3)
MCHC RBC AUTO-ENTMCNC: 27.5 G/DL (ref 31.4–37.4)
MCV RBC AUTO: 76 FL (ref 82–98)
MONOCYTES # BLD AUTO: 0.74 THOUSAND/ΜL (ref 0.17–1.22)
MONOCYTES NFR BLD AUTO: 15 % (ref 4–12)
NEUTROPHILS # BLD AUTO: 2.52 THOUSANDS/ΜL (ref 1.85–7.62)
NEUTS SEG NFR BLD AUTO: 50 % (ref 43–75)
NRBC BLD AUTO-RTO: 0 /100 WBCS
PLATELET # BLD AUTO: 231 THOUSANDS/UL (ref 149–390)
PMV BLD AUTO: 9.4 FL (ref 8.9–12.7)
POTASSIUM SERPL-SCNC: 3.6 MMOL/L (ref 3.5–5.3)
RBC # BLD AUTO: 3.96 MILLION/UL (ref 3.88–5.62)
SODIUM SERPL-SCNC: 140 MMOL/L (ref 136–145)
WBC # BLD AUTO: 5.04 THOUSAND/UL (ref 4.31–10.16)

## 2019-10-13 PROCEDURE — 99232 SBSQ HOSP IP/OBS MODERATE 35: CPT | Performed by: INTERNAL MEDICINE

## 2019-10-13 PROCEDURE — 85025 COMPLETE CBC W/AUTO DIFF WBC: CPT | Performed by: INTERNAL MEDICINE

## 2019-10-13 PROCEDURE — C9113 INJ PANTOPRAZOLE SODIUM, VIA: HCPCS | Performed by: PHYSICIAN ASSISTANT

## 2019-10-13 PROCEDURE — 0D738ZZ DILATION OF LOWER ESOPHAGUS, VIA NATURAL OR ARTIFICIAL OPENING ENDOSCOPIC: ICD-10-PCS | Performed by: INTERNAL MEDICINE

## 2019-10-13 PROCEDURE — 80048 BASIC METABOLIC PNL TOTAL CA: CPT | Performed by: INTERNAL MEDICINE

## 2019-10-13 PROCEDURE — 0DB38ZX EXCISION OF LOWER ESOPHAGUS, VIA NATURAL OR ARTIFICIAL OPENING ENDOSCOPIC, DIAGNOSTIC: ICD-10-PCS | Performed by: INTERNAL MEDICINE

## 2019-10-13 RX ADMIN — PANTOPRAZOLE SODIUM 40 MG: 40 INJECTION, POWDER, FOR SOLUTION INTRAVENOUS at 21:16

## 2019-10-13 RX ADMIN — PANTOPRAZOLE SODIUM 40 MG: 40 INJECTION, POWDER, FOR SOLUTION INTRAVENOUS at 09:05

## 2019-10-13 NOTE — ASSESSMENT & PLAN NOTE
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

## 2019-10-13 NOTE — PROGRESS NOTES
Progress Note - Cardiology   Ed Campos  59 y o  male MRN: [id number]  Unit/Bed#: [place] [address] -01 Encounter: [id number]    Assessment/Plan :  #Bradycardia  - No episodes overnight  - continue to monitor on tele while inpatient   - Ok to proceed with EGD - if possible avoid propofol, may lead him to wenckebach; if occurs, then can use epinephrine, isoprel or atropine  Subjective/Objective   Chief Complaint: Dizziness    Subjective: Denies any symptoms including dizziness, lightheadedness, bleeding or chest pain  Objective:     Vitals: /83   Pulse 68   Temp 97 9 °F (36 6 °C)   Resp 16   Ht 6' (1 829 m)   Wt 65 5 kg (144 lb 6 4 oz)   SpO2 100%   BMI 19 58 kg/m²   Vitals:    10/11/19 1455 10/11/19 1827   Weight: 65 5 kg (144 lb 6 4 oz) 65 5 kg (144 lb 6 4 oz)     Orthostatic Blood Pressures      Most Recent Value   Blood Pressure  153/83 filed at 10/13/2019 7881   Patient Position - Orthostatic VS  Lying filed at 10/11/2019 1757          No intake or output data in the 24 hours ending 10/13/19 1210    Invasive Devices     Peripheral Intravenous Line            Peripheral IV 10/11/19 Right Antecubital 1 day                Review of Systems: per HPI, otherwise negative  Physical Exam:   Objective:   /83   Pulse 68   Temp 97 9 °F (36 6 °C)   Resp 16   Ht 6' (1 829 m)   Wt 65 5 kg (144 lb 6 4 oz)   SpO2 100%   BMI 19 58 kg/m²    Physical Exam:  GEN: NAD, Alert and oriented, well appearing  HEENT:Head, neck, ears, oral pharynx: Mucus membranes moist, oral pharynx clear, nares clear  External ears normal  EYES: Pupils equal, sclera anicteric  NECK: No JVD  CARDIOVASCULAR: RRR, No murmur, rub, gallops S1,S2  LUNGS: Clear To auscultation bilaterally  ABDOMEN: Soft, nondistended  EXTREMITIES/VASCULAR: No edema    PSYCH: Normal Affect  NEURO: Grossly intact, moving all extremiteis equal, face symetric  HEME: No bleeding, bruising, petechia  SKIN: No significant rashes     Lab Results:   CBC with diff:   Results from last 7 days   Lab Units 10/13/19  0516   WBC Thousand/uL 5 04   RBC Million/uL 3 96   HEMOGLOBIN g/dL 8 3*   HEMATOCRIT % 30 2*   MCV fL 76*   MCH pg 21 0*   MCHC g/dL 27 5*   RDW % 21 7*   MPV fL 9 4   PLATELETS Thousands/uL 231     CMP:   Results from last 7 days   Lab Units 10/13/19  0516 10/12/19  0831   SODIUM mmol/L 140 143   POTASSIUM mmol/L 3 6 3 9   CHLORIDE mmol/L 106 108   CO2 mmol/L 26 25   BUN mg/dL 10 13   CREATININE mg/dL 0 68 0 75   CALCIUM mg/dL 8 5 8 5   AST U/L  --  22   ALT U/L  --  13   ALK PHOS U/L  --  102   EGFR ml/min/1 73sq m 101 97     Troponin:   0   Lab Value Date/Time    TROPONINI <0 02 10/11/2019 1524    TROPONINI <0 02 05/14/2019 1108    TROPONINI 0 02 12/02/2018 1154    TROPONINI <0 02 09/15/2018 0958    TROPONINI <0 02 07/22/2018 0801    TROPONINI <0 02 01/21/2018 1752    TROPONINI <0 02 01/21/2018 1454    TROPONINI <0 02 10/06/2017 0351    TROPONINI <0 02 10/06/2017 0102     BNP:   Results from last 7 days   Lab Units 10/13/19  0516   POTASSIUM mmol/L 3 6   CHLORIDE mmol/L 106   CO2 mmol/L 26   BUN mg/dL 10   CREATININE mg/dL 0 68   CALCIUM mg/dL 8 5   EGFR ml/min/1 73sq m 101     Tele - Sinus rhythm in 60s-70s

## 2019-10-13 NOTE — ASSESSMENT & PLAN NOTE
-patient noted to having episodes of bradycardia in the 40s  -cardiology input appreciated, no further episodes  -continue to monitor on telemetry  -okay to proceed with EGD

## 2019-10-13 NOTE — UTILIZATION REVIEW
Initial Clinical Review    Admission: Date/Time/Statement: Inpatient Admission Orders (From admission, onward)     Ordered        10/11/19 1633  Inpatient Admission  Once                   Orders Placed This Encounter   Procedures    Inpatient Admission     Standing Status:   Standing     Number of Occurrences:   1     Order Specific Question:   Admitting Physician     Answer:   Allyson Elliott [[id number]]     Order Specific Question:   Level of Care     Answer:   Med Surg [[id number]]     Order Specific Question:   Estimated length of stay     Answer:   More than 2 Midnights     Order Specific Question:   Certification     Answer:   I certify that inpatient services are medically necessary for this patient for a duration of greater than two midnights  See H&P and MD Progress Notes for additional information about the patient's course of treatment  ED Arrival Information     Expected Arrival Acuity Means of Arrival Escorted By Service Admission Type    - 10/11/2019 14:49 Urgent Walk-In Self Hospitalist Urgent    Arrival Complaint    fatigue        Chief Complaint   Patient presents with    Fatigue     Started 2 weeks ago  Has history of low hemoglobin  Assessment/Plan:  58 YO MALE presentd to er from home as inpatient admission of anemia Per patient (+) bloody stools and bright red blood from rectum noted  Patient c/o SOB pale in color and fatigue  (+) guaiac stool  PMH Wills's esophagus, esophagitis, anemia and small bowel AVM s/p ablation  PLAN BLOOD TRANSFUSION , CONSULT GI & CARDIOLOGY WITH SUPPORTIVE CARE     ED Triage Vitals   Temperature Pulse Respirations Blood Pressure SpO2   10/11/19 1455 10/11/19 1455 10/11/19 1455 10/11/19 1455 10/11/19 1455   97 7 °F (36 5 °C) 80 16 132/63 100 %      Temp Source Heart Rate Source Patient Position - Orthostatic VS BP Location FiO2 (%)   10/11/19 1455 10/11/19 1557 10/11/19 1455 10/11/19 1455 --   Oral Monitor Sitting Right arm       Pain Score       10/11/19 1455 No Pain        Wt Readings from Last 1 Encounters:   10/11/19 65 5 kg (144 lb 6 4 oz)     Additional Vital Signs:   10/12/19 08:20:45  98 4 °F (36 9 °C)  44Abnormal   16  123/67  86  99 %       10/12/19 0036      17             10/12/19 00:35:44  98 6 °F (37 °C)  46Abnormal     128/69  89  98 %       10/11/19 23:41:17    47Abnormal   18  118/69  85  98 %       10/11/19 2131  98 8 °F (37 1 °C)  61  18  143/78           10/11/19 2110  98 7 °F (37 1 °C)  74  16  145/79           10/11/19 20:31:45  98 4 °F (36 9 °C)  48Abnormal   18  145/82  103  100 %       10/11/19 18:14:03  99 °F (37 2 °C)    16  148/85  106         10/11/19 1757  98 7 °F (37 1 °C)  68  16  152/79    93 %  None (Room air)  Lying   10/11/19 1745    66  16  155/74    95 %       10/11/19 1739  98 3 °F (36 8 °C)  71  16  155/74    95 %  None (Room air)     10/11/19 1722    68  16  170/83    95 %  None (Room air)  Lying   10/11/19 1557    70  16  137/75    98 %  None (Room air)  Lying       Pertinent Labs/Diagnostic Test Results:   Results from last 7 days   Lab Units 10/13/19  0516 10/12/19  2336 10/12/19  1657 10/12/19  0831 10/12/19  0034 10/11/19  1524   WBC Thousand/uL 5 04  --   --   --   --  4 17*   HEMOGLOBIN g/dL 8 3* 7 8* 8 3* 7 7* 7 6* 6 1*   HEMATOCRIT % 30 2* 28 3* 29 9* 27 4* 27 4* 23 4*   PLATELETS Thousands/uL 231  --   --   --   --  316   NEUTROS ABS Thousands/µL 2 52  --   --   --   --  2 28         Results from last 7 days   Lab Units 10/13/19  0516 10/12/19  0831 10/11/19  1524   SODIUM mmol/L 140 143 142   POTASSIUM mmol/L 3 6 3 9 4 3   CHLORIDE mmol/L 106 108 107   CO2 mmol/L 26 25 27   ANION GAP mmol/L 8 10 8   BUN mg/dL 10 13 13   CREATININE mg/dL 0 68 0 75 0 85   EGFR ml/min/1 73sq m 101 97 92   CALCIUM mg/dL 8 5 8 5 8 8     Results from last 7 days   Lab Units 10/12/19  0831   AST U/L 22   ALT U/L 13   ALK PHOS U/L 102   TOTAL PROTEIN g/dL 5 5*   ALBUMIN g/dL 2 8*   TOTAL BILIRUBIN mg/dL 0 77         Results from last 7 days   Lab Units 10/13/19  0516 10/12/19  0831 10/11/19  1524   GLUCOSE RANDOM mg/dL 85 86 95     Results from last 7 days   Lab Units 10/11/19  1524   TROPONIN I ng/mL <0 02     ekg 10-11-19  Rate:  70  ECG rate assessment: normal    Rhythm: sinus rhythm    Ectopy:  none    QRS axis:  Normal  Intervals: normal   Q waves: None   ST segments:  Normal  T waves: Inverted in aVL and V2       Impression: NSR with nonspecific T wave changes    Past Medical History:   Diagnosis Date    Anemia     AVM (arteriovenous malformation) of small bowel, acquired     Wills's esophagus     Cirilo ulcer     Esophagitis     History of blood transfusion     22 prior transfusions     Present on Admission:   Anemia   Cirilo ulcer   Wills's esophagus   Melena   Generalized maculopapular rash      Admitting Diagnosis: Fatigue [R53 83]  GI bleed [K92 2]  Cirilo ulcer [K25 9]  Symptomatic anemia [D64 9]  Age/Sex: 59 y o  male  Admission Orders:    Medications:  pantoprazole 40 mg Intravenous Q12H [facility] & long-term          acetaminophen 650 mg Oral Q6H PRN   ondansetron 4 mg Intravenous Q6H PRN       IP CONSULT TO GASTROENTEROLOGY  IP CONSULT TO CARDIOLOGY  S/p 2 U PRBC  H/H Q 8 HRS  TELEMETRY  EGD Monday   NPO Sunday @ MIDNIGHT        Network Utilization Review Department  Phone: [phone]; Fax [phone]  [email or web address]  ATTENTION: Please call with any questions or concerns to [phone]  and carefully listen to the prompts so that you are directed to the right person  Send all requests for admission clinical reviews, approved or denied determinations and any other requests to fax [phone]   All voicemails are confidential

## 2019-10-13 NOTE — PLAN OF CARE
Problem: Potential for Falls  Goal: Patient will remain free of falls  Description  INTERVENTIONS:  - Assess patient frequently for physical needs  -  Identify cognitive and physical deficits and behaviors that affect risk of falls    -  [place] fall precautions as indicated by assessment   - Educate patient/family on patient safety including physical limitations  - Instruct patient to call for assistance with activity based on assessment  - Modify environment to reduce risk of injury  - Consider OT/PT consult to assist with strengthening/mobility  Outcome: Progressing     Problem: PAIN - ADULT  Goal: Verbalizes/displays adequate comfort level or baseline comfort level  Description  Interventions:  - Encourage patient to monitor pain and request assistance  - Assess pain using appropriate pain scale  - Administer analgesics based on type and severity of pain and evaluate response  - Implement non-pharmacological measures as appropriate and evaluate response  - Consider cultural and social influences on pain and pain management  - Notify physician/advanced practitioner if interventions unsuccessful or patient reports new pain  Outcome: Progressing     Problem: INFECTION - ADULT  Goal: Absence of fever/infection during neutropenic period  Description  INTERVENTIONS:  - Monitor WBC    Outcome: Progressing     Problem: SAFETY ADULT  Goal: Maintain or return to baseline ADL function  Description  INTERVENTIONS:  -  Assess patient's ability to carry out ADLs; assess patient's baseline for ADL function and identify physical deficits which impact ability to perform ADLs (bathing, care of mouth/teeth, toileting, grooming, dressing, etc )  - Assess/evaluate cause of self-care deficits   - Assess range of motion  - Assess patient's mobility; develop plan if impaired  - Assess patient's need for assistive devices and provide as appropriate  - Encourage maximum independence but intervene and supervise when necessary  - Involve family in performance of ADLs  - Assess for home care needs following discharge   - Consider OT consult to assist with ADL evaluation and planning for discharge  - Provide patient education as appropriate  Outcome: Progressing  Goal: Maintain or return mobility status to optimal level  Description  INTERVENTIONS:  - Assess patient's baseline mobility status (ambulation, transfers, stairs, etc )    - Identify cognitive and physical deficits and behaviors that affect mobility  - Identify mobility aids required to assist with transfers and/or ambulation (gait belt, sit-to-stand, lift, walker, cane, etc )  - [place] fall precautions as indicated by assessment  - Record patient progress and toleration of activity level on Mobility SBAR; progress patient to next Phase/Stage  - Instruct patient to call for assistance with activity based on assessment  - Consider rehabilitation consult to assist with strengthening/weightbearing, etc   Outcome: Progressing     Problem: DISCHARGE PLANNING  Goal: Discharge to home or other facility with appropriate resources  Description  INTERVENTIONS:  - Identify barriers to discharge w/patient and caregiver  - Arrange for needed discharge resources and transportation as appropriate  - Identify discharge learning needs (meds, wound care, etc )  - Arrange for interpretive services to assist at discharge as needed  - Refer to Case Management Department for coordinating discharge planning if the patient needs post-hospital services based on physician/advanced practitioner order or complex needs related to functional status, cognitive ability, or social support system  Outcome: Progressing     Problem: Knowledge Deficit  Goal: Patient/family/caregiver demonstrates understanding of disease process, treatment plan, medications, and discharge instructions  Description  Complete learning assessment and assess knowledge base    Interventions:  - Provide teaching at level of understanding  - Provide teaching via preferred learning methods  Outcome: Progressing

## 2019-10-13 NOTE — PROGRESS NOTES
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  Had undergone repeat egd in 06/2019 at this facility for dysphagia significant for significantly edematous and erythematous mucosa and inability to traverse upper esophagus to significant internal stricture  -s/p 2 IU PRBC in ed  -hemoglobin today 8 3, will monitor H&H, transfuse if less than 7    Wills's esophagus  Assessment & Plan  -EGD in AM  Continue with IV PPI b i d  Bradycardia  Assessment & Plan  -patient noted to having episodes of bradycardia in the 40s  -cardiology input appreciated, no further episodes  -continue to monitor on telemetry  -okay to proceed with EGD    Generalized maculopapular rash  Assessment & Plan  -patient has chronic rash present for over 30 years          VTE Pharmacologic Prophylaxis:   Pharmacologic:  Contraindicated due to GI bleed    Patient Centered Rounds: I have performed bedside rounds with nursing staff today  Discussions with Specialists or Other Care Team Provider:  GI    Time Spent for Care: 20 minutes  More than 50% of total time spent on counseling and coordination of care as described above      Current Length of Stay: 2 day(s)    Current Patient Status: Inpatient   Certification Statement: The patient will continue to require additional inpatient hospital stay due to GI bleed    Discharge Plan / Estimated Discharge Date:  2-3 days    Code Status: Level 1 - Full Code      Subjective: Patient seen and examined at bedside, denies any abdominal pain, nausea, vomiting    Objective:     Vitals:   Temp (24hrs), Av 1 °F (36 7 °C), Min:97 9 °F (36 6 °C), Max:98 3 °F (36 8 °C)    Temp:  [97 9 °F (36 6 °C)-98 3 °F (36 8 °C)] 97 9 °F (36 6 °C)  HR:  [51-69] 68  Resp:  [16-18] 16  BP: (121-153)/(70-83) 153/83  SpO2:  [98 %-100 %] 100 %  Body mass index is 19 58 kg/m²  Input and Output Summary (last 24 hours):     No intake or output data in the 24 hours ending 10/13/19 1423    Physical Exam:    Constitutional: Patient is oriented to person, place and time, no acute distress  HEENT:  Normocephalic, atraumatic, EOMI, PERRLA, no scleral icterus, no pallor, moist oral mucosa  Neck:  Supple, no masses, no thyromegaly, no bruits Normal range of motion  Lymph nodes:  No lymphadenopathy  Cardiovascular: Normal S1S2, RRR, No murmurs/rubs/gallops appreciated  Pulmonary:  Bilateral air entry, No rhonchi/rales/wheezing appreciated  Abdominal: Soft, Bowel sounds present, Non-tender, Non-distended, No rebound/guarding, no hepatomegaly   Musculoskeletal: No tenderness/abnormality   Extremities:  No cyanosis, clubbing or edema  Peripheral pulses palpable and equal bilaterally  Neurological: Cranial nerves II-XII grossly intact, sensation intact, otherwise no focal neurological symptoms  Skin: Skin is warm and dry, no rashes  Additional Data:     Labs:    Results from last 7 days   Lab Units 10/13/19  0516   WBC Thousand/uL 5 04   HEMOGLOBIN g/dL 8 3*   HEMATOCRIT % 30 2*   PLATELETS Thousands/uL 231   NEUTROS PCT % 50   LYMPHS PCT % 17   MONOS PCT % 15*   EOS PCT % 16*     Results from last 7 days   Lab Units 10/13/19  0516 10/12/19  0831   POTASSIUM mmol/L 3 6 3 9   CHLORIDE mmol/L 106 108   CO2 mmol/L 26 25   BUN mg/dL 10 13   CREATININE mg/dL 0 68 0 75   CALCIUM mg/dL 8 5 8 5   ALK PHOS U/L  --  102   ALT U/L  --  13   AST U/L  --  22            I Have Reviewed All Lab Data Listed Above          Recent Cultures (last 7 days):           Last 24 Hours Medication List:     Current Facility-Administered Medications:  acetaminophen 650 mg Oral Q6H PRN Nakia Travis PA-C   ondansetron 4 mg Intravenous Q6H PRN Nakia Travis PA-C   pantoprazole 40 mg Intravenous Q12H [address] Nakia Gant PA-C        Today, Patient Was Seen By: Tee Christy MD Admission

## 2019-10-13 NOTE — PROGRESS NOTES
[facility]  Progress Note - Ed Campos  59 y o  male MRN: [id number]    Unit/Bed#: [place] 2 -01 Encounter: [id number]    Assessment/Plan:   59y o  year old male with a PMH of Wills's esophagus, Derrel Mark lesion and duodenal AVM presented to the hospital for fatigue, dyspnea on exertion and pallor and was found to have anemia      1  Acute on chronic anemia  2  History of AVM  3  Hiatal hernia with Derrel Mark lesion  4  Wills's esophagus  5  Esophageal stricture              -his hemoglobin is improved at 8 3 today               -he is anemic at baseline, his baseline over the past year peer to be around 8 however his hemoglobin was as high as 10 9 3 months ago               -he is hemodynamically stable and in no acute distress              -he denies any hematochezia, he states he sees black stool intermittently but he is also on oral iron it every other day  He denies "sticky, tarry" stool and denies diarrhea               -he has had several upper endoscopies and colonoscopies in the past, he had an AVM in his duodenum in May 2019 and then he had an upper endoscopy in June showing an internal stricture in the upper esophagus which could not be traversed, fortunately biopsies of this area were benign  Repeat upper endoscopy was recommended however he did not follow up for this  -he reports his previous dysphagia has resolved and he is tolerating a regular diet              -he is taking Motrin 2 tablets twice daily, strongly recommend all NSAID cessation, we discussed that this puts him at increased risk for peptic ulcer disease               -agree with IV PPI b i d    -differential diagnosis includes peptic ulcer disease, esophagitis/gastritis, Derrel Mark erosion or AVMs                -repeat upper endoscopy planned for tomorrow              -He will need to be NPO after midnight              -Recommend outpatient colonoscopy, he had a colonoscopy in September 2018 which was unable completed due to a tortuous colon, repeat was recommended with CO2 insufflation at [place]  Subjective:   He reports he is feeling well and tolerating his diet, he denies any bowel movements today  He denies any abdominal pain, nausea or vomiting  Objective:     Vitals: Blood pressure 153/83, pulse 68, temperature 97 9 °F (36 6 °C), resp  rate 16, height 6' (1 829 m), weight 65 5 kg (144 lb 6 4 oz), SpO2 100 %  ,Body mass index is 19 58 kg/m²  No intake or output data in the 24 hours ending 10/13/19 1000    Review of Systems: as per HPI  Review of Systems   Constitutional: Negative for activity change, appetite change, chills, fatigue, fever and unexpected weight change  HENT: Negative for mouth sores, sore throat and trouble swallowing  Respiratory: Negative for shortness of breath  Cardiovascular: Negative for chest pain  Gastrointestinal: Negative for abdominal distention, abdominal pain, blood in stool, constipation, diarrhea, nausea and vomiting  Skin: Negative for color change, pallor, rash and wound  Neurological: Negative for tremors and syncope  All other systems reviewed and are negative  Physical Exam:     Physical Exam   Constitutional: He is oriented to person, place, and time  He appears well-developed and well-nourished  No distress  Dentures in place   HENT:   Head: Normocephalic and atraumatic  Eyes: Right eye exhibits no discharge  Left eye exhibits no discharge  No scleral icterus  Neck: Neck supple  No tracheal deviation present  Cardiovascular: Regular rhythm and intact distal pulses  Exam reveals no gallop and no friction rub  Murmur heard  Pulmonary/Chest: Effort normal and breath sounds normal  No respiratory distress  He has no wheezes  He has no rales  He exhibits no tenderness  Abdominal: Soft  Bowel sounds are normal  He exhibits no distension and no mass  There is no tenderness  There is no rebound and no guarding  Neurological: He is alert and oriented to person, place, and time  Skin: Skin is warm and dry  Psychiatric: He has a normal mood and affect           Invasive Devices     Peripheral Intravenous Line            Peripheral IV 10/11/19 Right Antecubital 1 day                        CBC:   Lab Results   Component Value Date    WBC 5 04 10/13/2019    HGB 8 3 (L) 10/13/2019    HCT 30 2 (L) 10/13/2019    MCV 76 (L) 10/13/2019     10/13/2019    MCH 21 0 (L) 10/13/2019    MCHC 27 5 (L) 10/13/2019    RDW 21 7 (H) 10/13/2019    MPV 9 4 10/13/2019    NRBC 0 10/13/2019   ,   CMP:   Lab Results   Component Value Date    K 3 6 10/13/2019     10/13/2019    CO2 26 10/13/2019    BUN 10 10/13/2019    CREATININE 0 68 10/13/2019    CALCIUM 8 5 10/13/2019    EGFR 101 10/13/2019

## 2019-10-13 NOTE — PLAN OF CARE
Problem: Potential for Falls  Goal: Patient will remain free of falls  Description  INTERVENTIONS:  - Assess patient frequently for physical needs  -  Identify cognitive and physical deficits and behaviors that affect risk of falls    -  [place] fall precautions as indicated by assessment   - Educate patient/family on patient safety including physical limitations  - Instruct patient to call for assistance with activity based on assessment  - Modify environment to reduce risk of injury  - Consider OT/PT consult to assist with strengthening/mobility  Outcome: Progressing     Problem: PAIN - ADULT  Goal: Verbalizes/displays adequate comfort level or baseline comfort level  Description  Interventions:  - Encourage patient to monitor pain and request assistance  - Assess pain using appropriate pain scale  - Administer analgesics based on type and severity of pain and evaluate response  - Implement non-pharmacological measures as appropriate and evaluate response  - Consider cultural and social influences on pain and pain management  - Notify physician/advanced practitioner if interventions unsuccessful or patient reports new pain  Outcome: Progressing     Problem: INFECTION - ADULT  Goal: Absence of fever/infection during neutropenic period  Description  INTERVENTIONS:  - Monitor WBC    Outcome: Progressing     Problem: SAFETY ADULT  Goal: Maintain or return to baseline ADL function  Description  INTERVENTIONS:  -  Assess patient's ability to carry out ADLs; assess patient's baseline for ADL function and identify physical deficits which impact ability to perform ADLs (bathing, care of mouth/teeth, toileting, grooming, dressing, etc )  - Assess/evaluate cause of self-care deficits   - Assess range of motion  - Assess patient's mobility; develop plan if impaired  - Assess patient's need for assistive devices and provide as appropriate  - Encourage maximum independence but intervene and supervise when necessary  - Involve family in performance of ADLs  - Assess for home care needs following discharge   - Consider OT consult to assist with ADL evaluation and planning for discharge  - Provide patient education as appropriate  Outcome: Progressing  Goal: Maintain or return mobility status to optimal level  Description  INTERVENTIONS:  - Assess patient's baseline mobility status (ambulation, transfers, stairs, etc )    - Identify cognitive and physical deficits and behaviors that affect mobility  - Identify mobility aids required to assist with transfers and/or ambulation (gait belt, sit-to-stand, lift, walker, cane, etc )  - [place] fall precautions as indicated by assessment  - Record patient progress and toleration of activity level on Mobility SBAR; progress patient to next Phase/Stage  - Instruct patient to call for assistance with activity based on assessment  - Consider rehabilitation consult to assist with strengthening/weightbearing, etc   Outcome: Progressing     Problem: DISCHARGE PLANNING  Goal: Discharge to home or other facility with appropriate resources  Description  INTERVENTIONS:  - Identify barriers to discharge w/patient and caregiver  - Arrange for needed discharge resources and transportation as appropriate  - Identify discharge learning needs (meds, wound care, etc )  - Arrange for interpretive services to assist at discharge as needed  - Refer to Case Management Department for coordinating discharge planning if the patient needs post-hospital services based on physician/advanced practitioner order or complex needs related to functional status, cognitive ability, or social support system  Outcome: Progressing     Problem: Knowledge Deficit  Goal: Patient/family/caregiver demonstrates understanding of disease process, treatment plan, medications, and discharge instructions  Description  Complete learning assessment and assess knowledge base    Interventions:  - Provide teaching at level of understanding  - Provide teaching via preferred learning methods  Outcome: Progressing

## 2019-10-14 ENCOUNTER — ANESTHESIA EVENT (INPATIENT)
Dept: GASTROENTEROLOGY | Facility: HOSPITAL | Age: 64
DRG: 812 | End: 2019-10-14
Payer: COMMERCIAL

## 2019-10-14 ENCOUNTER — APPOINTMENT (INPATIENT)
Dept: GASTROENTEROLOGY | Facility: HOSPITAL | Age: 64
DRG: 812 | End: 2019-10-14
Payer: COMMERCIAL

## 2019-10-14 ENCOUNTER — ANESTHESIA (INPATIENT)
Dept: GASTROENTEROLOGY | Facility: HOSPITAL | Age: 64
DRG: 812 | End: 2019-10-14
Payer: COMMERCIAL

## 2019-10-14 VITALS
SYSTOLIC BLOOD PRESSURE: 152 MMHG | HEART RATE: 50 BPM | TEMPERATURE: 98.2 F | WEIGHT: 144.4 LBS | OXYGEN SATURATION: 97 % | BODY MASS INDEX: 19.56 KG/M2 | DIASTOLIC BLOOD PRESSURE: 80 MMHG | HEIGHT: 72 IN | RESPIRATION RATE: 20 BRPM

## 2019-10-14 PROBLEM — R00.1 BRADYCARDIA: Status: RESOLVED | Noted: 2019-10-12 | Resolved: 2019-10-14

## 2019-10-14 PROBLEM — D62 ANEMIA ASSOCIATED WITH ACUTE BLOOD LOSS: Status: ACTIVE | Noted: 2019-10-14

## 2019-10-14 PROBLEM — D62 ANEMIA ASSOCIATED WITH ACUTE BLOOD LOSS: Status: RESOLVED | Noted: 2019-10-14 | Resolved: 2019-10-14

## 2019-10-14 LAB
ANION GAP SERPL CALCULATED.3IONS-SCNC: 10 MMOL/L (ref 4–13)
BASOPHILS # BLD AUTO: 0.11 THOUSANDS/ΜL (ref 0–0.1)
BASOPHILS NFR BLD AUTO: 2 % (ref 0–1)
BUN SERPL-MCNC: 6 MG/DL (ref 5–25)
CALCIUM SERPL-MCNC: 8.4 MG/DL (ref 8.3–10.1)
CHLORIDE SERPL-SCNC: 104 MMOL/L (ref 100–108)
CO2 SERPL-SCNC: 26 MMOL/L (ref 21–32)
CREAT SERPL-MCNC: 0.67 MG/DL (ref 0.6–1.3)
EOSINOPHIL # BLD AUTO: 0.8 THOUSAND/ΜL (ref 0–0.61)
EOSINOPHIL NFR BLD AUTO: 14 % (ref 0–6)
ERYTHROCYTE [DISTWIDTH] IN BLOOD BY AUTOMATED COUNT: 22.5 % (ref 11.6–15.1)
GFR SERPL CREATININE-BSD FRML MDRD: 102 ML/MIN/1.73SQ M
GLUCOSE SERPL-MCNC: 84 MG/DL (ref 65–140)
HCT VFR BLD AUTO: 29.5 % (ref 36.5–49.3)
HGB BLD-MCNC: 8.3 G/DL (ref 12–17)
IMM GRANULOCYTES # BLD AUTO: 0.02 THOUSAND/UL (ref 0–0.2)
IMM GRANULOCYTES NFR BLD AUTO: 0 % (ref 0–2)
LYMPHOCYTES # BLD AUTO: 1.01 THOUSANDS/ΜL (ref 0.6–4.47)
LYMPHOCYTES NFR BLD AUTO: 18 % (ref 14–44)
MCH RBC QN AUTO: 21.4 PG (ref 26.8–34.3)
MCHC RBC AUTO-ENTMCNC: 28.1 G/DL (ref 31.4–37.4)
MCV RBC AUTO: 76 FL (ref 82–98)
MONOCYTES # BLD AUTO: 0.77 THOUSAND/ΜL (ref 0.17–1.22)
MONOCYTES NFR BLD AUTO: 14 % (ref 4–12)
NEUTROPHILS # BLD AUTO: 2.87 THOUSANDS/ΜL (ref 1.85–7.62)
NEUTS SEG NFR BLD AUTO: 52 % (ref 43–75)
NRBC BLD AUTO-RTO: 0 /100 WBCS
PLATELET # BLD AUTO: 221 THOUSANDS/UL (ref 149–390)
PMV BLD AUTO: 10.1 FL (ref 8.9–12.7)
POTASSIUM SERPL-SCNC: 3.5 MMOL/L (ref 3.5–5.3)
RBC # BLD AUTO: 3.87 MILLION/UL (ref 3.88–5.62)
SODIUM SERPL-SCNC: 140 MMOL/L (ref 136–145)
WBC # BLD AUTO: 5.58 THOUSAND/UL (ref 4.31–10.16)

## 2019-10-14 PROCEDURE — 88312 SPECIAL STAINS GROUP 1: CPT | Performed by: PATHOLOGY

## 2019-10-14 PROCEDURE — 99232 SBSQ HOSP IP/OBS MODERATE 35: CPT

## 2019-10-14 PROCEDURE — 43239 EGD BIOPSY SINGLE/MULTIPLE: CPT | Performed by: INTERNAL MEDICINE

## 2019-10-14 PROCEDURE — 88341 IMHCHEM/IMCYTCHM EA ADD ANTB: CPT | Performed by: PATHOLOGY

## 2019-10-14 PROCEDURE — 80048 BASIC METABOLIC PNL TOTAL CA: CPT | Performed by: INTERNAL MEDICINE

## 2019-10-14 PROCEDURE — 43249 ESOPH EGD DILATION <30 MM: CPT | Performed by: INTERNAL MEDICINE

## 2019-10-14 PROCEDURE — 88305 TISSUE EXAM BY PATHOLOGIST: CPT | Performed by: PATHOLOGY

## 2019-10-14 PROCEDURE — C1726 CATH, BAL DIL, NON-VASCULAR: HCPCS

## 2019-10-14 PROCEDURE — C9113 INJ PANTOPRAZOLE SODIUM, VIA: HCPCS | Performed by: PHYSICIAN ASSISTANT

## 2019-10-14 PROCEDURE — 88342 IMHCHEM/IMCYTCHM 1ST ANTB: CPT | Performed by: PATHOLOGY

## 2019-10-14 PROCEDURE — 99239 HOSP IP/OBS DSCHRG MGMT >30: CPT | Performed by: INTERNAL MEDICINE

## 2019-10-14 PROCEDURE — 85025 COMPLETE CBC W/AUTO DIFF WBC: CPT | Performed by: INTERNAL MEDICINE

## 2019-10-14 RX ORDER — SODIUM CHLORIDE 9 MG/ML
125 INJECTION, SOLUTION INTRAVENOUS CONTINUOUS
Status: DISCONTINUED | OUTPATIENT
Start: 2019-10-14 | End: 2019-10-14 | Stop reason: HOSPADM

## 2019-10-14 RX ORDER — PROPOFOL 10 MG/ML
INJECTION, EMULSION INTRAVENOUS AS NEEDED
Status: DISCONTINUED | OUTPATIENT
Start: 2019-10-14 | End: 2019-10-14 | Stop reason: SURG

## 2019-10-14 RX ORDER — PANTOPRAZOLE SODIUM 40 MG/1
40 TABLET, DELAYED RELEASE ORAL
Status: DISCONTINUED | OUTPATIENT
Start: 2019-10-14 | End: 2019-10-14 | Stop reason: HOSPADM

## 2019-10-14 RX ADMIN — LIDOCAINE HYDROCHLORIDE 50 MG: 20 INJECTION, SOLUTION INTRAVENOUS at 11:47

## 2019-10-14 RX ADMIN — PANTOPRAZOLE SODIUM 40 MG: 40 INJECTION, POWDER, FOR SOLUTION INTRAVENOUS at 08:53

## 2019-10-14 RX ADMIN — PROPOFOL 100 MG: 10 INJECTION, EMULSION INTRAVENOUS at 11:47

## 2019-10-14 RX ADMIN — PROPOFOL 50 MG: 10 INJECTION, EMULSION INTRAVENOUS at 11:49

## 2019-10-14 RX ADMIN — SODIUM CHLORIDE 125 ML/HR: 0.9 INJECTION, SOLUTION INTRAVENOUS at 11:13

## 2019-10-14 RX ADMIN — PROPOFOL 50 MG: 10 INJECTION, EMULSION INTRAVENOUS at 12:00

## 2019-10-14 RX ADMIN — PROPOFOL 50 MG: 10 INJECTION, EMULSION INTRAVENOUS at 11:54

## 2019-10-14 RX ADMIN — PANTOPRAZOLE SODIUM 40 MG: 40 TABLET, DELAYED RELEASE ORAL at 17:39

## 2019-10-14 RX ADMIN — PROPOFOL 50 MG: 10 INJECTION, EMULSION INTRAVENOUS at 12:03

## 2019-10-14 RX ADMIN — PROPOFOL 50 MG: 10 INJECTION, EMULSION INTRAVENOUS at 11:51

## 2019-10-14 NOTE — NURSING NOTE
Discharge instruction reviewed with patient  Peripheral IV removed  Scripts given to patient  Daughter picking up pt  Belongings being sent home  No further needs at discharge

## 2019-10-14 NOTE — ASSESSMENT & PLAN NOTE
-patient has chronic rash present for over 30 years    No significant changes - Outpatient derm/PCP f/u

## 2019-10-14 NOTE — ASSESSMENT & PLAN NOTE
Patient required 2 units of PRBC infusion    - Stable hemoglobin    No source identified      EGD Report Below:  IMPRESSION:  Esophageal stricture dilated  Wills's esophagus biopsied  [place] hernia  Normal stomach and duodenal      RECOMMENDATION:  Follow up biopsy results  PPI b i d  Close outpatient follow-up    Repeat EGD in 3 months to evaluate for esophagitis

## 2019-10-14 NOTE — ASSESSMENT & PLAN NOTE
-patient noted to having episodes of bradycardia in the 40s  -cardiology input appreciated, no further episodes  Cleared by Cardiology, likely from acute anemia

## 2019-10-14 NOTE — DISCHARGE SUMMARY
Discharge- Ned Marcial 1955, 59 y o  male MRN: [id number]    Unit/Bed#: [address] [place] [address] [id number] Encounter: [id number]    Primary Care Provider: Jackelyn Werner MD   Date and time admitted to hospital: 10/11/2019  2:59 PM        Admitting Provider:  Naina Easton MD  Discharge Provider:  Briana Hogan DO  Admission Date: 10/11/2019       Discharge Date: 10/14/19   LOS: 3  Primary Care Physician at Discharge: Jackelyn Werner, [address]:  Ned Marcial  is a 59 y o  male who presented symptomatic anemia, he has a history of previous upper GI bleed with noted Royden Fidencio lesions as well as any B M  He was transfused 2 units of packed red blood cells and underwent upper endoscopy with no bleeding source found  He was found to have esophageal stricture for which she underwent dilatation  Biopsies were performed ER we followed up in the outpatient setting  At the time of discharge the patient was tolerating oral diet he was ambulating without significant shortness of breath or difficulty breathing and was clinically stable for discharge home with planned outpatient follow-up  The patient did have episodes of bradycardia and had asymptomatic Wenckebach on telemetry  It was felt that this was not clinically significant and electrophysiology performed over the weekend demonstrated no acute block requiring pacemaker placement  DISCHARGE DIAGNOSES  1  Anemia, EGD completed today without any complications  2  Asymptomatic bradycardia  3  Asymptomatic Wenckebach on telemetry    Admission Diagnosis  1  Anemia, EGD completed today without any complications  2  Asymptomatic bradycardia  3   Asymptomatic Wenckebach on telemetr    Same    IP CONSULT TO GASTROENTEROLOGY  IP CONSULT TO CARDIOLOGY    PROCEDURES PERFORMED    EGD  IMPRESSION:  Esophageal stricture dilated  Wills's esophagus biopsied  Hiatus hernia  Normal stomach and duodenal      RECOMMENDATION:  Follow up biopsy results  PPI b i d  Close outpatient follow-up  Repeat EGD in 3 months to evaluate for esophagitis    RADIOLOGY RESULTS  No results found      LABS  Results from last 7 days   Lab Units 10/14/19  0459 10/13/19  0516 10/12/19  2336 10/12/19  1657 10/12/19  0831 10/12/19  0034 10/11/19  1524   WBC Thousand/uL 5 58 5 04  --   --   --   --  4 17*   HEMOGLOBIN g/dL 8 3* 8 3* 7 8* 8 3* 7 7* 7 6* 6 1*   HEMATOCRIT % 29 5* 30 2* 28 3* 29 9* 27 4* 27 4* 23 4*   MCV fL 76* 76*  --   --   --   --  71*   PLATELETS Thousands/uL 221 231  --   --   --   --  316     Results from last 7 days   Lab Units 10/14/19  0455 10/13/19  0516 10/12/19  0831 10/11/19  1524   SODIUM mmol/L 140 140 143 142   POTASSIUM mmol/L 3 5 3 6 3 9 4 3   CHLORIDE mmol/L 104 106 108 107   CO2 mmol/L 26 26 25 27   BUN mg/dL 6 10 13 13   CREATININE mg/dL 0 67 0 68 0 75 0 85   CALCIUM mg/dL 8 4 8 5 8 5 8 8   ALBUMIN g/dL  --   --  2 8*  --    TOTAL BILIRUBIN mg/dL  --   --  0 77  --    ALK PHOS U/L  --   --  102  --    ALT U/L  --   --  13  --    AST U/L  --   --  22  --    EGFR ml/min/1 73sq m 102 101 97 92   GLUCOSE RANDOM mg/dL 84 85 86 95     Results from last 7 days   Lab Units 10/11/19  1524   TROPONIN I ng/mL <0 02                                    Cultures:         Invalid input(s): URIBILINOGEN              PHYSICAL EXAM:  Vitals:   Blood Pressure: 152/80 (10/14/19 1406)  Pulse: (!) 50 (10/14/19 1406)  Temperature: 98 2 °F (36 8 °C) (10/14/19 1406)  Temp Source: Temporal (10/14/19 1112)  Respirations: 20 (10/14/19 1406)  Height: 6' (182 9 cm) (10/11/19 1827)  Weight - Scale: 65 5 kg (144 lb 6 4 oz) (10/11/19 5787)  SpO2: 97 % (10/14/19 1406)    General: well appearing, no acute distress  HEENT: atraumatic, PERRLA, moist mucosa, normal pharynx, normal tonsils and adenoids, normal tongue, no fluid in sinuses  Neck: Trachea midline, no carotid bruit, no masses  Respiratory: normal chest wall expansion, CTA B, no r/r/w, no rubs  Cardiovascular: RRR, no m/r/g, Normal S1 and S2  Abdomen: Soft, non-tender, non-distended, normal bowel sounds in all quadrants, no hepatosplenomegaly, no tympany  Rectal: deferred  Musculoskeletal: normal ROM in upper and lower extremities  Integumentary: warm, dry, and pink, with no rash, purpura, or petechia  Heme/Lymph: no lymphadenopathy, no bruises  Neurological: Cranial Nerves II-XII grossly intact, no tics, normal sensation to pressure and light touch  Psychiatric: cooperative with normal mood, affect, and cognition        Discharge Disposition: Home/Self Care  *    Test Results Pending at Discharge:    Order Current Status    Tissue Exam In process      Incidental findings: None    Medications   · Summary of Medication Adjustments made as a result of this hospitalization:  No adjustments made  · Medication Dosing Tapers - Please refer to Discharge Medication List for details on any medication dosing tapers (if applicable to patient)  · Discharge Medication List: See after visit summary for reconciled discharge medications  Diet restrictions:  Dysphagia 2        Diet Orders   (From admission, onward)             Start     Ordered    10/15/19 0001  Diet NPO  Diet effective midnight     Question:  Diet Type  Answer:  NPO    10/14/19 1112    10/15/19 0000  Diet NPO; Sips with meds  Diet effective midnight     Question Answer Comment   Diet Type NPO    NPO Except: Sips with meds        10/14/19 0653    10/14/19 1430  Diet Dysphagia/Modified Consistency; Dysphagia 2-Mechanical Soft; Dysphagia 2-Mechanical Soft; Thin Liquid  Diet effective now     Question Answer Comment   Diet Type Dysphagia/Modified Consistency    Dysphagia/Modified Consistency Dysphagia 2-Mechanical Soft    Other Restriction(s): Dysphagia 2-Mechanical Soft    Liquid Modifier Thin Liquid    RD to adjust diet per protocol?  Yes        10/14/19 1429              Activity restrictions: No strenuous activity  Discharge Condition: good    Outpatient Follow-Up and Discharge Instructions  See after visit summary section titled Discharge Instructions for information provided to patient and family  Code Status: Level 1 - Full Code  Discharge Statement   I spent 35 minutes discharging the patient  This time was spent on the day of discharge  Greater than 50% of total time was spent with the patient and / or family counseling and / or coordination of care  Due to rapid unexpected improvement, the patient now meets criteria for discharge    ** Please Note: This note has been constructed using a voice recognition system   **

## 2019-10-14 NOTE — PLAN OF CARE
Problem: Potential for Falls  Goal: Patient will remain free of falls  Description  INTERVENTIONS:  - Assess patient frequently for physical needs  -  Identify cognitive and physical deficits and behaviors that affect risk of falls    -  [place] fall precautions as indicated by assessment   - Educate patient/family on patient safety including physical limitations  - Instruct patient to call for assistance with activity based on assessment  - Modify environment to reduce risk of injury  - Consider OT/PT consult to assist with strengthening/mobility  Outcome: Progressing     Problem: PAIN - ADULT  Goal: Verbalizes/displays adequate comfort level or baseline comfort level  Description  Interventions:  - Encourage patient to monitor pain and request assistance  - Assess pain using appropriate pain scale  - Administer analgesics based on type and severity of pain and evaluate response  - Implement non-pharmacological measures as appropriate and evaluate response  - Consider cultural and social influences on pain and pain management  - Notify physician/advanced practitioner if interventions unsuccessful or patient reports new pain  Outcome: Progressing     Problem: INFECTION - ADULT  Goal: Absence of fever/infection during neutropenic period  Description  INTERVENTIONS:  - Monitor WBC    Outcome: Progressing     Problem: SAFETY ADULT  Goal: Maintain or return to baseline ADL function  Description  INTERVENTIONS:  -  Assess patient's ability to carry out ADLs; assess patient's baseline for ADL function and identify physical deficits which impact ability to perform ADLs (bathing, care of mouth/teeth, toileting, grooming, dressing, etc )  - Assess/evaluate cause of self-care deficits   - Assess range of motion  - Assess patient's mobility; develop plan if impaired  - Assess patient's need for assistive devices and provide as appropriate  - Encourage maximum independence but intervene and supervise when necessary  - Involve family in performance of ADLs  - Assess for home care needs following discharge   - Consider OT consult to assist with ADL evaluation and planning for discharge  - Provide patient education as appropriate  Outcome: Progressing  Goal: Maintain or return mobility status to optimal level  Description  INTERVENTIONS:  - Assess patient's baseline mobility status (ambulation, transfers, stairs, etc )    - Identify cognitive and physical deficits and behaviors that affect mobility  - Identify mobility aids required to assist with transfers and/or ambulation (gait belt, sit-to-stand, lift, walker, cane, etc )  - [place] fall precautions as indicated by assessment  - Record patient progress and toleration of activity level on Mobility SBAR; progress patient to next Phase/Stage  - Instruct patient to call for assistance with activity based on assessment  - Consider rehabilitation consult to assist with strengthening/weightbearing, etc   Outcome: Progressing     Problem: DISCHARGE PLANNING  Goal: Discharge to home or other facility with appropriate resources  Description  INTERVENTIONS:  - Identify barriers to discharge w/patient and caregiver  - Arrange for needed discharge resources and transportation as appropriate  - Identify discharge learning needs (meds, wound care, etc )  - Arrange for interpretive services to assist at discharge as needed  - Refer to Case Management Department for coordinating discharge planning if the patient needs post-hospital services based on physician/advanced practitioner order or complex needs related to functional status, cognitive ability, or social support system  Outcome: Progressing     Problem: Knowledge Deficit  Goal: Patient/family/caregiver demonstrates understanding of disease process, treatment plan, medications, and discharge instructions  Description  Complete learning assessment and assess knowledge base    Interventions:  - Provide teaching at level of understanding  - Provide teaching via preferred learning methods  Outcome: Progressing

## 2019-10-14 NOTE — PLAN OF CARE
Problem: Potential for Falls  Goal: Patient will remain free of falls  Description  INTERVENTIONS:  - Assess patient frequently for physical needs  -  Identify cognitive and physical deficits and behaviors that affect risk of falls    -  [place] fall precautions as indicated by assessment   - Educate patient/family on patient safety including physical limitations  - Instruct patient to call for assistance with activity based on assessment  - Modify environment to reduce risk of injury  - Consider OT/PT consult to assist with strengthening/mobility  Outcome: Progressing     Problem: PAIN - ADULT  Goal: Verbalizes/displays adequate comfort level or baseline comfort level  Description  Interventions:  - Encourage patient to monitor pain and request assistance  - Assess pain using appropriate pain scale  - Administer analgesics based on type and severity of pain and evaluate response  - Implement non-pharmacological measures as appropriate and evaluate response  - Consider cultural and social influences on pain and pain management  - Notify physician/advanced practitioner if interventions unsuccessful or patient reports new pain  Outcome: Progressing     Problem: INFECTION - ADULT  Goal: Absence of fever/infection during neutropenic period  Description  INTERVENTIONS:  - Monitor WBC    Outcome: Progressing     Problem: SAFETY ADULT  Goal: Maintain or return to baseline ADL function  Description  INTERVENTIONS:  -  Assess patient's ability to carry out ADLs; assess patient's baseline for ADL function and identify physical deficits which impact ability to perform ADLs (bathing, care of mouth/teeth, toileting, grooming, dressing, etc )  - Assess/evaluate cause of self-care deficits   - Assess range of motion  - Assess patient's mobility; develop plan if impaired  - Assess patient's need for assistive devices and provide as appropriate  - Encourage maximum independence but intervene and supervise when necessary  - Involve family in performance of ADLs  - Assess for home care needs following discharge   - Consider OT consult to assist with ADL evaluation and planning for discharge  - Provide patient education as appropriate  Outcome: Progressing  Goal: Maintain or return mobility status to optimal level  Description  INTERVENTIONS:  - Assess patient's baseline mobility status (ambulation, transfers, stairs, etc )    - Identify cognitive and physical deficits and behaviors that affect mobility  - Identify mobility aids required to assist with transfers and/or ambulation (gait belt, sit-to-stand, lift, walker, cane, etc )  - [place] fall precautions as indicated by assessment  - Record patient progress and toleration of activity level on Mobility SBAR; progress patient to next Phase/Stage  - Instruct patient to call for assistance with activity based on assessment  - Consider rehabilitation consult to assist with strengthening/weightbearing, etc   Outcome: Progressing     Problem: DISCHARGE PLANNING  Goal: Discharge to home or other facility with appropriate resources  Description  INTERVENTIONS:  - Identify barriers to discharge w/patient and caregiver  - Arrange for needed discharge resources and transportation as appropriate  - Identify discharge learning needs (meds, wound care, etc )  - Arrange for interpretive services to assist at discharge as needed  - Refer to Case Management Department for coordinating discharge planning if the patient needs post-hospital services based on physician/advanced practitioner order or complex needs related to functional status, cognitive ability, or social support system  Outcome: Progressing     Problem: Knowledge Deficit  Goal: Patient/family/caregiver demonstrates understanding of disease process, treatment plan, medications, and discharge instructions  Description  Complete learning assessment and assess knowledge base    Interventions:  - Provide teaching at level of understanding  - Provide teaching via preferred learning methods  Outcome: Progressing

## 2019-10-14 NOTE — ANESTHESIA PREPROCEDURE EVALUATION
Review of Systems/Medical History  Patient summary reviewed  Chart reviewed  No history of anesthetic complications     Cardiovascular   Pulmonary  Negative pulmonary ROS        GI/Hepatic    PUD, GERD poorly controlled, Esophageal disease bryant esophagus,        Negative  ROS        Endo/Other  Negative endo/other ROS   Comment: Alcohol abuse    GYN  Negative gynecology ROS          Hematology  Anemia iron deficiency anemia,     Musculoskeletal  Negative musculoskeletal ROS        Neurology  Negative neurology ROS      Psychology   Negative psychology ROS              Physical Exam    Airway    Mallampati score: II  TM Distance: >3 FB  Neck ROM: full     Dental   No notable dental hx     Cardiovascular  Rhythm: regular, Rate: normal, Cardiovascular exam normal    Pulmonary  Pulmonary exam normal Breath sounds clear to auscultation,     Other Findings        Anesthesia Plan  ASA Score- 2 Emergent    Anesthesia Type- IV sedation with anesthesia with ASA Monitors  Additional Monitors:   Airway Plan:         Plan Factors-Patient not instructed to abstain from smoking on day of procedure  Patient did not smoke on day of surgery  Induction- intravenous  Postoperative Plan- Plan for postoperative opioid use  Informed Consent- Anesthetic plan and risks discussed with patient

## 2019-10-14 NOTE — ANESTHESIA POSTPROCEDURE EVALUATION
Post-Op Assessment Note    CV Status:  Stable    Pain management: adequate     Mental Status:  Alert and awake   Hydration Status:  Euvolemic   PONV Controlled:  Controlled   Airway Patency:  Patent   Post Op Vitals Reviewed: Yes      Staff: Anesthesiologist, CRNA           /84 (10/14/19 1227)    Temp      Pulse 58 (10/14/19 1227)   Resp 20 (10/14/19 1227)    SpO2 99 % (10/14/19 1227)

## 2019-10-14 NOTE — DISCHARGE INSTRUCTIONS
Anemia   WHAT YOU NEED TO KNOW:   Anemia is a low number of red blood cells or a low amount of hemoglobin in your red blood cells  Hemoglobin is a protein that helps carry oxygen throughout your body  Red blood cells use iron to create hemoglobin  Anemia may develop if your body does not have enough iron  It may also develop if your body does not make enough red blood cells or they die faster than your body can make them  DISCHARGE INSTRUCTIONS:   Call 911 or have someone call 911 for any of the following:   · You lose consciousness  · You have severe chest pain  Seek care immediately if:   · You have dark or bloody bowel movements  Contact your healthcare provider if:   · Your symptoms are worse, even after treatment  · You have questions or concerns about your condition or care  Medicines:   · Iron or folic acid supplements  help increase your red blood cell and hemoglobin levels  · Vitamin B12 injections  may help boost your red blood cell level and decrease your symptoms  Ask your healthcare provider how to inject B12  · Take your medicine as directed  Contact your healthcare provider if you think your medicine is not helping or if you have side effects  Tell him of her if you are allergic to any medicine  Keep a list of the medicines, vitamins, and herbs you take  Include the amounts, and when and why you take them  Bring the list or the pill bottles to follow-up visits  Carry your medicine list with you in case of an emergency  Prevent anemia:  Eat healthy foods rich in iron and vitamin C  Nuts, meat, dark leafy green vegetables, and beans are high in iron and protein  Vitamin C helps your body absorb iron  Foods rich in vitamin C include oranges and other citrus fruits  Ask your healthcare provider for a list of other foods that are high in iron or vitamin C  Ask if you need to be on a special diet     Follow up with your healthcare provider as directed:  Write down your questions so you remember to ask them during your visits  © 2017 [employer] Information is for End User's use only and may not be sold, redistributed or otherwise used for commercial purposes  All illustrations and images included in CareNotes® are the copyrighted property of [employer]  or Moe Harvey  The above information is an  only  It is not intended as medical advice for individual conditions or treatments  Talk to your doctor, nurse or pharmacist before following any medical regimen to see if it is safe and effective for you

## 2019-10-14 NOTE — PROGRESS NOTES
Progress Note - Liza Sher 1955, 59 y o  male MRN: [id number]    Unit/Bed#: [place] [address] [address] 211-01 Encounter: [id number]    Primary Care Provider: Federico Knight MD   Date and time admitted to hospital: 10/11/2019  2:59 PM    Communication plan:  Restart diet  Await GI recommendations of potential colonoscopy versus capsule endoscopy  Potentially restart anticoagulation  No bleeding source identified, patient did receive showed dilatation of known esophageal stricture  Discharge planning      Anemia associated with acute blood loss  Assessment & Plan  Patient required 2 units of PRBC infusion    - Stable hemoglobin    No source identified      EGD Report Below:  IMPRESSION:  Esophageal stricture dilated  Wills's esophagus biopsied  Hiatus hernia  Normal stomach and duodenal      RECOMMENDATION:  Follow up biopsy results  PPI b i d  Close outpatient follow-up  Repeat EGD in 3 months to evaluate for esophagitis    Bradycardia  Assessment & Plan  -patient noted to having episodes of bradycardia in the 40s  -cardiology input appreciated, no further episodes  Cleared by Cardiology, likely from acute anemia    Generalized maculopapular rash  Assessment & Plan  -patient has chronic rash present for over 30 years    No significant changes - Outpatient derm/PCP f/u    Wills's esophagus  Assessment & Plan  Resume PPI at discharge  Melvenia Im ulcer  Assessment & Plan  History of Pinkie Alma lesion , no bleeding seen on EGD    * Anemia  Assessment & Plan  Symptomatic anemia in patient melena stools, 4 point drop in hgb to 6 1 from 10 9 in 06/2019  -Pt has hx of caio lesion, severe esophagitis in the mid and distal esophagus with ulceration  Mild esophageal stricture in the mid esophagus dilated 10/14  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    Had undergone repeat egd in 06/2019 a this facility for dysphagia significant for significantly edematous and erythematous mucosa and inability to traverse upper esophagus to significant internal stricture  -s/p 2 IU PRBC  -hemoglobin 8 3, no evidence of repeat bleeding    Will discuss with GI plans for repeat upper endoscopy vs capsule study        VTE Pharmacologic Prophylaxis:   Pharmacologic: Held for GI bleed  Mechanical VTE Prophylaxis in Place: Yes    Patient Centered Rounds: I have performed bedside rounds with nursing staff today  Discussions with Specialists or Other Care Team Provider:  Reviewed with GI    Education and Discussions with Family / Patient:  Patient    Time Spent for Care: 30 minutes  More than 50% of total time spent on counseling and coordination of care as described above  Current Length of Stay: 3 day(s)    Current Patient Status: Inpatient   Certification Statement: The patient, who was admitted as an inpatient, is being discharged sooner than originally anticipated due to: No bleeding identified, likely can be discharged home    Discharge Plan:  Home    Code Status: Level 1 - Full Code      Subjective:   Patient seen and examined after upper endoscopy, no abdominal pain, no noted black stools  Case reviewed with the GI, he has had previous upper endoscopies as well as colonoscopy without any etiology identified  Last colonoscopy from May of 2019  The patient denies any other shortness of breath, no nausea or vomiting    A complete and comprehensive 14 point organ system review has been performed and all other systems are negative other than stated above  Objective:     Vitals:   Temp (24hrs), Av 7 °F (37 1 °C), Min:98 2 °F (36 8 °C), Max:99 5 °F (37 5 °C)    Temp:  [98 2 °F (36 8 °C)-99 5 °F (37 5 °C)] 98 2 °F (36 8 °C)  HR:  [50-77] 50  Resp:  [16-24] 20  BP: (114-160)/(75-85) 152/80  SpO2:  [97 %-100 %] 97 %  Body mass index is 19 58 kg/m²  Input and Output Summary (last 24 hours):        Intake/Output Summary (Last 24 hours) at 10/14/2019 1609  Last data filed at 10/14/2019 1240  Gross per 24 hour   Intake 300 ml   Output    Net 300 ml       Physical Exam:   General: well appearing, no acute distress  HEENT: atraumatic, PERRLA, moist mucosa, normal pharynx, normal tonsils and adenoids, normal tongue, no fluid in sinuses  Neck: Trachea midline, no carotid bruit, no masses  Respiratory: normal chest wall expansion, CTA B, no r/r/w, no rubs  Cardiovascular: RRR, no m/r/g, Normal S1 and S2  Abdomen: Soft, non-tender, non-distended, normal bowel sounds in all quadrants, no hepatosplenomegaly, no tympany  Rectal: deferred  Musculoskeletal: normal ROM in upper and lower extremities  Integumentary:  Maculopapular rash distributed on face, arms, legs  Heme/Lymph: no lymphadenopathy, no bruises  Neurological: Cranial Nerves II-XII grossly intact, no tics, normal sensation to pressure and light touch  Psychiatric: cooperative with normal mood, affect, and cognition      Additional Data:     Labs:    Results from last 7 days   Lab Units 10/14/19  0459   WBC Thousand/uL 5 58   HEMOGLOBIN g/dL 8 3*   HEMATOCRIT % 29 5*   PLATELETS Thousands/uL 221   NEUTROS PCT % 52   LYMPHS PCT % 18   MONOS PCT % 14*   EOS PCT % 14*     Results from last 7 days   Lab Units 10/14/19  0455  10/12/19  0831   SODIUM mmol/L 140   < > 143   POTASSIUM mmol/L 3 5   < > 3 9   CHLORIDE mmol/L 104   < > 108   CO2 mmol/L 26   < > 25   BUN mg/dL 6   < > 13   CREATININE mg/dL 0 67   < > 0 75   ANION GAP mmol/L 10   < > 10   CALCIUM mg/dL 8 4   < > 8 5   ALBUMIN g/dL  --   --  2 8*   TOTAL BILIRUBIN mg/dL  --   --  0 77   ALK PHOS U/L  --   --  102   ALT U/L  --   --  13   AST U/L  --   --  22   GLUCOSE RANDOM mg/dL 84   < > 86    < > = values in this interval not displayed  * I Have Reviewed All Lab Data Listed Above  * Additional Pertinent Lab Tests Reviewed:  All Labs For Current Hospital Admission Reviewed    Imaging:    Imaging Reports Reviewed Today Include:  Reviewed EGD reported      Recent Cultures (last 7 days): Last 24 Hours Medication List:     Current Facility-Administered Medications:  acetaminophen 650 mg Oral Q6H PRN Nakia Travis PA-C    ondansetron 4 mg Intravenous Q6H PRN Nakia Travis PA-C    pantoprazole 40 mg Intravenous Q12H [address] Nakia Travis PA-C    sodium chloride 125 mL/hr Intravenous Continuous Mitchell Null DO Last Rate: Stopped (10/14/19 1240)        Today, Patient Was Seen By: Talbert Riedel, DO    ** Please Note: Dictation voice to text software may have been used in the creation of this document   **

## 2019-10-14 NOTE — ASSESSMENT & PLAN NOTE
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

## 2019-10-14 NOTE — PROGRESS NOTES
Progress Note - Cardiology   Luis Carlos Men  59 y o  male MRN: [id number]  Unit/Bed#: [address] [address] 2 -01 Encounter: [id number]        Problem List:  Principal Problem:    Anemia  Active Problems:    Melene Stu ulcer    Wills's esophagus    Generalized maculopapular rash    Bradycardia      Asessment/Plan:  1  Anemia, EGD completed today without any complications  2  Asymptomatic bradycardia  3  Asymptomatic Wenckebach on telemetry     Plan:  1  No further workup necessary from Cardiology perspective  2  Please refer to electrophysiology evaluation over the weekend, no need for permanent pacemaker implantation  3  Workup for anemia per GI    Will sign off, please call if needed      Subjective:  Has no physical complaints    Vitals:  Vitals:    10/11/19 1455 10/11/19 1827   Weight: 65 5 kg (144 lb 6 4 oz) 65 5 kg (144 lb 6 4 oz)   ,  Vitals:    10/14/19 1215 10/14/19 1221 10/14/19 1227 10/14/19 1406   BP: 127/75 148/77 143/84 152/80   Pulse: 65 62 58 (!) 50   Resp: 20 22 20 20   Temp:    98 2 °F (36 8 °C)   TempSrc:       SpO2: 100% 98% 99% 97%   Weight:       Height:           Exam:  General:  alert, oriented and in no distress  Heart:  Regular, bradycardic, no murmurs  Respiratory effort:  Breathing comfortably  Lungs:  Clear bilaterally  Lower Limbs:  No edema           Telemetry:           Medications:    Current Facility-Administered Medications:     acetaminophen (TYLENOL) tablet 650 mg, 650 mg, Oral, Q6H PRN, Nakia Travis PA-C    ondansetron (ZOFRAN) injection 4 mg, 4 mg, Intravenous, Q6H PRN, Nakia Travis PA-C    pantoprazole (PROTONIX) injection 40 mg, 40 mg, Intravenous, Q12H [address], Nakia Travis PA-C, 40 mg at 10/14/19 0853    sodium chloride 0 9 % infusion, 125 mL/hr, Intravenous, Continuous, Mitchell Null DO, Stopped at 10/14/19 1240      Labs/Data:        Results from last 7 days   Lab Units 10/14/19  0459 10/13/19  0516 10/12/19  0086  10/11/19  1524   WBC Thousand/uL 5 58 5 04  --   --  4 17*   HEMOGLOBIN g/dL 8 3* 8 3* 7 8*   < > 6 1*   HEMATOCRIT % 29 5* 30 2* 28 3*   < > 23 4*   PLATELETS Thousands/uL 221 231  --   --  316    < > = values in this interval not displayed       Results from last 7 days   Lab Units 10/14/19  0455 10/13/19  0516 10/12/19  0831 10/11/19  1524   POTASSIUM mmol/L 3 5 3 6 3 9 4 3   CHLORIDE mmol/L 104 106 108 107   CO2 mmol/L 26 26 25 27   BUN mg/dL 6 10 13 13   TROPONIN I ng/mL  --   --   --  <0 02

## 2019-10-14 NOTE — QUICK NOTE
[facility]  [address] [facility], [address] [place] [address] Blvd  (Y) [phone] (h) [phone]  10/14/19      RE:  Stef Soriano  59 y o  male  : 1955    To whom it may concern,     Stef Soriano  was hospitalized under my care from 10/11/2019 to 10/14/19  Please excuse from all appointments and/or work and school related activities during this interim  Patient may return to work at previous activity level on/or after 10/15/2019    Feel free to contact me with any questions if necessary  Sincerely,                 Luis Enrique [facility]

## 2019-10-15 ENCOUNTER — TELEPHONE (OUTPATIENT)
Dept: GASTROENTEROLOGY | Facility: MEDICAL CENTER | Age: 64
End: 2019-10-15

## 2019-10-15 NOTE — TELEPHONE ENCOUNTER
----- Message from Sonam Helms PA-C sent at 10/14/2019  2:29 PM EDT -----  Please schedule hospital follow up with Dr Elder Gutierrez in 1 month   Thank you

## 2019-10-21 DIAGNOSIS — B37.81 ESOPHAGEAL CANDIDIASIS (HCC): Primary | ICD-10-CM

## 2019-10-21 RX ORDER — FLUCONAZOLE 200 MG/1
200 TABLET ORAL DAILY
Qty: 14 TABLET | Refills: 0 | Status: SHIPPED | OUTPATIENT
Start: 2019-10-21 | End: 2019-11-04

## 2019-10-28 ENCOUNTER — TELEPHONE (OUTPATIENT)
Dept: GASTROENTEROLOGY | Facility: MEDICAL CENTER | Age: 64
End: 2019-10-28

## 2019-10-28 NOTE — TELEPHONE ENCOUNTER
Patient was called twice and messages were left to call the office     also mailed patient a letter to call office     - entered repeat EGD recall

## 2019-10-28 NOTE — TELEPHONE ENCOUNTER
----- Message from Betsy Fregoso MD sent at 10/21/2019  4:02 PM EDT -----  Esophageal biopsy was benign, it has signs of inflammation  Biopsy also showed fungal infection, I will start him on fluconazole for 14 days to treat esophageal fungal infection  He needs repeat EGD in 3 months  Please schedule  Thanks

## 2020-01-02 ENCOUNTER — HOSPITAL ENCOUNTER (INPATIENT)
Facility: HOSPITAL | Age: 65
LOS: 5 days | Discharge: HOME | DRG: 381 | End: 2020-01-07
Attending: EMERGENCY MEDICINE | Admitting: HOSPITALIST
Payer: COMMERCIAL

## 2020-01-02 ENCOUNTER — APPOINTMENT (INPATIENT)
Dept: CT IMAGING | Facility: HOSPITAL | Age: 65
DRG: 381 | End: 2020-01-02
Payer: COMMERCIAL

## 2020-01-02 DIAGNOSIS — D50.0 IRON DEFICIENCY ANEMIA DUE TO CHRONIC BLOOD LOSS: ICD-10-CM

## 2020-01-02 DIAGNOSIS — D64.9 SYMPTOMATIC ANEMIA: Primary | ICD-10-CM

## 2020-01-02 DIAGNOSIS — Q27.30 AVM (ARTERIOVENOUS MALFORMATION): ICD-10-CM

## 2020-01-02 DIAGNOSIS — K22.70 BARRETT'S ESOPHAGUS WITHOUT DYSPLASIA: ICD-10-CM

## 2020-01-02 DIAGNOSIS — D75.839 THROMBOCYTOSIS: ICD-10-CM

## 2020-01-02 DIAGNOSIS — D64.9 ANEMIA: ICD-10-CM

## 2020-01-02 PROBLEM — R10.10 PAIN OF UPPER ABDOMEN: Status: ACTIVE | Noted: 2020-01-02

## 2020-01-02 LAB
ABO GROUP BLD: NORMAL
ALBUMIN SERPL BCP-MCNC: 2.4 G/DL (ref 3.5–5)
ALP SERPL-CCNC: 112 U/L (ref 46–116)
ALT SERPL W P-5'-P-CCNC: 13 U/L (ref 12–78)
ANION GAP SERPL CALCULATED.3IONS-SCNC: 9 MMOL/L (ref 4–13)
APTT PPP: 31 SECONDS (ref 23–37)
AST SERPL W P-5'-P-CCNC: 17 U/L (ref 5–45)
ATRIAL RATE: 79 BPM
BASOPHILS # BLD AUTO: 0.18 THOUSANDS/ΜL (ref 0–0.1)
BASOPHILS NFR BLD AUTO: 3 % (ref 0–1)
BILIRUB SERPL-MCNC: 0.5 MG/DL (ref 0.2–1)
BLD GP AB SCN SERPL QL: NEGATIVE
BUN SERPL-MCNC: 10 MG/DL (ref 5–25)
CALCIUM SERPL-MCNC: 8.6 MG/DL (ref 8.3–10.1)
CHLORIDE SERPL-SCNC: 104 MMOL/L (ref 100–108)
CO2 SERPL-SCNC: 26 MMOL/L (ref 21–32)
CREAT SERPL-MCNC: 1.16 MG/DL (ref 0.6–1.3)
EOSINOPHIL # BLD AUTO: 0.18 THOUSAND/ΜL (ref 0–0.61)
EOSINOPHIL NFR BLD AUTO: 3 % (ref 0–6)
ERYTHROCYTE [DISTWIDTH] IN BLOOD BY AUTOMATED COUNT: 19 % (ref 11.6–15.1)
GFR SERPL CREATININE-BSD FRML MDRD: 66 ML/MIN/1.73SQ M
GLUCOSE SERPL-MCNC: 107 MG/DL (ref 65–140)
HCT VFR BLD AUTO: 25.7 % (ref 36.5–49.3)
HCT VFR BLD AUTO: 26.7 % (ref 36.5–49.3)
HGB BLD-MCNC: 6.7 G/DL (ref 12–17)
HGB BLD-MCNC: 7.1 G/DL (ref 12–17)
HOLD SPECIMEN: NORMAL
IMM GRANULOCYTES # BLD AUTO: 0.03 THOUSAND/UL (ref 0–0.2)
IMM GRANULOCYTES NFR BLD AUTO: 1 % (ref 0–2)
INR PPP: 1.05 (ref 0.84–1.19)
LIPASE SERPL-CCNC: 169 U/L (ref 73–393)
LYMPHOCYTES # BLD AUTO: 0.77 THOUSANDS/ΜL (ref 0.6–4.47)
LYMPHOCYTES NFR BLD AUTO: 12 % (ref 14–44)
MCH RBC QN AUTO: 18.1 PG (ref 26.8–34.3)
MCHC RBC AUTO-ENTMCNC: 26.1 G/DL (ref 31.4–37.4)
MCV RBC AUTO: 70 FL (ref 82–98)
MONOCYTES # BLD AUTO: 0.95 THOUSAND/ΜL (ref 0.17–1.22)
MONOCYTES NFR BLD AUTO: 15 % (ref 4–12)
NEUTROPHILS # BLD AUTO: 4.28 THOUSANDS/ΜL (ref 1.85–7.62)
NEUTS SEG NFR BLD AUTO: 66 % (ref 43–75)
NRBC BLD AUTO-RTO: 0 /100 WBCS
P AXIS: 65 DEGREES
PLATELET # BLD AUTO: 757 THOUSANDS/UL (ref 149–390)
PMV BLD AUTO: 9 FL (ref 8.9–12.7)
POTASSIUM SERPL-SCNC: 3.9 MMOL/L (ref 3.5–5.3)
PR INTERVAL: 170 MS
PROT SERPL-MCNC: 7.2 G/DL (ref 6.4–8.2)
PROTHROMBIN TIME: 13.8 SECONDS (ref 11.6–14.5)
QRS AXIS: 113 DEGREES
QRSD INTERVAL: 88 MS
QT INTERVAL: 374 MS
QTC INTERVAL: 428 MS
RBC # BLD AUTO: 3.7 MILLION/UL (ref 3.88–5.62)
RH BLD: POSITIVE
SODIUM SERPL-SCNC: 139 MMOL/L (ref 136–145)
SPECIMEN EXPIRATION DATE: NORMAL
T WAVE AXIS: 90 DEGREES
VENTRICULAR RATE: 79 BPM
WBC # BLD AUTO: 6.39 THOUSAND/UL (ref 4.31–10.16)

## 2020-01-02 PROCEDURE — 74177 CT ABD & PELVIS W/CONTRAST: CPT

## 2020-01-02 PROCEDURE — 93005 ELECTROCARDIOGRAM TRACING: CPT

## 2020-01-02 PROCEDURE — 36415 COLL VENOUS BLD VENIPUNCTURE: CPT | Performed by: EMERGENCY MEDICINE

## 2020-01-02 PROCEDURE — 86850 RBC ANTIBODY SCREEN: CPT | Performed by: EMERGENCY MEDICINE

## 2020-01-02 PROCEDURE — 30233N1 TRANSFUSION OF NONAUTOLOGOUS RED BLOOD CELLS INTO PERIPHERAL VEIN, PERCUTANEOUS APPROACH: ICD-10-PCS | Performed by: EMERGENCY MEDICINE

## 2020-01-02 PROCEDURE — 99284 EMERGENCY DEPT VISIT MOD MDM: CPT

## 2020-01-02 PROCEDURE — 96374 THER/PROPH/DIAG INJ IV PUSH: CPT

## 2020-01-02 PROCEDURE — 99285 EMERGENCY DEPT VISIT HI MDM: CPT | Performed by: EMERGENCY MEDICINE

## 2020-01-02 PROCEDURE — 85018 HEMOGLOBIN: CPT | Performed by: INTERNAL MEDICINE

## 2020-01-02 PROCEDURE — 85025 COMPLETE CBC W/AUTO DIFF WBC: CPT | Performed by: EMERGENCY MEDICINE

## 2020-01-02 PROCEDURE — 86901 BLOOD TYPING SEROLOGIC RH(D): CPT | Performed by: EMERGENCY MEDICINE

## 2020-01-02 PROCEDURE — 85610 PROTHROMBIN TIME: CPT | Performed by: EMERGENCY MEDICINE

## 2020-01-02 PROCEDURE — 83690 ASSAY OF LIPASE: CPT | Performed by: EMERGENCY MEDICINE

## 2020-01-02 PROCEDURE — 85014 HEMATOCRIT: CPT | Performed by: INTERNAL MEDICINE

## 2020-01-02 PROCEDURE — 86923 COMPATIBILITY TEST ELECTRIC: CPT

## 2020-01-02 PROCEDURE — 93010 ELECTROCARDIOGRAM REPORT: CPT | Performed by: INTERNAL MEDICINE

## 2020-01-02 PROCEDURE — 85730 THROMBOPLASTIN TIME PARTIAL: CPT | Performed by: EMERGENCY MEDICINE

## 2020-01-02 PROCEDURE — 83883 ASSAY NEPHELOMETRY NOT SPEC: CPT | Performed by: INTERNAL MEDICINE

## 2020-01-02 PROCEDURE — P9016 RBC LEUKOCYTES REDUCED: HCPCS

## 2020-01-02 PROCEDURE — 86900 BLOOD TYPING SEROLOGIC ABO: CPT | Performed by: EMERGENCY MEDICINE

## 2020-01-02 PROCEDURE — 80053 COMPREHEN METABOLIC PANEL: CPT | Performed by: EMERGENCY MEDICINE

## 2020-01-02 PROCEDURE — C9113 INJ PANTOPRAZOLE SODIUM, VIA: HCPCS | Performed by: EMERGENCY MEDICINE

## 2020-01-02 PROCEDURE — C9113 INJ PANTOPRAZOLE SODIUM, VIA: HCPCS | Performed by: NURSE PRACTITIONER

## 2020-01-02 PROCEDURE — 99222 1ST HOSP IP/OBS MODERATE 55: CPT | Performed by: NURSE PRACTITIONER

## 2020-01-02 RX ORDER — ACETAMINOPHEN 325 MG/1
650 TABLET ORAL EVERY 6 HOURS PRN
Status: DISCONTINUED | OUTPATIENT
Start: 2020-01-02 | End: 2020-01-07 | Stop reason: HOSPADM

## 2020-01-02 RX ORDER — PANTOPRAZOLE SODIUM 40 MG/1
40 INJECTION, POWDER, FOR SOLUTION INTRAVENOUS ONCE
Status: COMPLETED | OUTPATIENT
Start: 2020-01-02 | End: 2020-01-02

## 2020-01-02 RX ORDER — ONDANSETRON 2 MG/ML
4 INJECTION INTRAMUSCULAR; INTRAVENOUS EVERY 4 HOURS PRN
Status: DISCONTINUED | OUTPATIENT
Start: 2020-01-02 | End: 2020-01-07 | Stop reason: HOSPADM

## 2020-01-02 RX ORDER — SUCRALFATE 1 G/1
1 TABLET ORAL
Status: DISCONTINUED | OUTPATIENT
Start: 2020-01-02 | End: 2020-01-03

## 2020-01-02 RX ORDER — PANTOPRAZOLE SODIUM 40 MG/1
40 INJECTION, POWDER, FOR SOLUTION INTRAVENOUS EVERY 12 HOURS SCHEDULED
Status: DISCONTINUED | OUTPATIENT
Start: 2020-01-02 | End: 2020-01-06

## 2020-01-02 RX ORDER — MULTIVIT WITH MINERALS/LUTEIN
250 TABLET ORAL DAILY
COMMUNITY

## 2020-01-02 RX ADMIN — PANTOPRAZOLE SODIUM 40 MG: 40 INJECTION, POWDER, FOR SOLUTION INTRAVENOUS at 21:12

## 2020-01-02 RX ADMIN — PANTOPRAZOLE SODIUM 40 MG: 40 INJECTION, POWDER, FOR SOLUTION INTRAVENOUS at 14:30

## 2020-01-02 RX ADMIN — IOHEXOL 50 ML: 240 INJECTION, SOLUTION INTRATHECAL; INTRAVASCULAR; INTRAVENOUS; ORAL at 19:10

## 2020-01-02 RX ADMIN — IOHEXOL 100 ML: 350 INJECTION, SOLUTION INTRAVENOUS at 19:10

## 2020-01-02 RX ADMIN — SUCRALFATE 1 G: 1 TABLET ORAL at 21:12

## 2020-01-02 RX ADMIN — SUCRALFATE 1 G: 1 TABLET ORAL at 16:59

## 2020-01-02 NOTE — ASSESSMENT & PLAN NOTE
· Underwent EGD on 10/13 with findings of Wills's esophagus, severe esophagitis, and large hiatal hernia  · Biopsy results reveal fungal infection  Patient was to be treated with Fluconazole however GI was unable to reach patient  · Did not follow up with GI  · To have repeat EGD in 3 months from that time  · Denies current reflux symptoms  · GI consult requested  Will defer treatment to them

## 2020-01-02 NOTE — LETTER
[id number]   Dept: [phone]    January 7, 2020     Patient: Madelaine Jaeger  YOB: 1955   Date of Visit: 1/2/2020       To Whom it May Concern:    Tuan Huang is under my professional care  He was seen in the hospital from 1/2/2020   to 01/07/20  He may return to work on Monday, 1/13/20 without limitations  If you have any questions or concerns, please don't hesitate to call           Sincerely,          ROMA Morales

## 2020-01-02 NOTE — ASSESSMENT & PLAN NOTE
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

## 2020-01-02 NOTE — H&P
[address] [facility]  H&P- Angel Mcmahon 1955, 59 y o  male MRN: [id number]    [facility] [place] [facility] Encounter: [id number]    Primary Care Provider: Glenda Li MD   Date and time admitted to hospital: 1/2/2020  1:58 PM        Iron deficiency anemia due to chronic blood loss  Assessment & Plan  · With recurrent GI bleeding  · History of small bowel AVM, esophagitis, Wills's esophagus  · Presents with fatigue and some intermittent shortness of breath, Hgb 6 7  Denies dark stool or blood in stool  · Transfuse one unit prbc's  · Monitor H&H  · Fecal occult blood ordered  · Consult to GI    * Pain of upper abdomen  Assessment & Plan  · 7-10 day history of abdominal pain that has been worsening  States that the pain is postprandial and occurs approximately 2 hours after eating  He describes it as a ache  Tenderness in the periumbilical/epigastric region  He has never had pain like this before  · 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  · LFTs/lipase within normal limits  No leukocytosis  · CT abdomen/pelvis ordered with contrast for better evaluation in this patient who has had unexpected weight loss of 12 lb since October, anemia, fatigue, and abdominal pain which is new  · Consult to GI pending  · Serial abdominal exams    Moderate protein-calorie malnutrition ([address] )  Assessment & Plan  Malnutrition Findings:           BMI Findings: Body mass index is 18 87 kg/m²  · Unexpected weight loss of 12 lb since his last admission in October  · Denies issue with appetite however has been experiencing pain after eating  · Consult dietitian    Wills's esophagus  Assessment & Plan  · Underwent EGD on 10/13 with findings of Wills's esophagus, severe esophagitis, and large hiatal hernia  · Biopsy results reveal fungal infection  Patient was to be treated with Fluconazole however GI was unable to reach patient    · Did not follow up with GI  · To have repeat EGD in 3 months from that time  · Denies current reflux symptoms  · GI consult requested  Will defer treatment to them  VTE Prophylaxis: Pharmacologic VTE Prophylaxis contraindicated due to GI bleed  / sequential compression device   Code Status:  Full code  POLST: POLST is not applicable to this patient  Discussion with family:  Plan of care with patient    Anticipated Length of Stay:  Patient will be admitted on an Inpatient basis with an anticipated length of stay of  more than 2 midnights  Justification for Hospital Stay:  GI evaluation/monitoring of anemia    Total Time for Visit, including Counseling / Coordination of Care: 45 minutes  Greater than 50% of this total time spent on direct patient counseling and coordination of care  Chief Complaint:   Abdominal pain    History of Present Illness:    Clarisse Hernandez  is a 59 y o  male who presents with abdominal pain  Patient reports for the last 7-10 days, he has been having worsening abdominal pain  He describes it as intermittent, aching, and postprandial occurring about 2 hours after eating  He denies nausea, vomiting, constipation, or diarrhea  He denies dark stools or blood in stool  He states he has never had pain like this before  He has been experiencing periodic light headedness for about the last month  He denies shortness of breath or palpitations  Patient's history is pertinent for severe esophagitis, Wills's esophagus, large hiatal hernia, previous upper GI bleeding, small bowel AVM  He has undergone multiple endoscopies and colonoscopy as well  He has received blood transfusions in the past   After his last hospitalization in October, he did not have any GI follow-up  He was found to have a fungal esophagitis and was to be treated with fluconazole however GI could not reach him by telephone therefore this was never treated    He also reports a 12 lb weight loss since that hospitalization that was unintentional   He reports no issue with his appetite  Patient is admitted for further observation and management  A blood transfusion has been ordered  A consultation has been requested with GI  Imaging of his abdomen with contrast will be performed due to his abdominal tenderness, weight loss, anemia, and new onset of this pain  Serial H&Hs will be performed as well as abdominal exams  Review of Systems:    Review of Systems   Constitutional: Positive for fatigue and unexpected weight change  Negative for activity change, appetite change, chills and fever  HENT: Negative for congestion and sore throat  Respiratory: Positive for shortness of breath  Negative for cough  Cardiovascular: Negative for chest pain and palpitations  Gastrointestinal: Positive for abdominal pain  Negative for abdominal distention, anal bleeding, blood in stool, constipation, diarrhea, nausea and vomiting  Genitourinary: Negative for difficulty urinating, dysuria, frequency and urgency  Musculoskeletal: Negative for arthralgias and joint swelling  Skin: Negative for rash  Neurological: Positive for dizziness and light-headedness  Negative for seizures, syncope, weakness, numbness and headaches  All other systems reviewed and are negative  Past Medical and Surgical History:     Past Medical History:   Diagnosis Date    Anemia     AVM (arteriovenous malformation) of small bowel, acquired     Wills's esophagus     Cirilo ulcer     Esophagitis     History of blood transfusion     22 prior transfusions       Past Surgical History:   Procedure Laterality Date    APPENDECTOMY      COLONOSCOPY N/A 11/6/2017    Procedure: COLONOSCOPY;  Surgeon: Amol Yadav MD;  Location: [facility];   Service: Gastroenterology    EGD AND COLONOSCOPY  07/18/2017    EGD AND COLONOSCOPY N/A 9/17/2018    Procedure: EGD with biopsy AND COLONOSCOPY-incomplete to sigmoid colon;  Surgeon: Chris Lin DO;  Location: [facility];  Service: Gastroenterology    ESOPHAGOGASTRODUODENOSCOPY N/A 10/6/2017    Procedure: ESOPHAGOGASTRODUODENOSCOPY (EGD) with biopsy;  Surgeon: Yomi Lombardi MD;  Location: [facility]; Service: Gastroenterology    ESOPHAGOGASTRODUODENOSCOPY N/A 11/3/2017    Procedure: ESOPHAGOGASTRODUODENOSCOPY (EGD) with biopsy;  Surgeon: Jhony Enciso MD;  Location: [facility]; Service: Gastroenterology    ESOPHAGOGASTRODUODENOSCOPY N/A 1/23/2018    Procedure: ESOPHAGOGASTRODUODENOSCOPY (EGD) with biopsy;  Surgeon: Yomi Lombardi MD;  Location: [facility]; Service: Gastroenterology    ESOPHAGOGASTRODUODENOSCOPY N/A 3/26/2018    Procedure: ESOPHAGOGASTRODUODENOSCOPY (EGD); Surgeon: Jeremy Woody MD;  Location: [facility]; Service: Gastroenterology       Meds/Allergies:    Prior to Admission medications    Medication Sig Start Date End Date Taking? Authorizing Provider   ascorbic acid (VITAMIN C) 250 mg tablet Take 250 mg by mouth daily   Yes Historical Provider, MD   pantoprazole (PROTONIX) 40 mg tablet Take 1 tablet (40 mg total) by mouth 2 (two) times a day 5/17/19  Yes Ryan Camargo MD   sucralfate (CARAFATE) 1 g tablet Take 1 tablet by mouth 4 times per day  5/28/19  Yes Historical Provider, MD   ferrous sulfate 324 (65 Fe) mg Take 325 mg by mouth daily before breakfast  1/25/18   Historical Provider, MD   lactase (LACTAID) 3,000 units tablet Take 1 tablet (3,000 Units total) by mouth 3 (three) times a day with meals  Patient not taking: Reported on 1/2/2020 5/17/19   Ryan Camargo MD     I have reviewed home medications with patient personally  Allergies:    Allergies   Allergen Reactions    Doxylamine GI Intolerance    Lactose      Pt lactose intolerant         Social History:     Marital Status:    Occupation:  Finishes counter tops  Patient Pre-hospital Living Situation:  Alone  Patient Pre-hospital Level of Mobility:  No limitations  Patient Pre-hospital Diet Restrictions: Lactose intolerant  Substance Use History:   Social History     Substance and Sexual Activity   Alcohol Use Not Currently    Comment: once or twice a week      Social History     Tobacco Use   Smoking Status Former Smoker    Last attempt to quit: 11/3/2014    Years since quittin 1   Smokeless Tobacco Never Used     Social History     Substance and Sexual Activity   Drug Use No       Family History:    non-contributory    Physical Exam:     Vitals:   Blood Pressure: 155/79 (20 1520)  Pulse: 71 (20 1520)  Temperature: 98 8 °F (37 1 °C) (20 1328)  Temp Source: Temporal (20 1328)  Respirations: 16 (20 1520)  Weight - Scale: 63 1 kg (139 lb 1 8 oz) (20 1328)  SpO2: 96 % (20 1520)    Physical Exam   Constitutional: He is oriented to person, place, and time  He appears cachectic  No distress  HENT:   Head: Normocephalic and atraumatic  Mouth/Throat: He has dentures (Poor fitting dentures)  Eyes: Conjunctivae are normal  No scleral icterus  Cardiovascular: Normal rate, regular rhythm and normal heart sounds  No murmur heard  Pulmonary/Chest: Effort normal and breath sounds normal  No respiratory distress  He has no wheezes  He has no rales  Abdominal: Soft  Bowel sounds are normal  He exhibits no distension  There is tenderness in the epigastric area and periumbilical area  Musculoskeletal: Normal range of motion  He exhibits no edema or tenderness  Neurological: He is alert and oriented to person, place, and time  Skin: Skin is warm and dry  Rash (On face) noted  He is not diaphoretic  Psychiatric: He has a normal mood and affect  His behavior is normal    Nursing note and vitals reviewed  Additional Data:     Lab Results: I have personally reviewed pertinent reports        Results from last 7 days   Lab Units 20  1337   WBC Thousand/uL 6 39   HEMOGLOBIN g/dL 6 7*   HEMATOCRIT % 25 7*   PLATELETS Thousands/uL 757*   NEUTROS PCT % 66 LYMPHS PCT % 12*   MONOS PCT % 15*   EOS PCT % 3     Results from last 7 days   Lab Units 01/02/20  1337   SODIUM mmol/L 139   POTASSIUM mmol/L 3 9   CHLORIDE mmol/L 104   CO2 mmol/L 26   BUN mg/dL 10   CREATININE mg/dL 1 16   ANION GAP mmol/L 9   CALCIUM mg/dL 8 6   ALBUMIN g/dL 2 4*   TOTAL BILIRUBIN mg/dL 0 50   ALK PHOS U/L 112   ALT U/L 13   AST U/L 17   GLUCOSE RANDOM mg/dL 107     Results from last 7 days   Lab Units 01/02/20  1337   INR  1 05                   Imaging: I have personally reviewed pertinent reports  CT abdomen pelvis w contrast    (Results Pending)       EKG, Pathology, and Other Studies Reviewed on Admission:   · EKG:  None    Allscripts / Epic Records Reviewed: Yes     ** Please Note: This note has been constructed using a voice recognition system   **

## 2020-01-02 NOTE — ED PROVIDER NOTES
History  Chief Complaint   Patient presents with    Fatigue     Pt reports fatigue x 1 5 weeks, pt reports upper abd pain  Denies N/V/D, intermittent SOB     80-year-old male presenting for evaluation of fatigue and upper abdominal pain  Patient states that symptoms have been present for the past 2 progressively worse  He reports fatigue/decreased energy  Also reports some upper abdominal pain  States the pain is worse about 2 hours after eating or drinking anything  No episodes of vomiting  Reports normal bowel movements and denies any loose, dark or bloody stools  Has not noticed any areas of bleeding  Not currently on any blood thinners  History of Wills's esophagus, 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, small bowel AVM s/p ablation  Patient was last admitted October 2019 for symptomatic anemia, EGD was performed at that time  He required 2 units blood transfusion during admission  He was supposed to follow up with GI outpatient and to have a repeat EGD done 3 months later, however patient did not  A/P:  80-year-old male with upper abdominal pain, symptomatic anemia, concern for repeat GI bleed, patient consents to 1 unit PRBCs, will get abdominal labs, coags, admit         Per review of records  10/28 note- Esophageal bx- benign, fungal infection- rx for fluconazole  Recommended repeat EGD in 3 months    Admitted 10/11- 10/14 for symptomatic anemia  H/o previous upper GI bleed with Daniels Cumins lesions, transfused 2u PRBCs during admission    H/o Wills's esophagus, small bowel AVM s/p ablation    During admission in May 2019, patient EGD showed small ulcer and AVM without any active bleeding, plan was to have a small-bowel capsule endoscopy to look for additional AVMs    Prior to Admission Medications   Prescriptions Last Dose Informant Patient Reported?  Taking?   ascorbic acid (VITAMIN C) 250 mg tablet   Yes Yes   Sig: Take 250 mg by mouth daily   ferrous sulfate 324 (65 Fe) mg   Yes No   Sig: Take 325 mg by mouth daily before breakfast    lactase (LACTAID) 3,000 units tablet Not Taking at Unknown time  No No   Sig: Take 1 tablet (3,000 Units total) by mouth 3 (three) times a day with meals   Patient not taking: Reported on 1/2/2020   pantoprazole (PROTONIX) 40 mg tablet   No Yes   Sig: Take 1 tablet (40 mg total) by mouth 2 (two) times a day   sucralfate (CARAFATE) 1 g tablet   Yes Yes   Sig: Take 1 tablet by mouth 4 times per day  Facility-Administered Medications: None       Past Medical History:   Diagnosis Date    Anemia     AVM (arteriovenous malformation) of small bowel, acquired     Wills's esophagus     Cirilo ulcer     Esophagitis     History of blood transfusion     22 prior transfusions       Past Surgical History:   Procedure Laterality Date    APPENDECTOMY      COLONOSCOPY N/A 11/6/2017    Procedure: COLONOSCOPY;  Surgeon: Naima Kang MD;  Location: [facility]; Service: Gastroenterology    EGD AND COLONOSCOPY  07/18/2017    EGD AND COLONOSCOPY N/A 9/17/2018    Procedure: EGD with biopsy AND COLONOSCOPY-incomplete to sigmoid colon;  Surgeon: [facility], DO;  Location: [facility]; Service: Gastroenterology    ESOPHAGOGASTRODUODENOSCOPY N/A 10/6/2017    Procedure: ESOPHAGOGASTRODUODENOSCOPY (EGD) with biopsy;  Surgeon: Josias Lopez MD;  Location: [facility]; Service: Gastroenterology    ESOPHAGOGASTRODUODENOSCOPY N/A 11/3/2017    Procedure: ESOPHAGOGASTRODUODENOSCOPY (EGD) with biopsy;  Surgeon: Jazzy Oliver MD;  Location: [facility]; Service: Gastroenterology    ESOPHAGOGASTRODUODENOSCOPY N/A 1/23/2018    Procedure: ESOPHAGOGASTRODUODENOSCOPY (EGD) with biopsy;  Surgeon: Josias Lopez MD;  Location: [facility]; Service: Gastroenterology    ESOPHAGOGASTRODUODENOSCOPY N/A 3/26/2018    Procedure: ESOPHAGOGASTRODUODENOSCOPY (EGD); Surgeon: Namia Kang MD;  Location: [facility];   Service: Gastroenterology       Family History   Problem Relation Age of Onset    Hemangiomas Father      I have reviewed and agree with the history as documented  Social History     Tobacco Use    Smoking status: Former Smoker     Last attempt to quit: 11/3/2014     Years since quittin 1    Smokeless tobacco: Never Used   Substance Use Topics    Alcohol use: Not Currently     Comment: once or twice a week     Drug use: No        Review of Systems   Constitutional: Positive for fatigue  Negative for chills, fever and unexpected weight change  HENT: Negative for ear pain, rhinorrhea and sore throat  Eyes: Negative for pain and visual disturbance  Respiratory: Negative for cough and shortness of breath  Cardiovascular: Negative for chest pain and leg swelling  Gastrointestinal: Positive for abdominal pain  Negative for constipation, diarrhea, nausea and vomiting  Endocrine: Negative for polydipsia, polyphagia and polyuria  Genitourinary: Negative for dysuria, frequency, hematuria and urgency  Musculoskeletal: Negative for back pain, myalgias and neck pain  Skin: Negative for color change and rash  Allergic/Immunologic: Negative for environmental allergies and immunocompromised state  Neurological: Negative for dizziness, weakness, light-headedness, numbness and headaches  Hematological: Negative for adenopathy  Does not bruise/bleed easily  Psychiatric/Behavioral: Negative for agitation and confusion  All other systems reviewed and are negative  Physical Exam  Physical Exam   Constitutional: He is oriented to person, place, and time  He appears well-developed and well-nourished  HENT:   Head: Normocephalic and atraumatic  Nose: Nose normal    Mouth/Throat: Oropharynx is clear and moist    Eyes: Conjunctivae and EOM are normal    Neck: Normal range of motion  Neck supple  Cardiovascular: Normal rate, regular rhythm, normal heart sounds and intact distal pulses     Pulmonary/Chest: Effort normal and breath sounds normal  No stridor  No respiratory distress  He has no wheezes  He has no rales  He exhibits no tenderness  Abdominal: Soft  He exhibits no distension  There is no tenderness  There is no rebound and no guarding  Musculoskeletal: He exhibits no edema or deformity  Neurological: He is alert and oriented to person, place, and time  He exhibits normal muscle tone  Coordination normal    Skin: Skin is warm and dry  No rash noted  Psychiatric: He has a normal mood and affect  Judgment and thought content normal    Nursing note and vitals reviewed        Vital Signs  ED Triage Vitals   Temperature Pulse Respirations Blood Pressure SpO2   01/02/20 1328 01/02/20 1328 01/02/20 1328 01/02/20 1328 01/02/20 1328   98 8 °F (37 1 °C) 94 18 154/86 95 %      Temp Source Heart Rate Source Patient Position - Orthostatic VS BP Location FiO2 (%)   01/02/20 1328 01/02/20 1328 01/02/20 1520 01/02/20 1328 --   Temporal Monitor Lying Right arm       Pain Score       01/02/20 1328       3           Vitals:    01/02/20 1611 01/02/20 1648 01/02/20 1832 01/02/20 2227   BP: 154/90 153/89 147/87 137/78   Pulse: 72 72 67 65   Patient Position - Orthostatic VS: Lying Sitting           Visual Acuity      ED Medications  Medications   sucralfate (CARAFATE) tablet 1 g (1 g Oral Given 1/2/20 2112)   acetaminophen (TYLENOL) tablet 650 mg (has no administration in time range)   pantoprazole (PROTONIX) injection 40 mg (40 mg Intravenous Given 1/2/20 2112)   ondansetron (ZOFRAN) injection 4 mg (has no administration in time range)   pantoprazole (PROTONIX) injection 40 mg (40 mg Intravenous Given 1/2/20 1430)   iohexol (OMNIPAQUE) 350 MG/ML injection (MULTI-DOSE) 100 mL (100 mL Intravenous Given 1/2/20 1910)   iohexol (OMNIPAQUE) 240 MG/ML solution 50 mL (50 mL Oral Given 1/2/20 1910)       Diagnostic Studies  Results Reviewed     Procedure Component Value Units Date/Time    APTT [[id number]]  (Normal) Collected:  01/02/20 1337    Lab Status:  Final result Specimen:  Blood from Arm, Left Updated:  01/02/20 1433     PTT 31 seconds     Protime-INR [[id number]]  (Normal) Collected:  01/02/20 1337    Lab Status:  Final result Specimen:  Blood from Arm, Left Updated:  01/02/20 1433     Protime 13 8 seconds      INR 1 05    Comprehensive metabolic panel [[id number]]  (Abnormal) Collected:  01/02/20 1337    Lab Status:  Final result Specimen:  Blood from Arm, Left Updated:  01/02/20 1418     Sodium 139 mmol/L      Potassium 3 9 mmol/L      Chloride 104 mmol/L      CO2 26 mmol/L      ANION GAP 9 mmol/L      BUN 10 mg/dL      Creatinine 1 16 mg/dL      Glucose 107 mg/dL      Calcium 8 6 mg/dL      AST 17 U/L      ALT 13 U/L      Alkaline Phosphatase 112 U/L      Total Protein 7 2 g/dL      Albumin 2 4 g/dL      Total Bilirubin 0 50 mg/dL      eGFR 66 ml/min/1 73sq m     Narrative:       [place] guidelines for Chronic Kidney Disease (CKD):     Stage 1 with normal or high GFR (GFR > 90 mL/min/1 73 square meters)    Stage 2 Mild CKD (GFR = 60-89 mL/min/1 73 square meters)    Stage 3A Moderate CKD (GFR = 45-59 mL/min/1 73 square meters)    Stage 3B Moderate CKD (GFR = 30-44 mL/min/1 73 square meters)    Stage 4 Severe CKD (GFR = 15-29 mL/min/1 73 square meters)    Stage 5 End Stage CKD (GFR <15 mL/min/1 73 square meters)  Note: GFR calculation is accurate only with a steady state creatinine    Lipase [[id number]]  (Normal) Collected:  01/02/20 1337    Lab Status:  Final result Specimen:  Blood from Arm, Left Updated:  01/02/20 1418     Lipase 169 u/L     CBC and differential [[id number]]  (Abnormal) Collected:  01/02/20 1337    Lab Status:  Final result Specimen:  Blood from Arm, Left Updated:  01/02/20 1356     WBC 6 39 Thousand/uL      RBC 3 70 Million/uL      Hemoglobin 6 7 g/dL      Hematocrit 25 7 %      MCV 70 fL      MCH 18 1 pg      MCHC 26 1 g/dL      RDW 19 0 %      MPV 9 0 fL      Platelets 199 Thousands/uL      nRBC 0 /100 WBCs Neutrophils Relative 66 %      Immat GRANS % 1 %      Lymphocytes Relative 12 %      Monocytes Relative 15 %      Eosinophils Relative 3 %      Basophils Relative 3 %      Neutrophils Absolute 4 28 Thousands/µL      Immature Grans Absolute 0 03 Thousand/uL      Lymphocytes Absolute 0 77 Thousands/µL      Monocytes Absolute 0 95 Thousand/µL      Eosinophils Absolute 0 18 Thousand/µL      Basophils Absolute 0 18 Thousands/µL     [place] draw [[id number]] Collected:  01/02/20 1337    Lab Status: In process Specimen:  Blood from Arm, Left Updated:  01/02/20 1341    Narrative: The following orders were created for panel order [place] draw  Procedure                               Abnormality         Status                     ---------                               -----------         ------                     Caitlin Perez Top 7ml on TVXM[[id number]]                         In process                   Please view results for these tests on the individual orders  CT abdomen pelvis w contrast    (Results Pending)              Procedures  Procedures         ED Course  ED Course as of Jan 02 2247   Thu Jan 02, 2020   1417 Hemoglobin(!!): 6 7   1418 Blood transfusion consent signed by patient and on chart      [id number] Pending call back from [facility], paged for admission                                  MDM  Number of Diagnoses or Management Options  Symptomatic anemia:   Diagnosis management comments: 30-year-old male presenting for evaluation of fatigue, likely from symptomatic anemia  Concern for recurrent upper GI bleed, patient ordered for 1 unit PRBCs    Admitted for further management/workup       Amount and/or Complexity of Data Reviewed  Clinical lab tests: ordered and reviewed  Tests in the radiology section of CPT®: reviewed  Tests in the medicine section of CPT®: ordered and reviewed          Disposition  Final diagnoses:   Symptomatic anemia     Time reflects when diagnosis was documented in both MDM as applicable and the Disposition within this note     Time User Action Codes Description Comment    1/2/2020  3:13 PM Donovan Jimenez Add [D64 9] Symptomatic anemia     1/2/2020  3:31 PM Kyleelyharis Nickerson Add [K22 70] Wills's esophagus without dysplasia       ED Disposition     ED Disposition Condition Date/Time Comment    Admit Stable Thu Jan 2, 2020  3:13 PM Case was discussed with JOANN and the patient's admission status was agreed to be Admission Status: inpatient status to the service of Dr Wendy Parrish   Follow-up Information    None         Current Discharge Medication List      CONTINUE these medications which have NOT CHANGED    Details   ascorbic acid (VITAMIN C) 250 mg tablet Take 250 mg by mouth daily      pantoprazole (PROTONIX) 40 mg tablet Take 1 tablet (40 mg total) by mouth 2 (two) times a day  Qty: 28 tablet, Refills: 0    Associated Diagnoses: Symptomatic anemia; Iron deficiency anemia due to chronic blood loss; AVM (arteriovenous malformation); Wills's esophagus without dysplasia      sucralfate (CARAFATE) 1 g tablet Take 1 tablet by mouth 4 times per day  ferrous sulfate 324 (65 Fe) mg Take 325 mg by mouth daily before breakfast       lactase (LACTAID) 3,000 units tablet Take 1 tablet (3,000 Units total) by mouth 3 (three) times a day with meals  Qty: 15 tablet, Refills: 0    Associated Diagnoses: AVM (arteriovenous malformation)           No discharge procedures on file      ED Provider  Electronically Signed by           Jennifer Naidu DO  01/02/20 5672

## 2020-01-02 NOTE — ASSESSMENT & PLAN NOTE
Malnutrition Findings:           BMI Findings: Body mass index is 18 87 kg/m²     · Unexpected weight loss of 12 lb since his last admission in October  · Denies issue with appetite however has been experiencing pain after eating  · Consult dietitian

## 2020-01-02 NOTE — ASSESSMENT & PLAN NOTE
· With recurrent GI bleeding  · History of small bowel AVM, esophagitis, Wills's esophagus  · Presents with fatigue and some intermittent shortness of breath, Hgb 6 7  Denies dark stool or blood in stool    · Transfuse one unit prbc's  · Monitor H&H  · Fecal occult blood ordered  · Consult to GI

## 2020-01-02 NOTE — PLAN OF CARE
Problem: GASTROINTESTINAL - ADULT  Goal: Maintains or returns to baseline bowel function  Description  INTERVENTIONS:  - Assess bowel function  - Encourage oral fluids to ensure adequate hydration  - Administer IV fluids if ordered to ensure adequate hydration  - Administer ordered medications as needed  - Encourage mobilization and activity  - Consider nutritional services referral to assist patient with adequate nutrition and appropriate food choices  Outcome: Progressing  Goal: Maintains adequate nutritional intake  Description  INTERVENTIONS:  - Monitor percentage of each meal consumed  - Identify factors contributing to decreased intake, treat as appropriate  - Assist with meals as needed  - Monitor I&O, weight, and lab values if indicated  - Obtain nutrition services referral as needed  Outcome: Progressing     Problem: HEMATOLOGIC - ADULT  Goal: Maintains hematologic stability  Description  INTERVENTIONS  - Assess for signs and symptoms of bleeding or hemorrhage  - Monitor labs  - Administer supportive blood products/factors as ordered and appropriate  Outcome: Progressing     Problem: DISCHARGE PLANNING  Goal: Discharge to home or other facility with appropriate resources  Description  INTERVENTIONS:  - Identify barriers to discharge w/patient and caregiver  - Arrange for needed discharge resources and transportation as appropriate  - Identify discharge learning needs (meds, wound care, etc )  - Arrange for interpretive services to assist at discharge as needed  - Refer to Case Management Department for coordinating discharge planning if the patient needs post-hospital services based on physician/advanced practitioner order or complex needs related to functional status, cognitive ability, or social support system  Outcome: Progressing     Problem: Knowledge Deficit  Goal: Patient/family/caregiver demonstrates understanding of disease process, treatment plan, medications, and discharge instructions  Description  Complete learning assessment and assess knowledge base    Interventions:  - Provide teaching at level of understanding  - Provide teaching via preferred learning methods  Outcome: Progressing

## 2020-01-02 NOTE — ED NOTES
Pt reports upper abdominal pain worse after eating x 2 weeks  Pt also reports feeling fatigued  Pt denies dark  bloody stools or vomit       Shannan Becker RN  01/02/20 3880

## 2020-01-03 ENCOUNTER — ANESTHESIA EVENT (INPATIENT)
Dept: GASTROENTEROLOGY | Facility: HOSPITAL | Age: 65
DRG: 381 | End: 2020-01-03
Payer: COMMERCIAL

## 2020-01-03 ENCOUNTER — ANESTHESIA (INPATIENT)
Dept: GASTROENTEROLOGY | Facility: HOSPITAL | Age: 65
DRG: 381 | End: 2020-01-03
Payer: COMMERCIAL

## 2020-01-03 ENCOUNTER — APPOINTMENT (INPATIENT)
Dept: GASTROENTEROLOGY | Facility: HOSPITAL | Age: 65
DRG: 381 | End: 2020-01-03
Payer: COMMERCIAL

## 2020-01-03 LAB
ABO GROUP BLD BPU: NORMAL
ALBUMIN SERPL BCP-MCNC: 3 G/DL (ref 3.5–5)
ALP SERPL-CCNC: 97 U/L (ref 46–116)
ALT SERPL W P-5'-P-CCNC: 14 U/L (ref 12–78)
ANION GAP SERPL CALCULATED.3IONS-SCNC: 9 MMOL/L (ref 4–13)
AST SERPL W P-5'-P-CCNC: 18 U/L (ref 5–45)
BILIRUB SERPL-MCNC: 0.62 MG/DL (ref 0.2–1)
BPU ID: NORMAL
BUN SERPL-MCNC: 9 MG/DL (ref 5–25)
CALCIUM SERPL-MCNC: 8.2 MG/DL (ref 8.3–10.1)
CHLORIDE SERPL-SCNC: 103 MMOL/L (ref 100–108)
CO2 SERPL-SCNC: 26 MMOL/L (ref 21–32)
CREAT SERPL-MCNC: 0.88 MG/DL (ref 0.6–1.3)
CROSSMATCH: NORMAL
ERYTHROCYTE [DISTWIDTH] IN BLOOD BY AUTOMATED COUNT: 20.9 % (ref 11.6–15.1)
GFR SERPL CREATININE-BSD FRML MDRD: 91 ML/MIN/1.73SQ M
GLUCOSE SERPL-MCNC: 87 MG/DL (ref 65–140)
HCT VFR BLD AUTO: 27.6 % (ref 36.5–49.3)
HCT VFR BLD AUTO: 29.1 % (ref 36.5–49.3)
HGB BLD-MCNC: 7.5 G/DL (ref 12–17)
HGB BLD-MCNC: 7.9 G/DL (ref 12–17)
MCH RBC QN AUTO: 19.5 PG (ref 26.8–34.3)
MCHC RBC AUTO-ENTMCNC: 27.2 G/DL (ref 31.4–37.4)
MCV RBC AUTO: 72 FL (ref 82–98)
PLATELET # BLD AUTO: 665 THOUSANDS/UL (ref 149–390)
PMV BLD AUTO: 9.8 FL (ref 8.9–12.7)
POTASSIUM SERPL-SCNC: 3.7 MMOL/L (ref 3.5–5.3)
PROT SERPL-MCNC: 6.3 G/DL (ref 6.4–8.2)
RBC # BLD AUTO: 3.84 MILLION/UL (ref 3.88–5.62)
SODIUM SERPL-SCNC: 138 MMOL/L (ref 136–145)
UNIT DISPENSE STATUS: NORMAL
UNIT PRODUCT CODE: NORMAL
UNIT RH: NORMAL
WBC # BLD AUTO: 5.39 THOUSAND/UL (ref 4.31–10.16)

## 2020-01-03 PROCEDURE — 88312 SPECIAL STAINS GROUP 1: CPT | Performed by: PATHOLOGY

## 2020-01-03 PROCEDURE — 82728 ASSAY OF FERRITIN: CPT | Performed by: INTERNAL MEDICINE

## 2020-01-03 PROCEDURE — 82746 ASSAY OF FOLIC ACID SERUM: CPT | Performed by: INTERNAL MEDICINE

## 2020-01-03 PROCEDURE — 83540 ASSAY OF IRON: CPT | Performed by: INTERNAL MEDICINE

## 2020-01-03 PROCEDURE — C9113 INJ PANTOPRAZOLE SODIUM, VIA: HCPCS | Performed by: NURSE PRACTITIONER

## 2020-01-03 PROCEDURE — 88305 TISSUE EXAM BY PATHOLOGIST: CPT | Performed by: PATHOLOGY

## 2020-01-03 PROCEDURE — 0DB38ZX EXCISION OF LOWER ESOPHAGUS, VIA NATURAL OR ARTIFICIAL OPENING ENDOSCOPIC, DIAGNOSTIC: ICD-10-PCS | Performed by: INTERNAL MEDICINE

## 2020-01-03 PROCEDURE — 85027 COMPLETE CBC AUTOMATED: CPT | Performed by: NURSE PRACTITIONER

## 2020-01-03 PROCEDURE — 88342 IMHCHEM/IMCYTCHM 1ST ANTB: CPT | Performed by: PATHOLOGY

## 2020-01-03 PROCEDURE — 83550 IRON BINDING TEST: CPT | Performed by: INTERNAL MEDICINE

## 2020-01-03 PROCEDURE — 85018 HEMOGLOBIN: CPT | Performed by: PHYSICIAN ASSISTANT

## 2020-01-03 PROCEDURE — 85014 HEMATOCRIT: CPT | Performed by: PHYSICIAN ASSISTANT

## 2020-01-03 PROCEDURE — 99232 SBSQ HOSP IP/OBS MODERATE 35: CPT | Performed by: PHYSICIAN ASSISTANT

## 2020-01-03 PROCEDURE — 80053 COMPREHEN METABOLIC PANEL: CPT | Performed by: NURSE PRACTITIONER

## 2020-01-03 RX ORDER — PROPOFOL 10 MG/ML
INJECTION, EMULSION INTRAVENOUS AS NEEDED
Status: DISCONTINUED | OUTPATIENT
Start: 2020-01-03 | End: 2020-01-03 | Stop reason: SURG

## 2020-01-03 RX ORDER — SODIUM CHLORIDE 9 MG/ML
100 INJECTION, SOLUTION INTRAVENOUS CONTINUOUS
Status: DISCONTINUED | OUTPATIENT
Start: 2020-01-03 | End: 2020-01-06

## 2020-01-03 RX ADMIN — SUCRALFATE 1 G: 1 TABLET ORAL at 06:18

## 2020-01-03 RX ADMIN — PANTOPRAZOLE SODIUM 40 MG: 40 INJECTION, POWDER, FOR SOLUTION INTRAVENOUS at 08:16

## 2020-01-03 RX ADMIN — PROPOFOL 30 MG: 10 INJECTION, EMULSION INTRAVENOUS at 12:41

## 2020-01-03 RX ADMIN — SODIUM CHLORIDE 100 ML/HR: 0.9 INJECTION, SOLUTION INTRAVENOUS at 12:28

## 2020-01-03 RX ADMIN — PANTOPRAZOLE SODIUM 40 MG: 40 INJECTION, POWDER, FOR SOLUTION INTRAVENOUS at 22:36

## 2020-01-03 RX ADMIN — PROPOFOL 120 MG: 10 INJECTION, EMULSION INTRAVENOUS at 12:36

## 2020-01-03 RX ADMIN — SODIUM CHLORIDE 100 ML/HR: 0.9 INJECTION, SOLUTION INTRAVENOUS at 22:38

## 2020-01-03 RX ADMIN — PROPOFOL 50 MG: 10 INJECTION, EMULSION INTRAVENOUS at 12:38

## 2020-01-03 NOTE — ANESTHESIA PREPROCEDURE EVALUATION
Review of Systems/Medical History  Patient summary reviewed  Chart reviewed  No history of anesthetic complications     Cardiovascular   Pulmonary  Negative pulmonary ROS        GI/Hepatic    PUD, GERD poorly controlled, Esophageal disease bryant esophagus,        Negative  ROS        Endo/Other  Negative endo/other ROS   Comment: Alcohol abuse    GYN  Negative gynecology ROS          Hematology  Anemia iron deficiency anemia,     Musculoskeletal  Negative musculoskeletal ROS        Neurology  Negative neurology ROS      Psychology   Negative psychology ROS              Physical Exam    Airway    Mallampati score: II  TM Distance: >3 FB  Neck ROM: full     Dental   No notable dental hx     Cardiovascular  Rhythm: regular, Rate: normal, Cardiovascular exam normal    Pulmonary  Pulmonary exam normal Breath sounds clear to auscultation,     Other Findings        Anesthesia Plan  ASA Score- 2 Emergent    Anesthesia Type- IV sedation with anesthesia with ASA Monitors  Additional Monitors:   Airway Plan:         Plan Factors-Patient not instructed to abstain from smoking on day of procedure  Patient did not smoke on day of surgery  Induction- intravenous  Postoperative Plan- Plan for postoperative opioid use  Informed Consent- Anesthetic plan and risks discussed with patient  I personally reviewed this patient with the CRNA  Discussed and agreed on the Anesthesia Plan with the CRNA  Alyx Monte

## 2020-01-03 NOTE — PLAN OF CARE
Problem: GASTROINTESTINAL - ADULT  Goal: Maintains or returns to baseline bowel function  Description  INTERVENTIONS:  - Assess bowel function  - Encourage oral fluids to ensure adequate hydration  - Administer IV fluids if ordered to ensure adequate hydration  - Administer ordered medications as needed  - Encourage mobilization and activity  - Consider nutritional services referral to assist patient with adequate nutrition and appropriate food choices  Outcome: Progressing  Goal: Maintains adequate nutritional intake  Description  INTERVENTIONS:  - Monitor percentage of each meal consumed  - Identify factors contributing to decreased intake, treat as appropriate  - Assist with meals as needed  - Monitor I&O, weight, and lab values if indicated  - Obtain nutrition services referral as needed  Outcome: Progressing     Problem: HEMATOLOGIC - ADULT  Goal: Maintains hematologic stability  Description  INTERVENTIONS  - Assess for signs and symptoms of bleeding or hemorrhage  - Monitor labs  - Administer supportive blood products/factors as ordered and appropriate  Outcome: Progressing     Problem: DISCHARGE PLANNING  Goal: Discharge to home or other facility with appropriate resources  Description  INTERVENTIONS:  - Identify barriers to discharge w/patient and caregiver  - Arrange for needed discharge resources and transportation as appropriate  - Identify discharge learning needs (meds, wound care, etc )  - Arrange for interpretive services to assist at discharge as needed  - Refer to Case Management Department for coordinating discharge planning if the patient needs post-hospital services based on physician/advanced practitioner order or complex needs related to functional status, cognitive ability, or social support system  Outcome: Progressing     Problem: Knowledge Deficit  Goal: Patient/family/caregiver demonstrates understanding of disease process, treatment plan, medications, and discharge instructions  Description  Complete learning assessment and assess knowledge base  Interventions:  - Provide teaching at level of understanding  - Provide teaching via preferred learning methods  Outcome: Progressing     Problem: Potential for Falls  Goal: Patient will remain free of falls  Description  INTERVENTIONS:  - Assess patient frequently for physical needs  -  Identify cognitive and physical deficits and behaviors that affect risk of falls  -  [facility] fall precautions as indicated by assessment   - Educate patient/family on patient safety including physical limitations  - Instruct patient to call for assistance with activity based on assessment  - Modify environment to reduce risk of injury  - Consider OT/PT consult to assist with strengthening/mobility  Outcome: Progressing     Problem: Nutrition/Hydration-ADULT  Goal: Nutrient/Hydration intake appropriate for improving, restoring or maintaining nutritional needs  Description  Monitor and assess patient's nutrition/hydration status for malnutrition  Collaborate with interdisciplinary team and initiate plan and interventions as ordered  Monitor patient's weight and dietary intake as ordered or per policy  Utilize nutrition screening tool and intervene as necessary  Determine patient's food preferences and provide high-protein, high-caloric foods as appropriate       INTERVENTIONS:  - Monitor oral intake, urinary output, labs, and treatment plans  - Assess nutrition and hydration status and recommend course of action  - Evaluate amount of meals eaten  - Assist patient with eating if necessary   - Allow adequate time for meals  - Recommend/ encourage appropriate diets, oral nutritional supplements, and vitamin/mineral supplements  - Order, calculate, and assess calorie counts as needed  - Recommend, monitor, and adjust tube feedings and TPN/PPN based on assessed needs  - Assess need for intravenous fluids  - Provide specific nutrition/hydration education as appropriate  - Include patient/family/caregiver in decisions related to nutrition  Outcome: Progressing

## 2020-01-03 NOTE — ASSESSMENT & PLAN NOTE
· With recurrent GI bleeding  · History of small bowel AVM, esophagitis, Wills's esophagus  · Presents with fatigue and some intermittent shortness of breath, Hgb 6 7  Denies dark stool or blood in stool    · S/P 1u PRBCs 1/2  · Monitor H&H  · Fecal occult blood ordered  · Consulted Hematology given new thrombocytosis

## 2020-01-03 NOTE — ASSESSMENT & PLAN NOTE
· 7-10 day history of abdominal pain that has been worsening  States that the pain is postprandial and occurs approximately 2 hours after eating  He describes it as a ache  Tenderness in the periumbilical/epigastric region  He has never had pain like this before  · 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  · LFTs/lipase within normal limits  No leukocytosis    · CT abdomen/pelvis ordered with contrast for better evaluation in this patient who has had unexpected weight loss of 12 lb since October, anemia, fatigue, and abdominal pain which is new  · GI following, patient scheduled for EGD 1/3  · Serial abdominal exams

## 2020-01-03 NOTE — ASSESSMENT & PLAN NOTE
Malnutrition Findings:   Malnutrition type: Acute illness(r/t abdominal pain, as evidenced by intake meeting <75% estimated needs > 1 week, and mild muscle mass loss noted at clavicle  Treatment: pending GI testing/initiation of PO diet )  Degree of Malnutrition: Malnutrition of moderate degree    BMI Findings: Body mass index is 18 87 kg/m²     · Unexpected weight loss of 12 lb since his last admission in October  · Denies issue with appetite however has been experiencing pain after eating  · Consult dietitian

## 2020-01-03 NOTE — H&P (VIEW-ONLY)
Patient MRN: [id number]  Date of Service: 1/3/2020  Referring Physician: ROMA Arreola  Provider Creating Note: Claire Briggs PA-C  PCP: Gabriela Helton  Reason for Consult: Anemia  HPI  Shaq Moreno  is a 59 y o  male who was admitted with Pain of upper abdomen  He has a past medical history Wills's esophagus, Cirilo lesions and duodenal AVMs  He had an upper endoscopy 10/14 showed severe esophagitis, a benign stricture at the Z-line, an intrinsic stricture at 20 cm which was dilated with a balloon and a large hiatal hernia  Biopsies were positive for Wills's and a fungal infection  Biopsies were negative for viral inclusions  [facility]  attempted to contact the patient to treat him for the fungal infection but they were unsuccessful  He has been taking pantoprazole 2 or 3 times daily and Carafate  He complains of dysphagia to solids which is intermittent  He does not need to regurgitate the food  He also complains of epigastric abdominal pain that occurs approximately 2 hours after he eats  His appetite has been decreased and he has lost 5 lb since his October admission  In October his hemoglobin was 8 3 with an MCV of 76  He denies any hematochezia, melena, constipation or diarrhea  Last colonoscopy was 07/2017 by Dr Roger Reyes that showed hemorrhoids only  He takes an occasional acetaminophen, denies any NSAIDs  Past Medical History:   Diagnosis Date    Anemia     AVM (arteriovenous malformation) of small bowel, acquired     Wills's esophagus     Cirilo ulcer     Esophagitis     History of blood transfusion     22 prior transfusions     Past Surgical History:   Procedure Laterality Date    APPENDECTOMY      COLONOSCOPY N/A 11/6/2017    Procedure: COLONOSCOPY;  Surgeon: Atilio Freeman MD;  [facility];   Service: Gastroenterology    EGD AND COLONOSCOPY  07/18/2017    EGD AND COLONOSCOPY N/A 9/17/2018    Procedure: EGD with biopsy AND COLONOSCOPY-incomplete to sigmoid colon;  Surgeon: Char Leyva DO;  [facility]; Service: Gastroenterology    ESOPHAGOGASTRODUODENOSCOPY N/A 10/6/2017    Procedure: ESOPHAGOGASTRODUODENOSCOPY (EGD) with biopsy;  Surgeon: Tesha Hung MD;  [facility]; Service: Gastroenterology    ESOPHAGOGASTRODUODENOSCOPY N/A 11/3/2017    Procedure: ESOPHAGOGASTRODUODENOSCOPY (EGD) with biopsy;  Surgeon: Juana Stevenson MD;  [facility]; Service: Gastroenterology    ESOPHAGOGASTRODUODENOSCOPY N/A 1/23/2018    Procedure: ESOPHAGOGASTRODUODENOSCOPY (EGD) with biopsy;  Surgeon: Tesha Hung MD;  [facility]; Service: Gastroenterology    ESOPHAGOGASTRODUODENOSCOPY N/A 3/26/2018    Procedure: ESOPHAGOGASTRODUODENOSCOPY (EGD); Surgeon: Josue Causey MD;  [facility]; Service: Gastroenterology     Medications  Home Medications:   Prior to Admission medications    Medication Sig Start Date End Date Taking? Authorizing Provider   ascorbic acid (VITAMIN C) 250 mg tablet Take 250 mg by mouth daily   Yes Historical Provider, MD   pantoprazole (PROTONIX) 40 mg tablet Take 1 tablet (40 mg total) by mouth 2 (two) times a day 5/17/19  Yes Lorne Lewis MD   sucralfate (CARAFATE) 1 g tablet Take 1 tablet by mouth 4 times per day   5/28/19  Yes Historical Provider, MD   ferrous sulfate 324 (65 Fe) mg Take 325 mg by mouth daily before breakfast  1/25/18   Historical Provider, MD   lactase (LACTAID) 3,000 units tablet Take 1 tablet (3,000 Units total) by mouth 3 (three) times a day with meals  Patient not taking: Reported on 1/2/2020 5/17/19   Lorne Lewis MD       Inhouse Medications    Current Facility-Administered Medications:     acetaminophen (TYLENOL) tablet 650 mg, 650 mg, Oral, Q6H PRN    ondansetron (ZOFRAN) injection 4 mg, 4 mg, Intravenous, Q4H PRN    pantoprazole (PROTONIX) injection 40 mg, 40 mg, Intravenous, Q12H VIPUL, 40 mg at 01/03/20 0816    sucralfate (Tammy Kirk) tablet 1 g, 1 g, Oral, 4x Daily (AC & HS), 1 g at 01/03/20 9590    Allergies  Allergies   Allergen Reactions    Doxylamine GI Intolerance    Lactose      Pt lactose intolerant       Social History   reports that he quit smoking about 5 years ago  He has never used smokeless tobacco  He reports that he drank alcohol  He reports that he does not use drugs  Family History  Family History   Problem Relation Age of Onset    Hemangiomas Father      ROS  ROS: Reports:  Weight loss, dysphagia, lightheadedness, epigastric abdominal pain  Denies constipation, diarrhea, nausea  All others negative except as noted in HPI  Objective   Vitals  /83 (BP Location: Left arm)   Pulse 61   Temp 98 3 °F (36 8 °C) (Temporal)   Resp 18   Wt 63 1 kg (139 lb 1 8 oz)   SpO2 97%   BMI 18 87 kg/m²   General: Alert, no apparent distress  Eyes:  No scleral icterus  ENT:  Mucous membranes moist  Card:  Regular rhythm  Lungs: Clear to ascultation b/l  No wheezes, rales, rhonchi  Abdomen:  Soft  Nontender  Bowel sounds normoactive  Skin:  No jaundice  Extremities:  No edema  Neuro: Alert and oriented x3    Intention tremor present    Laboratory Studies  Lab Results   Component Value Date    WBC 5 39 01/03/2020    HGB 7 5 (L) 01/03/2020    HCT 27 6 (L) 01/03/2020     (H) 01/03/2020    MCV 72 (L) 01/03/2020     Lab Results   Component Value Date    CREATININE 0 88 01/03/2020    BUN 9 01/03/2020    SODIUM 138 01/03/2020    K 3 7 01/03/2020     01/03/2020    CO2 26 01/03/2020    GLUC 87 01/03/2020    CALCIUM 8 2 (L) 01/03/2020    ALKPHOS 97 01/03/2020    ALB 3 0 (L) 01/03/2020    TBILI 0 62 01/03/2020    AST 18 01/03/2020    ALT 14 01/03/2020     Lab Results   Component Value Date    PROTIME 13 8 01/02/2020    INR 1 05 01/02/2020       Imaging and Other Studies  CT abd/pelvis 1/2 2020    FINDINGS:     ABDOMEN     LOWER CHEST:  Atelectatic changes are noted at the lung bases        LIVER/BILIARY TREE:  1 6 cm cyst in the left lobe the liver near the junction of segments 3 and 4 noted      GALLBLADDER:  Slight nodularity in the gallbladder lumen is consistent with previously identified polyps, better visualized and characterized with sonography  Please refer to the report from 6/24/2019, where annual ultrasound was recommended based on ACR   guidelines  No gallbladder wall thickening or pericholecystic fluid noted  No biliary ductal dilatation identified  No calcified gallstones noted      SPLEEN:  Unremarkable      PANCREAS:  Unremarkable      ADRENAL GLANDS:  Unremarkable      KIDNEYS/URETERS:  Nonobstructing 2 mm right lower pole collecting system calculus is present  One or more sharply circumscribed subcentimeter renal hypodensities are noted  These lesions are too small to accurately characterize, but are statistically   most likely to represent benign cortical renal cyst(s)  According to the guidelines published in the [facility] Paper of the ACR Incidental Findings Committee (Radiology 2010), no further workup of these lesions is recommended      STOMACH AND BOWEL:  Small hiatal hernia is noted  No abnormal bowel wall thickening noted  No intestinal obstruction identified      APPENDIX:  No findings to suggest appendicitis      ABDOMINOPELVIC CAVITY:  No ascites or free intraperitoneal air  No lymphadenopathy      VESSELS:  Unremarkable for patient's age      PELVIS     REPRODUCTIVE ORGANS:  Mildly prominent prostate      URINARY BLADDER:  Unremarkable      ABDOMINAL WALL/INGUINAL REGIONS:  Unremarkable      OSSEOUS STRUCTURES:  Chronic appearing bilateral L5 pars interarticularis defects are present with minimal grade 1 anterior listhesis of L5 on S1  Altered level mild discogenic disease in the lower lumbar spine is present as well as multilevel mild   posterior element hypertrophic change /facet arthrosis  No lucent or sclerotic lesions are identified      IMPRESSION:        1   No acute abnormality identified in the abdomen or pelvis  2  Slight nodularity in the gallbladder lumen likely reflecting known underlying gallbladder polyps  These were better visualized and characterized on previous ultrasound dated 6/24/2019  Based on that study and ACR guidelines, a one-year follow-up   ultrasound was suggested  3  Small hiatal hernia  4  Nonobstructing 2 mm right lower pole renal collecting system calculus  5  Additional findings as noted  Assessment and Plan:  1  Anemia with history of Wills's esophagus, Cirilo lesions and duodenal AVMs  Plan upper endoscopy today    Continue pantoprazole and Carafate    Principal Problem:    Pain of upper abdomen  Active Problems:    Iron deficiency anemia due to chronic blood loss    Wills's esophagus    Moderate protein-calorie malnutrition (HCC)      Merle Briggs PA-C

## 2020-01-03 NOTE — UTILIZATION REVIEW
MICHELLE Scott    Specialty- Hospitalist,   Medicare Number-   Medicaid Number -   UPIN Number - [id number]  [facility] ID- [id number]    Primary Office:  [facility], [address] [place] [address]  Phone 1: [phone]  Phone 2:   Fax: [phone]

## 2020-01-03 NOTE — UTILIZATION REVIEW
Notification of Inpatient Admission/Inpatient Authorization Request   This is a Notification of Inpatient Admission for [address]  Be advised that this patient was admitted to our facility under Inpatient Status  Contact Cheyenne Ko at [phone] for additional admission information  [facility]  DEDICATED -[phone]  Patient Name:   Huy Peña  YOB: 1955       [address]   [address]:   [facility]  Tax ID: [id number]  NPI: [id number] Attending Provider/NPI: Janiya Burton [[id number]]   Place of Service Code: 24     [facility]   Start Date: 1/2/20 1513     Discharge Date & Time: No discharge date for patient encounter  Type of Admission: Inpatient Status Discharge Disposition   (if discharged): Home/Self Care   Patient Diagnoses: Abdominal pain [R10 9]  Wills's esophagus without dysplasia [K22 70]  Symptomatic anemia [D64 9]     Orders: Admission Orders (From admission, onward)     Ordered        01/02/20 1513  Inpatient Admission  Once                    Assigned Utilization Review Contact: Jacki Timmons  Utilization   Network Utilization Review Department  Phone: [phone]; Fax [phone]  Email: [email or web address]   ATTENTION PAYERS: Please call the assigned Utilization  directly with any questions or concerns ALL voicemails in the department are confidential  Send all requests for admission clinical reviews, approved or denied determinations and any other requests to dedicated fax number belonging to the campus where the patient is receiving treatment

## 2020-01-03 NOTE — CONSULTS
Patient MRN: [id number]  Date of Service: 1/3/2020  Referring Physician: ROMA Hwang  Provider Creating Note: Hesham Briggs PA-C  PCP: Celeste Mazariegos  Reason for Consult: Anemia  HPI  Ed Andres  is a 59 y o  male who was admitted with Pain of upper abdomen  He has a past medical history Wills's esophagus, Cirilo lesions and duodenal AVMs  He had an upper endoscopy 10/14 showed severe esophagitis, a benign stricture at the Z-line, an intrinsic stricture at 20 cm which was dilated with a balloon and a large hiatal hernia  Biopsies were positive for Wills's and a fungal infection  Biopsies were negative for viral inclusions  [facility] attempted to contact the patient to treat him for the fungal infection but they were unsuccessful  He has been taking pantoprazole 2 or 3 times daily and Carafate  He complains of dysphagia to solids which is intermittent  He does not need to regurgitate the food  He also complains of epigastric abdominal pain that occurs approximately 2 hours after he eats  His appetite has been decreased and he has lost 5 lb since his October admission  In October his hemoglobin was 8 3 with an MCV of 76  He denies any hematochezia, melena, constipation or diarrhea  Last colonoscopy was 07/2017 by Dr Abhi Costa that showed hemorrhoids only  He takes an occasional acetaminophen, denies any NSAIDs  Past Medical History:   Diagnosis Date    Anemia     AVM (arteriovenous malformation) of small bowel, acquired     Wills's esophagus     Cirilo ulcer     Esophagitis     History of blood transfusion     22 prior transfusions     Past Surgical History:   Procedure Laterality Date    APPENDECTOMY      COLONOSCOPY N/A 11/6/2017    Procedure: COLONOSCOPY;  Surgeon: Sanket Hill MD;  [facility];   Service: Gastroenterology    EGD AND COLONOSCOPY  07/18/2017    EGD AND COLONOSCOPY N/A 9/17/2018    Procedure: EGD with biopsy AND COLONOSCOPY-incomplete to sigmoid colon;  Surgeon: José Miguel Snyder DO;  [facility]; Service: Gastroenterology    ESOPHAGOGASTRODUODENOSCOPY N/A 10/6/2017    Procedure: ESOPHAGOGASTRODUODENOSCOPY (EGD) with biopsy;  Surgeon: Luma Sterling MD;  [facility]; Service: Gastroenterology    ESOPHAGOGASTRODUODENOSCOPY N/A 11/3/2017    Procedure: ESOPHAGOGASTRODUODENOSCOPY (EGD) with biopsy;  Surgeon: Tim Basurto MD;  [facility]; Service: Gastroenterology    ESOPHAGOGASTRODUODENOSCOPY N/A 1/23/2018    Procedure: ESOPHAGOGASTRODUODENOSCOPY (EGD) with biopsy;  Surgeon: Luma Sterling MD;  [facility]; Service: Gastroenterology    ESOPHAGOGASTRODUODENOSCOPY N/A 3/26/2018    Procedure: ESOPHAGOGASTRODUODENOSCOPY (EGD); Surgeon: Dolores Silver MD;  [facility]; Service: Gastroenterology     Medications  Home Medications:   Prior to Admission medications    Medication Sig Start Date End Date Taking? Authorizing Provider   ascorbic acid (VITAMIN C) 250 mg tablet Take 250 mg by mouth daily   Yes Historical Provider, MD   pantoprazole (PROTONIX) 40 mg tablet Take 1 tablet (40 mg total) by mouth 2 (two) times a day 5/17/19  Yes Reyna Arita MD   sucralfate (CARAFATE) 1 g tablet Take 1 tablet by mouth 4 times per day   5/28/19  Yes Historical Provider, MD   ferrous sulfate 324 (65 Fe) mg Take 325 mg by mouth daily before breakfast  1/25/18   Historical Provider, MD   lactase (LACTAID) 3,000 units tablet Take 1 tablet (3,000 Units total) by mouth 3 (three) times a day with meals  Patient not taking: Reported on 1/2/2020 5/17/19   Reyna Arita MD       Inhouse Medications    Current Facility-Administered Medications:     acetaminophen (TYLENOL) tablet 650 mg, 650 mg, Oral, Q6H PRN    ondansetron (ZOFRAN) injection 4 mg, 4 mg, Intravenous, Q4H PRN    pantoprazole (PROTONIX) injection 40 mg, 40 mg, Intravenous, Q12H VIPUL, 40 mg at 01/03/20 0816    sucralfate (Lobo Modest) tablet 1 g, 1 g, Oral, 4x Daily (AC & HS), 1 g at 01/03/20 4780    Allergies  Allergies   Allergen Reactions    Doxylamine GI Intolerance    Lactose      Pt lactose intolerant       Social History   reports that he quit smoking about 5 years ago  He has never used smokeless tobacco  He reports that he drank alcohol  He reports that he does not use drugs  Family History  Family History   Problem Relation Age of Onset    Hemangiomas Father      ROS  ROS: Reports:  Weight loss, dysphagia, lightheadedness, epigastric abdominal pain  Denies constipation, diarrhea, nausea  All others negative except as noted in HPI  Objective   Vitals  /83 (BP Location: Left arm)   Pulse 61   Temp 98 3 °F (36 8 °C) (Temporal)   Resp 18   Wt 63 1 kg (139 lb 1 8 oz)   SpO2 97%   BMI 18 87 kg/m²   General: Alert, no apparent distress  Eyes:  No scleral icterus  ENT:  Mucous membranes moist  Card:  Regular rhythm  Lungs: Clear to ascultation b/l  No wheezes, rales, rhonchi  Abdomen:  Soft  Nontender  Bowel sounds normoactive  Skin:  No jaundice  Extremities:  No edema  Neuro: Alert and oriented x3    Intention tremor present    Laboratory Studies  Lab Results   Component Value Date    WBC 5 39 01/03/2020    HGB 7 5 (L) 01/03/2020    HCT 27 6 (L) 01/03/2020     (H) 01/03/2020    MCV 72 (L) 01/03/2020     Lab Results   Component Value Date    CREATININE 0 88 01/03/2020    BUN 9 01/03/2020    SODIUM 138 01/03/2020    K 3 7 01/03/2020     01/03/2020    CO2 26 01/03/2020    GLUC 87 01/03/2020    CALCIUM 8 2 (L) 01/03/2020    ALKPHOS 97 01/03/2020    ALB 3 0 (L) 01/03/2020    TBILI 0 62 01/03/2020    AST 18 01/03/2020    ALT 14 01/03/2020     Lab Results   Component Value Date    PROTIME 13 8 01/02/2020    INR 1 05 01/02/2020       Imaging and Other Studies  CT abd/pelvis 1/2 2020    FINDINGS:     ABDOMEN     LOWER CHEST:  Atelectatic changes are noted at the lung bases        LIVER/BILIARY TREE:  1 6 cm cyst in the left lobe the liver near the junction of segments 3 and 4 noted      GALLBLADDER:  Slight nodularity in the gallbladder lumen is consistent with previously identified polyps, better visualized and characterized with sonography  Please refer to the report from 6/24/2019, where annual ultrasound was recommended based on ACR   guidelines  No gallbladder wall thickening or pericholecystic fluid noted  No biliary ductal dilatation identified  No calcified gallstones noted      SPLEEN:  Unremarkable      PANCREAS:  Unremarkable      ADRENAL GLANDS:  Unremarkable      KIDNEYS/URETERS:  Nonobstructing 2 mm right lower pole collecting system calculus is present  One or more sharply circumscribed subcentimeter renal hypodensities are noted  These lesions are too small to accurately characterize, but are statistically   most likely to represent benign cortical renal cyst(s)  According to the guidelines published in the [facility] Paper of the ACR Incidental Findings Committee (Radiology 2010), no further workup of these lesions is recommended      STOMACH AND BOWEL:  Small hiatal hernia is noted  No abnormal bowel wall thickening noted  No intestinal obstruction identified      APPENDIX:  No findings to suggest appendicitis      ABDOMINOPELVIC CAVITY:  No ascites or free intraperitoneal air  No lymphadenopathy      VESSELS:  Unremarkable for patient's age      PELVIS     REPRODUCTIVE ORGANS:  Mildly prominent prostate      URINARY BLADDER:  Unremarkable      ABDOMINAL WALL/INGUINAL REGIONS:  Unremarkable      OSSEOUS STRUCTURES:  Chronic appearing bilateral L5 pars interarticularis defects are present with minimal grade 1 anterior listhesis of L5 on S1  Altered level mild discogenic disease in the lower lumbar spine is present as well as multilevel mild   posterior element hypertrophic change /facet arthrosis  No lucent or sclerotic lesions are identified      IMPRESSION:        1   No acute abnormality identified in the abdomen or pelvis  2  Slight nodularity in the gallbladder lumen likely reflecting known underlying gallbladder polyps  These were better visualized and characterized on previous ultrasound dated 6/24/2019  Based on that study and ACR guidelines, a one-year follow-up   ultrasound was suggested  3  Small hiatal hernia  4  Nonobstructing 2 mm right lower pole renal collecting system calculus  5  Additional findings as noted  Assessment and Plan:  1  Anemia with history of Wills's esophagus, Cirilo lesions and duodenal AVMs  Plan upper endoscopy today    Continue pantoprazole and Carafate    Principal Problem:    Pain of upper abdomen  Active Problems:    Iron deficiency anemia due to chronic blood loss    Wills's esophagus    Moderate protein-calorie malnutrition (HCC)      Merle Briggs PA-C

## 2020-01-03 NOTE — ASSESSMENT & PLAN NOTE
· May be secondary to chronic blood loss from GI bleed  · Given increase, will consult hematology  · Also with recent 12lb weight loss

## 2020-01-03 NOTE — ANESTHESIA POSTPROCEDURE EVALUATION
Post-Op Assessment Note    CV Status:  Stable    Pain management: adequate     Mental Status:  Alert and awake   Hydration Status:  Euvolemic   PONV Controlled:  Controlled   Airway Patency:  Patent   Post Op Vitals Reviewed: Yes      Staff: AnesthesiologistSYED           /89 (01/03/20 1245)    Temp      Pulse 79 (01/03/20 1245)   Resp 20 (01/03/20 1245)    SpO2 94 % (01/03/20 1245)

## 2020-01-03 NOTE — UTILIZATION REVIEW
Initial Clinical Review    Admission: Date/Time/Statement: Inpatient Admission Orders (From admission, onward)     Ordered        01/02/20 1513  Inpatient Admission  Once                   Orders Placed This Encounter   Procedures    Inpatient Admission     Standing Status:   Standing     Number of Occurrences:   1     Order Specific Question:   Admitting Physician     Answer:   Monserrat Salcedo [[id number]]     Order Specific Question:   Level of Care     Answer:   Med Surg [[id number]]     Order Specific Question:   Estimated length of stay     Answer:   More than 2 Midnights     Order Specific Question:   Certification     Answer:   I certify that inpatient services are medically necessary for this patient for a duration of greater than two midnights  See H&P and MD Progress Notes for additional information about the patient's course of treatment  ED Arrival Information     Expected Arrival Acuity Means of Arrival Escorted By Service Admission Type    - 1/2/2020 13:21 Urgent Walk-In Self Hospitalist Urgent    Arrival Complaint    Abdominal Pain        Chief Complaint   Patient presents with    Fatigue     Pt reports fatigue x 1 5 weeks, pt reports upper abd pain  Denies N/V/D, intermittent SOB     Assessment/Plan: this is a 59year old male from home to ED admitted to inpatient due to abdominal pain/recurrent GI bleed with history of small bowel SVM, esophagitis, bryant's esophagus  Presented due t fatigue and upper abdominal pain for last 2 weeks progressively worse with shortness of breath  Has 12 pound weight loss since October  On exam has poor fitting dentures  Tenderness in epigastric and periumbilical area  Rash on face  H&H 6 7/25 7  Albumin 2 4  CT abdomen without acute findings  PRBC transfusion with monitoring of H&H in progress  GI consulted  1/3/2020 per GI - Anemia with history of Bryant's esophagus, Cirilo lesions and duodenal AVMs  Plan upper endoscopy today    Continue pantoprazole and Carafate    1/3/2020 procedure EGD- Wills's esophagus  2  Distal ulcer biopsy to rule out evidence of malignancy or atypia  3  Upper esophageal Wills's stricture-friable not biopsied as did appear grossly benign  Per Gi on 1/3 - Bleeding likely secondary to esophageal ulcer and Wills's stricture  Per attending on 1/3 heme consulted due to new thrombocytosis  Per patient abdominal pain improving  Protonix continues       ED Triage Vitals   Temperature Pulse Respirations Blood Pressure SpO2   01/02/20 1328 01/02/20 1328 01/02/20 1328 01/02/20 1328 01/02/20 1328   98 8 °F (37 1 °C) 94 18 154/86 95 %      Temp Source Heart Rate Source Patient Position - Orthostatic VS BP Location FiO2 (%)   01/02/20 1328 01/02/20 1328 01/02/20 1520 01/02/20 1328 --   Temporal Monitor Lying Right arm       Pain Score       01/02/20 1328       3        Wt Readings from Last 1 Encounters:   01/02/20 63 1 kg (139 lb 1 8 oz)     Additional Vital Signs:   01/03/20 0720  98 3 °F (36 8 °C)  61  18  135/83    97 %    Lying   01/02/20 22:27:02  98 3 °F (36 8 °C)  65  18  137/78  98  99 %       01/02/20 1832  98 3 °F (36 8 °C)  67  18  147/87           01/02/20 16:48:46  98 3 °F (36 8 °C)  72  18  153/89  110    None (Room air)  Sitting   01/02/20 16:11:15  98 2 °F (36 8 °C)  72  19  154/90  111  83 %Abnormal    None (Room air)         Pertinent Labs/Diagnostic Test Results:   1/3/2020 - CT abdomen - No acute abnormality identified in the abdomen or pelvis  2  Slight nodularity in the gallbladder lumen likely reflecting known underlying gallbladder polyps  These were better visualized and characterized on previous ultrasound dated 6/24/2019  Based on that study and ACR guidelines, a one-year follow-up   ultrasound was suggested  3  Small hiatal hernia    4  Nonobstructing 2 mm right lower pole renal collecting system calculus    1/2/2020 EKG - Normal sinus rhythm  Right axis deviation  Abnormal ECG  When compared with ECG of 12-OCT-2019 12:33,  Vent   rate has increased BY  34 BPM  Results from last 7 days   Lab Units 01/03/20  0614 01/02/20  2249 01/02/20  1337   WBC Thousand/uL 5 39  --  6 39   HEMOGLOBIN g/dL 7 5* 7 1* 6 7*   HEMATOCRIT % 27 6* 26 7* 25 7*   PLATELETS Thousands/uL 665*  --  757*   NEUTROS ABS Thousands/µL  --   --  4 28     Results from last 7 days   Lab Units 01/03/20  0614 01/02/20  1337   SODIUM mmol/L 138 139   POTASSIUM mmol/L 3 7 3 9   CHLORIDE mmol/L 103 104   CO2 mmol/L 26 26   ANION GAP mmol/L 9 9   BUN mg/dL 9 10   CREATININE mg/dL 0 88 1 16   EGFR ml/min/1 73sq m 91 66   CALCIUM mg/dL 8 2* 8 6     Results from last 7 days   Lab Units 01/03/20  0614 01/02/20  1337   AST U/L 18 17   ALT U/L 14 13   ALK PHOS U/L 97 112   TOTAL PROTEIN g/dL 6 3* 7 2   ALBUMIN g/dL 3 0* 2 4*   TOTAL BILIRUBIN mg/dL 0 62 0 50         Results from last 7 days   Lab Units 01/03/20  0614 01/02/20  1337   GLUCOSE RANDOM mg/dL 87 107     Results from last 7 days   Lab Units 01/02/20  1337   PROTIME seconds 13 8   INR  1 05   PTT seconds 31     Results from last 7 days   Lab Units 01/02/20  1337   LIPASE u/L 169       ED Treatment:   Medication Administration from 01/02/2020 1321 to 01/02/2020 1544       Date/Time Order Dose Route Action Action by Comments        Past Medical History:   Diagnosis Date    Anemia     AVM (arteriovenous malformation) of small bowel, acquired     Wills's esophagus     Cirilo ulcer     Esophagitis     History of blood transfusion     22 prior transfusions     Present on Admission:   Iron deficiency anemia due to chronic blood loss   Moderate protein-calorie malnutrition ([address] )   Wills's esophagus      Admitting Diagnosis: Abdominal pain [R10 9]  Wills's esophagus without dysplasia [K22 70]  Symptomatic anemia [D64 9]  Age/Sex: 59 y o  male  Admission Orders: 1/2/2020 1513 inpatient  Scheduled Medications:  Medications:  pantoprazole 40 mg Intravenous Q12H [address]   sucralfate 1 g Oral 4x Daily (AC & HS)     Continuous IV Infusions:    sodium chloride 0 9 % infusion   Rate: 100 mL/hr Dose: 100 mL/hr  Freq: Continuous Route: IV  Indications Comment: for EGD  Last Dose: Stopped (01/03/20 1309)    PRN Meds: not used   acetaminophen 650 mg Oral Q6H PRN   ondansetron 4 mg Intravenous Q4H PRN       IP CONSULT TO GASTROENTEROLOGY  IP CONSULT TO ANESTHESIOLOGY    Network Utilization Review Department  [email or web address]  ATTENTION: Please call with any questions or concerns to [phone] and carefully listen to the prompts so that you are directed to the right person  All voicemails are confidential   Tyrel Edwards all requests for admission clinical reviews, approved or denied determinations and any other requests to dedicated fax number below belonging to the campus where the patient is receiving treatment   List of dedicated fax numbers for the Facilities:  [address] [place] [address] DENIALS (Administrative/Medical Necessity) [phone]   [address] (Maternity/NICU/Pediatrics) [phone]   Ricardo Fernandez [phone]   [place] [phone]   Moy Martel [phone]   Lennox Radford [phone]   [address] [phone]   [facility]  [phone]   [address] [facility]  [address] [facility] 1000 [facility] [phone]

## 2020-01-03 NOTE — PROGRESS NOTES
[facility]  Progress Note - Chemo Otto 1955, 59 y o  male MRN: [id number]  Unit/Bed#: [facility] [address] 220-02 Encounter: [id number]  Primary Care Provider: Estuardo Dickens MD   Date and time admitted to hospital: 1/2/2020  1:58 PM    Moderate protein-calorie malnutrition ([address] )  Assessment & Plan  Malnutrition Findings:   Malnutrition type: Acute illness(r/t abdominal pain, as evidenced by intake meeting <75% estimated needs > 1 week, and mild muscle mass loss noted at clavicle  Treatment: pending GI testing/initiation of PO diet )  Degree of Malnutrition: Malnutrition of moderate degree    BMI Findings: Body mass index is 18 87 kg/m²  · Unexpected weight loss of 12 lb since his last admission in October  · Denies issue with appetite however has been experiencing pain after eating  · Consult dietitian    Thrombocytosis [facility])  Assessment & Plan  · May be secondary to chronic blood loss from GI bleed  · Given increase, will consult hematology  · Also with recent 12lb weight loss    Wills's esophagus  Assessment & Plan  · Underwent EGD on 10/13 with findings of Wills's esophagus, severe esophagitis, and large hiatal hernia  · Biopsy results reveal fungal infection  Patient was to be treated with Fluconazole however GI was unable to reach patient  · Did not follow up with GI  · To have repeat EGD in 3 months from that time  · Denies current reflux symptoms  · GI following  Will defer treatment to them  Iron deficiency anemia due to chronic blood loss  Assessment & Plan  · With recurrent GI bleeding  · History of small bowel AVM, esophagitis, Wills's esophagus  · Presents with fatigue and some intermittent shortness of breath, Hgb 6 7  Denies dark stool or blood in stool    · S/P 1u PRBCs 1/2  · Monitor H&H  · Fecal occult blood ordered  · Consulted Hematology given new thrombocytosis    * Pain of upper abdomen  Assessment & Plan  · 7-10 day history of abdominal pain that has been worsening  States that the pain is postprandial and occurs approximately 2 hours after eating  He describes it as a ache  Tenderness in the periumbilical/epigastric region  He has never had pain like this before  · 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  · LFTs/lipase within normal limits  No leukocytosis  · CT abdomen/pelvis ordered with contrast for better evaluation in this patient who has had unexpected weight loss of 12 lb since October, anemia, fatigue, and abdominal pain which is new  · GI following, patient scheduled for EGD 1/3  · Serial abdominal exams    VTE Pharmacologic Prophylaxis:   Pharmacologic: Pharmacologic VTE Prophylaxis contraindicated due to GI bleed  Mechanical VTE Prophylaxis in Place: Yes    Patient Centered Rounds: I have performed bedside rounds with nursing staff today  Discussions with Specialists or Other Care Team Provider: Discussed patient with attending who recommends Hematology consult given thrombocytosis  Education and Discussions with Family / Patient: Discussed care plan with patient at bedside  Answered all questions to the best of my ability  Time Spent for Care: 30 minutes  More than 50% of total time spent on counseling and coordination of care as described above  Current Length of Stay: 1 day(s)    Current Patient Status: Inpatient   Certification Statement: The patient will continue to require additional inpatient hospital stay due to Pending EGD results, hematology evaluation    Discharge Plan: Patient comes from home, plan to return pending Hb stabilization and EGD findings  May be able to return tomorrow pending results and Hb in AM     Code Status: Level 1 - Full Code      Subjective:   Patient reports abdominal pain is improving  He is unable to identify source of bleed and denies any dark stool, BRBPR or hematemesis      Objective:     Vitals:   Temp (24hrs), Av 5 °F (36 9 °C), Min:98 2 °F (36 8 °C), Max:99 1 °F (37 3 °C)    Temp:  [98 2 °F (36 8 °C)-99 1 °F (37 3 °C)] 99 1 °F (37 3 °C)  HR:  [58-94] 58  Resp:  [16-20] 18  BP: (135-157)/(75-90) 157/77  SpO2:  [83 %-100 %] 100 %  Body mass index is 18 87 kg/m²  Input and Output Summary (last 24 hours): Intake/Output Summary (Last 24 hours) at 1/3/2020 1325  Last data filed at 1/3/2020 1309  Gross per 24 hour   Intake 550 ml   Output    Net 550 ml       Physical Exam:     Physical Exam   Constitutional: He appears well-developed and well-nourished  He appears cachectic  No distress  HENT:   Head: Normocephalic and atraumatic  Eyes: Conjunctivae are normal  No scleral icterus  Cardiovascular: Normal rate and regular rhythm  No murmur heard  Pulmonary/Chest: Effort normal and breath sounds normal  No respiratory distress  He has no wheezes  He has no rales  Abdominal: Soft  He exhibits no distension  There is tenderness  Musculoskeletal: He exhibits no edema  Neurological: He is alert  Skin: Skin is warm and dry  No erythema  There is pallor  Psychiatric: He has a normal mood and affect  Nursing note and vitals reviewed  Additional Data:     Labs:    Results from last 7 days   Lab Units 01/03/20  1209 01/03/20  0614  01/02/20  1337   WBC Thousand/uL  --  5 39  --  6 39   HEMOGLOBIN g/dL 7 9* 7 5*   < > 6 7*   HEMATOCRIT % 29 1* 27 6*   < > 25 7*   PLATELETS Thousands/uL  --  665*  --  757*   NEUTROS PCT %  --   --   --  66   LYMPHS PCT %  --   --   --  12*   MONOS PCT %  --   --   --  15*   EOS PCT %  --   --   --  3    < > = values in this interval not displayed       Results from last 7 days   Lab Units 01/03/20  0614   SODIUM mmol/L 138   POTASSIUM mmol/L 3 7   CHLORIDE mmol/L 103   CO2 mmol/L 26   BUN mg/dL 9   CREATININE mg/dL 0 88   ANION GAP mmol/L 9   CALCIUM mg/dL 8 2*   ALBUMIN g/dL 3 0*   TOTAL BILIRUBIN mg/dL 0 62   ALK PHOS U/L 97   ALT U/L 14   AST U/L 18   GLUCOSE RANDOM mg/dL 87     Results from last 7 days   Lab Units 01/02/20  1337   INR  1 05                       * I Have Reviewed All Lab Data Listed Above  * Additional Pertinent Lab Tests Reviewed: All Labs Within Last 24 Hours Reviewed    Imaging:    Imaging Reports Reviewed Today Include:   CT abdomen pelvis 1/2:   1  No acute abnormality identified in the abdomen or pelvis  2  Slight nodularity in the gallbladder lumen likely reflecting known underlying gallbladder polyps  These were better visualized and characterized on previous ultrasound dated 6/24/2019  Based on that study and ACR guidelines, a one-year follow-up   ultrasound was suggested  3  Small hiatal hernia  4  Nonobstructing 2 mm right lower pole renal collecting system calculus  5  Additional findings as noted  Imaging Personally Reviewed by Myself Includes:  None    Recent Cultures (last 7 days):           Last 24 Hours Medication List:     Current Facility-Administered Medications:  acetaminophen 650 mg Oral Q6H PRN ROMA Sadler    ondansetron 4 mg Intravenous Q4H PRN ROMA Sadler    pantoprazole 40 mg Intravenous Q12H [facility] ROMA Sadler    sodium chloride 100 mL/hr Intravenous Continuous Jesse Massey DO Last Rate: Stopped (01/03/20 1309)        Today, Patient Was Seen By: Shalini Gautam PA-C    ** Please Note: Dictation voice to text software may have been used in the creation of this document   **

## 2020-01-03 NOTE — ASSESSMENT & PLAN NOTE
· Underwent EGD on 10/13 with findings of Wills's esophagus, severe esophagitis, and large hiatal hernia  · Biopsy results reveal fungal infection  Patient was to be treated with Fluconazole however GI was unable to reach patient  · Did not follow up with GI  · To have repeat EGD in 3 months from that time  · Denies current reflux symptoms  · GI following  Will defer treatment to them

## 2020-01-03 NOTE — MALNUTRITION/BMI
This medical record reflects one or more clinical indicators suggestive of malnutrition  Malnutrition Findings:   Malnutrition type: Acute illness(r/t abdominal pain, as evidenced by intake meeting <75% estimated needs > 1 week, and mild muscle mass loss noted at clavicle  Treatment: pending GI testing/initiation of PO diet )  Degree of Malnutrition: Malnutrition of moderate degree  Malnutrition Characteristics: Inadequate energy, Muscle loss     Body mass index is 18 87 kg/m²  See Nutrition note dated 1/3/20 for additional details  Completed nutrition assessment is viewable in the nutrition documentation

## 2020-01-03 NOTE — INTERVAL H&P NOTE
H&P reviewed  After examining the patient I find no changes in the patients condition since the H&P had been written      Vitals:    01/03/20 1221   BP: 153/80   Pulse: 61   Resp: 18   Temp: 99 1 °F (37 3 °C)   SpO2: 100%

## 2020-01-04 LAB
ERYTHROCYTE [DISTWIDTH] IN BLOOD BY AUTOMATED COUNT: 21.5 % (ref 11.6–15.1)
FERRITIN SERPL-MCNC: 6 NG/ML (ref 8–388)
FOLATE SERPL-MCNC: 2.1 NG/ML (ref 3.1–17.5)
HCT VFR BLD AUTO: 28.5 % (ref 36.5–49.3)
HGB BLD-MCNC: 7.5 G/DL (ref 12–17)
IRON SATN MFR SERPL: 4 %
IRON SERPL-MCNC: 15 UG/DL (ref 65–175)
MCH RBC QN AUTO: 19.1 PG (ref 26.8–34.3)
MCHC RBC AUTO-ENTMCNC: 26.3 G/DL (ref 31.4–37.4)
MCV RBC AUTO: 73 FL (ref 82–98)
PLATELET # BLD AUTO: 645 THOUSANDS/UL (ref 149–390)
PMV BLD AUTO: 9.7 FL (ref 8.9–12.7)
RBC # BLD AUTO: 3.92 MILLION/UL (ref 3.88–5.62)
TIBC SERPL-MCNC: 350 UG/DL (ref 250–450)
VIT B12 SERPL-MCNC: 594 PG/ML (ref 100–900)
WBC # BLD AUTO: 5.19 THOUSAND/UL (ref 4.31–10.16)

## 2020-01-04 PROCEDURE — 99232 SBSQ HOSP IP/OBS MODERATE 35: CPT | Performed by: HOSPITALIST

## 2020-01-04 PROCEDURE — 85027 COMPLETE CBC AUTOMATED: CPT | Performed by: NURSE PRACTITIONER

## 2020-01-04 PROCEDURE — 99222 1ST HOSP IP/OBS MODERATE 55: CPT | Performed by: INTERNAL MEDICINE

## 2020-01-04 PROCEDURE — C9113 INJ PANTOPRAZOLE SODIUM, VIA: HCPCS | Performed by: NURSE PRACTITIONER

## 2020-01-04 PROCEDURE — 84165 PROTEIN E-PHORESIS SERUM: CPT | Performed by: PATHOLOGY

## 2020-01-04 PROCEDURE — 84165 PROTEIN E-PHORESIS SERUM: CPT | Performed by: INTERNAL MEDICINE

## 2020-01-04 PROCEDURE — 82607 VITAMIN B-12: CPT | Performed by: INTERNAL MEDICINE

## 2020-01-04 RX ADMIN — PANTOPRAZOLE SODIUM 40 MG: 40 INJECTION, POWDER, FOR SOLUTION INTRAVENOUS at 21:22

## 2020-01-04 RX ADMIN — PANTOPRAZOLE SODIUM 40 MG: 40 INJECTION, POWDER, FOR SOLUTION INTRAVENOUS at 09:37

## 2020-01-04 NOTE — ASSESSMENT & PLAN NOTE
Suspected, but as far as I can tell, we haven't found active bleeding  It may be in the small bowel    Appreciate hematology help looking for other sources    H/H up after transfusion

## 2020-01-04 NOTE — PLAN OF CARE
Problem: GASTROINTESTINAL - ADULT  Goal: Maintains or returns to baseline bowel function  Description  INTERVENTIONS:  - Assess bowel function  - Encourage oral fluids to ensure adequate hydration  - Administer IV fluids if ordered to ensure adequate hydration  - Administer ordered medications as needed  - Encourage mobilization and activity  - Consider nutritional services referral to assist patient with adequate nutrition and appropriate food choices  Outcome: Progressing  Goal: Maintains adequate nutritional intake  Description  INTERVENTIONS:  - Monitor percentage of each meal consumed  - Identify factors contributing to decreased intake, treat as appropriate  - Assist with meals as needed  - Monitor I&O, weight, and lab values if indicated  - Obtain nutrition services referral as needed  Outcome: Progressing     Problem: HEMATOLOGIC - ADULT  Goal: Maintains hematologic stability  Description  INTERVENTIONS  - Assess for signs and symptoms of bleeding or hemorrhage  - Monitor labs  - Administer supportive blood products/factors as ordered and appropriate  Outcome: Progressing     Problem: DISCHARGE PLANNING  Goal: Discharge to home or other facility with appropriate resources  Description  INTERVENTIONS:  - Identify barriers to discharge w/patient and caregiver  - Arrange for needed discharge resources and transportation as appropriate  - Identify discharge learning needs (meds, wound care, etc )  - Arrange for interpretive services to assist at discharge as needed  - Refer to Case Management Department for coordinating discharge planning if the patient needs post-hospital services based on physician/advanced practitioner order or complex needs related to functional status, cognitive ability, or social support system  Outcome: Progressing     Problem: Knowledge Deficit  Goal: Patient/family/caregiver demonstrates understanding of disease process, treatment plan, medications, and discharge instructions  Description  Complete learning assessment and assess knowledge base  Interventions:  - Provide teaching at level of understanding  - Provide teaching via preferred learning methods  Outcome: Progressing     Problem: Potential for Falls  Goal: Patient will remain free of falls  Description  INTERVENTIONS:  - Assess patient frequently for physical needs  -  Identify cognitive and physical deficits and behaviors that affect risk of falls  -  [place] fall precautions as indicated by assessment   - Educate patient/family on patient safety including physical limitations  - Instruct patient to call for assistance with activity based on assessment  - Modify environment to reduce risk of injury  - Consider OT/PT consult to assist with strengthening/mobility  Outcome: Progressing     Problem: Nutrition/Hydration-ADULT  Goal: Nutrient/Hydration intake appropriate for improving, restoring or maintaining nutritional needs  Description  Monitor and assess patient's nutrition/hydration status for malnutrition  Collaborate with interdisciplinary team and initiate plan and interventions as ordered  Monitor patient's weight and dietary intake as ordered or per policy  Utilize nutrition screening tool and intervene as necessary  Determine patient's food preferences and provide high-protein, high-caloric foods as appropriate       INTERVENTIONS:  - Monitor oral intake, urinary output, labs, and treatment plans  - Assess nutrition and hydration status and recommend course of action  - Evaluate amount of meals eaten  - Assist patient with eating if necessary   - Allow adequate time for meals  - Recommend/ encourage appropriate diets, oral nutritional supplements, and vitamin/mineral supplements  - Order, calculate, and assess calorie counts as needed  - Recommend, monitor, and adjust tube feedings and TPN/PPN based on assessed needs  - Assess need for intravenous fluids  - Provide specific nutrition/hydration education as appropriate  - Include patient/family/caregiver in decisions related to nutrition  Outcome: Progressing

## 2020-01-04 NOTE — PROGRESS NOTES
Progress Note - Axel Denny 1955, 59 y o  male MRN: [id number]    Unit/Bed#: [facility] [address] 220-02 Encounter: [id number]    Primary Care Provider: Lucinda Day MD   Date and time admitted to hospital: 2020  1:58 PM        Iron deficiency anemia due to chronic blood loss  Assessment & Plan  Suspected, but as far as I can tell, we haven't found active bleeding  It may be in the small bowel    Appreciate hematology help looking for other sources    H/H up after transfusion          Subjective:   Feels better  Has more energy  He says that sometimes he is fearful of eating because he is worried that he is going to get pain after eating  Objective:     Vitals:   Temp (24hrs), Av 5 °F (36 9 °C), Min:97 5 °F (36 4 °C), Max:99 °F (37 2 °C)    Temp:  [97 5 °F (36 4 °C)-99 °F (37 2 °C)] 97 5 °F (36 4 °C)  HR:  [64-69] 69  Resp:  [16-18] 18  BP: (125-137)/(78-87) 137/87  SpO2:  [96 %-99 %] 99 %  Body mass index is 18 87 kg/m²  Input and Output Summary (last 24 hours): Intake/Output Summary (Last 24 hours) at 2020 1621  Last data filed at 1/3/2020 2238  Gross per 24 hour   Intake 970 ml   Output    Net 970 ml       Physical Exam:     Physical Exam   HENT:   Head: Normocephalic and atraumatic  Eyes: Pupils are equal, round, and reactive to light  EOM are normal    Cardiovascular: Normal rate and regular rhythm  Exam reveals no gallop and no friction rub  No murmur heard  Pulmonary/Chest: Effort normal and breath sounds normal  He has no wheezes  He has no rales  Abdominal: Soft  Bowel sounds are normal  There is no tenderness  Musculoskeletal: He exhibits no edema  Nursing note and vitals reviewed              Additional Data:     Labs:    Results from last 7 days   Lab Units 20  0456  20  1337   WBC Thousand/uL 5 19   < > 6 39   HEMOGLOBIN g/dL 7 5*   < > 6 7*   HEMATOCRIT % 28 5*   < > 25 7*   PLATELETS Thousands/uL 645*   < > 757*   NEUTROS PCT %  --   --  66 LYMPHS PCT %  --   --  12*   MONOS PCT %  --   --  15*   EOS PCT %  --   --  3    < > = values in this interval not displayed  Results from last 7 days   Lab Units 01/03/20  0614   POTASSIUM mmol/L 3 7   CHLORIDE mmol/L 103   CO2 mmol/L 26   BUN mg/dL 9   CREATININE mg/dL 0 88   CALCIUM mg/dL 8 2*   ALK PHOS U/L 97   ALT U/L 14   AST U/L 18     Results from last 7 days   Lab Units 01/02/20  1337   INR  1 05                   * I Have Reviewed All Lab Data     Recent Cultures (last 7 days):             Last 24 Hours Medication List:     Current Facility-Administered Medications:  acetaminophen 650 mg Oral Q6H PRN Roberto Billet, CRNP    ondansetron 4 mg Intravenous Q4H PRN Roberto Billet, CRNP    pantoprazole 40 mg Intravenous Q12H [address] Roberto Billet, CRNP    sodium chloride 100 mL/hr Intravenous Continuous [place] Seat, DO Last Rate: Stopped (01/04/20 0423)         VTE Pharmacologic Prophylaxis:   Pharmacologic: Other Medication: none with possible GI bleeding      Current Length of Stay: 2 day(s)    Current Patient Status: Inpatient       Discharge Plan:   Code Status: Level 1 - Full Code           Today, Patient Was Seen By: Mat Prader, DO    ** Please Note: Dictation voice to text software may have been used in the creation of this document   **

## 2020-01-05 LAB — ERYTHROCYTE [SEDIMENTATION RATE] IN BLOOD: 4 MM/HOUR (ref 0–10)

## 2020-01-05 PROCEDURE — 85652 RBC SED RATE AUTOMATED: CPT | Performed by: INTERNAL MEDICINE

## 2020-01-05 PROCEDURE — 99232 SBSQ HOSP IP/OBS MODERATE 35: CPT | Performed by: HOSPITALIST

## 2020-01-05 PROCEDURE — 86038 ANTINUCLEAR ANTIBODIES: CPT | Performed by: INTERNAL MEDICINE

## 2020-01-05 PROCEDURE — C9113 INJ PANTOPRAZOLE SODIUM, VIA: HCPCS | Performed by: NURSE PRACTITIONER

## 2020-01-05 PROCEDURE — 86039 ANTINUCLEAR ANTIBODIES (ANA): CPT | Performed by: INTERNAL MEDICINE

## 2020-01-05 RX ADMIN — PANTOPRAZOLE SODIUM 40 MG: 40 INJECTION, POWDER, FOR SOLUTION INTRAVENOUS at 09:25

## 2020-01-05 RX ADMIN — PANTOPRAZOLE SODIUM 40 MG: 40 INJECTION, POWDER, FOR SOLUTION INTRAVENOUS at 20:25

## 2020-01-05 NOTE — PROGRESS NOTES
Progress Note - Devon Luther 1955, 59 y o  male MRN: [id number]    Unit/Bed#: [facility] [address] [place] [address] Encounter: [id number]    Primary Care Provider: Belen Ortiz MD   Date and time admitted to hospital: 2020  1:58 PM        Iron deficiency anemia due to chronic blood loss  Assessment & Plan  Suspected, but as far as I can tell, we haven't found active bleeding  He has had multiple EGD and colonsocopies  He needs a blood transfusion about every three months  It may be in the small bowel  He may need outpatient capsule endoscopy    Appreciate hematology help looking for other sources    H/H up after transfusion    Wills's esophagus  Assessment & Plan  · Underwent EGD on 10/13 with findings of Wills's esophagus, severe esophagitis, and large hiatal hernia  · Biopsy results reveal fungal infection  Patient was to be treated with Fluconazole however GI was unable to reach patient  · Did not follow up with GI  · Biopsy of Wills's by GI this admisison          Subjective:   Feels better  He is eating more   Starting to have enough energy to ambulate  No bloody nor black stools  Objective:     Vitals:   Temp (24hrs), Av 2 °F (36 8 °C), Min:97 5 °F (36 4 °C), Max:98 9 °F (37 2 °C)    Temp:  [97 5 °F (36 4 °C)-98 9 °F (37 2 °C)] 98 9 °F (37 2 °C)  HR:  [67-69] 67  Resp:  [16-18] 16  BP: (121-140)/(62-87) 121/73  SpO2:  [97 %-99 %] 97 %  Body mass index is 18 87 kg/m²  Input and Output Summary (last 24 hours):     No intake or output data in the 24 hours ending 20 1412    Physical Exam:     Physical Exam   HENT:   Head: Normocephalic and atraumatic  Eyes: Pupils are equal, round, and reactive to light  EOM are normal    Cardiovascular: Normal rate and regular rhythm  Exam reveals no gallop and no friction rub  No murmur heard  Pulmonary/Chest: Effort normal and breath sounds normal  He has no wheezes  He has no rales  Abdominal: Soft   Bowel sounds are normal  There is no tenderness  Musculoskeletal: He exhibits no edema  Nursing note and vitals reviewed          Additional Data:     Labs:    Results from last 7 days   Lab Units 01/04/20  0456  01/02/20  1337   WBC Thousand/uL 5 19   < > 6 39   HEMOGLOBIN g/dL 7 5*   < > 6 7*   HEMATOCRIT % 28 5*   < > 25 7*   PLATELETS Thousands/uL 645*   < > 757*   NEUTROS PCT %  --   --  66   LYMPHS PCT %  --   --  12*   MONOS PCT %  --   --  15*   EOS PCT %  --   --  3    < > = values in this interval not displayed  Results from last 7 days   Lab Units 01/03/20  0614   POTASSIUM mmol/L 3 7   CHLORIDE mmol/L 103   CO2 mmol/L 26   BUN mg/dL 9   CREATININE mg/dL 0 88   CALCIUM mg/dL 8 2*   ALK PHOS U/L 97   ALT U/L 14   AST U/L 18     Results from last 7 days   Lab Units 01/02/20  1337   INR  1 05                   * I Have Reviewed All Lab Data     Recent Cultures (last 7 days):             Last 24 Hours Medication List:     Current Facility-Administered Medications:  acetaminophen 650 mg Oral Q6H PRN David Lus, CRNP    ondansetron 4 mg Intravenous Q4H PRN David Leonard, CRNP    pantoprazole 40 mg Intravenous Q12H [address] David Leonard, CRNP    sodium chloride 100 mL/hr Intravenous Continuous Guadalupe Day, DO Last Rate: Stopped (01/04/20 0423)         VTE Pharmacologic Prophylaxis:   Pharmacologic: none with GI bleed      Current Length of Stay: 3 day(s)    Current Patient Status: Inpatient       Discharge Plan: home likely tomorrow if ok with heme    Code Status: Level 1 - Full Code           Today, Patient Was Seen By: Kj Mancuso DO    ** Please Note: Dictation voice to text software may have been used in the creation of this document   **

## 2020-01-05 NOTE — ASSESSMENT & PLAN NOTE
Suspected, but as far as I can tell, we haven't found active bleeding  He has had multiple EGD and colonsocopies  He needs a blood transfusion about every three months  It may be in the small bowel  He may need outpatient capsule endoscopy    Appreciate hematology help looking for other sources    H/H up after transfusion

## 2020-01-05 NOTE — ASSESSMENT & PLAN NOTE
· Underwent EGD on 10/13 with findings of Wills's esophagus, severe esophagitis, and large hiatal hernia  · Biopsy results reveal fungal infection  Patient was to be treated with Fluconazole however GI was unable to reach patient    · Did not follow up with GI  · Biopsy of Wills's by GI this admisison

## 2020-01-05 NOTE — PLAN OF CARE
Problem: GASTROINTESTINAL - ADULT  Goal: Maintains or returns to baseline bowel function  Description  INTERVENTIONS:  - Assess bowel function  - Encourage oral fluids to ensure adequate hydration  - Administer IV fluids if ordered to ensure adequate hydration  - Administer ordered medications as needed  - Encourage mobilization and activity  - Consider nutritional services referral to assist patient with adequate nutrition and appropriate food choices  Outcome: Progressing  Goal: Maintains adequate nutritional intake  Description  INTERVENTIONS:  - Monitor percentage of each meal consumed  - Identify factors contributing to decreased intake, treat as appropriate  - Assist with meals as needed  - Monitor I&O, weight, and lab values if indicated  - Obtain nutrition services referral as needed  Outcome: Progressing     Problem: HEMATOLOGIC - ADULT  Goal: Maintains hematologic stability  Description  INTERVENTIONS  - Assess for signs and symptoms of bleeding or hemorrhage  - Monitor labs  - Administer supportive blood products/factors as ordered and appropriate  Outcome: Progressing     Problem: DISCHARGE PLANNING  Goal: Discharge to home or other facility with appropriate resources  Description  INTERVENTIONS:  - Identify barriers to discharge w/patient and caregiver  - Arrange for needed discharge resources and transportation as appropriate  - Identify discharge learning needs (meds, wound care, etc )  - Arrange for interpretive services to assist at discharge as needed  - Refer to Case Management Department for coordinating discharge planning if the patient needs post-hospital services based on physician/advanced practitioner order or complex needs related to functional status, cognitive ability, or social support system  Outcome: Progressing     Problem: Knowledge Deficit  Goal: Patient/family/caregiver demonstrates understanding of disease process, treatment plan, medications, and discharge instructions  Description  Complete learning assessment and assess knowledge base  Interventions:  - Provide teaching at level of understanding  - Provide teaching via preferred learning methods  Outcome: Progressing     Problem: Potential for Falls  Goal: Patient will remain free of falls  Description  INTERVENTIONS:  - Assess patient frequently for physical needs  -  Identify cognitive and physical deficits and behaviors that affect risk of falls  -  [place] fall precautions as indicated by assessment   - Educate patient/family on patient safety including physical limitations  - Instruct patient to call for assistance with activity based on assessment  - Modify environment to reduce risk of injury  - Consider OT/PT consult to assist with strengthening/mobility  Outcome: Progressing     Problem: Nutrition/Hydration-ADULT  Goal: Nutrient/Hydration intake appropriate for improving, restoring or maintaining nutritional needs  Description  Monitor and assess patient's nutrition/hydration status for malnutrition  Collaborate with interdisciplinary team and initiate plan and interventions as ordered  Monitor patient's weight and dietary intake as ordered or per policy  Utilize nutrition screening tool and intervene as necessary  Determine patient's food preferences and provide high-protein, high-caloric foods as appropriate       INTERVENTIONS:  - Monitor oral intake, urinary output, labs, and treatment plans  - Assess nutrition and hydration status and recommend course of action  - Evaluate amount of meals eaten  - Assist patient with eating if necessary   - Allow adequate time for meals  - Recommend/ encourage appropriate diets, oral nutritional supplements, and vitamin/mineral supplements  - Order, calculate, and assess calorie counts as needed  - Recommend, monitor, and adjust tube feedings and TPN/PPN based on assessed needs  - Assess need for intravenous fluids  - Provide specific nutrition/hydration education as appropriate  - Include patient/family/caregiver in decisions related to nutrition  Outcome: Progressing

## 2020-01-05 NOTE — PLAN OF CARE
Problem: GASTROINTESTINAL - ADULT  Goal: Maintains or returns to baseline bowel function  Description  INTERVENTIONS:  - Assess bowel function  - Encourage oral fluids to ensure adequate hydration  - Administer IV fluids if ordered to ensure adequate hydration  - Administer ordered medications as needed  - Encourage mobilization and activity  - Consider nutritional services referral to assist patient with adequate nutrition and appropriate food choices  Outcome: Progressing  Goal: Maintains adequate nutritional intake  Description  INTERVENTIONS:  - Monitor percentage of each meal consumed  - Identify factors contributing to decreased intake, treat as appropriate  - Assist with meals as needed  - Monitor I&O, weight, and lab values if indicated  - Obtain nutrition services referral as needed  Outcome: Progressing     Problem: HEMATOLOGIC - ADULT  Goal: Maintains hematologic stability  Description  INTERVENTIONS  - Assess for signs and symptoms of bleeding or hemorrhage  - Monitor labs  - Administer supportive blood products/factors as ordered and appropriate  Outcome: Progressing     Problem: DISCHARGE PLANNING  Goal: Discharge to home or other facility with appropriate resources  Description  INTERVENTIONS:  - Identify barriers to discharge w/patient and caregiver  - Arrange for needed discharge resources and transportation as appropriate  - Identify discharge learning needs (meds, wound care, etc )  - Arrange for interpretive services to assist at discharge as needed  - Refer to Case Management Department for coordinating discharge planning if the patient needs post-hospital services based on physician/advanced practitioner order or complex needs related to functional status, cognitive ability, or social support system  Outcome: Progressing     Problem: Knowledge Deficit  Goal: Patient/family/caregiver demonstrates understanding of disease process, treatment plan, medications, and discharge instructions  Description  Complete learning assessment and assess knowledge base  Interventions:  - Provide teaching at level of understanding  - Provide teaching via preferred learning methods  Outcome: Progressing     Problem: Potential for Falls  Goal: Patient will remain free of falls  Description  INTERVENTIONS:  - Assess patient frequently for physical needs  -  Identify cognitive and physical deficits and behaviors that affect risk of falls  -  [place] fall precautions as indicated by assessment   - Educate patient/family on patient safety including physical limitations  - Instruct patient to call for assistance with activity based on assessment  - Modify environment to reduce risk of injury  - Consider OT/PT consult to assist with strengthening/mobility  Outcome: Progressing     Problem: Nutrition/Hydration-ADULT  Goal: Nutrient/Hydration intake appropriate for improving, restoring or maintaining nutritional needs  Description  Monitor and assess patient's nutrition/hydration status for malnutrition  Collaborate with interdisciplinary team and initiate plan and interventions as ordered  Monitor patient's weight and dietary intake as ordered or per policy  Utilize nutrition screening tool and intervene as necessary  Determine patient's food preferences and provide high-protein, high-caloric foods as appropriate       INTERVENTIONS:  - Monitor oral intake, urinary output, labs, and treatment plans  - Assess nutrition and hydration status and recommend course of action  - Evaluate amount of meals eaten  - Assist patient with eating if necessary   - Allow adequate time for meals  - Recommend/ encourage appropriate diets, oral nutritional supplements, and vitamin/mineral supplements  - Order, calculate, and assess calorie counts as needed  - Recommend, monitor, and adjust tube feedings and TPN/PPN based on assessed needs  - Assess need for intravenous fluids  - Provide specific nutrition/hydration education as appropriate  - Include patient/family/caregiver in decisions related to nutrition  Outcome: Progressing

## 2020-01-06 LAB
ANA HOMOGEN SER QL IF: NORMAL
ANA HOMOGEN TITR SER: NORMAL {TITER}
ANION GAP SERPL CALCULATED.3IONS-SCNC: 9 MMOL/L (ref 4–13)
BASOPHILS # BLD AUTO: 0.15 THOUSANDS/ΜL (ref 0–0.1)
BASOPHILS NFR BLD AUTO: 3 % (ref 0–1)
BUN SERPL-MCNC: 10 MG/DL (ref 5–25)
CALCIUM SERPL-MCNC: 8.3 MG/DL (ref 8.3–10.1)
CHLORIDE SERPL-SCNC: 105 MMOL/L (ref 100–108)
CO2 SERPL-SCNC: 26 MMOL/L (ref 21–32)
CREAT SERPL-MCNC: 0.74 MG/DL (ref 0.6–1.3)
EOSINOPHIL # BLD AUTO: 0.47 THOUSAND/ΜL (ref 0–0.61)
EOSINOPHIL NFR BLD AUTO: 9 % (ref 0–6)
ERYTHROCYTE [DISTWIDTH] IN BLOOD BY AUTOMATED COUNT: 21.4 % (ref 11.6–15.1)
GFR SERPL CREATININE-BSD FRML MDRD: 97 ML/MIN/1.73SQ M
GLUCOSE SERPL-MCNC: 97 MG/DL (ref 65–140)
HCT VFR BLD AUTO: 27.1 % (ref 36.5–49.3)
HCT VFR BLD AUTO: 31.9 % (ref 36.5–49.3)
HEMOCCULT STL QL: NEGATIVE
HGB BLD-MCNC: 7.3 G/DL (ref 12–17)
HGB BLD-MCNC: 8.5 G/DL (ref 12–17)
IMM GRANULOCYTES # BLD AUTO: 0.01 THOUSAND/UL (ref 0–0.2)
IMM GRANULOCYTES NFR BLD AUTO: 0 % (ref 0–2)
LYMPHOCYTES # BLD AUTO: 1.06 THOUSANDS/ΜL (ref 0.6–4.47)
LYMPHOCYTES NFR BLD AUTO: 20 % (ref 14–44)
MCH RBC QN AUTO: 19.5 PG (ref 26.8–34.3)
MCHC RBC AUTO-ENTMCNC: 26.9 G/DL (ref 31.4–37.4)
MCV RBC AUTO: 72 FL (ref 82–98)
MONOCYTES # BLD AUTO: 0.86 THOUSAND/ΜL (ref 0.17–1.22)
MONOCYTES NFR BLD AUTO: 16 % (ref 4–12)
NEUTROPHILS # BLD AUTO: 2.72 THOUSANDS/ΜL (ref 1.85–7.62)
NEUTS SEG NFR BLD AUTO: 52 % (ref 43–75)
NRBC BLD AUTO-RTO: 0 /100 WBCS
PLATELET # BLD AUTO: 631 THOUSANDS/UL (ref 149–390)
PMV BLD AUTO: 9.5 FL (ref 8.9–12.7)
POTASSIUM SERPL-SCNC: 3.5 MMOL/L (ref 3.5–5.3)
RBC # BLD AUTO: 3.75 MILLION/UL (ref 3.88–5.62)
RYE IGE QN: POSITIVE
SODIUM SERPL-SCNC: 140 MMOL/L (ref 136–145)
WBC # BLD AUTO: 5.27 THOUSAND/UL (ref 4.31–10.16)

## 2020-01-06 PROCEDURE — 82272 OCCULT BLD FECES 1-3 TESTS: CPT | Performed by: NURSE PRACTITIONER

## 2020-01-06 PROCEDURE — 85014 HEMATOCRIT: CPT | Performed by: NURSE PRACTITIONER

## 2020-01-06 PROCEDURE — 85018 HEMOGLOBIN: CPT | Performed by: NURSE PRACTITIONER

## 2020-01-06 PROCEDURE — 80048 BASIC METABOLIC PNL TOTAL CA: CPT | Performed by: HOSPITALIST

## 2020-01-06 PROCEDURE — 99232 SBSQ HOSP IP/OBS MODERATE 35: CPT | Performed by: INTERNAL MEDICINE

## 2020-01-06 PROCEDURE — 85025 COMPLETE CBC W/AUTO DIFF WBC: CPT | Performed by: HOSPITALIST

## 2020-01-06 RX ORDER — PANTOPRAZOLE SODIUM 40 MG/1
40 TABLET, DELAYED RELEASE ORAL
Status: DISCONTINUED | OUTPATIENT
Start: 2020-01-06 | End: 2020-01-07 | Stop reason: HOSPADM

## 2020-01-06 RX ORDER — CHOLECALCIFEROL (VITAMIN D3) 125 MCG
3000 CAPSULE ORAL
Status: DISCONTINUED | OUTPATIENT
Start: 2020-01-06 | End: 2020-01-07 | Stop reason: HOSPADM

## 2020-01-06 RX ORDER — SUCRALFATE ORAL 1 G/10ML
1000 SUSPENSION ORAL
Status: DISCONTINUED | OUTPATIENT
Start: 2020-01-06 | End: 2020-01-07 | Stop reason: HOSPADM

## 2020-01-06 RX ADMIN — SUCRALFATE 1000 MG: 1 SUSPENSION ORAL at 22:14

## 2020-01-06 RX ADMIN — PANTOPRAZOLE SODIUM 40 MG: 40 TABLET, DELAYED RELEASE ORAL at 16:51

## 2020-01-06 RX ADMIN — LACTASE TAB 3000 UNIT 3000 UNITS: 3000 TAB at 12:34

## 2020-01-06 RX ADMIN — IRON SUCROSE 200 MG: 20 INJECTION, SOLUTION INTRAVENOUS at 10:50

## 2020-01-06 RX ADMIN — LACTASE TAB 3000 UNIT 3000 UNITS: 3000 TAB at 16:51

## 2020-01-06 RX ADMIN — SUCRALFATE 1000 MG: 1 SUSPENSION ORAL at 16:51

## 2020-01-06 RX ADMIN — SUCRALFATE 1000 MG: 1 SUSPENSION ORAL at 12:33

## 2020-01-06 NOTE — UTILIZATION REVIEW
Continued Stay Review    Date: 01/06/2020                          Current Patient Class: Inpatient  Current Level of Care: Med/Surg    HPI:64 y o  male initially admitted on 01/02/2020    Assessment/Plan: Start venofer, Monitor H&H,     Pertinent Labs/Diagnostic Results:   Results from last 7 days   Lab Units 01/06/20  0525 01/04/20  0456 01/03/20  1209 01/03/20  0614 01/02/20  2249 01/02/20  1337   WBC Thousand/uL 5 27 5 19  --  5 39  --  6 39   HEMOGLOBIN g/dL 7 3* 7 5* 7 9* 7 5* 7 1* 6 7*   HEMATOCRIT % 27 1* 28 5* 29 1* 27 6* 26 7* 25 7*   PLATELETS Thousands/uL 631* 645*  --  665*  --  757*   NEUTROS ABS Thousands/µL 2 72  --   --   --   --  4 28     Results from last 7 days   Lab Units 01/06/20  0524 01/03/20  0614 01/02/20  1337   SODIUM mmol/L 140 138 139   POTASSIUM mmol/L 3 5 3 7 3 9   CHLORIDE mmol/L 105 103 104   CO2 mmol/L 26 26 26   ANION GAP mmol/L 9 9 9   BUN mg/dL 10 9 10   CREATININE mg/dL 0 74 0 88 1 16   EGFR ml/min/1 73sq m 97 91 66   CALCIUM mg/dL 8 3 8 2* 8 6     Results from last 7 days   Lab Units 01/03/20  0614 01/02/20  1337   AST U/L 18 17   ALT U/L 14 13   ALK PHOS U/L 97 112   TOTAL PROTEIN g/dL 6 3* 7 2   ALBUMIN g/dL 3 0* 2 4*   TOTAL BILIRUBIN mg/dL 0 62 0 50     Results from last 7 days   Lab Units 01/06/20  0524 01/03/20  0614 01/02/20  1337   GLUCOSE RANDOM mg/dL 97 87 107     Results from last 7 days   Lab Units 01/02/20  1337   PROTIME seconds 13 8   INR  1 05   PTT seconds 31     Results from last 7 days   Lab Units 01/03/20  0614   FERRITIN ng/mL 6*     Results from last 7 days   Lab Units 01/02/20  1337   LIPASE u/L 169     Results from last 7 days   Lab Units 01/05/20  0524   SED RATE mm/hour 4     Vital Signs: /83 (BP Location: Left arm)   Pulse 70   Temp 98 3 °F (36 8 °C) (Temporal)   Resp 18   Wt 63 1 kg (139 lb 1 8 oz)   SpO2 98%   BMI 18 87 kg/m²     Medications:   Scheduled Medications:  Medications:  iron sucrose 200 mg Intravenous Daily   lactase 3,000 Units Oral TID With Meals   pantoprazole 40 mg Oral BID AC   sucralfate 1,000 mg Oral 4x Daily (AC & HS)   Continuous IV Infusions:   PRN Meds:  acetaminophen 650 mg Oral Q6H PRN   ondansetron 4 mg Intravenous Q4H PRN     Discharge Plan: TBD    Network Utilization Review Department  [email or web address]  ATTENTION: Please call with any questions or concerns to [phone] and carefully listen to the prompts so that you are directed to the right person  All voicemails are confidential   Jami Miller all requests for admission clinical reviews, approved or denied determinations and any other requests to dedicated fax number below belonging to the campus where the patient is receiving treatment   List of dedicated fax numbers for the Facilities:  [address] DENIALS (Administrative/Medical Necessity) [phone]   [address] [place] [address] (Maternity/NICU/Pediatrics) [phone]   Leif Valero [phone]   Franklin Caller [phone]   Michela Menchaca [phone]   [place] [phone]   [address] [place] [address] [phone]   [facility]  [phone]   [address] [facility] [address]  [address] [facility] [address] [facility] [phone]

## 2020-01-06 NOTE — PLAN OF CARE
Problem: GASTROINTESTINAL - ADULT  Goal: Maintains or returns to baseline bowel function  Description  INTERVENTIONS:  - Assess bowel function  - Encourage oral fluids to ensure adequate hydration  - Administer IV fluids if ordered to ensure adequate hydration  - Administer ordered medications as needed  - Encourage mobilization and activity  - Consider nutritional services referral to assist patient with adequate nutrition and appropriate food choices  Outcome: Progressing  Goal: Maintains adequate nutritional intake  Description  INTERVENTIONS:  - Monitor percentage of each meal consumed  - Identify factors contributing to decreased intake, treat as appropriate  - Assist with meals as needed  - Monitor I&O, weight, and lab values if indicated  - Obtain nutrition services referral as needed  Outcome: Progressing     Problem: HEMATOLOGIC - ADULT  Goal: Maintains hematologic stability  Description  INTERVENTIONS  - Assess for signs and symptoms of bleeding or hemorrhage  - Monitor labs  - Administer supportive blood products/factors as ordered and appropriate  Outcome: Progressing     Problem: DISCHARGE PLANNING  Goal: Discharge to home or other facility with appropriate resources  Description  INTERVENTIONS:  - Identify barriers to discharge w/patient and caregiver  - Arrange for needed discharge resources and transportation as appropriate  - Identify discharge learning needs (meds, wound care, etc )  - Arrange for interpretive services to assist at discharge as needed  - Refer to Case Management Department for coordinating discharge planning if the patient needs post-hospital services based on physician/advanced practitioner order or complex needs related to functional status, cognitive ability, or social support system  Outcome: Progressing     Problem: Knowledge Deficit  Goal: Patient/family/caregiver demonstrates understanding of disease process, treatment plan, medications, and discharge instructions  Description  Complete learning assessment and assess knowledge base  Interventions:  - Provide teaching at level of understanding  - Provide teaching via preferred learning methods  Outcome: Progressing     Problem: Potential for Falls  Goal: Patient will remain free of falls  Description  INTERVENTIONS:  - Assess patient frequently for physical needs  -  Identify cognitive and physical deficits and behaviors that affect risk of falls  -  [place] fall precautions as indicated by assessment   - Educate patient/family on patient safety including physical limitations  - Instruct patient to call for assistance with activity based on assessment  - Modify environment to reduce risk of injury  - Consider OT/PT consult to assist with strengthening/mobility  Outcome: Progressing     Problem: Nutrition/Hydration-ADULT  Goal: Nutrient/Hydration intake appropriate for improving, restoring or maintaining nutritional needs  Description  Monitor and assess patient's nutrition/hydration status for malnutrition  Collaborate with interdisciplinary team and initiate plan and interventions as ordered  Monitor patient's weight and dietary intake as ordered or per policy  Utilize nutrition screening tool and intervene as necessary  Determine patient's food preferences and provide high-protein, high-caloric foods as appropriate       INTERVENTIONS:  - Monitor oral intake, urinary output, labs, and treatment plans  - Assess nutrition and hydration status and recommend course of action  - Evaluate amount of meals eaten  - Assist patient with eating if necessary   - Allow adequate time for meals  - Recommend/ encourage appropriate diets, oral nutritional supplements, and vitamin/mineral supplements  - Order, calculate, and assess calorie counts as needed  - Recommend, monitor, and adjust tube feedings and TPN/PPN based on assessed needs  - Assess need for intravenous fluids  - Provide specific nutrition/hydration education as appropriate  - Include patient/family/caregiver in decisions related to nutrition  Outcome: Progressing

## 2020-01-06 NOTE — PROGRESS NOTES
[facility]  Progress Note - Rakel Reyes 1955, 59 y o  male MRN: [id number]    Unit/Bed#: [address] 220-02 Encounter: [id number]    Primary Care Provider: Luis Bhandari MD   Date and time admitted to hospital: 1/2/2020  1:58 PM        Iron deficiency anemia due to chronic blood loss  Assessment & Plan  · He has had multiple EGD and colonsocopies  · He needs a blood transfusion about every three months  · Seen by GI and underwent EGD on 1/3 with Wills's esophagus and ulcer in distal esophagus, biopsies pending  · Hematology consult appreciated  Additional labs ordered for iron status, substrates, SPEP, free light chains  Suggest possible small bowel capsule endoscopy for further evaluation with history of AVM's   · Start Venofer  · Monitor H&H  Lab Results   Component Value Date    HGB 7 3 (L) 01/06/2020    HGB 7 5 (L) 01/04/2020    HGB 7 9 (L) 01/03/2020    HGB 7 5 (L) 01/03/2020    HGB 7 1 (L) 01/02/2020         * Pain of upper abdomen  Assessment & Plan  · 7-10 day history of abdominal pain that has been worsening  States that the pain is postprandial and occurs approximately 2 hours after eating  He describes it as a ache  Tenderness in the periumbilical/epigastric region  He has never had pain like this before  · 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  · LFTs/lipase within normal limits  No leukocytosis  · CT abdomen/pelvis ordered with contrast for better evaluation in this patient who has had unexpected weight loss of 12 lb since October, anemia, fatigue, and abdominal pain which is new  · GI following, underwent EGD 1/3  · Patient reports resolution of pain    Moderate protein-calorie malnutrition ([address] )  Assessment & Plan  Malnutrition Findings:   Malnutrition type: Acute illness(r/t abdominal pain, as evidenced by intake meeting <75% estimated needs > 1 week, and mild muscle mass loss noted at clavicle   Treatment: pending GI testing/initiation of PO diet )  Degree of Malnutrition: Malnutrition of moderate degree    BMI Findings: Body mass index is 18 87 kg/m²  · Unexpected weight loss of 12 lb since his last admission in October  · Denies issue with appetite however has been experiencing pain after eating  · Consult dietitian    Wills's esophagus  Assessment & Plan  · Underwent EGD on 10/13 with findings of Wills's esophagus, severe esophagitis, and large hiatal hernia  · Biopsy results reveal fungal infection  Patient was to be treated with Fluconazole however GI was unable to reach patient  · Did not follow up with GI  · Biopsy of Wills's by GI this admission  · Will need follow up with GI as an outpatient    Thrombocytosis [facility])  Assessment & Plan  · May be secondary to chronic blood loss from GI bleed  · Hematology consult appreciated  · Also with recent 12lb weight loss        VTE Pharmacologic Prophylaxis:   Pharmacologic: Pharmacologic VTE Prophylaxis contraindicated due to GI bleed  Mechanical VTE Prophylaxis in Place: Yes    Patient Centered Rounds: I have performed bedside rounds with nursing staff today  Discussions with Specialists or Other Care Team Provider:  Provider notes reviewed  Discussed with GI  Education and Discussions with Family / Patient:  Plan of care with patient    Time Spent for Care: 20 minutes  More than 50% of total time spent on counseling and coordination of care as described above  Current Length of Stay: 4 day(s)    Current Patient Status: Inpatient   Certification Statement: The patient will continue to require additional inpatient hospital stay due to Monitoring of blood counts    Discharge Plan:  Possible discharge in 24 hours if hemoglobin stable    Code Status: Level 1 - Full Code      Subjective:   Patient denies current pain  He states he does not feel well however and has not been sleeping  He denies shortness of breath or dizziness      Objective:     Vitals: Temp (24hrs), Av 6 °F (37 °C), Min:98 5 °F (36 9 °C), Max:98 8 °F (37 1 °C)    Temp:  [98 5 °F (36 9 °C)-98 8 °F (37 1 °C)] 98 5 °F (36 9 °C)  HR:  [64-68] 67  Resp:  [16-18] 18  BP: (118-152)/(68-87) 118/68  SpO2:  [95 %-98 %] 95 %  Body mass index is 18 87 kg/m²  Input and Output Summary (last 24 hours):     No intake or output data in the 24 hours ending 20 5496    Physical Exam:     Physical Exam   Constitutional: He is oriented to person, place, and time  He appears cachectic  No distress  HENT:   Head: Normocephalic and atraumatic  Nose: Nose normal    Mouth/Throat: He has dentures (poor fitting)  Eyes: Conjunctivae are normal  No scleral icterus  Cardiovascular: Normal rate, regular rhythm and normal heart sounds  No murmur heard  Pulmonary/Chest: Effort normal and breath sounds normal  No respiratory distress  He has no wheezes  He has no rales  Abdominal: Soft  Bowel sounds are normal  He exhibits no distension  There is no tenderness  Musculoskeletal: Normal range of motion  He exhibits no edema or tenderness  Neurological: He is alert and oriented to person, place, and time  Skin: Skin is warm and dry  Rash (facial) noted  He is not diaphoretic  Psychiatric: He has a normal mood and affect  Nursing note and vitals reviewed          Additional Data:     Labs:    Results from last 7 days   Lab Units 20  0525   WBC Thousand/uL 5 27   HEMOGLOBIN g/dL 7 3*   HEMATOCRIT % 27 1*   PLATELETS Thousands/uL 631*   NEUTROS PCT % 52   LYMPHS PCT % 20   MONOS PCT % 16*   EOS PCT % 9*     Results from last 7 days   Lab Units 20  0524 20  0614   SODIUM mmol/L 140 138   POTASSIUM mmol/L 3 5 3 7   CHLORIDE mmol/L 105 103   CO2 mmol/L 26 26   BUN mg/dL 10 9   CREATININE mg/dL 0 74 0 88   ANION GAP mmol/L 9 9   CALCIUM mg/dL 8 3 8 2*   ALBUMIN g/dL  --  3 0*   TOTAL BILIRUBIN mg/dL  --  0 62   ALK PHOS U/L  --  97   ALT U/L  --  14   AST U/L  --  18   GLUCOSE RANDOM mg/dL 97 87     Results from last 7 days   Lab Units 01/02/20  1337   INR  1 05                       * I Have Reviewed All Lab Data Listed Above  * Additional Pertinent Lab Tests Reviewed: [address] Admission Reviewed    Imaging:    Imaging Reports Reviewed Today Include: EGD 1/3  Imaging Personally Reviewed by Myself Includes:  None    Recent Cultures (last 7 days):           Last 24 Hours Medication List:     Current Facility-Administered Medications:  acetaminophen 650 mg Oral Q6H PRN ROMA Solomon   iron sucrose 200 mg Intravenous Daily ROMA Solomon   lactase 3,000 Units Oral TID With Meals ROMA Solomon   ondansetron 4 mg Intravenous Q4H PRN ROMA Solomon   pantoprazole 40 mg Oral BID AC ROMA Solomon   sucralfate 1,000 mg Oral 4x Daily (AC & HS) RMOA Solomon        Today, Patient Was Seen By: ROMA Solomon    ** Please Note: Dictation voice to text software may have been used in the creation of this document   **

## 2020-01-06 NOTE — PROGRESS NOTES
[facility]  Progress Note - Andria Stager  59 y o  male MRN: [id number]    Unit/Bed#: [facility] [address] 220-02 Encounter: [id number]    Assessment/Plan:  79-year-old male past medical history of Wills's esophagus, Alexa Pound lesion and duodenal AVM presented to the hospital for acute on chronic anemia  1  Iron deficiency anemia  2  Wills's esophagus  3  Esophageal ulcer   -his hemoglobin has remained stable over the past several days, it is 7 3 today    -he has chronic anemia and has required transfusion several times over the past year    -his last colonoscopy was in May showing small internal hemorrhoids without bleeding, this was otherwise normal   The prep was not adequate however    -he has had multiple upper endoscopies over the past year, the most recent being 3 days ago showing an esophageal ulcer as well as Wills's esophagus with an inflamed stricture which was quite friable as it was dilated with the endoscope  Biopsies of the ulcer were taken, pathology is still pending    -he denies any overt bleeding   -he is going to be started on IV iron, appreciate hematology recommendations    -consider capsule endoscopy as an outpatient to evaluate for small bowel AVMs  -continue to monitor hemoglobin and transfuse as necessary, continue to monitor bowel movements for color and consistency    Subjective:   He states he is feeling well; denies any complaints  Denies overt bleeding  Objective:     Vitals: Blood pressure 148/83, pulse 70, temperature 98 3 °F (36 8 °C), temperature source Temporal, resp  rate 18, weight 63 1 kg (139 lb 1 8 oz), SpO2 98 %  ,Body mass index is 18 87 kg/m²  No intake or output data in the 24 hours ending 01/06/20 7239    Review of Systems: as per HPI  Review of Systems   Constitutional: Negative for activity change, appetite change, chills, fatigue, fever and unexpected weight change  HENT: Negative for mouth sores, sore throat and trouble swallowing  Respiratory: Negative for shortness of breath  Cardiovascular: Negative for chest pain  Gastrointestinal: Negative for abdominal distention, abdominal pain, blood in stool, constipation, diarrhea, nausea and vomiting  Skin: Negative for color change, pallor, rash and wound  Neurological: Negative for tremors and syncope  All other systems reviewed and are negative  Physical Exam:     Physical Exam   Constitutional: He is oriented to person, place, and time  He appears well-developed and well-nourished  No distress  HENT:   Head: Normocephalic and atraumatic    edentulous   Eyes: Right eye exhibits no discharge  Left eye exhibits no discharge  No scleral icterus  Neck: Neck supple  No tracheal deviation present  Cardiovascular: Normal rate, regular rhythm, normal heart sounds and intact distal pulses  Exam reveals no gallop and no friction rub  No murmur heard  Pulmonary/Chest: Effort normal and breath sounds normal  No respiratory distress  He has no wheezes  He has no rales  He exhibits no tenderness  Abdominal: Soft  Bowel sounds are normal  He exhibits no distension and no mass  There is no tenderness  There is no rebound and no guarding  Neurological: He is alert and oriented to person, place, and time  Skin: Skin is warm and dry  Psychiatric: He has a normal mood and affect           Invasive Devices     Peripheral Intravenous Line            Peripheral IV 01/02/20 Left Wrist 4 days                        CBC:   Lab Results   Component Value Date    WBC 5 27 01/06/2020    HGB 7 3 (L) 01/06/2020    HCT 27 1 (L) 01/06/2020    MCV 72 (L) 01/06/2020     (H) 01/06/2020    MCH 19 5 (L) 01/06/2020    MCHC 26 9 (L) 01/06/2020    RDW 21 4 (H) 01/06/2020    MPV 9 5 01/06/2020    NRBC 0 01/06/2020   ,   CMP:   Lab Results   Component Value Date    K 3 5 01/06/2020     01/06/2020    CO2 26 01/06/2020    BUN 10 01/06/2020    CREATININE 0 74 01/06/2020    CALCIUM 8 3 01/06/2020 EGFR 97 01/06/2020

## 2020-01-06 NOTE — ASSESSMENT & PLAN NOTE
· May be secondary to chronic blood loss from GI bleed  · Hematology consult appreciated  · Also with recent 12lb weight loss

## 2020-01-06 NOTE — ASSESSMENT & PLAN NOTE
· Underwent EGD on 10/13 with findings of Wills's esophagus, severe esophagitis, and large hiatal hernia  · Biopsy results reveal fungal infection  Patient was to be treated with Fluconazole however GI was unable to reach patient    · Did not follow up with GI  · Biopsy of Wills's by GI this admission  · Will need follow up with GI as an outpatient

## 2020-01-06 NOTE — PLAN OF CARE
Problem: GASTROINTESTINAL - ADULT  Goal: Maintains or returns to baseline bowel function  Description  INTERVENTIONS:  - Assess bowel function  - Encourage oral fluids to ensure adequate hydration  - Administer IV fluids if ordered to ensure adequate hydration  - Administer ordered medications as needed  - Encourage mobilization and activity  - Consider nutritional services referral to assist patient with adequate nutrition and appropriate food choices  Outcome: Progressing  Goal: Maintains adequate nutritional intake  Description  INTERVENTIONS:  - Monitor percentage of each meal consumed  - Identify factors contributing to decreased intake, treat as appropriate  - Assist with meals as needed  - Monitor I&O, weight, and lab values if indicated  - Obtain nutrition services referral as needed  Outcome: Progressing     Problem: HEMATOLOGIC - ADULT  Goal: Maintains hematologic stability  Description  INTERVENTIONS  - Assess for signs and symptoms of bleeding or hemorrhage  - Monitor labs  - Administer supportive blood products/factors as ordered and appropriate  Outcome: Progressing     Problem: DISCHARGE PLANNING  Goal: Discharge to home or other facility with appropriate resources  Description  INTERVENTIONS:  - Identify barriers to discharge w/patient and caregiver  - Arrange for needed discharge resources and transportation as appropriate  - Identify discharge learning needs (meds, wound care, etc )  - Arrange for interpretive services to assist at discharge as needed  - Refer to Case Management Department for coordinating discharge planning if the patient needs post-hospital services based on physician/advanced practitioner order or complex needs related to functional status, cognitive ability, or social support system  Outcome: Progressing     Problem: Knowledge Deficit  Goal: Patient/family/caregiver demonstrates understanding of disease process, treatment plan, medications, and discharge instructions  Description  Complete learning assessment and assess knowledge base  Interventions:  - Provide teaching at level of understanding  - Provide teaching via preferred learning methods  Outcome: Progressing     Problem: Potential for Falls  Goal: Patient will remain free of falls  Description  INTERVENTIONS:  - Assess patient frequently for physical needs  -  Identify cognitive and physical deficits and behaviors that affect risk of falls  -  [place] fall precautions as indicated by assessment   - Educate patient/family on patient safety including physical limitations  - Instruct patient to call for assistance with activity based on assessment  - Modify environment to reduce risk of injury  - Consider OT/PT consult to assist with strengthening/mobility  Outcome: Progressing     Problem: Nutrition/Hydration-ADULT  Goal: Nutrient/Hydration intake appropriate for improving, restoring or maintaining nutritional needs  Description  Monitor and assess patient's nutrition/hydration status for malnutrition  Collaborate with interdisciplinary team and initiate plan and interventions as ordered  Monitor patient's weight and dietary intake as ordered or per policy  Utilize nutrition screening tool and intervene as necessary  Determine patient's food preferences and provide high-protein, high-caloric foods as appropriate       INTERVENTIONS:  - Monitor oral intake, urinary output, labs, and treatment plans  - Assess nutrition and hydration status and recommend course of action  - Evaluate amount of meals eaten  - Assist patient with eating if necessary   - Allow adequate time for meals  - Recommend/ encourage appropriate diets, oral nutritional supplements, and vitamin/mineral supplements  - Order, calculate, and assess calorie counts as needed  - Recommend, monitor, and adjust tube feedings and TPN/PPN based on assessed needs  - Assess need for intravenous fluids  - Provide specific nutrition/hydration education as appropriate  - Include patient/family/caregiver in decisions related to nutrition  Outcome: Progressing     Problem: PAIN - ADULT  Goal: Verbalizes/displays adequate comfort level or baseline comfort level  Description  Interventions:  - Encourage patient to monitor pain and request assistance  - Assess pain using appropriate pain scale  - Administer analgesics based on type and severity of pain and evaluate response  - Implement non-pharmacological measures as appropriate and evaluate response  - Consider cultural and social influences on pain and pain management  - Notify physician/advanced practitioner if interventions unsuccessful or patient reports new pain  Outcome: Progressing     Problem: INFECTION - ADULT  Goal: Absence or prevention of progression during hospitalization  Description  INTERVENTIONS:  - Assess and monitor for signs and symptoms of infection  - Monitor lab/diagnostic results  - Monitor all insertion sites, i e  indwelling lines, tubes, and drains  - Monitor endotracheal if appropriate and nasal secretions for changes in amount and color  - [place] appropriate cooling/warming therapies per order  - Administer medications as ordered  - Instruct and encourage patient and family to use good hand hygiene technique  - Identify and instruct in appropriate isolation precautions for identified infection/condition  Outcome: Progressing  Goal: Absence of fever/infection during neutropenic period  Description  INTERVENTIONS:  - Monitor WBC    Outcome: Progressing     Problem: SAFETY ADULT  Goal: Patient will remain free of falls  Description  INTERVENTIONS:  - Assess patient frequently for physical needs  -  Identify cognitive and physical deficits and behaviors that affect risk of falls    -  [place] fall precautions as indicated by assessment   - Educate patient/family on patient safety including physical limitations  - Instruct patient to call for assistance with activity based on assessment  - Modify environment to reduce risk of injury  - Consider OT/PT consult to assist with strengthening/mobility  Outcome: Progressing  Goal: Maintain or return to baseline ADL function  Description  INTERVENTIONS:  -  Assess patient's ability to carry out ADLs; assess patient's baseline for ADL function and identify physical deficits which impact ability to perform ADLs (bathing, care of mouth/teeth, toileting, grooming, dressing, etc )  - Assess/evaluate cause of self-care deficits   - Assess range of motion  - Assess patient's mobility; develop plan if impaired  - Assess patient's need for assistive devices and provide as appropriate  - Encourage maximum independence but intervene and supervise when necessary  - Involve family in performance of ADLs  - Assess for home care needs following discharge   - Consider OT consult to assist with ADL evaluation and planning for discharge  - Provide patient education as appropriate  Outcome: Progressing  Goal: Maintain or return mobility status to optimal level  Description  INTERVENTIONS:  - Assess patient's baseline mobility status (ambulation, transfers, stairs, etc )    - Identify cognitive and physical deficits and behaviors that affect mobility  - Identify mobility aids required to assist with transfers and/or ambulation (gait belt, sit-to-stand, lift, walker, cane, etc )  - [place] fall precautions as indicated by assessment  - Record patient progress and toleration of activity level on Mobility SBAR; progress patient to next Phase/Stage  - Instruct patient to call for assistance with activity based on assessment  - Consider rehabilitation consult to assist with strengthening/weightbearing, etc   Outcome: Progressing

## 2020-01-06 NOTE — ASSESSMENT & PLAN NOTE
· He has had multiple EGD and colonsocopies  · He needs a blood transfusion about every three months  · Seen by GI and underwent EGD on 1/3 with Wills's esophagus and ulcer in distal esophagus, biopsies pending  · Hematology consult appreciated  Additional labs ordered for iron status, substrates, SPEP, free light chains    Suggest possible small bowel capsule endoscopy for further evaluation with history of AVM's   · Start Venofer  · Monitor H&H  Lab Results   Component Value Date    HGB 7 3 (L) 01/06/2020    HGB 7 5 (L) 01/04/2020    HGB 7 9 (L) 01/03/2020    HGB 7 5 (L) 01/03/2020    HGB 7 1 (L) 01/02/2020

## 2020-01-06 NOTE — ASSESSMENT & PLAN NOTE
· 7-10 day history of abdominal pain that has been worsening  States that the pain is postprandial and occurs approximately 2 hours after eating  He describes it as a ache  Tenderness in the periumbilical/epigastric region  He has never had pain like this before  · 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  · LFTs/lipase within normal limits  No leukocytosis    · CT abdomen/pelvis ordered with contrast for better evaluation in this patient who has had unexpected weight loss of 12 lb since October, anemia, fatigue, and abdominal pain which is new  · GI following, underwent EGD 1/3  · Patient reports resolution of pain

## 2020-01-07 ENCOUNTER — TELEPHONE (OUTPATIENT)
Dept: HEMATOLOGY ONCOLOGY | Facility: CLINIC | Age: 65
End: 2020-01-07

## 2020-01-07 VITALS
DIASTOLIC BLOOD PRESSURE: 72 MMHG | WEIGHT: 139.11 LBS | OXYGEN SATURATION: 96 % | HEART RATE: 73 BPM | SYSTOLIC BLOOD PRESSURE: 113 MMHG | TEMPERATURE: 99.1 F | BODY MASS INDEX: 18.87 KG/M2 | RESPIRATION RATE: 18 BRPM

## 2020-01-07 PROBLEM — R10.10 PAIN OF UPPER ABDOMEN: Status: RESOLVED | Noted: 2020-01-02 | Resolved: 2020-01-07

## 2020-01-07 LAB
ALBUMIN SERPL ELPH-MCNC: 3.47 G/DL (ref 3.5–5)
ALBUMIN SERPL ELPH-MCNC: 55.9 % (ref 52–65)
ALPHA1 GLOB SERPL ELPH-MCNC: 0.42 G/DL (ref 0.1–0.4)
ALPHA1 GLOB SERPL ELPH-MCNC: 6.7 % (ref 2.5–5)
ALPHA2 GLOB SERPL ELPH-MCNC: 0.63 G/DL (ref 0.4–1.2)
ALPHA2 GLOB SERPL ELPH-MCNC: 10.1 % (ref 7–13)
BETA GLOB ABNORMAL SERPL ELPH-MCNC: 0.46 G/DL (ref 0.4–0.8)
BETA1 GLOB SERPL ELPH-MCNC: 7.4 % (ref 5–13)
BETA2 GLOB SERPL ELPH-MCNC: 5.3 % (ref 2–8)
BETA2+GAMMA GLOB SERPL ELPH-MCNC: 0.33 G/DL (ref 0.2–0.5)
ERYTHROCYTE [DISTWIDTH] IN BLOOD BY AUTOMATED COUNT: 21.9 % (ref 11.6–15.1)
GAMMA GLOB ABNORMAL SERPL ELPH-MCNC: 0.91 G/DL (ref 0.5–1.6)
GAMMA GLOB SERPL ELPH-MCNC: 14.6 % (ref 12–22)
HCT VFR BLD AUTO: 27.4 % (ref 36.5–49.3)
HGB BLD-MCNC: 7.2 G/DL (ref 12–17)
IGG/ALB SER: 1.27 {RATIO} (ref 1.1–1.8)
KAPPA LC FREE SER-MCNC: 31.2 MG/L (ref 3.3–19.4)
KAPPA LC FREE/LAMBDA FREE SER: 1.12 {RATIO} (ref 0.26–1.65)
LAMBDA LC FREE SERPL-MCNC: 27.8 MG/L (ref 5.7–26.3)
MCH RBC QN AUTO: 19.2 PG (ref 26.8–34.3)
MCHC RBC AUTO-ENTMCNC: 26.3 G/DL (ref 31.4–37.4)
MCV RBC AUTO: 73 FL (ref 82–98)
PLATELET # BLD AUTO: 620 THOUSANDS/UL (ref 149–390)
PMV BLD AUTO: 9.3 FL (ref 8.9–12.7)
PROT PATTERN SERPL ELPH-IMP: ABNORMAL
PROT SERPL-MCNC: 6.2 G/DL (ref 6.4–8.2)
RBC # BLD AUTO: 3.75 MILLION/UL (ref 3.88–5.62)
WBC # BLD AUTO: 5.56 THOUSAND/UL (ref 4.31–10.16)

## 2020-01-07 PROCEDURE — 99239 HOSP IP/OBS DSCHRG MGMT >30: CPT | Performed by: INTERNAL MEDICINE

## 2020-01-07 PROCEDURE — 85027 COMPLETE CBC AUTOMATED: CPT | Performed by: NURSE PRACTITIONER

## 2020-01-07 RX ORDER — SUCRALFATE 1 G/1
1 TABLET ORAL 4 TIMES DAILY
Qty: 120 TABLET | Refills: 0 | Status: SHIPPED | OUTPATIENT
Start: 2020-01-07

## 2020-01-07 RX ORDER — PANTOPRAZOLE SODIUM 40 MG/1
40 TABLET, DELAYED RELEASE ORAL 2 TIMES DAILY
Qty: 60 TABLET | Refills: 0 | Status: SHIPPED | OUTPATIENT
Start: 2020-01-07

## 2020-01-07 RX ADMIN — PANTOPRAZOLE SODIUM 40 MG: 40 TABLET, DELAYED RELEASE ORAL at 06:07

## 2020-01-07 RX ADMIN — LACTASE TAB 3000 UNIT 3000 UNITS: 3000 TAB at 08:28

## 2020-01-07 RX ADMIN — SUCRALFATE 1000 MG: 1 SUSPENSION ORAL at 06:07

## 2020-01-07 NOTE — PLAN OF CARE
Problem: GASTROINTESTINAL - ADULT  Goal: Maintains or returns to baseline bowel function  Description  INTERVENTIONS:  - Assess bowel function  - Encourage oral fluids to ensure adequate hydration  - Administer IV fluids if ordered to ensure adequate hydration  - Administer ordered medications as needed  - Encourage mobilization and activity  - Consider nutritional services referral to assist patient with adequate nutrition and appropriate food choices  Outcome: Progressing  Goal: Maintains adequate nutritional intake  Description  INTERVENTIONS:  - Monitor percentage of each meal consumed  - Identify factors contributing to decreased intake, treat as appropriate  - Assist with meals as needed  - Monitor I&O, weight, and lab values if indicated  - Obtain nutrition services referral as needed  Outcome: Progressing     Problem: HEMATOLOGIC - ADULT  Goal: Maintains hematologic stability  Description  INTERVENTIONS  - Assess for signs and symptoms of bleeding or hemorrhage  - Monitor labs  - Administer supportive blood products/factors as ordered and appropriate  Outcome: Progressing     Problem: DISCHARGE PLANNING  Goal: Discharge to home or other facility with appropriate resources  Description  INTERVENTIONS:  - Identify barriers to discharge w/patient and caregiver  - Arrange for needed discharge resources and transportation as appropriate  - Identify discharge learning needs (meds, wound care, etc )  - Arrange for interpretive services to assist at discharge as needed  - Refer to Case Management Department for coordinating discharge planning if the patient needs post-hospital services based on physician/advanced practitioner order or complex needs related to functional status, cognitive ability, or social support system  Outcome: Progressing     Problem: Knowledge Deficit  Goal: Patient/family/caregiver demonstrates understanding of disease process, treatment plan, medications, and discharge instructions  Description  Complete learning assessment and assess knowledge base  Interventions:  - Provide teaching at level of understanding  - Provide teaching via preferred learning methods  Outcome: Progressing     Problem: Potential for Falls  Goal: Patient will remain free of falls  Description  INTERVENTIONS:  - Assess patient frequently for physical needs  -  Identify cognitive and physical deficits and behaviors that affect risk of falls  -  [place] fall precautions as indicated by assessment   - Educate patient/family on patient safety including physical limitations  - Instruct patient to call for assistance with activity based on assessment  - Modify environment to reduce risk of injury  - Consider OT/PT consult to assist with strengthening/mobility  Outcome: Progressing     Problem: Nutrition/Hydration-ADULT  Goal: Nutrient/Hydration intake appropriate for improving, restoring or maintaining nutritional needs  Description  Monitor and assess patient's nutrition/hydration status for malnutrition  Collaborate with interdisciplinary team and initiate plan and interventions as ordered  Monitor patient's weight and dietary intake as ordered or per policy  Utilize nutrition screening tool and intervene as necessary  Determine patient's food preferences and provide high-protein, high-caloric foods as appropriate       INTERVENTIONS:  - Monitor oral intake, urinary output, labs, and treatment plans  - Assess nutrition and hydration status and recommend course of action  - Evaluate amount of meals eaten  - Assist patient with eating if necessary   - Allow adequate time for meals  - Recommend/ encourage appropriate diets, oral nutritional supplements, and vitamin/mineral supplements  - Order, calculate, and assess calorie counts as needed  - Recommend, monitor, and adjust tube feedings and TPN/PPN based on assessed needs  - Assess need for intravenous fluids  - Provide specific nutrition/hydration education as appropriate  - Include patient/family/caregiver in decisions related to nutrition  Outcome: Progressing     Problem: PAIN - ADULT  Goal: Verbalizes/displays adequate comfort level or baseline comfort level  Description  Interventions:  - Encourage patient to monitor pain and request assistance  - Assess pain using appropriate pain scale  - Administer analgesics based on type and severity of pain and evaluate response  - Implement non-pharmacological measures as appropriate and evaluate response  - Consider cultural and social influences on pain and pain management  - Notify physician/advanced practitioner if interventions unsuccessful or patient reports new pain  Outcome: Progressing     Problem: INFECTION - ADULT  Goal: Absence or prevention of progression during hospitalization  Description  INTERVENTIONS:  - Assess and monitor for signs and symptoms of infection  - Monitor lab/diagnostic results  - Monitor all insertion sites, i e  indwelling lines, tubes, and drains  - Monitor endotracheal if appropriate and nasal secretions for changes in amount and color  - [place] appropriate cooling/warming therapies per order  - Administer medications as ordered  - Instruct and encourage patient and family to use good hand hygiene technique  - Identify and instruct in appropriate isolation precautions for identified infection/condition  Outcome: Progressing  Goal: Absence of fever/infection during neutropenic period  Description  INTERVENTIONS:  - Monitor WBC    Outcome: Progressing     Problem: SAFETY ADULT  Goal: Patient will remain free of falls  Description  INTERVENTIONS:  - Assess patient frequently for physical needs  -  Identify cognitive and physical deficits and behaviors that affect risk of falls    -  [place] fall precautions as indicated by assessment   - Educate patient/family on patient safety including physical limitations  - Instruct patient to call for assistance with activity based on assessment  - Modify environment to reduce risk of injury  - Consider OT/PT consult to assist with strengthening/mobility  Outcome: Progressing  Goal: Maintain or return to baseline ADL function  Description  INTERVENTIONS:  -  Assess patient's ability to carry out ADLs; assess patient's baseline for ADL function and identify physical deficits which impact ability to perform ADLs (bathing, care of mouth/teeth, toileting, grooming, dressing, etc )  - Assess/evaluate cause of self-care deficits   - Assess range of motion  - Assess patient's mobility; develop plan if impaired  - Assess patient's need for assistive devices and provide as appropriate  - Encourage maximum independence but intervene and supervise when necessary  - Involve family in performance of ADLs  - Assess for home care needs following discharge   - Consider OT consult to assist with ADL evaluation and planning for discharge  - Provide patient education as appropriate  Outcome: Progressing  Goal: Maintain or return mobility status to optimal level  Description  INTERVENTIONS:  - Assess patient's baseline mobility status (ambulation, transfers, stairs, etc )    - Identify cognitive and physical deficits and behaviors that affect mobility  - Identify mobility aids required to assist with transfers and/or ambulation (gait belt, sit-to-stand, lift, walker, cane, etc )  - [place] fall precautions as indicated by assessment  - Record patient progress and toleration of activity level on Mobility SBAR; progress patient to next Phase/Stage  - Instruct patient to call for assistance with activity based on assessment  - Consider rehabilitation consult to assist with strengthening/weightbearing, etc   Outcome: Progressing

## 2020-01-07 NOTE — DISCHARGE SUMMARY
[address] Internal Medicine  Discharge- Rakel Reyes 1955, 59 y o  male MRN: [id number]    Unit/Bed#: [facility] 2 [address] 220-02 Encounter: [id number]    Primary Care Provider: Luis Bhandari MD   Date and time admitted to hospital: 1/2/2020  1:58 PM        Iron deficiency anemia due to chronic blood loss  Assessment & Plan  · He has had multiple EGD and colonsocopies  · He needs a blood transfusion about every three months  · Seen by GI and underwent EGD on 1/3 with Wills's esophagus and ulcer in distal esophagus, biopsies pending  · Hematology consult appreciated  Additional labs ordered for iron status, substrates, SPEP, free light chains  Suggest possible small bowel capsule endoscopy for further evaluation with history of AVM's   · Started Venofer  · Fecal occult blood negative  · H&H stable  Continue iron supplement twice daily  To follow up with GI for further care  Lab Results   Component Value Date    HGB 7 2 (L) 01/07/2020    HGB 8 5 (L) 01/06/2020    HGB 7 3 (L) 01/06/2020    HGB 7 5 (L) 01/04/2020    HGB 7 9 (L) 01/03/2020         * Pain of upper abdomenresolved as of 1/7/2020  Assessment & Plan  · 7-10 day history of abdominal pain that has been worsening  States that the pain is postprandial and occurs approximately 2 hours after eating  He describes it as a ache  Tenderness in the periumbilical/epigastric region  He has never had pain like this before  · 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  · LFTs/lipase within normal limits  No leukocytosis    · CT abdomen/pelvis ordered with contrast for better evaluation in this patient who has had unexpected weight loss of 12 lb since October, anemia, fatigue, and abdominal pain which is new  · GI following, underwent EGD 1/3  · Patient reports resolution of pain    Moderate protein-calorie malnutrition ([address] )  Assessment & Plan  Malnutrition Findings:   Malnutrition type: Acute illness(r/t abdominal pain, as evidenced by intake meeting <75% estimated needs > 1 week, and mild muscle mass loss noted at clavicle  Treatment: pending GI testing/initiation of PO diet )  Degree of Malnutrition: Malnutrition of moderate degree    BMI Findings: Body mass index is 18 87 kg/m²  · Unexpected weight loss of 12 lb since his last admission in October  · Denies issue with appetite however has been experiencing pain after eating, now resolved  · Consult dietitian    Wills's esophagus  Assessment & Plan  · Underwent EGD on 10/13 with findings of Wills's esophagus, severe esophagitis, and large hiatal hernia  · Biopsy results reveal fungal infection  Patient was to be treated with Fluconazole however GI was unable to reach patient  · Did not follow up with GI  · Biopsy of Wills's by GI this admission  · Will need follow up with GI as an outpatient  · Encouraged alcohol abstinence    Thrombocytosis ([address] )  Assessment & Plan  · May be secondary to chronic blood loss from GI bleed  · Hematology consult appreciated  · Also with recent 12lb weight loss  · Ambulatory referral for Hematology        Discharging Physician / Practitioner: Zehra Bagley, [facility]  PCP: Janeen Velázquez MD  Admission Date:   Admission Orders (From admission, onward)     Ordered        01/02/20 1513  Inpatient Admission  Once                   Discharge Date: 01/07/20    Resolved Problems  Date Reviewed: 1/7/2020          Resolved    * (Principal) Pain of upper abdomen 1/7/2020     Resolved by  Zehra Bagley, [address] [facility] Stay:  · Gastroenterology  · Hematology    Procedures Performed:   CT abdomen pelvis w contrast   Final Result by Michael Flores MD (01/03 0211)         1  No acute abnormality identified in the abdomen or pelvis  2  Slight nodularity in the gallbladder lumen likely reflecting known underlying gallbladder polyps  These were better visualized and characterized on previous ultrasound dated 6/24/2019  Based on that study and ACR guidelines, a one-year follow-up    ultrasound was suggested  3  Small hiatal hernia  4  Nonobstructing 2 mm right lower pole renal collecting system calculus  5  Additional findings as noted  Workstation performed: [id number]         ·  EGD 1/3:  · Single large, cratered, irregular ulcer in the lower third of the esophagus; performed cold biopsy  · Inflamed stricture (traversable) in the upper third of the esophagus  Patient had stricture in upper esophagus associated with Wills's  It was quite friable as it was dilated with endoscope  · Wills's esophagus  Wills's is seen extending from reduced hiatal hernia up almost 2/3 of the esophagus to the above Wills's stricture which appeared but grossly benign  She has some concern however for distal esophageal ulcer also associated with  Wills's  · The stomach and duodenum appeared normal  Significant Findings / Test Results:   · Hemoglobin 6 7 on arrival  · Fecal occult blood negative  · Platelets 552  · Serum iron 15, ferritin 6, iron saturation 4, TIBC 350, folate 2 1, vitamin B12 594  · Positive STEVEN    Incidental Findings:   · None     Test Results Pending at Discharge (will require follow up): · Immuno globulin free LT chains  · EGD biopsy     Outpatient Tests Requested:  · Further workup with GI    Complications:  None    Reason for Admission:  Anemia    Hospital Course:     Huy Peña  is a 59 y o  male patient who originally presented to the hospital on 1/2/2020 due to fatigue  Patient reported having fatigue for the last 10 days as well as upper abdominal pain  He was denying nausea, vomiting, or diarrhea  Also reported some intermittent shortness of breath  Patient has a longstanding history of anemia that has required multiple transfusions over the last few years  He has had multiple scopes done with GI and does have a history of esophagitis, Wills's esophagus, duodenal AVMs    His last EGD demonstrated a fungal infection however this was not treated  Patient is maintained on Protonix and Carafate  He does continue to consume alcohol a few times a week  Patient was evaluated in the emergency room and found to have a hemoglobin of 6 7  A CT of the abdomen and pelvis was performed with the above findings  The patient was admitted for further evaluation and management  Upon admission, the patient was transfused with 2 units of packed red blood cells  His blood counts were monitored  A consultation was requested with Gastroenterology  An EGD was performed with the above findings  Biopsies are pending at this time  The patient was also seen by Hematology for his ongoing anemia and also thrombocytosis with his platelet count running in the 600 range  Further testing was performed and the patient was found to have a positive STEVEN, other labs are pending at this time  An ambulatory referral has been provided for further follow-up with Hematology  A fecal occult blood was assessed which was negative  The patient had been complaining of postprandial upper abdominal pain however during the course of the admission, it has resolved  He is tolerating a regular diet  He denies any other symptoms such as shortness of breath or dizziness  The patient was given a dose of IV Venofer  His hemoglobin has been stable  He will need to continue follow-up with primary care to follow his blood counts as well as GI to determine further workup  The patient feels well enough to be discharged home today  He will continue on Protonix, Carafate, and iron supplementation  He was instructed on complete alcohol abstinence  Please see above list of diagnoses and related plan for additional information  Condition at Discharge: stable     Discharge Day Visit / Exam:     Subjective:  Patient denies pain, shortness of breath, or dizziness  Tolerating his diet    Vitals: Blood Pressure: 113/72 (01/07/20 0730)  Pulse: 73 (01/07/20 0730)  Temperature: 99 1 °F (37 3 °C) (01/07/20 0730)  Temp Source: Temporal (01/07/20 0730)  Respirations: 18 (01/07/20 0730)  Weight - Scale: 63 1 kg (139 lb 1 8 oz) (01/02/20 1328)  SpO2: 96 % (01/07/20 0730)  Exam:   Physical Exam   Constitutional: He is oriented to person, place, and time  He appears cachectic  No distress  HENT:   Head: Normocephalic and atraumatic  Mouth/Throat: He has dentures (Poor fitting)  Eyes: Conjunctivae are normal  No scleral icterus  Cardiovascular: Normal rate, regular rhythm and normal heart sounds  No murmur heard  Pulmonary/Chest: Effort normal and breath sounds normal  No respiratory distress  He has no wheezes  He has no rales  Abdominal: Soft  Bowel sounds are normal  He exhibits no distension  There is no tenderness  Musculoskeletal: Normal range of motion  He exhibits no edema or tenderness  Neurological: He is alert and oriented to person, place, and time  Skin: Skin is warm and dry  He is not diaphoretic  Psychiatric: He has a normal mood and affect  His behavior is normal    Nursing note and vitals reviewed  Discussion with Family:  Plan of care with patient    Discharge instructions/Information to patient and family:   See after visit summary for information provided to patient and family  Provisions for Follow-Up Care:  See after visit summary for information related to follow-up care and any pertinent home health orders  Disposition:     Home    For Discharges to [address] [facility]:   · Not Applicable to this Patient - Not Applicable to this Patient    Planned Readmission:  High risk     Discharge Statement:  I spent 45 minutes discharging the patient  This time was spent on the day of discharge  I had direct contact with the patient on the day of discharge   Greater than 50% of the total time was spent examining patient, answering all patient questions, arranging and discussing plan of care with patient as well as directly providing post-discharge instructions  Additional time then spent on discharge activities  Discharge Medications:  See after visit summary for reconciled discharge medications provided to patient and family        ** Please Note: This note has been constructed using a voice recognition system **

## 2020-01-07 NOTE — ASSESSMENT & PLAN NOTE
Malnutrition Findings:   Malnutrition type: Acute illness(r/t abdominal pain, as evidenced by intake meeting <75% estimated needs > 1 week, and mild muscle mass loss noted at clavicle  Treatment: pending GI testing/initiation of PO diet )  Degree of Malnutrition: Malnutrition of moderate degree    BMI Findings: Body mass index is 18 87 kg/m²     · Unexpected weight loss of 12 lb since his last admission in October  · Denies issue with appetite however has been experiencing pain after eating, now resolved  · Consult dietitian

## 2020-01-07 NOTE — TELEPHONE ENCOUNTER
New Patient Encounter    New Patient Intake Form   Patient Details:  Varun Alvarez  1955  [id number]    Background Information:  [address] starts by opening a telephone encounter and gathering the following information   Who is calling to schedule? If not self, relationship to patient? Self   Referring Provider ROMA Malave   What is the diagnosis? Iron deficiency anemia due to chronic blood loss   Is this diagnosis confirmed Yes   Is there a confirmed diagnosis from a biopsy/tissue reviewed by pathology? No   Is there any prior history of Cancer? No   If yes, please explain N/A   When was the diagnosis? January 2020   Is patient aware of diagnosis? Yes   Reason for visit? NP DX   Have you had any testing done? If so: when, where? Yes   Are records in [place]? yes   Was the patient told to bring a disk? No   Scheduling Information:   Preferred [place]:  [facility]     Requesting Specific Provider? N/A   Are there any dates/time the patient cannot be seen? N/A      Miscellaneous: N/A   After completing the above information, please route to Financial Counselor and the appropriate Nurse Navigator for review

## 2020-01-07 NOTE — ASSESSMENT & PLAN NOTE
· May be secondary to chronic blood loss from GI bleed  · Hematology consult appreciated  · Also with recent 12lb weight loss  · Ambulatory referral for Hematology

## 2020-01-07 NOTE — ASSESSMENT & PLAN NOTE
· He has had multiple EGD and colonsocopies  · He needs a blood transfusion about every three months  · Seen by GI and underwent EGD on 1/3 with Wills's esophagus and ulcer in distal esophagus, biopsies pending  · Hematology consult appreciated  Additional labs ordered for iron status, substrates, SPEP, free light chains  Suggest possible small bowel capsule endoscopy for further evaluation with history of AVM's   · Started Venofer  · Fecal occult blood negative  · H&H stable  Continue iron supplement twice daily  To follow up with GI for further care    Lab Results   Component Value Date    HGB 7 2 (L) 01/07/2020    HGB 8 5 (L) 01/06/2020    HGB 7 3 (L) 01/06/2020    HGB 7 5 (L) 01/04/2020    HGB 7 9 (L) 01/03/2020

## 2020-01-07 NOTE — ASSESSMENT & PLAN NOTE
· Underwent EGD on 10/13 with findings of Wills's esophagus, severe esophagitis, and large hiatal hernia  · Biopsy results reveal fungal infection  Patient was to be treated with Fluconazole however GI was unable to reach patient    · Did not follow up with GI  · Biopsy of Wills's by GI this admission  · Will need follow up with GI as an outpatient  · Encouraged alcohol abstinence

## 2020-01-07 NOTE — SOCIAL WORK
LATE ENTRY 1/7/2020 0930  Pt admitted with anemia and is stable for d/c home this day  Pt lives alone in a 2nd floor apartment able to navigate steps without difficulty, is independent with all aspects of care ambulating without an AD  Pt works and is in need of a work excuse upon d/c  Denies MH hx, D&A, legal issues nor having a POA  PCP is Dr Sol Jefferson and he uses [facility] in [facility] however is to use [facility] on d/c with DC status in HR and RN already aware of same  Pt's family to transport home  No further questions/concerns voiced    No barriers to d/c identified

## 2020-01-07 NOTE — NURSING NOTE
All discharge instructions, follow up appointments, and new medications were discussed with the patient  His new medications were delivered to the patient at bedside by [facility]  He took all his belongings with when discharged

## 2020-01-08 ENCOUNTER — TELEPHONE (OUTPATIENT)
Dept: GASTROENTEROLOGY | Facility: CLINIC | Age: 65
End: 2020-01-08

## 2020-01-08 NOTE — TELEPHONE ENCOUNTER
Left message for patient to contact our office  ----- Message from Tania Pike PA-C sent at 1/7/2020 10:50 AM EST -----  Can you please call pt to schedule a follow up with Dr Indu Root in the office within the next few weeks to discuss possible reflux surgery? Thank you!

## 2020-05-01 ENCOUNTER — HOSPITAL ENCOUNTER (EMERGENCY)
Facility: HOSPITAL | Age: 65
Discharge: HOME | End: 2020-05-01
Attending: EMERGENCY MEDICINE | Admitting: EMERGENCY MEDICINE
Payer: COMMERCIAL

## 2020-05-01 VITALS
WEIGHT: 136.24 LBS | RESPIRATION RATE: 16 BRPM | HEART RATE: 63 BPM | DIASTOLIC BLOOD PRESSURE: 79 MMHG | TEMPERATURE: 98.1 F | OXYGEN SATURATION: 100 % | BODY MASS INDEX: 18.48 KG/M2 | SYSTOLIC BLOOD PRESSURE: 151 MMHG

## 2020-05-01 DIAGNOSIS — Z51.89 ENCOUNTER FOR BLOOD TRANSFUSION: ICD-10-CM

## 2020-05-01 DIAGNOSIS — D64.9 ACUTE ON CHRONIC ANEMIA: Primary | ICD-10-CM

## 2020-05-01 LAB
ABO GROUP BLD: NORMAL
ALBUMIN SERPL BCP-MCNC: 4 G/DL (ref 3.5–5)
ALP SERPL-CCNC: 59 U/L (ref 46–116)
ALT SERPL W P-5'-P-CCNC: 20 U/L (ref 12–78)
ANION GAP SERPL CALCULATED.3IONS-SCNC: 8 MMOL/L (ref 4–13)
APTT PPP: 33 SECONDS (ref 23–37)
AST SERPL W P-5'-P-CCNC: 22 U/L (ref 5–45)
ATRIAL RATE: 69 BPM
BACTERIA UR QL AUTO: ABNORMAL /HPF
BASOPHILS # BLD AUTO: 0.19 THOUSANDS/ΜL (ref 0–0.1)
BASOPHILS NFR BLD AUTO: 3 % (ref 0–1)
BILIRUB SERPL-MCNC: 0.52 MG/DL (ref 0.2–1)
BILIRUB UR QL STRIP: NEGATIVE
BLD GP AB SCN SERPL QL: NEGATIVE
BUN SERPL-MCNC: 13 MG/DL (ref 5–25)
CALCIUM SERPL-MCNC: 9.1 MG/DL (ref 8.3–10.1)
CHLORIDE SERPL-SCNC: 104 MMOL/L (ref 100–108)
CLARITY UR: CLEAR
CO2 SERPL-SCNC: 28 MMOL/L (ref 21–32)
COLOR UR: YELLOW
CREAT SERPL-MCNC: 1.29 MG/DL (ref 0.6–1.3)
EOSINOPHIL # BLD AUTO: 0.78 THOUSAND/ΜL (ref 0–0.61)
EOSINOPHIL NFR BLD AUTO: 12 % (ref 0–6)
ERYTHROCYTE [DISTWIDTH] IN BLOOD BY AUTOMATED COUNT: 18.6 % (ref 11.6–15.1)
GFR SERPL CREATININE-BSD FRML MDRD: 58 ML/MIN/1.73SQ M
GLUCOSE SERPL-MCNC: 113 MG/DL (ref 65–140)
GLUCOSE UR STRIP-MCNC: NEGATIVE MG/DL
HCT VFR BLD AUTO: 24.1 % (ref 36.5–49.3)
HCT VFR BLD AUTO: 26.9 % (ref 36.5–49.3)
HGB BLD-MCNC: 6.1 G/DL (ref 12–17)
HGB BLD-MCNC: 7.1 G/DL (ref 12–17)
HGB UR QL STRIP.AUTO: NEGATIVE
IMM GRANULOCYTES # BLD AUTO: 0.01 THOUSAND/UL (ref 0–0.2)
IMM GRANULOCYTES NFR BLD AUTO: 0 % (ref 0–2)
INR PPP: 1.11 (ref 0.84–1.19)
KETONES UR STRIP-MCNC: NEGATIVE MG/DL
LEUKOCYTE ESTERASE UR QL STRIP: ABNORMAL
LYMPHOCYTES # BLD AUTO: 1.07 THOUSANDS/ΜL (ref 0.6–4.47)
LYMPHOCYTES NFR BLD AUTO: 17 % (ref 14–44)
MCH RBC QN AUTO: 16.1 PG (ref 26.8–34.3)
MCHC RBC AUTO-ENTMCNC: 25.3 G/DL (ref 31.4–37.4)
MCV RBC AUTO: 64 FL (ref 82–98)
MONOCYTES # BLD AUTO: 0.62 THOUSAND/ΜL (ref 0.17–1.22)
MONOCYTES NFR BLD AUTO: 10 % (ref 4–12)
NEUTROPHILS # BLD AUTO: 3.61 THOUSANDS/ΜL (ref 1.85–7.62)
NEUTS SEG NFR BLD AUTO: 58 % (ref 43–75)
NITRITE UR QL STRIP: NEGATIVE
NON-SQ EPI CELLS URNS QL MICRO: ABNORMAL /HPF
NRBC BLD AUTO-RTO: 0 /100 WBCS
P AXIS: 43 DEGREES
PH UR STRIP.AUTO: 7 [PH]
PLATELET # BLD AUTO: 525 THOUSANDS/UL (ref 149–390)
PMV BLD AUTO: 10 FL (ref 8.9–12.7)
POTASSIUM SERPL-SCNC: 3.8 MMOL/L (ref 3.5–5.3)
PR INTERVAL: 180 MS
PROT SERPL-MCNC: 7.6 G/DL (ref 6.4–8.2)
PROT UR STRIP-MCNC: ABNORMAL MG/DL
PROTHROMBIN TIME: 14.5 SECONDS (ref 11.6–14.5)
QRS AXIS: 109 DEGREES
QRSD INTERVAL: 96 MS
QT INTERVAL: 384 MS
QTC INTERVAL: 411 MS
RBC # BLD AUTO: 3.78 MILLION/UL (ref 3.88–5.62)
RBC #/AREA URNS AUTO: ABNORMAL /HPF
RH BLD: POSITIVE
SODIUM SERPL-SCNC: 140 MMOL/L (ref 136–145)
SP GR UR STRIP.AUTO: 1.01 (ref 1–1.03)
SPECIMEN EXPIRATION DATE: NORMAL
T WAVE AXIS: 73 DEGREES
TROPONIN I SERPL-MCNC: <0.02 NG/ML
UROBILINOGEN UR QL STRIP.AUTO: 0.2 E.U./DL
VENTRICULAR RATE: 69 BPM
WBC # BLD AUTO: 6.28 THOUSAND/UL (ref 4.31–10.16)
WBC #/AREA URNS AUTO: ABNORMAL /HPF

## 2020-05-01 PROCEDURE — 93005 ELECTROCARDIOGRAM TRACING: CPT

## 2020-05-01 PROCEDURE — 85014 HEMATOCRIT: CPT | Performed by: EMERGENCY MEDICINE

## 2020-05-01 PROCEDURE — 93010 ELECTROCARDIOGRAM REPORT: CPT | Performed by: INTERNAL MEDICINE

## 2020-05-01 PROCEDURE — P9016 RBC LEUKOCYTES REDUCED: HCPCS

## 2020-05-01 PROCEDURE — 85018 HEMOGLOBIN: CPT | Performed by: EMERGENCY MEDICINE

## 2020-05-01 PROCEDURE — 81001 URINALYSIS AUTO W/SCOPE: CPT | Performed by: EMERGENCY MEDICINE

## 2020-05-01 PROCEDURE — 99285 EMERGENCY DEPT VISIT HI MDM: CPT

## 2020-05-01 PROCEDURE — 85025 COMPLETE CBC W/AUTO DIFF WBC: CPT | Performed by: EMERGENCY MEDICINE

## 2020-05-01 PROCEDURE — 85610 PROTHROMBIN TIME: CPT | Performed by: EMERGENCY MEDICINE

## 2020-05-01 PROCEDURE — 36430 TRANSFUSION BLD/BLD COMPNT: CPT

## 2020-05-01 PROCEDURE — 86850 RBC ANTIBODY SCREEN: CPT | Performed by: EMERGENCY MEDICINE

## 2020-05-01 PROCEDURE — 99284 EMERGENCY DEPT VISIT MOD MDM: CPT | Performed by: EMERGENCY MEDICINE

## 2020-05-01 PROCEDURE — 80053 COMPREHEN METABOLIC PANEL: CPT | Performed by: EMERGENCY MEDICINE

## 2020-05-01 PROCEDURE — 85730 THROMBOPLASTIN TIME PARTIAL: CPT | Performed by: EMERGENCY MEDICINE

## 2020-05-01 PROCEDURE — 86900 BLOOD TYPING SEROLOGIC ABO: CPT | Performed by: EMERGENCY MEDICINE

## 2020-05-01 PROCEDURE — 86901 BLOOD TYPING SEROLOGIC RH(D): CPT | Performed by: EMERGENCY MEDICINE

## 2020-05-01 PROCEDURE — 86923 COMPATIBILITY TEST ELECTRIC: CPT

## 2020-05-01 PROCEDURE — 36415 COLL VENOUS BLD VENIPUNCTURE: CPT | Performed by: EMERGENCY MEDICINE

## 2020-05-01 PROCEDURE — 84484 ASSAY OF TROPONIN QUANT: CPT | Performed by: EMERGENCY MEDICINE

## 2022-05-24 ENCOUNTER — TELEPHONE (OUTPATIENT)
Dept: HEMATOLOGY ONCOLOGY | Facility: CLINIC | Age: 67
End: 2022-05-24

## 2022-05-24 NOTE — TELEPHONE ENCOUNTER
CALL RETURN FORM   Reason for patient call? Patient of Dr Sabrina Patel says has stomach and throat problems  Patient's primary oncologist? Dr Sabrina Patel    Name of person the patient was calling for? Dr Sabrina Patel    Any additional information to add, if applicable? [phone] please call   Informed patient that the message will be forwarded to the team and someone will get back to them as soon as possible    Did you relay this information to the patient?  Yes

## 2022-05-24 NOTE — TELEPHONE ENCOUNTER
Returned a call to pt but no answer and pt's voice mail box not set up yet  Pt has never been seen by Dr Gretta Epley pt cancelled an Ov with Areli Davies on 1/30/20 and was seen by Dr Brett Sanchez as inpt  1/4/20

## 2023-04-30 ENCOUNTER — HOSPITAL ENCOUNTER (INPATIENT)
Facility: HOSPITAL | Age: 68
LOS: 5 days | Discharge: HOME | End: 2023-05-05
Attending: EMERGENCY MEDICINE | Admitting: INTERNAL MEDICINE

## 2023-04-30 ENCOUNTER — APPOINTMENT (EMERGENCY)
Dept: RADIOLOGY | Facility: HOSPITAL | Age: 68
End: 2023-04-30

## 2023-04-30 DIAGNOSIS — I50.9 CHF EXACERBATION (HCC): Primary | ICD-10-CM

## 2023-04-30 DIAGNOSIS — K22.70 BARRETT'S ESOPHAGUS WITHOUT DYSPLASIA: ICD-10-CM

## 2023-04-30 DIAGNOSIS — R06.09 DYSPNEA ON EXERTION: ICD-10-CM

## 2023-04-30 DIAGNOSIS — R13.10 DYSPHAGIA: ICD-10-CM

## 2023-04-30 DIAGNOSIS — K22.2 ESOPHAGEAL STRICTURE: ICD-10-CM

## 2023-04-30 DIAGNOSIS — D50.0 IRON DEFICIENCY ANEMIA DUE TO CHRONIC BLOOD LOSS: ICD-10-CM

## 2023-04-30 DIAGNOSIS — D64.9 SYMPTOMATIC ANEMIA: ICD-10-CM

## 2023-04-30 DIAGNOSIS — Q27.30 AVM (ARTERIOVENOUS MALFORMATION): ICD-10-CM

## 2023-04-30 PROBLEM — I78.0 OSLER-WEBER-RENDU SYNDROME (HCC): Status: ACTIVE | Noted: 2023-04-30

## 2023-04-30 PROBLEM — I50.23 ACUTE ON CHRONIC SYSTOLIC CONGESTIVE HEART FAILURE (HCC): Status: ACTIVE | Noted: 2023-04-30

## 2023-04-30 LAB
2HR DELTA HS TROPONIN: 7 NG/L
4HR DELTA HS TROPONIN: 3 NG/L
ANION GAP SERPL CALCULATED.3IONS-SCNC: 9 MMOL/L (ref 4–13)
ATRIAL RATE: 101 BPM
BASOPHILS # BLD AUTO: 0.22 THOUSANDS/ΜL (ref 0–0.1)
BASOPHILS NFR BLD AUTO: 4 % (ref 0–1)
BNP SERPL-MCNC: 3898 PG/ML (ref 0–100)
BUN SERPL-MCNC: 9 MG/DL (ref 5–25)
CALCIUM SERPL-MCNC: 8.7 MG/DL (ref 8.4–10.2)
CARDIAC TROPONIN I PNL SERPL HS: 19 NG/L
CARDIAC TROPONIN I PNL SERPL HS: 22 NG/L
CARDIAC TROPONIN I PNL SERPL HS: 26 NG/L
CHLORIDE SERPL-SCNC: 106 MMOL/L (ref 96–108)
CO2 SERPL-SCNC: 26 MMOL/L (ref 21–32)
CREAT SERPL-MCNC: 0.79 MG/DL (ref 0.6–1.3)
EOSINOPHIL # BLD AUTO: 0.36 THOUSAND/ΜL (ref 0–0.61)
EOSINOPHIL NFR BLD AUTO: 7 % (ref 0–6)
ERYTHROCYTE [DISTWIDTH] IN BLOOD BY AUTOMATED COUNT: 20.1 % (ref 11.6–15.1)
GFR SERPL CREATININE-BSD FRML MDRD: 92 ML/MIN/1.73SQ M
GLUCOSE SERPL-MCNC: 98 MG/DL (ref 65–140)
HCT VFR BLD AUTO: 29.6 % (ref 36.5–49.3)
HGB BLD-MCNC: 8.6 G/DL (ref 12–17)
IMM GRANULOCYTES # BLD AUTO: 0.03 THOUSAND/UL (ref 0–0.2)
IMM GRANULOCYTES NFR BLD AUTO: 1 % (ref 0–2)
LYMPHOCYTES # BLD AUTO: 0.79 THOUSANDS/ΜL (ref 0.6–4.47)
LYMPHOCYTES NFR BLD AUTO: 15 % (ref 14–44)
MCH RBC QN AUTO: 21.4 PG (ref 26.8–34.3)
MCHC RBC AUTO-ENTMCNC: 29.1 G/DL (ref 31.4–37.4)
MCV RBC AUTO: 74 FL (ref 82–98)
MONOCYTES # BLD AUTO: 0.83 THOUSAND/ΜL (ref 0.17–1.22)
MONOCYTES NFR BLD AUTO: 16 % (ref 4–12)
NEUTROPHILS # BLD AUTO: 3.04 THOUSANDS/ΜL (ref 1.85–7.62)
NEUTS SEG NFR BLD AUTO: 57 % (ref 43–75)
NRBC BLD AUTO-RTO: 0 /100 WBCS
P AXIS: 69 DEGREES
PLATELET # BLD AUTO: 404 THOUSANDS/UL (ref 149–390)
PMV BLD AUTO: 10.2 FL (ref 8.9–12.7)
POTASSIUM SERPL-SCNC: 4 MMOL/L (ref 3.5–5.3)
PR INTERVAL: 190 MS
QRS AXIS: 107 DEGREES
QRSD INTERVAL: 88 MS
QT INTERVAL: 338 MS
QTC INTERVAL: 438 MS
RBC # BLD AUTO: 4.01 MILLION/UL (ref 3.88–5.62)
SODIUM SERPL-SCNC: 141 MMOL/L (ref 135–147)
T WAVE AXIS: 81 DEGREES
VENTRICULAR RATE: 101 BPM
WBC # BLD AUTO: 5.27 THOUSAND/UL (ref 4.31–10.16)

## 2023-04-30 RX ORDER — METOPROLOL SUCCINATE 25 MG/1
25 TABLET, EXTENDED RELEASE ORAL DAILY
Status: DISCONTINUED | OUTPATIENT
Start: 2023-04-30 | End: 2023-05-03

## 2023-04-30 RX ORDER — FUROSEMIDE 10 MG/ML
40 INJECTION INTRAMUSCULAR; INTRAVENOUS
Status: DISCONTINUED | OUTPATIENT
Start: 2023-04-30 | End: 2023-05-02

## 2023-04-30 RX ORDER — PANTOPRAZOLE SODIUM 40 MG/1
40 TABLET, DELAYED RELEASE ORAL 2 TIMES DAILY
Status: DISCONTINUED | OUTPATIENT
Start: 2023-04-30 | End: 2023-05-05 | Stop reason: HOSPADM

## 2023-04-30 RX ORDER — POLYETHYLENE GLYCOL 3350 17 G/17G
17 POWDER, FOR SOLUTION ORAL DAILY PRN
Status: DISCONTINUED | OUTPATIENT
Start: 2023-04-30 | End: 2023-05-05 | Stop reason: HOSPADM

## 2023-04-30 RX ORDER — LISINOPRIL 10 MG/1
10 TABLET ORAL DAILY
Status: DISCONTINUED | OUTPATIENT
Start: 2023-04-30 | End: 2023-05-03

## 2023-04-30 RX ORDER — FUROSEMIDE 10 MG/ML
40 INJECTION INTRAMUSCULAR; INTRAVENOUS ONCE
Status: COMPLETED | OUTPATIENT
Start: 2023-04-30 | End: 2023-04-30

## 2023-04-30 RX ORDER — ACETAMINOPHEN 325 MG/1
650 TABLET ORAL EVERY 6 HOURS PRN
Status: DISCONTINUED | OUTPATIENT
Start: 2023-04-30 | End: 2023-05-05 | Stop reason: HOSPADM

## 2023-04-30 RX ADMIN — PANTOPRAZOLE SODIUM 40 MG: 40 TABLET, DELAYED RELEASE ORAL at 17:49

## 2023-04-30 RX ADMIN — LISINOPRIL 10 MG: 10 TABLET ORAL at 16:10

## 2023-04-30 RX ADMIN — METOPROLOL SUCCINATE 25 MG: 25 TABLET, EXTENDED RELEASE ORAL at 16:10

## 2023-04-30 RX ADMIN — FUROSEMIDE 40 MG: 10 INJECTION, SOLUTION INTRAVENOUS at 18:34

## 2023-04-30 RX ADMIN — FUROSEMIDE 40 MG: 10 INJECTION, SOLUTION INTRAVENOUS at 13:44

## 2023-04-30 RX ADMIN — PNEUMOCOCCAL 20-VALENT CONJUGATE VACCINE 0.5 ML
2.2; 2.2; 2.2; 2.2; 2.2; 2.2; 2.2; 2.2; 2.2; 2.2; 2.2; 2.2; 2.2; 2.2; 2.2; 2.2; 4.4; 2.2; 2.2; 2.2 INJECTION, SUSPENSION INTRAMUSCULAR at 21:30

## 2023-04-30 RX ADMIN — IRON SUCROSE 200 MG: 20 INJECTION, SOLUTION INTRAVENOUS at 16:09

## 2023-04-30 NOTE — H&P
Unit/Bed#: E4 MS [id number] Encounter: [id number]    Chief complaint: Shortness of breath    History of Present Illness     HPI:  Ana Nguyen  is a 79 y o  male who presents with shortness of breath for 1 week  The patient has a history of congestive heart failure  For reasons that are unclear he has not been taking any medication for this lately  For the last 1 week he has developed dyspnea on exertion  This has become gradually worse  He has had no chest pain  He does describe orthopnea  Because of the worsening of the symptoms he came to the emergency room where he was found to be in congestive heart failure  He is admitted for further care  The patient's daughter says he usually walks 5 miles a day  Now he can only walk from one room of his house to the other  The patient was recently in [facility]   Echocardiogram revealed an ejection fraction of 43%  He was advised to follow-up with cardiology and stress testing was being considered  The patient's past medical history is positive for congestive heart failure as mentioned  The patient also has a long history of iron deficiency anemia  He has required multiple transfusions  He has a history of Cirilo ulcer  He has a history of esophageal stricture which required dilatation during his last admission  He was also told by hematologist that his problem is that his blood vessels are too close to the surface which causes recurrent bleeding  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  The patient has had multiple endoscopic procedures to evaluate his iron deficiency  The patient is not currently taking any medications  The patient is allergic to doxylamine  Family history is positive for hemangiomas in his father  Social history reveals that the patient is a former smoker  He quit about 8 years ago  He says that he drinks about 2 cans of beer twice a week  He denies any drug use      Review of systems was taken in detail  In addition to those issues mentioned above, the patient has been having dysphagia  He also feels that his voice has been somewhat hoarse  He has had no nausea or vomiting  He has noted no melena or hematochezia  Systemic review was otherwise negative  Objective   Vitals: Blood pressure 146/95, pulse 76, temperature 98 1 °F (36 7 °C), temperature source Temporal, resp  rate 20, weight 62 8 kg (138 lb 7 2 oz), SpO2 96 %  Physical Exam   The patient is a well-developed, frail man who is in no acute distress  Head is atraumatic and normocephalic  ENT examination is remarkable for multiple telangiectasias over the cheeks and lips  Eyes show the pupils to be equal, round, and reactive to light  Extraocular movements are intact  Neck is supple  Carotids are full without bruits  There is no lymphadenopathy or goiter  Lungs reveal rales at the bases bilaterally  Cardiac exam revealed a regular rhythm  I heard no murmur, gallop, or rub  The abdomen is soft with active bowel sounds  There is no mass, tenderness, organomegaly  +1 edema is present  There is no clubbing or cyanosis  Neurologic examination reveals the patient to be alert and oriented  There is no focal sign      Lab Results:   Results for orders placed or performed during the hospital encounter of 04/30/23   CBC and differential   Result Value Ref Range    WBC 5 27 4 31 - 10 16 Thousand/uL    RBC 4 01 3 88 - 5 62 Million/uL    Hemoglobin 8 6 (L) 12 0 - 17 0 g/dL    Hematocrit 29 6 (L) 36 5 - 49 3 %    MCV 74 (L) 82 - 98 fL    MCH 21 4 (L) 26 8 - 34 3 pg    MCHC 29 1 (L) 31 4 - 37 4 g/dL    RDW 20 1 (H) 11 6 - 15 1 %    MPV 10 2 8 9 - 12 7 fL    Platelets 290 (H) 704 - 390 Thousands/uL    nRBC 0 /100 WBCs    Neutrophils Relative 57 43 - 75 %    Immat GRANS % 1 0 - 2 %    Lymphocytes Relative 15 14 - 44 %    Monocytes Relative 16 (H) 4 - 12 %    Eosinophils Relative 7 (H) 0 - 6 %    Basophils Relative 4 (H) 0 - 1 % "Neutrophils Absolute 3 04 1 85 - 7 62 Thousands/µL    Immature Grans Absolute 0 03 0 00 - 0 20 Thousand/uL    Lymphocytes Absolute 0 79 0 60 - 4 47 Thousands/µL    Monocytes Absolute 0 83 0 17 - 1 22 Thousand/µL    Eosinophils Absolute 0 36 0 00 - 0 61 Thousand/µL    Basophils Absolute 0 22 (H) 0 00 - 0 10 Thousands/µL   Basic metabolic panel   Result Value Ref Range    Sodium 141 135 - 147 mmol/L    Potassium 4 0 3 5 - 5 3 mmol/L    Chloride 106 96 - 108 mmol/L    CO2 26 21 - 32 mmol/L    ANION GAP 9 4 - 13 mmol/L    BUN 9 5 - 25 mg/dL    Creatinine 0 79 0 60 - 1 30 mg/dL    Glucose 98 65 - 140 mg/dL    Calcium 8 7 8 4 - 10 2 mg/dL    eGFR 92 ml/min/1 73sq m   HS Troponin 0hr (reflex protocol)   Result Value Ref Range    hs TnI 0hr 19 \"Refer to ACS Flowchart\"- see link ng/L   B-Type Natriuretic Peptide(BNP)   Result Value Ref Range    BNP 3,898 (H) 0 - 100 pg/mL   HS Troponin I 2hr   Result Value Ref Range    hs TnI 2hr 26 \"Refer to ACS Flowchart\"- see link ng/L    Delta 2hr hsTnI 7 <20 ng/L   ECG 12 lead   Result Value Ref Range    Ventricular Rate 101 BPM    Atrial Rate 101 BPM    NJ Interval 190 ms    QRSD Interval 88 ms    QT Interval 338 ms    QTC Interval 438 ms    P [place] 69 degrees    QRS Axis 107 degrees    T Wave Axis 81 degrees         Assessment/Plan     Assessment:  1  Acute on chronic systolic congestive heart failure  2  Iron deficiency anemia secondary to chronic GI blood loss  3  Osler-Weber-Rendu syndrome  4  Esophageal stricture  5  History of esophagitis and Cirilo ulcers    Plan:  The patient will be admitted to the hospital and monitored carefully  He will be started on diuretics and ACE inhibitors  Depending on his response to this, addition of beta-blockers will be considered  The patient is anemic but his hemoglobin is better than when he was admitted to [facility]  He will be infused with iron  His hemoglobin will be monitored carefully  He will be transfused as needed    GI " evaluation has been requested  Upper endoscopy seems appropriate when the patient's heart failure has resolved  The patient has not yet received Prevnar 20 and this will be attended to  Considering the above information, I believe that it is clear that the patient's hospitalization will span 2 or more midnights  He is assigned to inpatient status  I discussed resuscitative measures with the patient and he would like all available modalities employed on his behalf in the event of a cardiac arrest   He is assigned to   [address] level 1          Code Status: Level 1 - Full Code

## 2023-04-30 NOTE — ED ATTENDING ATTESTATION
4/30/2023  IChad DO, saw and evaluated the patient  I have discussed the patient with the resident/non-physician practitioner and agree with the resident's/non-physician practitioner's findings, Plan of Care, and MDM as documented in the resident's/non-physician practitioner's note, except where noted  All available labs and Radiology studies were reviewed  I was present for key portions of any procedure(s) performed by the resident/non-physician practitioner and I was immediately available to provide assistance  At this point I agree with the current assessment done in the Emergency Department  I have conducted an independent evaluation of this patient a history and physical is as follows:    80 yo M h/o anemia, CHF; presenting for evaluation of SOB/HILL, chest pressure  Sx progressive over the past 1 week  C/o SOB at rest, worse with exertion, has to stop after a few steps to catch his breath  C/o chest pressure, also worse with exertion  Pt has h/o dysphagia and has h/o esophageal stricture s/p dilation in Feb  He reports improvement of sx after dilation, but sx worsened the past 2-3 weeks  No home diuretics       Admitted [facility] 2/13/23 Acute blood loss anemia, Hgb 5 2 s/p total of 3u PRBCs, EGD 2/15 with esophageal stricture s/p dilation, he declined colonoscopy  Has episode of SOB/hypoxia felt to be CHF exac and improved with IV lasix  2/14/23 ECHO: EF 42%    MDM: 80 yo M with exertional CP/SOB- EKG to r/o STEMI/arrhythmia/ischemic changes, troponin to evaluate for ischemia, CBC to r/o anemia and transfuse if needed, BMP to assess for SHALA/metabolic derangement, BNP to assess for CHF exc, CXR to eval for PNA/effusion/CHF      ED Course         Critical Care Time  Procedures

## 2023-04-30 NOTE — ED PROVIDER NOTES
"History  Chief Complaint   Patient presents with    Shortness of Breath     SOB and chest pain x1 week  Pt states \"it feels like I have a 100 pound weight on my chest      71-year-old male past medical history significant for CHF with an EF of 43% presents ED today for shortness of breath for the last 1 week  Patient states that he feels like he has a pressure on his chest   He states that even though he has a history of a acute anemia usually he gets more weakness and fatigue and does not get short of breath  Describes the chest pressure is intermittent  He is normally able to walk about 5 miles per the daughter who is in the room with him today but now he gets short of breath with even just walking a few steps or up to stairs  Denies any nausea or vomiting  No recent illnesses that he was aware of  No recent travel anywhere  No history of DVT/PE  Prior to Admission Medications   Prescriptions Last Dose Informant Patient Reported?  Taking?   ascorbic acid (VITAMIN C) 250 mg tablet   Yes No   Sig: Take 250 mg by mouth daily   ferrous sulfate 324 (65 Fe) mg   Yes No   Sig: Take 325 mg by mouth daily before breakfast    lactase (LACTAID) 3,000 units tablet   No No   Sig: Take 1 tablet (3,000 Units total) by mouth 3 (three) times a day with meals   Patient not taking: Reported on 1/2/2020   pantoprazole (PROTONIX) 40 mg tablet   No No   Sig: Take 1 tablet (40 mg total) by mouth 2 (two) times a day   sucralfate (CARAFATE) 1 g tablet   No No   Sig: Take 1 tablet (1 g total) by mouth 4 (four) times a day      Facility-Administered Medications: None       Past Medical History:   Diagnosis Date    Anemia     AVM (arteriovenous malformation) of small bowel, acquired     Wills's esophagus     Cirilo ulcer     Esophagitis     History of blood transfusion     22 prior transfusions       Past Surgical History:   Procedure Laterality Date    APPENDECTOMY      COLONOSCOPY N/A 11/6/2017    Procedure: " COLONOSCOPY;  Surgeon: Neyda Segundo MD;  Location: [facility]; Service: Gastroenterology    EGD AND COLONOSCOPY  2017    EGD AND COLONOSCOPY N/A 2018    Procedure: EGD with biopsy AND COLONOSCOPY-incomplete to sigmoid colon;  Surgeon: Daniela Kendrick DO;  Location: [facility]; Service: Gastroenterology    ESOPHAGOGASTRODUODENOSCOPY N/A 10/6/2017    Procedure: ESOPHAGOGASTRODUODENOSCOPY (EGD) with biopsy;  Surgeon: Ariana Silva MD;  Location: [facility]; Service: Gastroenterology    ESOPHAGOGASTRODUODENOSCOPY N/A 11/3/2017    Procedure: ESOPHAGOGASTRODUODENOSCOPY (EGD) with biopsy;  Surgeon: Amadeo Rodriguez MD;  Location: [facility]; Service: Gastroenterology    ESOPHAGOGASTRODUODENOSCOPY N/A 2018    Procedure: ESOPHAGOGASTRODUODENOSCOPY (EGD) with biopsy;  Surgeon: Ariana Silva MD;  Location: [facility]; Service: Gastroenterology    ESOPHAGOGASTRODUODENOSCOPY N/A 3/26/2018    Procedure: ESOPHAGOGASTRODUODENOSCOPY (EGD); Surgeon: Neyda Segundo MD;  Location: [facility]; Service: Gastroenterology       Family History   Problem Relation Age of Onset    Hemangiomas Father      I have reviewed and agree with the history as documented  E-Cigarette/Vaping     E-Cigarette/Vaping Substances     Social History     Tobacco Use    Smoking status: Former     Types: Cigarettes     Quit date: 11/3/2014     Years since quittin 4    Smokeless tobacco: Never   Substance Use Topics    Alcohol use: Not Currently     Comment: once or twice a week     Drug use: No        Review of Systems   Constitutional: Negative for chills and fever  HENT: Negative for hearing loss  Eyes: Negative for visual disturbance  Respiratory: Positive for shortness of breath  Cardiovascular: Positive for chest pain  Gastrointestinal: Negative for abdominal pain, constipation, diarrhea, nausea and vomiting  Genitourinary: Negative for difficulty urinating     Musculoskeletal: Negative for myalgias  Skin: Negative for rash  Neurological: Negative for dizziness  Psychiatric/Behavioral: Negative for agitation  All other systems reviewed and are negative  Physical Exam  ED Triage Vitals [04/30/23 1222]   Temperature Pulse Respirations Blood Pressure SpO2   98 2 °F (36 8 °C) 88 (!) 24 (!) 157/104 96 %      Temp Source Heart Rate Source Patient Position - Orthostatic VS BP Location FiO2 (%)   Oral Monitor Sitting Right arm --      Pain Score       --             Orthostatic Vital Signs  Vitals:    04/30/23 1222   BP: (!) 157/104   Pulse: 88   Patient Position - Orthostatic VS: Sitting       Physical Exam  Vitals and nursing note reviewed  Constitutional:       General: He is not in acute distress  Appearance: Normal appearance  He is not ill-appearing  HENT:      Head: Normocephalic and atraumatic  Right Ear: External ear normal       Left Ear: External ear normal       Nose: Nose normal  No congestion  Mouth/Throat:      Mouth: Mucous membranes are moist       Pharynx: Oropharynx is clear  No oropharyngeal exudate  Eyes:      General:         Right eye: No discharge  Left eye: No discharge  Extraocular Movements: Extraocular movements intact  Conjunctiva/sclera: Conjunctivae normal       Pupils: Pupils are equal, round, and reactive to light  Cardiovascular:      Rate and Rhythm: Normal rate and regular rhythm  Pulses: Normal pulses  Heart sounds: Normal heart sounds  No murmur heard  No gallop  Pulmonary:      Effort: Pulmonary effort is normal  No respiratory distress  Breath sounds: Normal breath sounds  No wheezing  Abdominal:      General: Abdomen is flat  Bowel sounds are normal  There is no distension  Palpations: Abdomen is soft  Tenderness: There is no abdominal tenderness  Musculoskeletal:         General: No swelling  Normal range of motion  Cervical back: Normal range of motion  No rigidity  Skin:     General: Skin is warm and dry  Capillary Refill: Capillary refill takes less than 2 seconds  Neurological:      General: No focal deficit present  Mental Status: He is alert and oriented to person, place, and time  Mental status is at baseline  Motor: No weakness        Gait: Gait normal    Psychiatric:         Mood and Affect: Mood normal          Behavior: Behavior normal          ED Medications  Medications   furosemide (LASIX) injection 40 mg (40 mg Intravenous Given 4/30/23 1344)       Diagnostic Studies  Results Reviewed     Procedure Component Value Units Date/Time    HS Troponin I 2hr [[id number]]     Lab Status: No result Specimen: Blood     HS Troponin 0hr (reflex protocol) [[id number]]  (Normal) Collected: 04/30/23 1249    Lab Status: Final result Specimen: Blood from Arm, Left Updated: 04/30/23 1348     hs TnI 0hr 19 ng/L     Basic metabolic panel [[id number]] Collected: 04/30/23 1249    Lab Status: Final result Specimen: Blood from Arm, Left Updated: 04/30/23 1343     Sodium 141 mmol/L      Potassium 4 0 mmol/L      Chloride 106 mmol/L      CO2 26 mmol/L      ANION GAP 9 mmol/L      BUN 9 mg/dL      Creatinine 0 79 mg/dL      Glucose 98 mg/dL      Calcium 8 7 mg/dL      eGFR 92 ml/min/1 73sq m     Narrative:      [place] guidelines for Chronic Kidney Disease (CKD):     Stage 1 with normal or high GFR (GFR > 90 mL/min/1 73 square meters)    Stage 2 Mild CKD (GFR = 60-89 mL/min/1 73 square meters)    Stage 3A Moderate CKD (GFR = 45-59 mL/min/1 73 square meters)    Stage 3B Moderate CKD (GFR = 30-44 mL/min/1 73 square meters)    Stage 4 Severe CKD (GFR = 15-29 mL/min/1 73 square meters)    Stage 5 End Stage CKD (GFR <15 mL/min/1 73 square meters)  Note: GFR calculation is accurate only with a steady state creatinine    CBC and differential [[id number]]  (Abnormal) Collected: 04/30/23 1249    Lab Status: Final result Specimen: Blood from Arm, Left Updated: 04/30/23 1307     WBC 5 27 Thousand/uL      RBC 4 01 Million/uL      Hemoglobin 8 6 g/dL      Hematocrit 29 6 %      MCV 74 fL      MCH 21 4 pg      MCHC 29 1 g/dL      RDW 20 1 %      MPV 10 2 fL      Platelets 665 Thousands/uL      nRBC 0 /100 WBCs      Neutrophils Relative 57 %      Immat GRANS % 1 %      Lymphocytes Relative 15 %      Monocytes Relative 16 %      Eosinophils Relative 7 %      Basophils Relative 4 %      Neutrophils Absolute 3 04 Thousands/µL      Immature Grans Absolute 0 03 Thousand/uL      Lymphocytes Absolute 0 79 Thousands/µL      Monocytes Absolute 0 83 Thousand/µL      Eosinophils Absolute 0 36 Thousand/µL      Basophils Absolute 0 22 Thousands/µL     B-Type Natriuretic Peptide(BNP) [[id number]] Collected: 04/30/23 1249    Lab Status: In process Specimen: Blood from Arm, Left Updated: 04/30/23 1305                 X-ray chest 1 view portable   ED Interpretation by Maicol De Los Santos MD (04/30 1344)   I interpreted this XR as increased pulmonary vascular congestion            Procedures  Procedures      ED Course  ED Course as of 04/30/23 1355   Sun Apr 30, 2023   1247 Procedure Note: EKG  Date/Time: 04/30/23 12:47 PM   Interpreted by: Isela David   Indications / Diagnosis: SOB  ECG reviewed by me, the ED Provider: yes   The EKG demonstrates:  Rhythm: normal sinus  Intervals: normal intervals  Axis: normal axis  QRS/Blocks: normal QRS  ST Changes: No acute ST Changes, no STD/TEJA        1322 Hemoglobin(!): 8 6  Low but patient is chronically low  No need for transfusion right now   8730 Basic metabolic panel  Unremarkable                             SBIRT 22yo+    Flowsheet Row Most Recent Value   Initial Alcohol Screen: US AUDIT-C     1  How often do you have a drink containing alcohol? 0 Filed at: 04/30/2023 1223   2  How many drinks containing alcohol do you have on a typical day you are drinking? 0 Filed at: 04/30/2023 1223   3a  Male UNDER 65:  How often do you have five or more drinks on one occasion? 0 Filed at: 04/30/2023 1223   3b  FEMALE Any Age, or MALE 65+: How often do you have 4 or more drinks on one occassion? 0 Filed at: 04/30/2023 1223   Audit-C Score 0 Filed at: 04/30/2023 1223   MERON: How many times in the past year have you    Used an illegal drug or used a prescription medication for non-medical reasons? Never Filed at: 04/30/2023 1223                Medical Decision Making  59-year-old male past medical history significant for CHF and acute blood loss anemia presenting to the ED today for shortness of breath and chest pressure  Differential diagnosis includes acute blood loss anemia versus ACS versus CHF exacerbation versus pneumonia versus pneumothorax  Will evaluate with a CBC, CMP, troponin, twelve-lead EKG, chest x-ray, BNP  Disposition pending work-up however likely admission given the fact the patient has significant dyspnea on exertion  Amount and/or Complexity of Data Reviewed  Labs: ordered  Radiology: ordered  Disposition  Final diagnoses:   [facility]F exacerbation ([facility] )   Dyspnea on exertion     Time reflects when diagnosis was documented in both MDM as applicable and the Disposition within this note     Time User Action Codes Description Comment    4/30/2023  1:53 PM Marilee Kumar Add [I50 9] CHF exacerbation ([facility] )     4/30/2023  1:53 PM Marilee Kumar Add [R06 09] Dyspnea on exertion       ED Disposition     ED Disposition   Admit    Condition   Stable    Date/Time   Sun Apr 30, 2023  1:53 PM    Comment   Case was discussed with Dr Dyana Nash and the patient's admission status was agreed to be Admission Status: inpatient status to the service of Dr Dyana Nash   Follow-up Information    None         Patient's Medications   Discharge Prescriptions    No medications on file     No discharge procedures on file  PDMP Review     None           ED Provider  Attending physically available and evaluated Nazario Son    I managed the patient along with the ED Attending      Electronically Signed by         Melony Beach MD  04/30/23 0761

## 2023-04-30 NOTE — LETTER
[address] [address]  Dept: [phone]    May 5, 2023     Patient: Nazario Son  YOB: 1955   Date of Visit: 4/30/2023       To Whom it May Concern:    Lisa Vazquez is under my professional care  He was seen in the hospital from 4/30/2023 to 05/05/23  He may return to work on 5/8/2023  If you have any questions or concerns, please don't hesitate to call           Sincerely,          Kobe Cuellar MD

## 2023-05-01 ENCOUNTER — ANESTHESIA EVENT (INPATIENT)
Dept: GASTROENTEROLOGY | Facility: HOSPITAL | Age: 68
End: 2023-05-01

## 2023-05-01 LAB
ANION GAP SERPL CALCULATED.3IONS-SCNC: 7 MMOL/L (ref 4–13)
BASOPHILS # BLD AUTO: 0.26 THOUSANDS/ΜL (ref 0–0.1)
BASOPHILS NFR BLD AUTO: 5 % (ref 0–1)
BUN SERPL-MCNC: 9 MG/DL (ref 5–25)
CALCIUM SERPL-MCNC: 8.9 MG/DL (ref 8.4–10.2)
CHLORIDE SERPL-SCNC: 103 MMOL/L (ref 96–108)
CO2 SERPL-SCNC: 29 MMOL/L (ref 21–32)
CREAT SERPL-MCNC: 0.81 MG/DL (ref 0.6–1.3)
EOSINOPHIL # BLD AUTO: 0.75 THOUSAND/ΜL (ref 0–0.61)
EOSINOPHIL NFR BLD AUTO: 14 % (ref 0–6)
ERYTHROCYTE [DISTWIDTH] IN BLOOD BY AUTOMATED COUNT: 20.1 % (ref 11.6–15.1)
GFR SERPL CREATININE-BSD FRML MDRD: 91 ML/MIN/1.73SQ M
GLUCOSE SERPL-MCNC: 88 MG/DL (ref 65–140)
HCT VFR BLD AUTO: 28.7 % (ref 36.5–49.3)
HCV AB SER QL: NORMAL
HGB BLD-MCNC: 8.5 G/DL (ref 12–17)
IMM GRANULOCYTES # BLD AUTO: 0.04 THOUSAND/UL (ref 0–0.2)
IMM GRANULOCYTES NFR BLD AUTO: 1 % (ref 0–2)
LYMPHOCYTES # BLD AUTO: 0.83 THOUSANDS/ΜL (ref 0.6–4.47)
LYMPHOCYTES NFR BLD AUTO: 15 % (ref 14–44)
MCH RBC QN AUTO: 21.4 PG (ref 26.8–34.3)
MCHC RBC AUTO-ENTMCNC: 29.6 G/DL (ref 31.4–37.4)
MCV RBC AUTO: 72 FL (ref 82–98)
MONOCYTES # BLD AUTO: 0.78 THOUSAND/ΜL (ref 0.17–1.22)
MONOCYTES NFR BLD AUTO: 14 % (ref 4–12)
NEUTROPHILS # BLD AUTO: 2.78 THOUSANDS/ΜL (ref 1.85–7.62)
NEUTS SEG NFR BLD AUTO: 51 % (ref 43–75)
NRBC BLD AUTO-RTO: 0 /100 WBCS
PLATELET # BLD AUTO: 389 THOUSANDS/UL (ref 149–390)
PMV BLD AUTO: 10 FL (ref 8.9–12.7)
POTASSIUM SERPL-SCNC: 3.7 MMOL/L (ref 3.5–5.3)
RBC # BLD AUTO: 3.98 MILLION/UL (ref 3.88–5.62)
SODIUM SERPL-SCNC: 139 MMOL/L (ref 135–147)
WBC # BLD AUTO: 5.44 THOUSAND/UL (ref 4.31–10.16)

## 2023-05-01 RX ADMIN — LISINOPRIL 10 MG: 10 TABLET ORAL at 09:47

## 2023-05-01 RX ADMIN — PANTOPRAZOLE SODIUM 40 MG: 40 TABLET, DELAYED RELEASE ORAL at 09:46

## 2023-05-01 RX ADMIN — PANTOPRAZOLE SODIUM 40 MG: 40 TABLET, DELAYED RELEASE ORAL at 17:44

## 2023-05-01 RX ADMIN — FUROSEMIDE 40 MG: 10 INJECTION, SOLUTION INTRAVENOUS at 09:47

## 2023-05-01 RX ADMIN — IRON SUCROSE 200 MG: 20 INJECTION, SOLUTION INTRAVENOUS at 15:52

## 2023-05-01 RX ADMIN — FUROSEMIDE 40 MG: 10 INJECTION, SOLUTION INTRAVENOUS at 15:49

## 2023-05-01 RX ADMIN — METOPROLOL SUCCINATE 25 MG: 25 TABLET, EXTENDED RELEASE ORAL at 09:47

## 2023-05-01 NOTE — ASSESSMENT & PLAN NOTE
Receiving Venofer  Stable  No indication for transfusion at this time      Recent Labs     04/30/23  1249 05/01/23  0458   HGB 8 6* 8 5*   MCV 74* 72*   RDW 20 1* 20 1*

## 2023-05-01 NOTE — ASSESSMENT & PLAN NOTE
Wt Readings from Last 3 Encounters:   04/30/23 62 8 kg (138 lb 7 2 oz)   05/01/20 61 8 kg (136 lb 3 9 oz)   01/02/20 63 1 kg (139 lb 1 8 oz)     75-year-old male presented to institution due to dysphagia and shortness of breath  Shortness of breath possibly secondary to acute on chronic systolic congestive heart failure  He has no evidence of sepsis nor pneumonia at this time  BNP elevated  X-ray showing possible congestion  Seems euvolemic at this point  · Continue Lasix twice daily, likely transition to po richie  · Echocardiogram  Echo performed early this year showing an ef of 42%, will repeat inpatient

## 2023-05-01 NOTE — PLAN OF CARE
Problem: Potential for Falls  Goal: Patient will remain free of falls  Description: INTERVENTIONS:  - Educate patient/family on patient safety including physical limitations  - Instruct patient to call for assistance with activity   - Consult OT/PT to assist with strengthening/mobility   - Keep Call bell within reach  - Keep bed low and locked with side rails adjusted as appropriate  - Keep care items and personal belongings within reach  - Initiate and maintain comfort rounds  - Make Fall Risk Sign visible to staff  Outcome: Progressing     Problem: PAIN - ADULT  Goal: Verbalizes/displays adequate comfort level or baseline comfort level  Description: Interventions:  - Encourage patient to monitor pain and request assistance  - Assess pain using appropriate pain scale  - Administer analgesics based on type and severity of pain and evaluate response  - Implement non-pharmacological measures as appropriate and evaluate response  - Consider cultural and social influences on pain and pain management  - Notify physician/advanced practitioner if interventions unsuccessful or patient reports new pain  Outcome: Progressing     Problem: DISCHARGE PLANNING  Goal: Discharge to home or other facility with appropriate resources  Description: INTERVENTIONS:  - Identify barriers to discharge w/patient and caregiver  - Arrange for needed discharge resources and transportation as appropriate  - Identify discharge learning needs (meds, wound care, etc )  - Refer to Case Management Department for coordinating discharge planning if the patient needs post-hospital services based on physician/advanced practitioner order or complex needs related to functional status, cognitive ability, or social support system  Outcome: Progressing     Problem: GASTROINTESTINAL - ADULT  Goal: Maintains adequate nutritional intake  Description: INTERVENTIONS:  - Monitor percentage of each meal consumed  - Identify factors contributing to decreased intake, treat as appropriate  - Assist with meals as needed  - Monitor I&O, weight, and lab values if indicated  - Obtain nutrition services referral as needed  Outcome: Progressing

## 2023-05-01 NOTE — CONSULTS
Consult Service: Gastroenterology      PATIENT INFORMATION      Meryle Baptise  79 y o  male MRN: [id number]  Unit/Bed#: E4 -01 Encounter: [id number]  PCP: Ayesha Mitchell MD  Date of Admission:  4/30/2023  Date of Consultation: 05/01/23  Requesting Physician: Julieth Thayer MD       ASSESSMENTS & PLAN     79years old male with PMH of recurrent esophageal stricture requiring balloon dilation, Wills's, small bowel AVMs with multiple UGI bleed in past   Recently admitted in 2/23 to [facility] in [place] for suspected GI bleed, dysphagia  Recent EGD in 2/23 was unremarkable except for stricture which was dilated  Did have hemoglobin of around 5 5 receiving blood transfusion  Baseline hemoglobin appears to be around 8-9     1   Dysphagia  - Progressing from solid to puréed diet today  - Underlying protein calorie malnutrition  - History of nondysplastic Wills's and stricture requiring multiple balloon dilation    2  Small bowel AVMs  3  Iron deficiency anemia    Plan:-  - Given his worsening dysphagia will plan for repeat EGD in a m   - N p o  from midnight  - Can continue diet for now  - Given hemoglobin is at baseline no plan for inpatient colonoscopy  - Recommend colonoscopy and GI follow-up outpatient  - Agree with IV Venofer 200 mg or can be changed to 300 mg for 3 days    REASON FOR CONSULTATION      Dysphagia and iron deficiency anemia      HISTORY OF PRESENT ILLNESS      Meryle Baptise  is a 79 y o  male with PMH of recurrent 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 s/p multiple balloon dilation in past   Recently done on 2/23  Also has history of multiple small bowel AVMs and multiple upper GI bleed history currently on Protonix and Carafate  Was recently admitted to [facility] in [place] in 2/23 with CHF exacerbation and acute blood loss anemia    Had EGD done at that time which showed a stricture s/p balloon dilation after which swelling is improved and was tolerating solid diet well  No GI bleed source was identified  Was recommended to get capsule endoscopy and colonoscopy outpatient but patient never followed up with any GI  Again presented with worsening shortness of breath found to have CHF exacerbation treated with IV diuretics  Patient hemoglobin has been at baseline  Does have baseline iron deficiency anemia on iron tablets  Has been having progressing dysphagia from solid to puréed diet today since last 3 weeks  States has been worsening  Complaining of esophageal dysphagia  Does have underlying protein malnutrition  Regurgitation of undigested food  No fever, chills  No acid reflux  No abdominal pain, diarrhea, nausea vomiting  Appears to be euvolemic  Saturating well on room air  Hemodynamically stable otherwise  REVIEW OF SYSTEMS     A thorough 12-point review of systems has been conducted  Pertinent positives and negatives are mentioned in the history of present illness  PAST MEDICAL & SURGICAL HISTORY      Past Medical History:   Diagnosis Date    Anemia     AVM (arteriovenous malformation) of small bowel, acquired     Wills's esophagus     Cirilo ulcer     Esophagitis     History of blood transfusion     22 prior transfusions       Past Surgical History:   Procedure Laterality Date    APPENDECTOMY      COLONOSCOPY N/A 11/6/2017    Procedure: COLONOSCOPY;  Surgeon: Yossi Foote MD;  [facility]; Service: Gastroenterology    EGD AND COLONOSCOPY  07/18/2017    EGD AND COLONOSCOPY N/A 9/17/2018    Procedure: EGD with biopsy AND COLONOSCOPY-incomplete to sigmoid colon;  Surgeon: Carlos Singh DO;  [facility]; Service: Gastroenterology    ESOPHAGOGASTRODUODENOSCOPY N/A 10/6/2017    Procedure: ESOPHAGOGASTRODUODENOSCOPY (EGD) with biopsy;  Surgeon: Linda España MD;  Location[facility];   Service: Gastroenterology    ESOPHAGOGASTRODUODENOSCOPY N/A 11/3/2017    Procedure: ESOPHAGOGASTRODUODENOSCOPY (EGD) with biopsy;  Surgeon: Isaac Marvin MD;  [facility]; Service: Gastroenterology    ESOPHAGOGASTRODUODENOSCOPY N/A 2018    Procedure: ESOPHAGOGASTRODUODENOSCOPY (EGD) with biopsy;  Surgeon: Iris Kinney MD;  [facility]; Service: Gastroenterology    ESOPHAGOGASTRODUODENOSCOPY N/A 3/26/2018    Procedure: ESOPHAGOGASTRODUODENOSCOPY (EGD); Surgeon: Neyda Hernandez MD;  [facility]; Service: Gastroenterology         MEDICATIONS & ALLERGIES       Medications:   Prior to Admission medications    Medication Sig Start Date End Date Taking? Authorizing Provider   ascorbic acid (VITAMIN C) 250 mg tablet Take 250 mg by mouth daily    Historical Provider, MD   ferrous sulfate 324 (65 Fe) mg Take 325 mg by mouth daily before breakfast  18   Historical Provider, MD   lactase (LACTAID) 3,000 units tablet Take 1 tablet (3,000 Units total) by mouth 3 (three) times a day with meals  Patient not taking: Reported on 2020   Mariah Haq MD   pantoprazole (PROTONIX) 40 mg tablet Take 1 tablet (40 mg total) by mouth 2 (two) times a day 20   ROMA Shaffer   sucralfate (CARAFATE) 1 g tablet Take 1 tablet (1 g total) by mouth 4 (four) times a day 20   ROMA Shaffer       Allergies:    Allergies   Allergen Reactions    Doxylamine GI Intolerance    Lactose - Food Allergy      Pt lactose intolerant           SOCIAL HISTORY      Marital Status:     Substance Use History:   Social History     Substance and Sexual Activity   Alcohol Use Not Currently    Comment: once or twice a week      Social History     Tobacco Use   Smoking Status Former    Types: Cigarettes    Quit date: 11/3/2014    Years since quittin 4   Smokeless Tobacco Never     Social History     Substance and Sexual Activity   Drug Use No         FAMILY HISTORY      Non-Contributory      PHYSICAL EXAM     Vitals:   Blood Pressure: 121/77 (05/01/23 0700)  Pulse: 81 (05/01/23 0700)  Temperature: 98 5 °F (36 9 °C) (05/01/23 0700)  Temp Source: Temporal (05/01/23 0700)  Respirations: 18 (05/01/23 0700)  Height: 6' (182 9 cm) (04/30/23 1940)  Weight - Scale: 62 8 kg (138 lb 7 2 oz) (04/30/23 1222)  SpO2: 94 % (05/01/23 0700)    Physical Exam:   GENERAL: NAD  HEENT:  NC/AT, no scleral icterus  CARDIAC:  RRR, +S1/S2  PULMONARY:  CTA B/L, no wheezing/rales/rhonci, non-labored breathing  ABDOMEN:  Soft, NT/ND, +BS, no rebound/guarding/rigidity  Extremities:  No edema, cyanosis, or clubbing  NEUROLOGIC:  Alert  SKIN:  No rashes or erythema      ADDITIONAL DATA     Lab Results:     Results from last 7 days   Lab Units 05/01/23  0458   WBC Thousand/uL 5 44   HEMOGLOBIN g/dL 8 5*   HEMATOCRIT % 28 7*   PLATELETS Thousands/uL 389   NEUTROS PCT % 51   LYMPHS PCT % 15   MONOS PCT % 14*   EOS PCT % 14*     Results from last 7 days   Lab Units 05/01/23  0458   POTASSIUM mmol/L 3 7   CHLORIDE mmol/L 103   CO2 mmol/L 29   BUN mg/dL 9   CREATININE mg/dL 0 81   CALCIUM mg/dL 8 9           Imaging:    X-ray chest 1 view portable    Result Date: 5/1/2023  Narrative: CHEST INDICATION:   chest pain  COMPARISON: 4/11/2018 EXAM PERFORMED/VIEWS:  XR CHEST PORTABLE FINDINGS:  Monitoring leads and clips project over the chest  Heart shadow is enlarged but unchanged from prior exam  Multifocal airspace opacity most dense in the suprahilar right lung and retrocardiac left lung  Blunting of the left costophrenic angle suggesting trace effusion  No pneumothorax  Osseous structures appear within normal limits for patient age  Impression: Multifocal airspace opacities which could represent nonuniform pulmonary vascular congestion, or multifocal pneumonia  Cardiomegaly and trace left effusion   Workstation performed: [id number]       EKG, Pathology, and Other Studies Reviewed on Admission:   · EKG: Reviewed      Counseling / Coordination of Care Time: 30 total mins spent n consult  Greater than 50% of total time spent on patient counseling and coordination of care     ** Please Note: This note is constructed using a voice recognition dictation system   **

## 2023-05-01 NOTE — UTILIZATION REVIEW
"Initial Clinical Review    Admission: Date/Time/Statement:   Admission Orders (From admission, onward)     Ordered        04/30/23 1356  INPATIENT ADMISSION  Once                      Orders Placed This Encounter   Procedures    INPATIENT ADMISSION     Standing Status:   Standing     Number of Occurrences:   1     Order Specific Question:   Level of Care     Answer:   Med Surg [16]     Order Specific Question:   Estimated length of stay     Answer:   More than 2 Midnights     Order Specific Question:   Certification     Answer:   I certify that inpatient services are medically necessary for this patient for a duration of greater than two midnights  See H&P and MD Progress Notes for additional information about the patient's course of treatment  ED Arrival Information     Expected   -    Arrival   4/30/2023 12:16    Acuity   Urgent            Means of arrival   Walk-In    Escorted by   Family Member    Service   Hospitalist    Admission type   Emergency            Arrival complaint   chest pain, sob           Chief Complaint   Patient presents with    Shortness of Breath     SOB and chest pain x1 week  Pt states \"it feels like I have a 100 pound weight on my chest        Initial Presentation: 79 y o  male presents to the ED from home with c/o SOB on exertion with pressure feeling on chest x 1 wk and orthopnea  PMH: for CHF with an EF of 43%, iron def anemia w/ multiple transfusions, Cirilo ulcer, esophageal stricture and had received dilatations, walks 5 miles routinely,  Has not been med compliant of late for unknown reasons  In the ED he was tachypneic and HTN  Labs - elevated BNP > 3800, Hgb 8 6, negative troponins  Imaging - multifocal pneumonia, cardiomegaly, trace L effusion  Treated with IV Lasix 40 mg x 1    He is admitted to INPATIENT status with Acute on chronic sCHF, Iron def anemia d/t chronic GI Loss, Osler-Weber-Rendu syndrome - diuretics, ACEI, may add BB, iron infusion, H/H monitoring, PRN " transfusions, GI Consult, Upper endoscopy when CHF resolved  Date: 5/1   Day 2:   VS stable  Hgb 8 5  remains on IV Lasix BID, IV Iron  Will likely transition to oral Lasix on 5/2  On exam feels improvement with breathing  Will have EGD on 5/1  Continues to have dysphagia  5/1 GI Consult - PMH: mid esophageal stricture status post dilations in the past, Wills's esophagus, small bowel AVMs, recurrent GI bleed, iron deficiency anemia admitted with CHF exacerbation with worsening swallowing issues and iron def anemia  Had balloon dilation of esophagus 2 mo  Ago  Now can take sips  Will do EGD on 5/2 for balloon dilation of stricture  Will bx stricture  Continue PPI BID, OP colonoscopy  ED Triage Vitals   Temperature Pulse Respirations Blood Pressure SpO2   04/30/23 1222 04/30/23 1222 04/30/23 1222 04/30/23 1222 04/30/23 1222   98 2 °F (36 8 °C) 88 (!) 24 (!) 157/104 96 %      Temp Source Heart Rate Source Patient Position - Orthostatic VS BP Location FiO2 (%)   04/30/23 1222 04/30/23 1222 04/30/23 1222 04/30/23 1222 --   Oral Monitor Sitting Right arm       Pain Score       04/30/23 1458       No Pain          Wt Readings from Last 1 Encounters:   04/30/23 62 8 kg (138 lb 7 2 oz)     Additional Vital Signs:   05/01/23 0700 98 5 °F (36 9 °C) 81 18 121/77 86 94 % None (Room air) Lying   04/30/23 2300 98 1 °F (36 7 °C) 69 20 131/76 98 93 % None (Room air) Lying   04/30/23 1816 -- -- -- 141/87 -- -- -- Sitting   04/30/23 1500 98 1 °F (36 7 °C) 76 20 146/95 111 96 % None (Room air) Sitting     Pertinent Labs/Diagnostic Test Results:         X-ray chest 1 view portable   ED Interpretation by Minh Bedoya MD (04/30 3592)   I interpreted this XR as increased pulmonary vascular congestion      Final Result by Antoni Carter MD (05/01 5915)      Multifocal airspace opacities which could represent nonuniform pulmonary vascular congestion, or multifocal pneumonia        Cardiomegaly and trace left effusion  Workstation performed: [id number]               Results from last 7 days   Lab Units 05/01/23  0458 04/30/23  1249   WBC Thousand/uL 5 44 5 27   HEMOGLOBIN g/dL 8 5* 8 6*   HEMATOCRIT % 28 7* 29 6*   PLATELETS Thousands/uL 389 404*   NEUTROS ABS Thousands/µL 2 78 3 04         Results from last 7 days   Lab Units 05/01/23  0458 04/30/23  1249   SODIUM mmol/L 139 141   POTASSIUM mmol/L 3 7 4 0   CHLORIDE mmol/L 103 106   CO2 mmol/L 29 26   ANION GAP mmol/L 7 9   BUN mg/dL 9 9   CREATININE mg/dL 0 81 0 79   EGFR ml/min/1 73sq m 91 92   CALCIUM mg/dL 8 9 8 7             Results from last 7 days   Lab Units 05/01/23  0458 04/30/23  1249   GLUCOSE RANDOM mg/dL 88 98     Results from last 7 days   Lab Units 04/30/23  1938 04/30/23  1640 04/30/23  1249   HS TNI 0HR ng/L  --   --  19   HS TNI 2HR ng/L  --  26  --    HSTNI D2 ng/L  --  7  --    HS TNI 4HR ng/L 22  --   --    HSTNI D4 ng/L 3  --   --          Results from last 7 days   Lab Units 04/30/23  1249   BNP pg/mL 3,898*     ED Treatment:   Medication Administration from 04/30/2023 1216 to 04/30/2023 1453       Date/Time Order Dose Route Action     04/30/2023 1344 EDT furosemide (LASIX) injection 40 mg 40 mg Intravenous Given        Past Medical History:   Diagnosis Date    Anemia     AVM (arteriovenous malformation) of small bowel, acquired     Wills's esophagus     Cirilo ulcer     Esophagitis     History of blood transfusion     22 prior transfusions     Present on Admission:   AVM (arteriovenous malformation) of small bowel, acquired   Esophageal dysphagia   Iron deficiency anemia due to chronic blood loss   Acute on chronic systolic congestive heart failure (HCC)   Osler-Weber-Rendu syndrome ([facility] [address] )      Admitting Diagnosis: SOB (shortness of breath) [R06 02]  Dyspnea on exertion [R06 09]  Iron deficiency anemia due to chronic blood loss [D50 0]  CHF exacerbation (HCC) [I50 9]  Dysphagia [R13 10]  Age/Sex: 79 y o  male  Admission Orders:  Scheduled Medications:  furosemide, 40 mg, Intravenous, BID (diuretic)  iron sucrose, 200 mg, Intravenous, Q24H  lisinopril, 10 mg, Oral, Daily  metoprolol succinate, 25 mg, Oral, Daily  pantoprazole, 40 mg, Oral, BID      Continuous IV Infusions:     PRN Meds:  acetaminophen, 650 mg, Oral, Q6H PRN  polyethylene glycol, 17 g, Oral, Daily PRN    Hep C antibody  OOB   EGD  IP CONSULT TO GASTROENTEROLOGY    Network Utilization Review Department  ATTENTION: Please call with any questions or concerns to [phone] and carefully listen to the prompts so that you are directed to the right person  All voicemails are confidential   Sony Montgomery all requests for admission clinical reviews, approved or denied determinations and any other requests to dedicated fax number below belonging to the campus where the patient is receiving treatment   List of dedicated fax numbers for the Facilities:  [address] DENIALS (Administrative/Medical Necessity) [phone]   [address] (Maternity/NICU/Pediatrics) [phone]   [address] [place] [address]  [phone]   [address] [facility] [place] [address] [facility] [address] [facility] [place] [address] [phone]

## 2023-05-01 NOTE — ANESTHESIA PREPROCEDURE EVALUATION
Procedure:  EGD    Relevant Problems   CARDIO   (+) AVM (arteriovenous malformation) of small bowel, acquired   (+) Acute on chronic systolic congestive heart failure (HCC)   (+) Cardiac murmur   (+) Osler-Weber-Rendu syndrome (HCC)      GI/HEPATIC   (+) Cirilo ulcer   (+) Esophageal dysphagia      /RENAL   (+) Acute kidney injury ([address] )      HEMATOLOGY   (+) Iron deficiency anemia due to chronic blood loss      Digestive   (+) Melena      Other   (+) Alcohol use disorder   (+) Moderate protein-calorie malnutrition (HCC)        Physical Exam    Airway    Mallampati score: unable to assess  TM Distance: >3 FB       Dental   upper dentures,     Cardiovascular  Rhythm: irregular, Rate: normal,     Pulmonary  Decreased breath sounds, Rales,     Other Findings        Anesthesia Plan  ASA Score- 4     Anesthesia Type- general with ASA Monitors  Additional Monitors:   Airway Plan:           Plan Factors-    Chart reviewed  EKG reviewed  Existing labs reviewed  Patient summary reviewed  Patient is not a current smoker  Induction- intravenous  Postoperative Plan-     Informed Consent- Anesthetic plan and risks discussed with patient

## 2023-05-01 NOTE — PROGRESS NOTES
[address] [place] [address]  Progress Note  Name: Vaibhav hC  MRN: [id number]  Unit/Bed#: E4 -01 I Date of Admission: 4/30/2023   Date of Service: 5/1/2023 I Hospital Day: 1    Assessment/Plan   * Acute on chronic systolic congestive heart failure [facility])  Assessment & Plan  Wt Readings from Last 3 Encounters:   04/30/23 62 8 kg (138 lb 7 2 oz)   05/01/20 61 8 kg (136 lb 3 9 oz)   01/02/20 63 1 kg (139 lb 1 8 oz)     59-year-old male presented to institution due to dysphagia and shortness of breath  Shortness of breath possibly secondary to acute on chronic systolic congestive heart failure  He has no evidence of sepsis nor pneumonia at this time  BNP elevated  X-ray showing possible congestion  Seems euvolemic at this point  · Continue Lasix twice daily, likely transition to po richie  · Echocardiogram  Echo performed early this year showing an ef of 42%, will repeat inpatient  Esophageal dysphagia  Assessment & Plan  Worsening dysphagia  · NPO after midnight  · EGD by GI richie    AVM (arteriovenous malformation) of small bowel, acquired  Assessment & Plan  Continue outpatient GI follow-up    Iron deficiency anemia due to chronic blood loss  Assessment & Plan  Receiving Venofer  Stable  No indication for transfusion at this time      Recent Labs     04/30/23  1249 05/01/23  0458   HGB 8 6* 8 5*   MCV 74* 72*   RDW 20 1* 20 1*                VTE Pharmacologic Prophylaxis:   Pharmacologic: Pharmacologic VTE Prophylaxis contraindicated due to egd  Mechanical VTE Prophylaxis in Place: Yes    Discussions with Specialists or Other Care Team Provider: nursing, gi    Education and Discussions with Family / Patient: patient    Current Length of Stay: 1 day(s)    Current Patient Status: Inpatient   Certification Statement: The patient will continue to require additional inpatient hospital stay due to egd, iv diuretics, echo    Discharge Plan: active    Code Status: Level 1 - Full Code      Subjective:   Patient seen and examined at bedside  Reports improvement with his shortness of breath  Still having dysphagia  Objective:     Vitals:   Temp (24hrs), Av 6 °F (37 °C), Min:98 1 °F (36 7 °C), Max:99 2 °F (37 3 °C)    Temp:  [98 1 °F (36 7 °C)-99 2 °F (37 3 °C)] 99 2 °F (37 3 °C)  HR:  [69-81] 76  Resp:  [18-20] 18  BP: (117-141)/(74-87) 117/74  SpO2:  [93 %-98 %] 98 %  Body mass index is 18 78 kg/m²  Input and Output Summary (last 24 hours): Intake/Output Summary (Last 24 hours) at 2023 172  Last data filed at 2023 0459  Gross per 24 hour   Intake 360 ml   Output 1350 ml   Net -990 ml       Physical Exam:     Physical Exam  Vitals reviewed  Constitutional:       General: He is not in acute distress  HENT:      Head: Normocephalic  Nose: Nose normal       Mouth/Throat:      Mouth: Mucous membranes are moist    Eyes:      General: No scleral icterus  Cardiovascular:      Rate and Rhythm: Normal rate  Pulmonary:      Effort: Pulmonary effort is normal  No respiratory distress  Abdominal:      General: There is no distension  Palpations: Abdomen is soft  Tenderness: There is no abdominal tenderness  Musculoskeletal:      Right lower leg: No edema  Left lower leg: No edema  Skin:     General: Skin is warm  Neurological:      Mental Status: He is alert and oriented to person, place, and time     Psychiatric:         Mood and Affect: Mood normal          Behavior: Behavior normal        Additional Data:     Labs:    Results from last 7 days   Lab Units 23  0458   WBC Thousand/uL 5 44   HEMOGLOBIN g/dL 8 5*   HEMATOCRIT % 28 7*   PLATELETS Thousands/uL 389   NEUTROS PCT % 51   LYMPHS PCT % 15   MONOS PCT % 14*   EOS PCT % 14*     Results from last 7 days   Lab Units 23  0458   SODIUM mmol/L 139   POTASSIUM mmol/L 3 7   CHLORIDE mmol/L 103   CO2 mmol/L 29   BUN mg/dL 9   CREATININE mg/dL 0 81   ANION GAP mmol/L 7   CALCIUM mg/dL 8 9 GLUCOSE RANDOM mg/dL 88                           * I Have Reviewed All Lab Data Listed Above  * Additional Pertinent Lab Tests Reviewed: Dasha [address] Admission Reviewed      Lines:   Invasive Devices     Peripheral Intravenous Line  Duration           Peripheral IV 04/30/23 Left Antecubital 1 day                   Imaging:    Imaging Reports Reviewed Today Include: xr chest    Recent Cultures (last 7 days):           Last 24 Hours Medication List:   Current Facility-Administered Medications   Medication Dose Route Frequency Provider Last Rate    acetaminophen  650 mg Oral Q6H PRN Va Shrestha MD      furosemide  40 mg Intravenous BID (diuretic) Va Shrestha MD      iron sucrose  200 mg Intravenous Q24H Va Shrestha MD 0 mg (04/30/23 1709)    lisinopril  10 mg Oral Daily Va Shrestha MD      metoprolol succinate  25 mg Oral Daily Va Shrestha MD      pantoprazole  40 mg Oral BID Va Shrestha MD      polyethylene glycol  17 g Oral Daily PRN Va Shrestha MD          Today, Patient Was Seen By: Neftali Escobedo MD    ** Please Note: Dictation voice to text software may have been used in the creation of this document   **

## 2023-05-02 ENCOUNTER — APPOINTMENT (INPATIENT)
Dept: GASTROENTEROLOGY | Facility: HOSPITAL | Age: 68
End: 2023-05-02

## 2023-05-02 ENCOUNTER — APPOINTMENT (INPATIENT)
Dept: NON INVASIVE DIAGNOSTICS | Facility: HOSPITAL | Age: 68
End: 2023-05-02

## 2023-05-02 ENCOUNTER — ANESTHESIA (INPATIENT)
Dept: GASTROENTEROLOGY | Facility: HOSPITAL | Age: 68
End: 2023-05-02

## 2023-05-02 LAB
ANION GAP SERPL CALCULATED.3IONS-SCNC: 9 MMOL/L (ref 4–13)
AORTIC VALVE MEAN VELOCITY: 11 M/S
APICAL FOUR CHAMBER EJECTION FRACTION: 35 %
ASCENDING AORTA: 3.3 CM
AV LVOT MEAN GRADIENT: 1 MMHG
AV LVOT PEAK GRADIENT: 1 MMHG
AV MEAN GRADIENT: 6 MMHG
AV PEAK GRADIENT: 11 MMHG
AV VELOCITY RATIO: 0.36
BASOPHILS # BLD AUTO: 0.26 THOUSANDS/ΜL (ref 0–0.1)
BASOPHILS NFR BLD AUTO: 3 % (ref 0–1)
BUN SERPL-MCNC: 11 MG/DL (ref 5–25)
CALCIUM SERPL-MCNC: 8.8 MG/DL (ref 8.4–10.2)
CHLORIDE SERPL-SCNC: 98 MMOL/L (ref 96–108)
CO2 SERPL-SCNC: 29 MMOL/L (ref 21–32)
CREAT SERPL-MCNC: 0.83 MG/DL (ref 0.6–1.3)
DOP CALC AO PEAK VEL: 1.63 M/S
DOP CALC AO VTI: 25.94 CM
DOP CALC LVOT PEAK VEL VTI: 10.4 CM
DOP CALC LVOT PEAK VEL: 0.58 M/S
E WAVE DECELERATION TIME: 185 MS
EOSINOPHIL # BLD AUTO: 1.11 THOUSAND/ΜL (ref 0–0.61)
EOSINOPHIL NFR BLD AUTO: 12 % (ref 0–6)
ERYTHROCYTE [DISTWIDTH] IN BLOOD BY AUTOMATED COUNT: 20.7 % (ref 11.6–15.1)
GFR SERPL CREATININE-BSD FRML MDRD: 91 ML/MIN/1.73SQ M
GLUCOSE SERPL-MCNC: 85 MG/DL (ref 65–140)
HCT VFR BLD AUTO: 32.6 % (ref 36.5–49.3)
HGB BLD-MCNC: 9.9 G/DL (ref 12–17)
IMM GRANULOCYTES # BLD AUTO: 0.08 THOUSAND/UL (ref 0–0.2)
IMM GRANULOCYTES NFR BLD AUTO: 1 % (ref 0–2)
INTERVENTRICULAR SEPTUM IN DIASTOLE (PARASTERNAL SHORT AXIS VIEW): 1.4 CM
LEFT ATRIUM AREA SYSTOLE SINGLE PLANE A4C: 17.9 CM2
LEFT VENTRICULAR INTERNAL DIMENSION IN DIASTOLE: 5.6 CM (ref 3.5–6)
LEFT VENTRICULAR POSTERIOR WALL IN END DIASTOLE: 1.5 CM
LYMPHOCYTES # BLD AUTO: 1.14 THOUSANDS/ΜL (ref 0.6–4.47)
LYMPHOCYTES NFR BLD AUTO: 12 % (ref 14–44)
MAGNESIUM SERPL-MCNC: 1.6 MG/DL (ref 1.9–2.7)
MCH RBC QN AUTO: 22.1 PG (ref 26.8–34.3)
MCHC RBC AUTO-ENTMCNC: 30.4 G/DL (ref 31.4–37.4)
MCV RBC AUTO: 73 FL (ref 82–98)
MONOCYTES # BLD AUTO: 1.15 THOUSAND/ΜL (ref 0.17–1.22)
MONOCYTES NFR BLD AUTO: 12 % (ref 4–12)
MV E'TISSUE VEL-SEP: 4 CM/S
MV PEAK A VEL: 0.55 M/S
MV PEAK E VEL: 41 CM/S
MV STENOSIS PRESSURE HALF TIME: 54 MS
MV VALVE AREA P 1/2 METHOD: 4.07
NEUTROPHILS # BLD AUTO: 5.69 THOUSANDS/ΜL (ref 1.85–7.62)
NEUTS SEG NFR BLD AUTO: 60 % (ref 43–75)
NRBC BLD AUTO-RTO: 0 /100 WBCS
PLATELET # BLD AUTO: 441 THOUSANDS/UL (ref 149–390)
PMV BLD AUTO: 10.2 FL (ref 8.9–12.7)
POTASSIUM SERPL-SCNC: 3.5 MMOL/L (ref 3.5–5.3)
RBC # BLD AUTO: 4.48 MILLION/UL (ref 3.88–5.62)
RIGHT ATRIUM AREA SYSTOLE A4C: 19.2 CM2
RIGHT VENTRICLE ID DIMENSION: 3.3 CM
SL CV LV EF: 35
SODIUM SERPL-SCNC: 136 MMOL/L (ref 135–147)
TRICUSPID ANNULAR PLANE SYSTOLIC EXCURSION: 2.2 CM
WBC # BLD AUTO: 9.43 THOUSAND/UL (ref 4.31–10.16)

## 2023-05-02 PROCEDURE — 0DB28ZX EXCISION OF MIDDLE ESOPHAGUS, VIA NATURAL OR ARTIFICIAL OPENING ENDOSCOPIC, DIAGNOSTIC: ICD-10-PCS | Performed by: INTERNAL MEDICINE

## 2023-05-02 RX ORDER — PROPOFOL 10 MG/ML
INJECTION, EMULSION INTRAVENOUS AS NEEDED
Status: DISCONTINUED | OUTPATIENT
Start: 2023-05-02 | End: 2023-05-02

## 2023-05-02 RX ORDER — MAGNESIUM SULFATE HEPTAHYDRATE 40 MG/ML
2 INJECTION, SOLUTION INTRAVENOUS ONCE
Status: COMPLETED | OUTPATIENT
Start: 2023-05-02 | End: 2023-05-02

## 2023-05-02 RX ORDER — LIDOCAINE HYDROCHLORIDE 20 MG/ML
INJECTION, SOLUTION EPIDURAL; INFILTRATION; INTRACAUDAL; PERINEURAL AS NEEDED
Status: DISCONTINUED | OUTPATIENT
Start: 2023-05-02 | End: 2023-05-02

## 2023-05-02 RX ORDER — SODIUM CHLORIDE 9 MG/ML
INJECTION, SOLUTION INTRAVENOUS CONTINUOUS PRN
Status: DISCONTINUED | OUTPATIENT
Start: 2023-05-02 | End: 2023-05-02

## 2023-05-02 RX ADMIN — PROPOFOL 50 MG: 10 INJECTION, EMULSION INTRAVENOUS at 12:10

## 2023-05-02 RX ADMIN — LIDOCAINE HYDROCHLORIDE 100 MG: 20 INJECTION, SOLUTION EPIDURAL; INFILTRATION; INTRACAUDAL; PERINEURAL at 12:05

## 2023-05-02 RX ADMIN — PROPOFOL 150 MG: 10 INJECTION, EMULSION INTRAVENOUS at 12:05

## 2023-05-02 RX ADMIN — MAGNESIUM SULFATE HEPTAHYDRATE 2 G: 40 INJECTION, SOLUTION INTRAVENOUS at 14:17

## 2023-05-02 RX ADMIN — PROPOFOL 25 MG: 10 INJECTION, EMULSION INTRAVENOUS at 12:25

## 2023-05-02 RX ADMIN — PROPOFOL 50 MG: 10 INJECTION, EMULSION INTRAVENOUS at 12:15

## 2023-05-02 RX ADMIN — PROPOFOL 25 MG: 10 INJECTION, EMULSION INTRAVENOUS at 12:18

## 2023-05-02 RX ADMIN — PANTOPRAZOLE SODIUM 40 MG: 40 TABLET, DELAYED RELEASE ORAL at 09:04

## 2023-05-02 RX ADMIN — IRON SUCROSE 200 MG: 20 INJECTION, SOLUTION INTRAVENOUS at 17:24

## 2023-05-02 RX ADMIN — PROPOFOL 50 MG: 10 INJECTION, EMULSION INTRAVENOUS at 12:21

## 2023-05-02 RX ADMIN — PANTOPRAZOLE SODIUM 40 MG: 40 TABLET, DELAYED RELEASE ORAL at 17:24

## 2023-05-02 RX ADMIN — SODIUM CHLORIDE: 0.9 INJECTION, SOLUTION INTRAVENOUS at 11:59

## 2023-05-02 NOTE — ASSESSMENT & PLAN NOTE
Receiving Venofer  Stable  No indication for transfusion at this time      Recent Labs     04/30/23  1249 05/01/23  0458 05/02/23  0559   HGB 8 6* 8 5* 9 9*   MCV 74* 72* 73*   RDW 20 1* 20 1* 20 7*

## 2023-05-02 NOTE — UTILIZATION REVIEW
Notification of Unplanned, Urgent, or   Emergency Inpatient Admission   [address] [place] [address] [address] [facility]  Tax ID: [id number]  NPI: [id number]  Place of Service: [address] [place], [place] [address]y  60[address]  Admission Level of Care: Inpatient  Place of Service Code: 24     Attending Physician Information  Attending Name and NPI#: JuliethJennifer Paizma [[id number]]  Phone: [phone]     Admission Information  Inpatient Admission Date/Time: 4/30/23  1:56 PM  Discharge Date/Time: No discharge date for patient encounter  Admitting Diagnosis Code/Description:  SOB (shortness of breath) [R06 02]  Dyspnea on exertion [R06 09]  Iron deficiency anemia due to chronic blood loss [D50 0]  CHF exacerbation ([facility] [address] ) [I50 9]  Dysphagia [R13 10]     Utilization Review Contact  Darron Hammer Utilization   Phone: [phone]  Fax: [phone]  Email: [email or web address]  Contact for approvals/pending authorizations, clinical reviews, and discharge  Physician Advisory Services Contact  Medical Necessity Denial & Fujc-yt-Naqk Discussion  Phone: [phone]  Fax: [phone]  Email: [email or web address]

## 2023-05-02 NOTE — PROGRESS NOTES
[address] [place] [address]  Progress Note  Name: Rachna Tilley  MRN: [id number]  Unit/Bed#: E4 -01 I Date of Admission: 4/30/2023   Date of Service: 5/2/2023 I Hospital Day: 2    Assessment/Plan   * Acute on chronic systolic congestive heart failure [facility])  Assessment & Plan  Wt Readings from Last 3 Encounters:   05/02/23 62 6 kg (138 lb)   05/01/20 61 8 kg (136 lb 3 9 oz)   01/02/20 63 1 kg (139 lb 1 8 oz)     60-year-old male presented to institution due to dysphagia and shortness of breath  Shortness of breath possibly secondary to acute on chronic systolic congestive heart failure  He has no evidence of sepsis nor pneumonia at this time  BNP elevated  X-ray showing possible congestion  Seems euvolemic at this point  · Discontinue lasix given npo status and improvement of symptoms  · Echocardiogram  Echo performed early this year showing an ef of 42%, will repeat inpatient  Esophageal dysphagia  Assessment & Plan  Worsening dysphagia  · NPO today  · EGD by GI today    AVM (arteriovenous malformation) of small bowel, acquired  Assessment & Plan  Continue outpatient GI follow-up    Iron deficiency anemia due to chronic blood loss  Assessment & Plan  Receiving Venofer  Stable  No indication for transfusion at this time  Recent Labs     04/30/23  1249 05/01/23  0458 05/02/23  0559   HGB 8 6* 8 5* 9 9*   MCV 74* 72* 73*   RDW 20 1* 20 1* 20 7*                VTE Pharmacologic Prophylaxis:   Discussions with Specialists or Other Care Team Provider: gi    Education and Discussions with Family / Patient: patient    Current Length of Stay: 2 day(s)    Current Patient Status: Inpatient   Certification Statement: The patient will continue to require additional inpatient hospital stay due to egd    Discharge Plan: active    Code Status: Level 1 - Full Code      Subjective:   Patient seen and examined at bedside  No shortness of breath      Objective:     Vitals:   Temp (24hrs), Av 8 °F (37 1 °C), Min:98 3 °F (36 8 °C), Max:99 2 °F (37 3 °C)    Temp:  [98 3 °F (36 8 °C)-99 2 °F (37 3 °C)] 99 °F (37 2 °C)  HR:  [71-78] 75  Resp:  [18] 18  BP: ()/(66-74) 115/71  SpO2:  [96 %-100 %] 100 %  Body mass index is 18 72 kg/m²  Input and Output Summary (last 24 hours): Intake/Output Summary (Last 24 hours) at 2023 1102  Last data filed at 2023 0501  Gross per 24 hour   Intake --   Output 900 ml   Net -900 ml       Physical Exam:     Physical Exam  Vitals reviewed  Constitutional:       General: He is not in acute distress  HENT:      Head: Normocephalic  Nose: Nose normal       Mouth/Throat:      Mouth: Mucous membranes are moist    Eyes:      General: No scleral icterus  Cardiovascular:      Rate and Rhythm: Normal rate  Pulmonary:      Effort: Pulmonary effort is normal  No respiratory distress  Abdominal:      General: There is no distension  Palpations: Abdomen is soft  Tenderness: There is no abdominal tenderness  Skin:     General: Skin is warm  Neurological:      Mental Status: He is alert  Mental status is at baseline  Psychiatric:         Mood and Affect: Mood normal          Behavior: Behavior normal        Additional Data:     Labs:    Results from last 7 days   Lab Units 23  0559   WBC Thousand/uL 9 43   HEMOGLOBIN g/dL 9 9*   HEMATOCRIT % 32 6*   PLATELETS Thousands/uL 441*   NEUTROS PCT % 60   LYMPHS PCT % 12*   MONOS PCT % 12   EOS PCT % 12*     Results from last 7 days   Lab Units 23  0559   SODIUM mmol/L 136   POTASSIUM mmol/L 3 5   CHLORIDE mmol/L 98   CO2 mmol/L 29   BUN mg/dL 11   CREATININE mg/dL 0 83   ANION GAP mmol/L 9   CALCIUM mg/dL 8 8   GLUCOSE RANDOM mg/dL 85                       * I Have Reviewed All Lab Data Listed Above  * Additional Pertinent Lab Tests Reviewed:  Dasha Calderon Admission Reviewed      Lines:   Invasive Devices     Peripheral Intravenous Line  Duration Peripheral IV 04/30/23 Left Antecubital 1 day                   Imaging:    Imaging Reports Reviewed Today Include: no new imaging    Recent Cultures (last 7 days):           Last 24 Hours Medication List:   Current Facility-Administered Medications   Medication Dose Route Frequency Provider Last Rate    acetaminophen  650 mg Oral Q6H PRN Guanakito Abad MD      furosemide  40 mg Intravenous BID (diuretic) Guanakito Abad MD      iron sucrose  200 mg Intravenous Q24H Guanakito Abad MD 0 mg (04/30/23 2416)    lisinopril  10 mg Oral Daily Guanakito Abad MD      metoprolol succinate  25 mg Oral Daily Guanakito Abad MD      pantoprazole  40 mg Oral BID Guanakito Abad MD      polyethylene glycol  17 g Oral Daily PRN Guanakito Abad MD          Today, Patient Was Seen By: Gerard Sahu MD    ** Please Note: Dictation voice to text software may have been used in the creation of this document   **

## 2023-05-02 NOTE — QUICK NOTE
Patient s/p EGD today with evidence of esophageal stricture at 26 cm  He was found to have food in the esophagus just proximal to the stricture which was removed via Althia Ducking net  Remainder of the exam was completed using a slim scope and was unremarkable  This 2 cm esophageal stricture was noted to have ulcerations and significant inflammation due to which dilation was not performed due to risk of perforation  Biopsies were obtained from the stricture given uncommon location  He was started on high-dose PPI with plans for repeat EGD with advanced GI attending for dilation vs stenting in 1 month

## 2023-05-02 NOTE — ANESTHESIA POSTPROCEDURE EVALUATION
Post-Op Assessment Note    CV Status:  Stable    Pain management: adequate     Mental Status:  Alert and awake   Hydration Status:  Euvolemic   PONV Controlled:  Controlled   Airway Patency:  Patent      Post Op Vitals Reviewed: Yes      Staff: Anesthesiologist         No notable events documented      BP      Temp      Pulse     Resp      SpO2      BP 97/57   Pulse 58   Temp 99 °F (37 2 °C) (Tympanic)   Resp 15   Ht 6' (1 829 m)   Wt 62 6 kg (138 lb)   SpO2 97%   BMI 18 72 kg/m²

## 2023-05-02 NOTE — PLAN OF CARE
Problem: Potential for Falls  Goal: Patient will remain free of falls  Description: INTERVENTIONS:  - Educate patient/family on patient safety including physical limitations  - Instruct patient to call for assistance with activity   - Consult OT/PT to assist with strengthening/mobility   - Keep Call bell within reach  - Keep bed low and locked with side rails adjusted as appropriate  - Keep care items and personal belongings within reach  - Initiate and maintain comfort rounds  - Make Fall Risk Sign visible to staff  Outcome: Progressing     Problem: PAIN - ADULT  Goal: Verbalizes/displays adequate comfort level or baseline comfort level  Description: Interventions:  - Encourage patient to monitor pain and request assistance  - Assess pain using appropriate pain scale  - Administer analgesics based on type and severity of pain and evaluate response  - Implement non-pharmacological measures as appropriate and evaluate response  - Consider cultural and social influences on pain and pain management  - Notify physician/advanced practitioner if interventions unsuccessful or patient reports new pain  Outcome: Progressing     Problem: DISCHARGE PLANNING  Goal: Discharge to home or other facility with appropriate resources  Description: INTERVENTIONS:  - Identify barriers to discharge w/patient and caregiver  - Arrange for needed discharge resources and transportation as appropriate  - Identify discharge learning needs (meds, wound care, etc )  - Refer to Case Management Department for coordinating discharge planning if the patient needs post-hospital services based on physician/advanced practitioner order or complex needs related to functional status, cognitive ability, or social support system  Outcome: Progressing     Problem: GASTROINTESTINAL - ADULT  Goal: Maintains adequate nutritional intake  Description: INTERVENTIONS:  - Monitor percentage of each meal consumed  - Identify factors contributing to decreased intake, treat as appropriate  - Assist with meals as needed  - Monitor I&O, weight, and lab values if indicated  - Obtain nutrition services referral as needed  Outcome: Progressing     Problem: Nutrition/Hydration-ADULT  Goal: Nutrient/Hydration intake appropriate for improving, restoring or maintaining nutritional needs  Description: Monitor and assess patient's nutrition/hydration status for malnutrition  Collaborate with interdisciplinary team and initiate plan and interventions as ordered  Monitor patient's weight and dietary intake as ordered or per policy  Utilize nutrition screening tool and intervene as necessary  Determine patient's food preferences and provide high-protein, high-caloric foods as appropriate       INTERVENTIONS:  - Monitor oral intake, urinary output, labs, and treatment plans  - Assess nutrition and hydration status and recommend course of action  - Evaluate amount of meals eaten  - Assist patient with eating if necessary   - Allow adequate time for meals  - Recommend/ encourage appropriate diets, oral nutritional supplements, and vitamin/mineral supplements  - Order, calculate, and assess calorie counts as needed  - Recommend, monitor, and adjust tube feedings and TPN/PPN based on assessed needs  - Assess need for intravenous fluids  - Provide specific nutrition/hydration education as appropriate  - Include patient/family/caregiver in decisions related to nutrition  Outcome: Progressing

## 2023-05-02 NOTE — ASSESSMENT & PLAN NOTE
Wt Readings from Last 3 Encounters:   05/02/23 62 6 kg (138 lb)   05/01/20 61 8 kg (136 lb 3 9 oz)   01/02/20 63 1 kg (139 lb 1 8 oz)     71-year-old male presented to institution due to dysphagia and shortness of breath  Shortness of breath possibly secondary to acute on chronic systolic congestive heart failure  He has no evidence of sepsis nor pneumonia at this time  BNP elevated  X-ray showing possible congestion  Seems euvolemic at this point  · Continue Lasix twice daily, likely transition to po richie  · Echocardiogram  Echo performed early this year showing an ef of 42%, will repeat inpatient

## 2023-05-03 ENCOUNTER — PREP FOR PROCEDURE (OUTPATIENT)
Dept: GASTROENTEROLOGY | Facility: HOSPITAL | Age: 68
End: 2023-05-03

## 2023-05-03 ENCOUNTER — TELEPHONE (OUTPATIENT)
Dept: GASTROENTEROLOGY | Facility: CLINIC | Age: 68
End: 2023-05-03

## 2023-05-03 DIAGNOSIS — R13.19 ESOPHAGEAL DYSPHAGIA: Primary | ICD-10-CM

## 2023-05-03 LAB
ANION GAP SERPL CALCULATED.3IONS-SCNC: 9 MMOL/L (ref 4–13)
BUN SERPL-MCNC: 11 MG/DL (ref 5–25)
CALCIUM SERPL-MCNC: 8.5 MG/DL (ref 8.4–10.2)
CHLORIDE SERPL-SCNC: 99 MMOL/L (ref 96–108)
CO2 SERPL-SCNC: 26 MMOL/L (ref 21–32)
CREAT SERPL-MCNC: 0.66 MG/DL (ref 0.6–1.3)
GFR SERPL CREATININE-BSD FRML MDRD: 100 ML/MIN/1.73SQ M
GLUCOSE SERPL-MCNC: 77 MG/DL (ref 65–140)
MAGNESIUM SERPL-MCNC: 2.1 MG/DL (ref 1.9–2.7)
POTASSIUM SERPL-SCNC: 3.6 MMOL/L (ref 3.5–5.3)
PROCALCITONIN SERPL-MCNC: 0.07 NG/ML
SODIUM SERPL-SCNC: 134 MMOL/L (ref 135–147)

## 2023-05-03 RX ORDER — ENOXAPARIN SODIUM 100 MG/ML
40 INJECTION SUBCUTANEOUS
Status: DISCONTINUED | OUTPATIENT
Start: 2023-05-03 | End: 2023-05-05 | Stop reason: HOSPADM

## 2023-05-03 RX ORDER — METOPROLOL SUCCINATE 25 MG/1
12.5 TABLET, EXTENDED RELEASE ORAL
Status: DISCONTINUED | OUTPATIENT
Start: 2023-05-03 | End: 2023-05-04

## 2023-05-03 RX ORDER — LISINOPRIL 10 MG/1
10 TABLET ORAL DAILY
Status: DISCONTINUED | OUTPATIENT
Start: 2023-05-04 | End: 2023-05-05 | Stop reason: HOSPADM

## 2023-05-03 RX ORDER — FUROSEMIDE 10 MG/ML
20 INJECTION INTRAMUSCULAR; INTRAVENOUS
Status: DISCONTINUED | OUTPATIENT
Start: 2023-05-03 | End: 2023-05-04

## 2023-05-03 RX ADMIN — IRON SUCROSE 200 MG: 20 INJECTION, SOLUTION INTRAVENOUS at 16:32

## 2023-05-03 RX ADMIN — METOPROLOL SUCCINATE 12.5 MG: 25 TABLET, EXTENDED RELEASE ORAL at 22:58

## 2023-05-03 RX ADMIN — PANTOPRAZOLE SODIUM 40 MG: 40 TABLET, DELAYED RELEASE ORAL at 08:25

## 2023-05-03 RX ADMIN — ENOXAPARIN SODIUM 40 MG: 100 INJECTION SUBCUTANEOUS at 16:31

## 2023-05-03 NOTE — ASSESSMENT & PLAN NOTE
Received Venofer  Stable  No indication for transfusion at this time      Recent Labs     05/01/23  0458 05/02/23  0559   HGB 8 5* 9 9*   MCV 72* 73*   RDW 20 1* 20 7*

## 2023-05-03 NOTE — ASSESSMENT & PLAN NOTE
Wt Readings from Last 3 Encounters:   05/02/23 62 6 kg (138 lb)   05/01/20 61 8 kg (136 lb 3 9 oz)   01/02/20 63 1 kg (139 lb 1 8 oz)     79-year-old male presented to institution due to dysphagia and shortness of breath  Shortness of breath possibly secondary to acute on chronic systolic congestive heart failure  He has no evidence of sepsis nor pneumonia at this time  BNP elevated  X-ray showing possible congestion  Approaching euvolemia  · Restart lasix IV BID  · Echo showing a decrease in EF down to 35%, severe global hypokinesis, and grade I DD

## 2023-05-03 NOTE — ASSESSMENT & PLAN NOTE
S/p EGD  Evidence of Inflamed stricture with ulceration in the mid esophagus  Not dilated due to ulceration and friability  Biopsy taken  Grade B esophagitis    · Continue PPI BID, clear liquid diet  · Ensure 3 times daily  · Repeat EGD in 2 to 4 weeks with advanced endoscopist

## 2023-05-03 NOTE — CASE MANAGEMENT
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    Gallbladder polyp 2019    Esophageal dysphagia 2019    Common bile duct dilatation 2019    Bladder wall thickening 2019    Moderate protein-calorie malnutrition ([facility] ) 2019    Thrombocytosis 05/15/2019    Cardiac murmur 2018    Alcohol use disorder 2018    Esophagitis     Iron deficiency anemia due to chronic blood loss 10/06/2017    Acute kidney injury ([facility] ) 10/06/2017    Jack Pomaria ulcer 10/06/2017    AVM (arteriovenous malformation) 10/06/2017    AVM (arteriovenous malformation) of small bowel, acquired     History of blood transfusion     Wills's esophagus     Melena 10/05/2017      LOS (days): 3  Geometric Mean LOS (GMLOS) (days):   Days to GMLOS:     OBJECTIVE:    Risk of Unplanned Readmission Score: 8 57         Current admission status: Inpatient       Preferred Pharmacy:   Omar Dill [address] [place] [address], [facility] [address] [address] [address]  Phone: [phone] Fax: [phone]    [address] [place] [address]'S [facility] [address] ,  [address] [place] [address] [address] [facility]  Phone: [phone] Fax: [phone]    Primary Care Provider: Nahum Sullivan MD    Primary Insurance: BLUE CROSS  Secondary Insurance:     ASSESSMENT:  [address], [address] - Daughter   Primary Phone: [phone] (Mobile)               Advance Directives  Does patient have a [address]?: Yes  Does patient have Advance Directives?: Yes  Advance Directives: Living will, Power of  for health care  Primary Contact: Elin Hurst (dtr) [phone]         Readmission Root Cause  30 Day Readmission: No    Patient Information  Admitted from[de-identified] Home  Mental Status: Alert  During Assessment patient was accompanied by: Not accompanied during assessment  Assessment information provided by[de-identified] Patient  Primary Caregiver: Self  Support Systems: Self, Daughter  [place] of Residence: [address] [place] [address] do you live in?: [facility] entry access options   Select all that apply : Stairs  Number of steps to enter home : One Flight  Do the steps have railings?: Yes  Type of Current Residence: Apartment  Floor Level: 2  Upon entering residence, is there a bedroom on the main floor (no further steps)?: Yes  Upon entering residence, is there a bathroom on the main floor (no further steps)?: Yes  In the last 12 months, was there a time when you were not able to pay the mortgage or rent on time?: No  In the last 12 months, how many places have you lived?: 1  In the last 12 months, was there a time when you did not have a steady place to sleep or slept in a shelter (including now)?: No  Homeless/housing insecurity resource given?: N/A  Living Arrangements: Lives Alone  Is patient a ?: No    Activities of Daily Living Prior to Admission  Functional Status: Independent  Completes ADLs independently?: Yes  Ambulates independently?: Yes  Does patient use assisted devices?: No  Does patient currently own DME?: No  Does patient have a history of Outpatient Therapy (PT/OT)?: No  Does the patient have a history of Short-Term Rehab?: No  Does patient have a history of HHC?: No  Does patient currently have [facility]?: No         Patient Information Continued  Income Source: Pension/long-term  Does patient have prescription coverage?: Yes  Within the past 12 months, you worried that your food would run out before you got the money to buy more : Never true  Within the past 12 months, the food you bought just didn't last and you didn't have money to get more : Never true  Food insecurity resource given?: N/A  Does patient receive dialysis treatments?: No  Does patient have a history of substance abuse?: No  Does patient have a history of Mental Health Diagnosis?: No         Means of Transportation  Means of Transport to Appts[de-identified] Drives Self  In the past 12 months, has lack of transportation kept you from medical appointments or from getting medications?: No  In the past 12 months, has lack of transportation kept you from meetings, work, or from getting things needed for daily living?: No  Was application for public transport provided?: N/A        DISCHARGE DETAILS:    Discharge planning discussed with[de-identified] Patient        CM contacted family/caregiver?: No- see comments (pt declined)  Were Treatment Team discharge recommendations reviewed with patient/caregiver?: Yes  Did patient/caregiver verbalize understanding of patient care needs?: Yes  Were patient/caregiver advised of the risks associated with not following Treatment Team discharge recommendations?: Yes    Contacts  Patient Contacts: Patient  Contact Method: In Person  Reason/Outcome: Continuity of Care, Discharge Planning                                                                     Additional Comments: CM met w/ pt at bedside to complete assessment  Pt currently lives alone in a 2nd floor apartment w/ one flight of steps to enter  Pt states he is able to complete his ADL's independently w/ no use of assisted devices  Pt does not have current HH  Pt dtr is EC & POA  Pt does not have any questions or concerns at this time  CM to continue to follow pt care & d/c

## 2023-05-03 NOTE — TELEPHONE ENCOUNTER
----- Message from Tali Shown, DO sent at 5/2/2023  2:04 PM EDT -----  Hi,     Can we please schedule this patient for repeat EGD in 1 month with one of our advanced docs for esophageal stricture dilation and potential stent placement? Thank you!

## 2023-05-03 NOTE — PROGRESS NOTES
[address] [place] [address]  Progress Note  Name: Seda Richard  MRN: [id number]  Unit/Bed#: E4 -01 I Date of Admission: 4/30/2023   Date of Service: 5/3/2023 I Hospital Day: 3    Assessment/Plan   * Acute on chronic systolic congestive heart failure [facility])  Assessment & Plan  Wt Readings from Last 3 Encounters:   05/02/23 62 6 kg (138 lb)   05/01/20 61 8 kg (136 lb 3 9 oz)   01/02/20 63 1 kg (139 lb 1 8 oz)     51-year-old male presented to institution due to dysphagia and shortness of breath  Shortness of breath possibly secondary to acute on chronic systolic congestive heart failure  He has no evidence of sepsis nor pneumonia at this time  BNP elevated  X-ray showing possible congestion  Approaching euvolemia  · Restart lasix IV BID  · Echo showing a decrease in EF down to 35%, severe global hypokinesis, and grade I DD  Esophageal dysphagia  Assessment & Plan  S/p EGD  Evidence of Inflamed stricture with ulceration in the mid esophagus  Not dilated due to ulceration and friability  Biopsy taken  Grade B esophagitis  · Continue PPI BID, clear liquid diet  · Ensure 3 times daily  · Repeat EGD in 2 to 4 weeks with advanced endoscopist       AVM (arteriovenous malformation) of small bowel, acquired  Assessment & Plan  Continue outpatient GI follow-up    Iron deficiency anemia due to chronic blood loss  Assessment & Plan  Received Venofer  Stable  No indication for transfusion at this time      Recent Labs     05/01/23  0458 05/02/23  0559   HGB 8 5* 9 9*   MCV 72* 73*   RDW 20 1* 20 7*                VTE Pharmacologic Prophylaxis:   Pharmacologic: Enoxaparin (Lovenox)  Mechanical VTE Prophylaxis in Place: Yes    Discussions with Specialists or Other Care Team Provider: nursing    Education and Discussions with Family / Patient: patient, daughter    Current Length of Stay: 3 day(s)    Current Patient Status: Inpatient   Certification Statement: The patient will continue to require additional inpatient hospital stay due to cardiology evaluation, iv diuretics    Discharge Plan: active    Code Status: Level 1 - Full Code      Subjective:   Patient seen and examined at bedside  Comfortable, but reports some shortness of breath still  Objective:     Vitals:   Temp (24hrs), Av 6 °F (36 4 °C), Min:97 °F (36 1 °C), Max:98 4 °F (36 9 °C)    Temp:  [97 °F (36 1 °C)-98 4 °F (36 9 °C)] 98 4 °F (36 9 °C)  HR:  [65-94] 94  Resp:  [16-17] 17  BP: (104-110)/(60-77) 106/77  SpO2:  [96 %] 96 %  Body mass index is 18 72 kg/m²  Input and Output Summary (last 24 hours):     No intake or output data in the 24 hours ending 23 1254    Physical Exam:     Physical Exam  Vitals reviewed  Constitutional:       General: He is not in acute distress  HENT:      Head: Normocephalic  Nose: Nose normal       Mouth/Throat:      Mouth: Mucous membranes are moist    Eyes:      General: No scleral icterus  Cardiovascular:      Rate and Rhythm: Normal rate  Pulmonary:      Effort: Pulmonary effort is normal  No respiratory distress  Abdominal:      General: There is no distension  Palpations: Abdomen is soft  Tenderness: There is no abdominal tenderness  Skin:     General: Skin is warm  Neurological:      Mental Status: He is alert  Mental status is at baseline     Psychiatric:         Mood and Affect: Mood normal          Behavior: Behavior normal        Additional Data:     Labs:    Results from last 7 days   Lab Units 23  0559   WBC Thousand/uL 9 43   HEMOGLOBIN g/dL 9 9*   HEMATOCRIT % 32 6*   PLATELETS Thousands/uL 441*   NEUTROS PCT % 60   LYMPHS PCT % 12*   MONOS PCT % 12   EOS PCT % 12*     Results from last 7 days   Lab Units 23  0507   SODIUM mmol/L 134*   POTASSIUM mmol/L 3 6   CHLORIDE mmol/L 99   CO2 mmol/L 26   BUN mg/dL 11   CREATININE mg/dL 0 66   ANION GAP mmol/L 9   CALCIUM mg/dL 8 5   GLUCOSE RANDOM mg/dL 77                 Results from last 7 days Lab Units 05/03/23  0507   PROCALCITONIN ng/ml 0 07           * I Have Reviewed All Lab Data Listed Above  * Additional Pertinent Lab Tests Reviewed: [place] [address] Admission Reviewed      Lines:   Invasive Devices     Peripheral Intravenous Line  Duration           Peripheral IV 04/30/23 Left Antecubital 3 days                   Imaging:    Imaging Reports Reviewed Today Include: no new imaging    Recent Cultures (last 7 days):           Last 24 Hours Medication List:   Current Facility-Administered Medications   Medication Dose Route Frequency Provider Last Rate    acetaminophen  650 mg Oral Q6H PRN Tashia Champion MD      furosemide  20 mg Intravenous BID (diuretic) Finn Canada MD      iron sucrose  200 mg Intravenous Q24H Tashia Champion  mg (05/02/23 1724)    lisinopril  10 mg Oral Daily Tashia Champion MD      metoprolol succinate  25 mg Oral Daily Tashia Champion MD      pantoprazole  40 mg Oral BID Tashia Champion MD      polyethylene glycol  17 g Oral Daily PRN Tashia Champion MD          Today, Patient Was Seen By: Jose Pinzon MD    ** Please Note: Dictation voice to text software may have been used in the creation of this document   **

## 2023-05-03 NOTE — CONSULTS
Consult - Cardiology   Niya Vitale  79 y o  male MRN: [id number]  Unit/Bed#: E4 -01 Encounter: [id number]        Reason For Consult: Congestive heart failure                 Assessment:  Dysphagia prior to admit, recurrent esophagitis and esophageal stricture   --Prior Hx Wills's esophagus, esophagitis, caio lesion and stricture   --EGD 5/3/23: Stricture at 23 cm, retained food proximal to stricture--> food removed with evidence of ulceration and inflammation for which dilation was not pursued D/T risk of perforation (biopsy, PPI, recommendation for repeat EGD/dilation or stenting in 1 month)   Osler-Weber-Rendu syndrome, Hx of small bowel AVMs, Iron deficiency anemia w/ chronic GI loss and multiple prior transfusions   --Patient reports pattern of intermittent recurrent insidious regression in hemoglobin with transfusions approximately 3 times per year  HFrEF, unspecified cardiomyopathy: Signs of acute decompensation POA   --Echo 5/3/23:  LVEF 35%, moderate concentric hypertrophy, global hypokinesia with some regional variability, grade 1 DD  Trace MR and TR   --Echo 2/14/2023 [facility]): EF 42%, global hypokinesia with more severe wall motion abnormalities of the mid and apical portions of the inferior wall  --Echo 8/22/2022: EF 40-45%  Hx asymptomatic Sinus bradycardia & Mobitz 1 heart block    Discussion / Plan:  # Patient with prior diagnosis of unspecified cardiomyopathy with midrange EF without ischemic testing or other evaluation, not followed by cardiologist and on no typical neurohormonal blockade           # Current echocardiogram possibly with some regression in EF compared to prior study    · Assessment of CAD advisable  In light of Osler-Weber-Rendu syndrome it may be most desirable to pursue noninvasive testing at this point    IF he were found to have significant ischemia for which catheterization would be desirable this would require a careful examination of the risk versus benefit of it and the possibility of antiplatelet therapy with possible consideration to bare-metal stenting (or new or DELPHINE) with a limited duration of antiplatelet therapy hopeful that he could tolerate this even though it might necessitate some transfusion until he could come off of antiplatelet therapy  · Agree with initiation of a low-dose of ACE inhibitor ~~> will adjust hold so that he receives Rx  · Begin a low-dose beta-blocker at bedtime following affect on his heart rate noting prior history of asymptomatic sinus bradycardia  · Exam seems to be nearing euvolemic hopeful to discontinue IV Lasix in the next 24 hours ~~> can address the need for routine versus as needed use of oral diuretic thereafter        History Of Present Illness:  Ankur Banerjee  40-year-old who presented to the hospital 4/30 with reporting dysphagia and a 1 week history of dyspnea including both effort dyspnea and reports of orthopnea  He has a history of Osler-Weber-Rendu syndrome with chronic anemia with recurrent acute episodes with multiple prior transfusions  He reported that during his episodes of worsening anemia his symptoms were typically manifest more as generalized fatigue rather than feelings of dyspnea which he was now experiencing  He does also have a history of unspecified cardiomyopathy which looks to have been diagnosed by echocardiogram in the fall of last year though he tells me he had had no other cardiac testing and was never seen by a cardiologist either during his prior hospitalizations or in the outpatient setting  It does not appear that he was taking any customary heart failure medications prior to his current admission  His admitting examiners reported him to to have some element of volume overload for which he was diagnosed with decompensated heart failureinitiated on diuretics with reports of improvement  We are now asked to see him for further care and assistance with his management    At this patient's baseline he has for a long time been taking 4-5 mile walks on a near daily basis  This historically been something he could comfortably perform though in recent months this has caused him a bit more fatigued than usual   During the last few months he has had some mild intermittent recurrent lower extremity edema with some orthopnea having been present during the 1 or 2 weeks leading up to his current hospitalization  He denies symptoms of angina and does a somewhat physical job with usually good tolerance  He gets some occasional feelings of palpitations though these seem to last only seconds denying any sustained tachycardia, near syncope, or syncope  Past Medical History:        Past Medical History:   Diagnosis Date    Anemia     AVM (arteriovenous malformation) of small bowel, acquired     Wlils's esophagus     Cirilo ulcer     Esophagitis     History of blood transfusion     22 prior transfusions      Past Surgical History:   Procedure Laterality Date    APPENDECTOMY      COLONOSCOPY N/A 11/6/2017    Procedure: COLONOSCOPY;  Surgeon: Jayne Cross MD;  Location: [facility]; Service: Gastroenterology    EGD AND COLONOSCOPY  07/18/2017    EGD AND COLONOSCOPY N/A 9/17/2018    Procedure: EGD with biopsy AND COLONOSCOPY-incomplete to sigmoid colon;  Surgeon: Sidney Blake DO;  Location: [facility]; Service: Gastroenterology    ESOPHAGOGASTRODUODENOSCOPY N/A 10/6/2017    Procedure: ESOPHAGOGASTRODUODENOSCOPY (EGD) with biopsy;  Surgeon: Hubert Lester MD;  Location: [facility]; Service: Gastroenterology    ESOPHAGOGASTRODUODENOSCOPY N/A 11/3/2017    Procedure: ESOPHAGOGASTRODUODENOSCOPY (EGD) with biopsy;  Surgeon: Ashwin Vera MD;  Location: [facility]; Service: Gastroenterology    ESOPHAGOGASTRODUODENOSCOPY N/A 1/23/2018    Procedure: ESOPHAGOGASTRODUODENOSCOPY (EGD) with biopsy;  Surgeon: Hubert Lester MD;  Location: [facility];   Service: Gastroenterology    ESOPHAGOGASTRODUODENOSCOPY N/A 3/26/2018    Procedure: ESOPHAGOGASTRODUODENOSCOPY (EGD); Surgeon: Earlene Shahid MD;  Location: [facility]; Service: Gastroenterology        Allergy:        Allergies   Allergen Reactions    Doxylamine GI Intolerance    Lactose - Food Allergy      Pt lactose intolerant         Medications:       Prior to Admission medications    Medication Sig Start Date End Date Taking?  Authorizing Provider   ascorbic acid (VITAMIN C) 250 mg tablet Take 250 mg by mouth daily    Historical Provider, MD   ferrous sulfate 324 (65 Fe) mg Take 325 mg by mouth daily before breakfast  18   Historical Provider, MD   lactase (LACTAID) 3,000 units tablet Take 1 tablet (3,000 Units total) by mouth 3 (three) times a day with meals  Patient not taking: Reported on 2020   Rolf Prince MD   pantoprazole (PROTONIX) 40 mg tablet Take 1 tablet (40 mg total) by mouth 2 (two) times a day 20   ROMA Frank   sucralfate (CARAFATE) 1 g tablet Take 1 tablet (1 g total) by mouth 4 (four) times a day 20   ROMA Frank       Family History:     Family History   Problem Relation Age of Onset    Hemangiomas Father         Social History:       Social History     Socioeconomic History    Marital status:      Spouse name: None    Number of children: None    Years of education: None    Highest education level: None   Occupational History     Employer: [employer]   Tobacco Use    Smoking status: Former     Types: Cigarettes     Quit date: 11/3/2014     Years since quittin 5    Smokeless tobacco: Never   Vaping Use    Vaping Use: Never used   Substance and Sexual Activity    Alcohol use: Not Currently     Comment: once or twice a week     Drug use: No    Sexual activity: None   Other Topics Concern    None   Social History Narrative    None     Social Determinants of Health     Financial Resource Strain: Not on file   Food Insecurity: Not on file   Transportation Needs: Not on file   Physical Activity: Not on file   Stress: Not on file   Social Connections: Not on file   Intimate Partner Violence: Not on file   Housing Stability: Not on file       ROS:  Symptoms per HPI  The remainder of the review of systems is negative    Exam:  General:  Alert, normally conversant, quite pleasant and comfortable appearing gentleman  Some telangiectasia visible on his thorax: Some scattered small petechiae  Head: Normocephalic, atraumatic  Eyes:  EOMI  Pupils - equal, round, reactive to accomodation  No icterus  Normal Conjunctiva  Oropharynx: Dental prosthesis  Moist without lesion  Neck:  No gross bruit, JVD, thyromegaly, or lymphadenopathy  Heart:  Regular with controlled rate  No rub nor pathologic murmur  Lungs: Good excursion air exchange with some mild basilar dulling but no gross rales rhonchi or wheeze   abdomen:  Soft and nontender with normal bowel sounds  No organomegaly or mass  Lower Limbs:  No edema  Pulses:  RLE - DP:  1-2+                LLE - DP:  1-2+  Musculoskeletal: Independent movement of limbs observed, Formal ROM and strength eval not performed  Neurologic:    Oriented to: person, place, situation  Cranial Nerves: grossly intact - vision, smell, taste, and hearing were not tested  Motor function: grossly normal, symmetric   Sensation: Was not tested    Vitals:    05/02/23 1245 05/02/23 1558 05/02/23 2300 05/03/23 0757   BP: 97/57 110/60 104/62 106/77   BP Location:  Right arm Right arm Left arm   Pulse: 58 66 65 94   Resp: 15 16 16 17   Temp:  97 5 °F (36 4 °C) (!) 97 °F (36 1 °C) 98 4 °F (36 9 °C)   TempSrc:  Temporal Temporal Temporal   SpO2: 97% 96% 96% 96%   Weight:       Height:               DATA:      Weights:     Wt Readings from Last 20 Encounters:   05/02/23 62 6 kg (138 lb)   05/01/20 61 8 kg (136 lb 3 9 oz)   01/02/20 63 1 kg (139 lb 1 8 oz)   10/11/19 65 5 kg (144 lb 6 4 oz)   06/25/19 62 6 kg (138 lb)   05/14/19 61 4 kg (135 lb 6 4 oz)   12/02/18 68 kg (150 lb)   09/15/18 63 5 kg (140 lb)   07/22/18 66 2 kg (145 lb 15 1 oz)   06/11/18 66 2 kg (146 lb)   04/11/18 65 8 kg (145 lb)   03/24/18 65 8 kg (145 lb)   01/21/18 72 6 kg (160 lb)   11/03/17 66 5 kg (146 lb 11 oz)   10/07/17 64 2 kg (141 lb 8 6 oz)   , Body mass index is 18 72 kg/m²           Lab Studies:               Results from last 7 days   Lab Units 05/02/23  0559 05/01/23  0458 04/30/23  1249   WBC Thousand/uL 9 43 5 44 5 27   HEMOGLOBIN g/dL 9 9* 8 5* 8 6*   HEMATOCRIT % 32 6* 28 7* 29 6*   PLATELETS Thousands/uL 441* 389 404*   ,   Results from last 7 days   Lab Units 05/03/23  0507 05/02/23  0559 05/01/23  0458   POTASSIUM mmol/L 3 6 3 5 3 7   CHLORIDE mmol/L 99 98 103   CO2 mmol/L 26 29 29   BUN mg/dL 11 11 9   CREATININE mg/dL 0 66 0 83 0 81   CALCIUM mg/dL 8 5 8 8 8 9

## 2023-05-03 NOTE — PLAN OF CARE
Problem: Potential for Falls  Goal: Patient will remain free of falls  Description: INTERVENTIONS:  - Educate patient/family on patient safety including physical limitations  - Instruct patient to call for assistance with activity   - Consult OT/PT to assist with strengthening/mobility   - Keep Call bell within reach  - Keep bed low and locked with side rails adjusted as appropriate  - Keep care items and personal belongings within reach  - Initiate and maintain comfort rounds  - Make Fall Risk Sign visible to staff  Outcome: Progressing     Problem: PAIN - ADULT  Goal: Verbalizes/displays adequate comfort level or baseline comfort level  Description: Interventions:  - Encourage patient to monitor pain and request assistance  - Assess pain using appropriate pain scale  - Administer analgesics based on type and severity of pain and evaluate response  - Implement non-pharmacological measures as appropriate and evaluate response  - Consider cultural and social influences on pain and pain management  - Notify physician/advanced practitioner if interventions unsuccessful or patient reports new pain  Outcome: Progressing     Problem: DISCHARGE PLANNING  Goal: Discharge to home or other facility with appropriate resources  Description: INTERVENTIONS:  - Identify barriers to discharge w/patient and caregiver  - Arrange for needed discharge resources and transportation as appropriate  - Identify discharge learning needs (meds, wound care, etc )  - Refer to Case Management Department for coordinating discharge planning if the patient needs post-hospital services based on physician/advanced practitioner order or complex needs related to functional status, cognitive ability, or social support system  Outcome: Progressing

## 2023-05-04 ENCOUNTER — APPOINTMENT (INPATIENT)
Dept: NUCLEAR MEDICINE | Facility: HOSPITAL | Age: 68
End: 2023-05-04

## 2023-05-04 ENCOUNTER — APPOINTMENT (INPATIENT)
Dept: NON INVASIVE DIAGNOSTICS | Facility: HOSPITAL | Age: 68
End: 2023-05-04

## 2023-05-04 ENCOUNTER — APPOINTMENT (INPATIENT)
Dept: RADIOLOGY | Facility: HOSPITAL | Age: 68
End: 2023-05-04

## 2023-05-04 DIAGNOSIS — I50.22 CHRONIC SYSTOLIC HEART FAILURE (HCC): Primary | ICD-10-CM

## 2023-05-04 LAB
ANION GAP SERPL CALCULATED.3IONS-SCNC: 6 MMOL/L (ref 4–13)
BASOPHILS # BLD AUTO: 0.22 THOUSANDS/ΜL (ref 0–0.1)
BASOPHILS NFR BLD AUTO: 4 % (ref 0–1)
BUN SERPL-MCNC: 9 MG/DL (ref 5–25)
CALCIUM SERPL-MCNC: 8.8 MG/DL (ref 8.4–10.2)
CHEST PAIN STATEMENT: NORMAL
CHLORIDE SERPL-SCNC: 101 MMOL/L (ref 96–108)
CO2 SERPL-SCNC: 28 MMOL/L (ref 21–32)
CREAT SERPL-MCNC: 0.71 MG/DL (ref 0.6–1.3)
EOSINOPHIL # BLD AUTO: 1.09 THOUSAND/ΜL (ref 0–0.61)
EOSINOPHIL NFR BLD AUTO: 18 % (ref 0–6)
ERYTHROCYTE [DISTWIDTH] IN BLOOD BY AUTOMATED COUNT: 21.6 % (ref 11.6–15.1)
GFR SERPL CREATININE-BSD FRML MDRD: 97 ML/MIN/1.73SQ M
GLUCOSE SERPL-MCNC: 79 MG/DL (ref 65–140)
HCT VFR BLD AUTO: 33.2 % (ref 36.5–49.3)
HGB BLD-MCNC: 9.5 G/DL (ref 12–17)
IMM GRANULOCYTES # BLD AUTO: 0.03 THOUSAND/UL (ref 0–0.2)
IMM GRANULOCYTES NFR BLD AUTO: 1 % (ref 0–2)
LYMPHOCYTES # BLD AUTO: 0.91 THOUSANDS/ΜL (ref 0.6–4.47)
LYMPHOCYTES NFR BLD AUTO: 15 % (ref 14–44)
MAGNESIUM SERPL-MCNC: 1.9 MG/DL (ref 1.9–2.7)
MAX DIASTOLIC BP: 70 MMHG
MAX HEART RATE: 117 BPM
MAX HR PERCENT: 76 %
MAX HR: 117 BPM
MAX PREDICTED HEART RATE: 153 BPM
MAX. SYSTOLIC BP: 114 MMHG
MCH RBC QN AUTO: 21.5 PG (ref 26.8–34.3)
MCHC RBC AUTO-ENTMCNC: 28.6 G/DL (ref 31.4–37.4)
MCV RBC AUTO: 75 FL (ref 82–98)
MONOCYTES # BLD AUTO: 0.87 THOUSAND/ΜL (ref 0.17–1.22)
MONOCYTES NFR BLD AUTO: 14 % (ref 4–12)
NEUTROPHILS # BLD AUTO: 2.99 THOUSANDS/ΜL (ref 1.85–7.62)
NEUTS SEG NFR BLD AUTO: 48 % (ref 43–75)
NRBC BLD AUTO-RTO: 0 /100 WBCS
NUC STRESS EJECTION FRACTION: 25 %
PLATELET # BLD AUTO: 440 THOUSANDS/UL (ref 149–390)
PMV BLD AUTO: 10 FL (ref 8.9–12.7)
POTASSIUM SERPL-SCNC: 3.7 MMOL/L (ref 3.5–5.3)
PROTOCOL NAME: NORMAL
RATE PRESSURE PRODUCT: NORMAL
RBC # BLD AUTO: 4.41 MILLION/UL (ref 3.88–5.62)
REASON FOR TERMINATION: NORMAL
SL CV REST NUCLEAR ISOTOPE DOSE: 10.2 MCI
SL CV STRESS NUCLEAR ISOTOPE DOSE: 31.2 MCI
SL CV STRESS RECOVERY BP: NORMAL MMHG
SL CV STRESS RECOVERY HR: 92 BPM
SL CV STRESS RECOVERY O2 SAT: 95 %
SODIUM SERPL-SCNC: 135 MMOL/L (ref 135–147)
STRESS ANGINA INDEX: 0
STRESS BASELINE BP: NORMAL MMHG
STRESS BASELINE HR: 75 BPM
STRESS O2 SAT REST: 93 %
STRESS PEAK HR: 117 BPM
STRESS POST ESTIMATED WORKLOAD: 1.5 METS
STRESS POST EXERCISE DUR MIN: 3 MIN
STRESS POST O2 SAT PEAK: 98 %
STRESS POST PEAK BP: 108 MMHG
STRESS/REST PERFUSION RATIO: 1.05
TARGET HR FORMULA: NORMAL
TEST INDICATION: NORMAL
TIME IN EXERCISE PHASE: NORMAL
WBC # BLD AUTO: 6.11 THOUSAND/UL (ref 4.31–10.16)

## 2023-05-04 RX ORDER — SACUBITRIL AND VALSARTAN 24; 26 MG/1; MG/1
1 TABLET, FILM COATED ORAL 2 TIMES DAILY
Qty: 60 TABLET | Refills: 0 | Status: SHIPPED | OUTPATIENT
Start: 2023-05-04 | End: 2023-05-05 | Stop reason: CLARIF

## 2023-05-04 RX ORDER — METOPROLOL SUCCINATE 25 MG/1
25 TABLET, EXTENDED RELEASE ORAL
Status: DISCONTINUED | OUTPATIENT
Start: 2023-05-04 | End: 2023-05-05 | Stop reason: HOSPADM

## 2023-05-04 RX ORDER — REGADENOSON 0.08 MG/ML
0.4 INJECTION, SOLUTION INTRAVENOUS ONCE
Status: COMPLETED | OUTPATIENT
Start: 2023-05-04 | End: 2023-05-04

## 2023-05-04 RX ADMIN — REGADENOSON 0.4 MG: 0.08 INJECTION, SOLUTION INTRAVENOUS at 10:40

## 2023-05-04 RX ADMIN — FUROSEMIDE 20 MG: 10 INJECTION, SOLUTION INTRAVENOUS at 17:33

## 2023-05-04 RX ADMIN — METOPROLOL SUCCINATE 25 MG: 25 TABLET, EXTENDED RELEASE ORAL at 21:32

## 2023-05-04 RX ADMIN — IRON SUCROSE 200 MG: 20 INJECTION, SOLUTION INTRAVENOUS at 16:00

## 2023-05-04 RX ADMIN — LISINOPRIL 10 MG: 10 TABLET ORAL at 08:53

## 2023-05-04 RX ADMIN — PANTOPRAZOLE SODIUM 40 MG: 40 TABLET, DELAYED RELEASE ORAL at 08:53

## 2023-05-04 RX ADMIN — PANTOPRAZOLE SODIUM 40 MG: 40 TABLET, DELAYED RELEASE ORAL at 17:33

## 2023-05-04 RX ADMIN — FUROSEMIDE 20 MG: 10 INJECTION, SOLUTION INTRAVENOUS at 08:53

## 2023-05-04 NOTE — UTILIZATION REVIEW
Continued Stay Review    Date:  5/2  REQUEST FOR             Current Patient Class: IP Current Level of Care: MS    HPI:67 y o  male initially admitted on 4/30 - Acute on chronic sCHF, dysphagia, anemia  Assessment/Plan:   Pt had ECG done on 5/2 for worsening dysphagia  Lasix was d/c d/t improvement and NPO status for EGD  Continues to receive Venofer  Hgb up to 9 9  On exam no SOB, no abd tenderness  Per GI - Patient s/p EGD today with evidence of esophageal stricture at 26 cm  He was found to have food in the esophagus just proximal to the stricture which was removed via [employer] net  Remainder of the exam was completed using a slim scope and was unremarkable  This 2 cm esophageal stricture was noted to have ulcerations and significant inflammation due to which dilation was not performed due to risk of perforation  Biopsies were obtained from the stricture given uncommon location  He was started on high-dose PPI with plans for repeat EGD with advanced GI attending for dilation vs stenting in 1 month     +++++++++++++++++++++  EGD - IMPRESSION:  Inflamed stricture with ulceration in the mid esophagus  Not dilated due to ulceration and friability  Biopsy taken  Grade B esophagitis    RECOMMENDATION:    Await pathology results    Start on pantoprazole 40 mg twice daily  Start on clear liquid diet  Ensure clear 3 times daily in between meals    Repeat EGD in 2 to 4 weeks with advanced endoscopist  ++++++++++++++++++++    Vital Signs:   05/02/23 2300 97 °F (36 1 °C) Abnormal  65 16 104/62 -- 96 % None (Room air) Lying   05/02/23 1558 97 5 °F (36 4 °C) 66 16 110/60 78 96 % None (Room air) Lying   05/02/23 1245 -- 58 15 97/57 -- 97 % None (Room air) --   05/02/23 1230 -- 65 16 90/54 -- 98 % None (Room air) --   05/02/23 1045 99 °F (37 2 °C) 75 18 115/71 -- 100 % None (Room air) --   05/02/23 1012 -- 71 -- 107/71 -- -- -- --   05/02/23 0728 98 3 °F (36 8 °C) 71 18 107/71 79 96 % None (Room air) Lying Pertinent Labs/Diagnostic Results:     5/2 Echo -   Left Ventricle: Left ventricular cavity size is normal  Wall thickness is moderately increased  There is moderate concentric hypertrophy  The left ventricular ejection fraction is 35% by single dimension measurement  Systolic function is normal  There is severe global hypokinesis with specific regional wall abnormalities  The septum is more hypokinetic than the remainder of the heart Diastolic function is mildly abnormal, consistent with grade I (abnormal) relaxation  Left atrial filling pressure is normal     Right Atrium: The atrium is mildly dilated    Aortic Valve: The leaflets are mildly thickened  There is aortic sclerosis  There is calcium along the aortic root wall posteriorly at the noncoronary and left coronary cusp commissure and at the left coronary and right coronary cusp commissures  The leaflets are moderately calcified  The leaflets exhibit normal mobility  There is no evidence of regurgitation  The aortic valve has no significant stenosis  The aortic valve peak velocity is 1 63 m/s  The aortic valve mean gradient is 6 mmHg   The dimensionless velocity index is 0 36     Mitral Valve: Mitral valve structure is normal  There is subvalvular apparatus has areas of focal calcification          Results from last 7 days   Lab Units 05/02/23  0559 05/01/23 0458 04/30/23  1249   WBC Thousand/uL 9 43 5 44 5 27   HEMOGLOBIN g/dL 9 9* 8 5* 8 6*   HEMATOCRIT % 32 6* 28 7* 29 6*   PLATELETS Thousands/uL 441* 389 404*   NEUTROS ABS Thousands/µL 5 69 2 78 3 04         Results from last 7 days   Lab Units 05/02/23  0559 05/01/23 0458 04/30/23  1249   SODIUM mmol/L 136 139 141   POTASSIUM mmol/L 3 5 3 7 4 0   CHLORIDE mmol/L 98 103 106   CO2 mmol/L 29 29 26   ANION GAP mmol/L 9 7 9   BUN mg/dL 11 9 9   CREATININE mg/dL 0 83 0 81 0 79   EGFR ml/min/1 73sq m 91 91 92   CALCIUM mg/dL 8 8 8 9 8 7   MAGNESIUM mg/dL 1 6*  --   --              Results from last 7 days   Lab Units 05/02/23  0559 05/01/23  0458 04/30/23  1249   GLUCOSE RANDOM mg/dL 85 88 98     Results from last 7 days   Lab Units 04/30/23  1938 04/30/23  1640 04/30/23  1249   HS TNI 0HR ng/L  --   --  19   HS TNI 2HR ng/L  --  26  --    HSTNI D2 ng/L  --  7  --    HS TNI 4HR ng/L 22  --   --    HSTNI D4 ng/L 3  --   --          Results from last 7 days   Lab Units 05/03/23  0507   PROCALCITONIN ng/ml 0 07     Results from last 7 days   Lab Units 04/30/23  1249   BNP pg/mL 3,898*     Results from last 7 days   Lab Units 05/01/23  0458   HEP C AB  Non-reactive     Medications:   Scheduled Medications:  enoxaparin, 40 mg, Subcutaneous, Q24H VIPUL  furosemide, 20 mg, Intravenous, BID (diuretic)  iron sucrose, 200 mg, Intravenous, Q24H  lisinopril, 10 mg, Oral, Daily  metoprolol succinate, 12 5 mg, Oral, HS  pantoprazole, 40 mg, Oral, BID      Continuous IV Infusions:    IV NSS PRN - for procedure and d/c      PRN Meds:  acetaminophen, 650 mg, Oral, Q6H PRN  polyethylene glycol, 17 g, Oral, Daily PRN    Discharge Plan: D    Network Utilization Review Department  ATTENTION: Please call with any questions or concerns to [phone] and carefully listen to the prompts so that you are directed to the right person  All voicemails are confidential   Kent Gladis all requests for admission clinical reviews, approved or denied determinations and any other requests to dedicated fax number below belonging to the campus where the patient is receiving treatment   List of dedicated fax numbers for the Facilities:  [address] [place] [address] DENIALS (Administrative/Medical Necessity) [phone]   [address] (Maternity/NICU/Pediatrics) [phone]   [address] [place] [address]  [address] Medical [place] [phone] [address] [place] [address] Alexandro Kessler [address] [facility] [facility] [phone]

## 2023-05-04 NOTE — PROGRESS NOTES
[address] [place] [address]  Progress Note  Name: Kristopher Bland  MRN: [id number]  Unit/Bed#: E4 -01 I Date of Admission: 4/30/2023   Date of Service: 5/4/2023 I Hospital Day: 4    Assessment/Plan   * Acute on chronic systolic congestive heart failure [facility])  Assessment & Plan  Wt Readings from Last 3 Encounters:   05/02/23 62 6 kg (138 lb)   05/01/20 61 8 kg (136 lb 3 9 oz)   01/02/20 63 1 kg (139 lb 1 8 oz)     79-year-old male presented to institution due to dysphagia and shortness of breath  Shortness of breath possibly secondary to acute on chronic systolic congestive heart failure  He has no evidence of sepsis nor pneumonia at this time  BNP elevated  X-ray showing possible congestion  Approaching euvolemia  · Currently on IV lasix  · Echo showing a decrease in EF down to 35%, severe global hypokinesis, and grade I DD  · Stress test results pending  · Await next step in management from cardiology      Esophageal dysphagia  Assessment & Plan  S/p EGD  Evidence of Inflamed stricture with ulceration in the mid esophagus  Not dilated due to ulceration and friability  Biopsy taken  Grade B esophagitis  · Continue PPI BID, clear liquid diet  · Ensure 3 times daily  · Repeat EGD in 2 to 4 weeks with advanced endoscopist       AVM (arteriovenous malformation) of small bowel, acquired  Assessment & Plan  Continue outpatient GI follow-up    Iron deficiency anemia due to chronic blood loss  Assessment & Plan  Received Venofer  Stable  No indication for transfusion at this time      Recent Labs     05/02/23  0559 05/04/23  0615   HGB 9 9* 9 5*   MCV 73* 75*   RDW 20 7* 21 6*                VTE Pharmacologic Prophylaxis:   Pharmacologic: Enoxaparin (Lovenox)  Mechanical VTE Prophylaxis in Place: Yes    Discussions with Specialists or Other Care Team Provider: cardiology    Education and Discussions with Family / Patient: patient    Current Length of Stay: 4 day(s)    Current Patient Status: Inpatient   Certification Statement: The patient will continue to require additional inpatient hospital stay due to cards reeval, iv diuretics    Discharge Plan: active    Code Status: Level 1 - Full Code      Subjective:   Patient seen and examined at bedside  Comfortable, breathing better    Objective:     Vitals:   Temp (24hrs), Av 4 °F (36 9 °C), Min:98 3 °F (36 8 °C), Max:98 5 °F (36 9 °C)    Temp:  [98 3 °F (36 8 °C)-98 5 °F (36 9 °C)] 98 3 °F (36 8 °C)  HR:  [62-83] 83  Resp:  [18-20] 18  BP: (108-120)/(58-73) 111/59  SpO2:  [90 %-100 %] 90 %  Body mass index is 18 72 kg/m²  Input and Output Summary (last 24 hours):     No intake or output data in the 24 hours ending 23 1701    Physical Exam:     Physical Exam  Vitals reviewed  Constitutional:       General: He is not in acute distress  HENT:      Head: Normocephalic  Nose: Nose normal       Mouth/Throat:      Mouth: Mucous membranes are moist    Eyes:      General: No scleral icterus  Cardiovascular:      Rate and Rhythm: Normal rate  Pulmonary:      Effort: Pulmonary effort is normal  No respiratory distress  Abdominal:      General: There is no distension  Palpations: Abdomen is soft  Tenderness: There is no abdominal tenderness  Musculoskeletal:      Right lower leg: No edema  Left lower leg: No edema  Skin:     General: Skin is warm  Neurological:      Mental Status: He is alert  Mental status is at baseline     Psychiatric:         Mood and Affect: Mood normal          Behavior: Behavior normal        Additional Data:     Labs:    Results from last 7 days   Lab Units 23  0615   WBC Thousand/uL 6 11   HEMOGLOBIN g/dL 9 5*   HEMATOCRIT % 33 2*   PLATELETS Thousands/uL 440*   NEUTROS PCT % 48   LYMPHS PCT % 15   MONOS PCT % 14*   EOS PCT % 18*     Results from last 7 days   Lab Units 23  0615   SODIUM mmol/L 135   POTASSIUM mmol/L 3 7   CHLORIDE mmol/L 101   CO2 mmol/L 28   BUN mg/dL 9 CREATININE mg/dL 0 71   ANION GAP mmol/L 6   CALCIUM mg/dL 8 8   GLUCOSE RANDOM mg/dL 79                 Results from last 7 days   Lab Units 05/03/23  0507   PROCALCITONIN ng/ml 0 07           * I Have Reviewed All Lab Data Listed Above  * Additional Pertinent Lab Tests Reviewed: Dasha 66 Admission Reviewed      Lines:   Invasive Devices     Peripheral Intravenous Line  Duration           Peripheral IV 04/30/23 Left Antecubital 4 days                   Imaging:    Imaging Reports Reviewed Today Include: XR chest    Recent Cultures (last 7 days):           Last 24 Hours Medication List:   Current Facility-Administered Medications   Medication Dose Route Frequency Provider Last Rate    acetaminophen  650 mg Oral Q6H PRN Gareth Gauthier MD      enoxaparin  40 mg Subcutaneous Q24H [facility] Sukhjinder Crowe MD      furosemide  20 mg Intravenous BID (diuretic) Sukhjinder Crowe MD      lisinopril  10 mg Oral Daily Magaly Bray PA-C      metoprolol succinate  25 mg Oral HS Magaly Bray PA-C      pantoprazole  40 mg Oral BID Gareth Gauthier MD      polyethylene glycol  17 g Oral Daily PRN Gareth Gauthier MD          Today, Patient Was Seen By: Maria Fernanda Gan MD    ** Please Note: Dictation voice to text software may have been used in the creation of this document   **

## 2023-05-04 NOTE — ASSESSMENT & PLAN NOTE
Received Venofer  Stable  No indication for transfusion at this time      Recent Labs     05/02/23  0559 05/04/23  0615   HGB 9 9* 9 5*   MCV 73* 75*   RDW 20 7* 21 6*

## 2023-05-04 NOTE — ASSESSMENT & PLAN NOTE
Wt Readings from Last 3 Encounters:   05/02/23 62 6 kg (138 lb)   05/01/20 61 8 kg (136 lb 3 9 oz)   01/02/20 63 1 kg (139 lb 1 8 oz)     20-year-old male presented to institution due to dysphagia and shortness of breath  Shortness of breath possibly secondary to acute on chronic systolic congestive heart failure  He has no evidence of sepsis nor pneumonia at this time  BNP elevated  X-ray showing possible congestion  Approaching euvolemia  · Currently on IV lasix  · Echo showing a decrease in EF down to 35%, severe global hypokinesis, and grade I DD    · Stress test results pending  · Await next step in management from cardiology

## 2023-05-04 NOTE — PROGRESS NOTES
Progress Note - Cardiology   Jordana Shaw  79 y o  male MRN: [id number]  Unit/Bed#: E4 -01 Encounter: [id number]          Assessment / Plan  Dysphagia prior to admit, recurrent esophagitis and esophageal stricture              --Prior Hx Wills's esophagus, esophagitis, caio lesion and stricture              --EGD 5/3/23: Stricture at 23 cm, retained food proximal to stricture--> food removed with evidence of ulceration and inflammation for which dilation was not pursued D/T risk of perforation (biopsy, PPI, recommendation for repeat EGD/dilation or stenting in 1 month)   Osler-Weber-Rendu syndrome, Hx of small bowel AVMs, Iron deficiency anemia w/ chronic GI loss and multiple prior transfusions              --Patient reports pattern of intermittent recurrent insidious regression in hemoglobin with transfusions approximately 3 times per year      HFrEF, unspecified cardiomyopathy: Signs of acute decompensation POA              --Echo 5/3/23:  LVEF 35%, moderate concentric hypertrophy, global hypokinesia with some regional variability, grade 1 DD  Trace MR and TR              --Echo 2/14/2023 [facility]): EF 42%, global hypokinesia with more severe wall motion abnormalities of the mid and apical portions of the inferior wall                  --Echo 8/22/2022: EF 40-45%   -- Initiated on lisinopril and Toprol this hospitalization  Hx asymptomatic Sinus bradycardia & Mobitz 1 heart block     - Systolic blood pressure [phone]   - Room air saturations at or above 95%   - Sodium 135, potassium 3 7, BUN 9, creatinine 0 71   - Magnesium 1 9    · Stress test completed with review forthcoming ~~> barring any significant ischemic burden plan will be to avoid coronary angiography and continue GDMT for the patient's cardiomyopathy as discussed in yesterday's consultation  · Initiated on lisinopril and Toprol yesterday ~~> will advance Toprol to 25 mg at bedtime with consideration for additional titration "and possible initiation of Entresto when seen in the outpatient setting (we will preemptively request a price check of Entresto from the home store pharmacy today)  · Patient subjectively improved with euvolemic exam and improved radiograph ~~> it may be reasonable for the patient to utilize diuretic on an as needed basis though this will be further discussed with attending  · Discussed with the patient and his daughter - how to calculate sodium intake, restriction less than 2 g, need for daily weights and medication compliance in addition to the possibility of recurrent heart failure and overall prognosis      Subjective:  No new complaints  Patient reports he was comfortably ambulating in the halls without dyspnea    Currently swallowing full liquids without difficulty noting his \"throat feels much better\"      Vitals:  Vitals:    04/30/23 1222 05/02/23 1012   Weight: 62 8 kg (138 lb 7 2 oz) 62 6 kg (138 lb)   ,  Vitals:    05/03/23 1431 05/03/23 2258 05/04/23 0000 05/04/23 0741   BP: 107/67 108/58 111/68 120/73   BP Location: Left arm  Right arm Right arm   Pulse: 75 70 62 76   Resp: 18  18 20   Temp: 98 °F (36 7 °C)  98 5 °F (36 9 °C) 98 4 °F (36 9 °C)   TempSrc: Temporal  Temporal Temporal   SpO2: 98%  100% 95%   Weight:       Height:           Exam:  General:  Alert normally conversant and comfortable-appearing  Heart:  Regular with controlled rate and no pathologic murmur or rub  Lungs:  Clear throughout  Lower Limbs:  No edema               Medications:    Current Facility-Administered Medications:     acetaminophen (TYLENOL) tablet 650 mg, 650 mg, Oral, Q6H PRN, Emma Cole MD    enoxaparin (LOVENOX) subcutaneous injection 40 mg, 40 mg, Subcutaneous, Q24H [facility], Mundo Palacios MD, 40 mg at 05/03/23 1631    furosemide (LASIX) injection 20 mg, 20 mg, Intravenous, BID (diuretic), Mundo Palacios MD, 20 mg at 05/04/23 0853    iron sucrose (VENOFER) 200 mg in sodium chloride 0 9 % 100 mL IVPB, 200 mg, " Intravenous, Q24H, Uche Dudley MD, Last Rate: 100 mL/hr at 05/02/23 1724, 200 mg at 05/03/23 1632    lisinopril (ZESTRIL) tablet 10 mg, 10 mg, Oral, Daily, Othella Dopp, PA-C, 10 mg at 05/04/23 2443    metoprolol succinate (TOPROL-XL) 24 hr tablet 12 5 mg, 12 5 mg, Oral, HS, Othella Dopp, PA-C, 12 5 mg at 05/03/23 2258    pantoprazole (PROTONIX) EC tablet 40 mg, 40 mg, Oral, BID, Uche Dudley MD, 40 mg at 05/04/23 0853    polyethylene glycol (MIRALAX) packet 17 g, 17 g, Oral, Daily PRN, Uche Dudley MD      Labs/Data:        Results from last 7 days   Lab Units 05/04/23  0615 05/02/23  0559 05/01/23  0458   WBC Thousand/uL 6 11 9 43 5 44   HEMOGLOBIN g/dL 9 5* 9 9* 8 5*   HEMATOCRIT % 33 2* 32 6* 28 7*   PLATELETS Thousands/uL 440* 441* 389     Results from last 7 days   Lab Units 05/04/23  0615 05/03/23  0507 05/02/23  0559   POTASSIUM mmol/L 3 7 3 6 3 5   CHLORIDE mmol/L 101 99 98   CO2 mmol/L 28 26 29   BUN mg/dL 9 11 11   CREATININE mg/dL 0 71 0 66 0 83

## 2023-05-05 VITALS
RESPIRATION RATE: 18 BRPM | TEMPERATURE: 98 F | DIASTOLIC BLOOD PRESSURE: 65 MMHG | HEART RATE: 69 BPM | BODY MASS INDEX: 18.69 KG/M2 | OXYGEN SATURATION: 93 % | WEIGHT: 138 LBS | SYSTOLIC BLOOD PRESSURE: 115 MMHG | HEIGHT: 72 IN

## 2023-05-05 RX ORDER — LOSARTAN POTASSIUM 25 MG/1
25 TABLET ORAL DAILY
Qty: 30 TABLET | Refills: 0 | Status: SHIPPED | OUTPATIENT
Start: 2023-05-05 | End: 2023-06-04

## 2023-05-05 RX ORDER — LOSARTAN POTASSIUM 25 MG/1
25 TABLET ORAL DAILY
Status: ON HOLD | COMMUNITY
End: 2023-05-05 | Stop reason: SDUPTHER

## 2023-05-05 RX ORDER — PANTOPRAZOLE SODIUM 40 MG/1
40 TABLET, DELAYED RELEASE ORAL 2 TIMES DAILY
Qty: 60 TABLET | Refills: 0 | Status: SHIPPED | OUTPATIENT
Start: 2023-05-05 | End: 2023-06-02

## 2023-05-05 RX ORDER — POTASSIUM CHLORIDE 750 MG/1
10 TABLET, EXTENDED RELEASE ORAL DAILY
Status: DISCONTINUED | OUTPATIENT
Start: 2023-05-05 | End: 2023-05-05

## 2023-05-05 RX ORDER — FUROSEMIDE 20 MG/1
20 TABLET ORAL DAILY
Status: DISCONTINUED | OUTPATIENT
Start: 2023-05-05 | End: 2023-05-05

## 2023-05-05 RX ORDER — CARVEDILOL 3.12 MG/1
3.12 TABLET ORAL 2 TIMES DAILY WITH MEALS
Status: ON HOLD | COMMUNITY
End: 2023-05-05 | Stop reason: SDUPTHER

## 2023-05-05 RX ORDER — CARVEDILOL 3.12 MG/1
3.12 TABLET ORAL 2 TIMES DAILY WITH MEALS
Qty: 60 TABLET | Refills: 0 | Status: SHIPPED | OUTPATIENT
Start: 2023-05-05 | End: 2023-06-04

## 2023-05-05 RX ADMIN — ENOXAPARIN SODIUM 40 MG: 100 INJECTION SUBCUTANEOUS at 10:02

## 2023-05-05 RX ADMIN — LISINOPRIL 10 MG: 10 TABLET ORAL at 10:02

## 2023-05-05 RX ADMIN — PANTOPRAZOLE SODIUM 40 MG: 40 TABLET, DELAYED RELEASE ORAL at 10:02

## 2023-05-05 NOTE — ASSESSMENT & PLAN NOTE
Wt Readings from Last 3 Encounters:   05/02/23 62 6 kg (138 lb)   05/01/20 61 8 kg (136 lb 3 9 oz)   01/02/20 63 1 kg (139 lb 1 8 oz)     45-year-old male presented to institution due to dysphagia and shortness of breath  Shortness of breath possibly secondary to acute on chronic systolic congestive heart failure  He has no evidence of sepsis nor pneumonia at this time  BNP elevated and CXR showing congestion  Euvolemic after diuretics was given  · Given low blood pressure, cardiology recommended holding off initiation of diuretics on discharge    · Cardiology recommended close pcp follow-up

## 2023-05-05 NOTE — ASSESSMENT & PLAN NOTE
Received Venofer  Stable  No indication for transfusion at this time      Recent Labs     05/04/23  0615   HGB 9 5*   MCV 75*   RDW 21 6*

## 2023-05-05 NOTE — PROGRESS NOTES
Progress Note - Cardiology   Meryle Baptise  79 y o  male MRN: [id number]  Unit/Bed#: E4 -01 Encounter: [id number]    Assessment:     Acute on chronic combined systolic and diastolic heart failure now stabilized  o LVEF 35%, moderate LVH, grade 1 diastolic dysfunction, mild AISHWARYA, aortic valve sclerosis, mitral valve sclerosis, trace MR/TR May 2023  o Abnormal nuclear stress test with basilar to mid inferior septal wall defect consistent with prior infarction, gated EF 25% May 2023   Dysphagia with recurrent esophagitis and esophageal strictures   Osler Giordano Rendu syndrome History of small bowel AVMs   Iron deficiency with chronic GI loss requiring multiple transfusions   Asymptomatic sinus bradycardia   History of second-degree AV block, Mobitz I    Plan:     Originally going to start patient on furosemide 20 mg p o  daily along with K-Dur 10 mill equivalents daily  However blood pressure today is only 98 systolic  Would discharge without furosemide or KCl to be evaluated by his doctor as an outpatient   Patient stable and may be discharged on these medications   To follow-up with a cardiologist in [place], [place]      Interval history:  Patient feels good and shortness of breath has resolved  Lungs are clear and he has no edema      PROBLEM LIST:    Patient Active Problem List    Diagnosis Date Noted    Acute on chronic systolic congestive heart failure ([address] ) 04/30/2023    Osler-Weber-Rendu syndrome ([facility] [address] ) 04/30/2023    Gallbladder polyp 06/26/2019    Esophageal dysphagia 06/24/2019    Common bile duct dilatation 06/24/2019    Bladder wall thickening 06/24/2019    Moderate protein-calorie malnutrition ([address] ) 05/16/2019    Thrombocytosis 05/15/2019    Cardiac murmur 09/16/2018    Alcohol use disorder 01/22/2018    Esophagitis     Iron deficiency anemia due to chronic blood loss 10/06/2017    Acute kidney injury ([address] ) 10/06/2017    Cirilo ulcer 10/06/2017    AVM (arteriovenous malformation) 10/06/2017    AVM (arteriovenous malformation) of small bowel, acquired     History of blood transfusion     Wills's esophagus     Melena 10/05/2017       Vitals: /53 (BP Location: Left arm)   Pulse 72   Temp 98 6 °F (37 °C) (Temporal)   Resp 16   Ht 6' (1 829 m)   Wt 62 6 kg (138 lb)   SpO2 95%   BMI 18 72 kg/m²   PREVIOUS WEIGHTS:   Weight (last 2 days)     None          Wt Readings from Last 2 Encounters:   05/02/23 62 6 kg (138 lb)   05/01/20 61 8 kg (136 lb 3 9 oz)       Labs/Data:        Results from last 7 days   Lab Units 05/04/23  0615 05/02/23  0559 05/01/23  0458   WBC Thousand/uL 6 11 9 43 5 44   HEMOGLOBIN g/dL 9 5* 9 9* 8 5*   HEMATOCRIT % 33 2* 32 6* 28 7*   MCV fL 75* 73* 72*   MCH pg 21 5* 22 1* 21 4*   MCHC g/dL 28 6* 30 4* 29 6*   PLATELETS Thousands/uL 440* 441* 389     Results from last 7 days   Lab Units 05/04/23  0615 05/03/23  0507 05/02/23  0559   EGFR ml/min/1 73sq m 97 100 91   SODIUM mmol/L 135 134* 136   POTASSIUM mmol/L 3 7 3 6 3 5   CHLORIDE mmol/L 101 99 98   CO2 mmol/L 28 26 29   BUN mg/dL 9 11 11   CREATININE mg/dL 0 71 0 66 0 83     Results from last 7 days   Lab Units 05/04/23  0615 05/03/23  0507 05/02/23  0559   CALCIUM mg/dL 8 8 8 5 8 8   MAGNESIUM mg/dL 1 9 2 1 1 6*     No results found for: NTBNP  Lab Results   Component Value Date    CHOLESTEROL 118 01/22/2018    TRIG 103 01/22/2018    HDL 39 (L) 01/22/2018    LDLCALC 58 01/22/2018    HGBA1C 4 8 01/22/2018              Current Facility-Administered Medications:     acetaminophen (TYLENOL) tablet 650 mg, 650 mg, Oral, Q6H PRN, Zakia Reddy MD    enoxaparin (LOVENOX) subcutaneous injection 40 mg, 40 mg, Subcutaneous, Q24H [address], Kishore Coppola MD, 40 mg at 05/03/23 1631    lisinopril (ZESTRIL) tablet 10 mg, 10 mg, Oral, Daily, Gayla Givens PA-C, 10 mg at 05/04/23 2305    metoprolol succinate (TOPROL-XL) 24 hr tablet 25 mg, 25 mg, Oral, HS, Tam Harsha Richey PA-C, 25 mg at 05/04/23 2132    pantoprazole (PROTONIX) EC tablet 40 mg, 40 mg, Oral, BID, Laura Sood MD, 40 mg at 05/04/23 1733    polyethylene glycol (MIRALAX) packet 17 g, 17 g, Oral, Daily PRN, Laura Sood MD  Allergies   Allergen Reactions    Doxylamine GI Intolerance    Lactose - Food Allergy      Pt lactose intolerant           Intake/Output Summary (Last 24 hours) at 5/5/2023 0954  Last data filed at 5/4/2023 1937  Gross per 24 hour   Intake --   Output 150 ml   Net -150 ml       Invasive Devices     Peripheral Intravenous Line  Duration           Peripheral IV 04/30/23 Left Antecubital 4 days                Review of Systems   Constitutional: Negative for activity change  Respiratory: Negative for cough, chest tightness, shortness of breath and wheezing  Cardiovascular: Negative for chest pain, palpitations and leg swelling  Musculoskeletal: Negative for gait problem  Skin: Negative for color change  Neurological: Negative for dizziness, tremors, syncope, weakness, light-headedness and headaches  Psychiatric/Behavioral: Negative for agitation and confusion  Physical Exam  Vitals reviewed  Constitutional:       General: He is not in acute distress  Appearance: He is well-developed  HENT:      Head: Normocephalic and atraumatic  Neck:      Thyroid: No thyromegaly  Vascular: No carotid bruit or JVD  Trachea: No tracheal deviation  Cardiovascular:      Rate and Rhythm: Normal rate and regular rhythm  Chest Wall: PMI is displaced  Pulses: Normal pulses  Heart sounds: Murmur heard  Systolic murmur is present with a grade of 2/6  No friction rub  No gallop  Comments: Holosystolic murmur at the apex  Pulmonary:      Effort: Pulmonary effort is normal  No respiratory distress  Breath sounds: Normal breath sounds  No wheezing, rhonchi or rales  Chest:      Chest wall: No tenderness     Musculoskeletal:      Cervical back: Normal range of motion and "neck supple  Right lower leg: No edema  Left lower leg: No edema  Skin:     General: Skin is warm and dry  Neurological:      General: No focal deficit present  Mental Status: He is alert and oriented to person, place, and time  Psychiatric:         Mood and Affect: Mood normal          Behavior: Behavior normal          Thought Content: Thought content normal          Judgment: Judgment normal          ======================================================     Imaging:   I have personally reviewed pertinent reports  Portions of the record may have been created with voice recognition software  Occasional wrong word or \"sound a like\" substitutions may have occurred due to the inherent limitations of voice recognition software  Read the chart carefully and recognize, using context, where substitutions have occurred      "

## 2023-05-05 NOTE — PLAN OF CARE
Problem: Potential for Falls  Goal: Patient will remain free of falls  Description: INTERVENTIONS:  - Educate patient/family on patient safety including physical limitations  - Instruct patient to call for assistance with activity   - Consult OT/PT to assist with strengthening/mobility   - Keep Call bell within reach  - Keep bed low and locked with side rails adjusted as appropriate  - Keep care items and personal belongings within reach  - Initiate and maintain comfort rounds  - Make Fall Risk Sign visible to staff  5/5/2023 1713 by Fernando Madrid  Outcome: Adequate for Discharge  5/5/2023 1135 by Fernando Madrid  Outcome: Progressing     Problem: PAIN - ADULT  Goal: Verbalizes/displays adequate comfort level or baseline comfort level  Description: Interventions:  - Encourage patient to monitor pain and request assistance  - Assess pain using appropriate pain scale  - Administer analgesics based on type and severity of pain and evaluate response  - Implement non-pharmacological measures as appropriate and evaluate response  - Consider cultural and social influences on pain and pain management  - Notify physician/advanced practitioner if interventions unsuccessful or patient reports new pain  5/5/2023 1713 by Fernando Madrid  Outcome: Adequate for Discharge  5/5/2023 1135 by Fernando Madrid  Outcome: Progressing     Problem: DISCHARGE PLANNING  Goal: Discharge to home or other facility with appropriate resources  Description: INTERVENTIONS:  - Identify barriers to discharge w/patient and caregiver  - Arrange for needed discharge resources and transportation as appropriate  - Identify discharge learning needs (meds, wound care, etc )  - Refer to Case Management Department for coordinating discharge planning if the patient needs post-hospital services based on physician/advanced practitioner order or complex needs related to functional status, cognitive ability, or social support system  5/5/2023 1713 by Jai Harrison Menjivar  Outcome: Adequate for Discharge  5/5/2023 1135 by Judge Howell  Outcome: Progressing     Problem: GASTROINTESTINAL - ADULT  Goal: Maintains adequate nutritional intake  Description: INTERVENTIONS:  - Monitor percentage of each meal consumed  - Identify factors contributing to decreased intake, treat as appropriate  - Assist with meals as needed  - Monitor I&O, weight, and lab values if indicated  - Obtain nutrition services referral as needed  5/5/2023 1713 by Judge Howell  Outcome: Adequate for Discharge  5/5/2023 1135 by Judge Howell  Outcome: Progressing     Problem: Nutrition/Hydration-ADULT  Goal: Nutrient/Hydration intake appropriate for improving, restoring or maintaining nutritional needs  Description: Monitor and assess patient's nutrition/hydration status for malnutrition  Collaborate with interdisciplinary team and initiate plan and interventions as ordered  Monitor patient's weight and dietary intake as ordered or per policy  Utilize nutrition screening tool and intervene as necessary  Determine patient's food preferences and provide high-protein, high-caloric foods as appropriate       INTERVENTIONS:  - Monitor oral intake, urinary output, labs, and treatment plans  - Assess nutrition and hydration status and recommend course of action  - Evaluate amount of meals eaten  - Assist patient with eating if necessary   - Allow adequate time for meals  - Recommend/ encourage appropriate diets, oral nutritional supplements, and vitamin/mineral supplements  - Order, calculate, and assess calorie counts as needed  - Recommend, monitor, and adjust tube feedings and TPN/PPN based on assessed needs  - Assess need for intravenous fluids  - Provide specific nutrition/hydration education as appropriate  - Include patient/family/caregiver in decisions related to nutrition  5/5/2023 1713 by Judge Howell  Outcome: Adequate for Discharge  5/5/2023 1135 by Judge Howell  Outcome: Progressing

## 2023-05-05 NOTE — DISCHARGE SUMMARY
[address] [facility]  Discharge- Shira Schwartz  1955, 79 y o  male MRN: [id number]  Unit/Bed#: E4 -01 Encounter: [id number]  Primary Care Provider: Meghan Marshall MD   Date and time admitted to hospital: 4/30/2023 12:24 PM    * Acute on chronic systolic congestive heart failure [facility])  Assessment & Plan  Wt Readings from Last 3 Encounters:   05/02/23 62 6 kg (138 lb)   05/01/20 61 8 kg (136 lb 3 9 oz)   01/02/20 63 1 kg (139 lb 1 8 oz)     49-year-old male presented to institution due to dysphagia and shortness of breath  Shortness of breath possibly secondary to acute on chronic systolic congestive heart failure  He has no evidence of sepsis nor pneumonia at this time  BNP elevated and CXR showing congestion  Euvolemic after diuretics was given  · Given low blood pressure, cardiology recommended holding off initiation of diuretics on discharge  · Cardiology recommended close pcp follow-up      Esophageal dysphagia  Assessment & Plan  S/p EGD  Evidence of Inflamed stricture with ulceration in the mid esophagus  Not dilated due to ulceration and friability  Biopsy taken  Grade B esophagitis  · Continue PPI BID, clear liquid diet  · Ensure 3 times daily  · Repeat EGD in 2 to 4 weeks with advanced endoscopist       AVM (arteriovenous malformation) of small bowel, acquired  Assessment & Plan  Continue outpatient GI follow-up    Iron deficiency anemia due to chronic blood loss  Assessment & Plan  Received Venofer  Stable  No indication for transfusion at this time      Recent Labs     05/04/23  0615   HGB 9 5*   MCV 75*   RDW 21 6*           Discharging Physician / Practitioner: Katherine Yepez MD  PCP: Mehgan Marshall MD  Admission Date:   Admission Orders (From admission, onward)     Ordered        04/30/23 178 [facility]  Once                      Discharge Date: 05/05/23    Medical Problems     Resolved Problems  Date Reviewed: 5/5/2023   None         Consultations During Hospital Stay:  · Cardiology, GI    Procedures Performed:   · NM Stress test     Abnormal pharmacologic nuclear stress test with fixed basal to mid inferoseptal wall defect with hypokinesis consistent with prior infarct  There is no evidence of myocardial ischemia    Stress ECG: The stress ECG is equivocal for ischemia due to baseline T wave abnormalities after pharmacologic vasodilation, without reproduction of symptoms    Stress ECG: The patient after exercising for 3 min and had a maximal HR of 117 bpm (76 % of MPHR) and 1 5 METS  The patient experienced no angina during the test  Blood pressure demonstrated a normal response and heart rate demonstrated a normal response to stress    Stress Function: Left ventricular function post-stress is abnormal  Post-stress ejection fraction is 25 %  There is severe hypokinesis of the basal to mid inferoseptal wall with mild to moderate global hypokinesis of the remaining walls    Frequent PACs  · EGD  IMPRESSION:  Inflamed stricture with ulceration in the mid esophagus  Not dilated due to ulceration and friability  Biopsy taken  Grade B esophagitis      RECOMMENDATION:    Await pathology results      Start on pantoprazole 40 mg twice daily  Start on clear liquid diet  Ensure clear 3 times daily in between meals  Repeat EGD in 2 to 4 weeks with advanced endoscopist         Significant Findings / Test Results:   · Imaging  · XR Chest #1:    Multifocal airspace opacities which could represent nonuniform pulmonary vascular congestion, or multifocal pneumonia      Cardiomegaly and trace left effusion  · XR Chest:    No acute pulmonary pathology    · Echo:      Left Ventricle: Left ventricular cavity size is normal  Wall thickness is moderately increased  There is moderate concentric hypertrophy  The left ventricular ejection fraction is 35% by single dimension measurement   Systolic function is normal  There is severe global hypokinesis with specific regional wall abnormalities  The septum is more hypokinetic than the remainder of the heart Diastolic function is mildly abnormal, consistent with grade I (abnormal) relaxation  Left atrial filling pressure is normal     Right Atrium: The atrium is mildly dilated    Aortic Valve: The leaflets are mildly thickened  There is aortic sclerosis  There is calcium along the aortic root wall posteriorly at the noncoronary and left coronary cusp commissure and at the left coronary and right coronary cusp commissures  The leaflets are moderately calcified  The leaflets exhibit normal mobility  There is no evidence of regurgitation  The aortic valve has no significant stenosis  The aortic valve peak velocity is 1 63 m/s  The aortic valve mean gradient is 6 mmHg  The dimensionless velocity index is 0 36     Mitral Valve: Mitral valve structure is normal  There is subvalvular apparatus has areas of focal calcification  Incidental Findings:   · See above     Test Results Pending at Discharge (will require follow up):   · egd biopsy / tissue exam     Outpatient Tests Requested:  · Pcp, cardiology, GI  · Cbc, bmp  · EGD    Complications:  none    Reason for Admission: chf exacerbation    Hospital Course:     Bhaskar Soto  is a 79 y o  male patient who originally presented to the hospital on 4/30/2023 due to shortness of breath  Patient has a history of chronic combined systolic and diastolic congestive heart failure, dysphagia with recurrent esophagitis and of esophageal strictures, Osler-Weber-Rendu syndrome, iron deficiency anemia with chronic GI loss requiring multiple transfusions, and history of small bowel AVMs  He presented to our institution due to shortness of breath  He received IV diuretics which resulted in some improvement  He was also complaining of dysphagia for which GI was consulted  They performed an EGD  EGD showed an Inflamed stricture with ulceration in the mid esophagus    Not dilated due to ulceration and friability  Biopsy taken  They would like to arrange an outpatient follow-up appointment for him for an advanced endoscopic to complete the procedure  For now, they want him on PPI twice daily and clear liquid diet with 3 times daily ensures  Going back to his heart failure exacerbation, cardiology was consulted to further optimize his medications  On discharge, cardiology recommended that we a beta-blocker and an ACE/ARB  I would simply renew his medications that he was prescribed for during his previous visit  Cardiology would like to hold off diuretics for the time being given low blood pressure levels and deferred further optimization to his primary care provider/cardiologist       Patient agrees to follow-up with his providers as scheduled and to take his medications as prescribed  All questions were addressed  he understood the need to seek immediate medical attention should he develop any chest pain, shortness of breath, severe pain, fever, chills, or any other concerning symptoms  Please see above list of diagnoses and related plan for additional information  Condition at Discharge: fair     Discharge Day Visit / Exam:     Subjective:  Patient seen and examined at bedside  Comfortable  Breathing better today  Vitals: Blood Pressure: 115/65 (05/05/23 1526)  Pulse: 69 (05/05/23 1526)  Temperature: 98 °F (36 7 °C) (05/05/23 1526)  Temp Source: Temporal (05/05/23 1526)  Respirations: 18 (05/05/23 1526)  Height: 6' (182 9 cm) (05/02/23 1012)  Weight - Scale: 62 6 kg (138 lb) (05/02/23 1012)  SpO2: 93 % (05/05/23 1526)  Exam:   Physical Exam  Vitals reviewed  Constitutional:       General: He is not in acute distress  HENT:      Head: Normocephalic  Nose: Nose normal       Mouth/Throat:      Mouth: Mucous membranes are moist    Eyes:      General: No scleral icterus  Cardiovascular:      Rate and Rhythm: Normal rate and regular rhythm     Pulmonary: Effort: Pulmonary effort is normal  No respiratory distress  Abdominal:      General: There is no distension  Palpations: Abdomen is soft  Tenderness: There is no abdominal tenderness  Musculoskeletal:      Right lower leg: No edema  Left lower leg: No edema  Skin:     General: Skin is warm  Neurological:      Mental Status: He is alert and oriented to person, place, and time  Psychiatric:         Mood and Affect: Mood normal          Behavior: Behavior normal        Discussion with Family: spoke to daughter overnight    Discharge instructions/Information to patient and family:   See after visit summary for information provided to patient and family  Provisions for Follow-Up Care:  See after visit summary for information related to follow-up care and any pertinent home health orders  Disposition:     Home    Planned Readmission: No     Discharge Statement:  I spent 36 minutes discharging the patient  This time was spent on the day of discharge  I had direct contact with the patient on the day of discharge  Greater than 50% of the total time was spent examining patient, answering all patient questions, arranging and discussing plan of care with patient as well as directly providing post-discharge instructions  Additional time then spent on discharge activities  Discharge Medications:  See after visit summary for reconciled discharge medications provided to patient and family        ** Please Note: This note has been constructed using a voice recognition system **

## 2023-05-05 NOTE — CASE MANAGEMENT
Case Management Discharge Planning Note    Patient name Ankur Banerjee  Location [facility] 4 /E4 -* MRN [id number]  : 1955 Date 2023       Current Admission Date: 2023  Current Admission Diagnosis:Acute on chronic systolic congestive heart failure [facility])   Patient Active Problem List    Diagnosis Date Noted    Acute on chronic systolic congestive heart failure ([facility] [address] ) 2023    Osler-Weber-Rendu syndrome ([facility] [address] ) 2023    Gallbladder polyp 2019    Esophageal dysphagia 2019    Common bile duct dilatation 2019    Bladder wall thickening 2019    Moderate protein-calorie malnutrition ([facility] [address] ) 2019    Thrombocytosis 05/15/2019    Cardiac murmur 2018    Alcohol use disorder 2018    Esophagitis     Iron deficiency anemia due to chronic blood loss 10/06/2017    Acute kidney injury ([facility] [address] ) 10/06/2017    Dottie Mascot ulcer 10/06/2017    AVM (arteriovenous malformation) 10/06/2017    AVM (arteriovenous malformation) of small bowel, acquired     History of blood transfusion     Wills's esophagus     Melena 10/05/2017      LOS (days): 5  Geometric Mean LOS (GMLOS) (days):   Days to GMLOS:     OBJECTIVE:  Risk of Unplanned Readmission Score: 9 73         Current admission status: Inpatient   Preferred Pharmacy:   [address] [place] [address] [address]  Phone: [phone] Fax: [phone]    [facility] [address] ,  [facility] [address]  Phone: [phone] Fax: [phone]    Primary Care Provider: Willow Monroe MD    Primary Insurance: BLUE CROSS  Secondary Insurance:     DISCHARGE DETAILS:                                   Additional Comments: LSW covering case today  MD is planning on discharging today  Offer VNA for CHF and pt decline

## 2023-05-08 ENCOUNTER — TELEPHONE (OUTPATIENT)
Dept: GASTROENTEROLOGY | Facility: CLINIC | Age: 68
End: 2023-05-08

## 2023-05-08 NOTE — TELEPHONE ENCOUNTER
----- Message from Fernando Cantrell MA sent at 5/3/2023  9:12 AM EDT -----    ----- Message -----  From: Sandee Crooks DO  Sent: 5/2/2023   2:05 PM EDT  To: , #    Hi,     Can we please schedule this patient for repeat EGD in 1 month with one of our advanced docs for esophageal stricture dilation and potential stent placement? Thank you!

## 2023-05-08 NOTE — TELEPHONE ENCOUNTER
Spoke to patients daughter and she asked if she could call back to set up egd once she spoke to her dad

## 2023-05-09 ENCOUNTER — PATIENT OUTREACH (OUTPATIENT)
Dept: CASE MANAGEMENT | Facility: HOSPITAL | Age: 68
End: 2023-05-09

## 2023-05-09 DIAGNOSIS — I50.23 ACUTE ON CHRONIC SYSTOLIC CONGESTIVE HEART FAILURE (HCC): Primary | ICD-10-CM

## 2023-05-09 NOTE — UTILIZATION REVIEW
NOTIFICATION OF ADMISSION DISCHARGE   This is a Notification of Discharge from [address]  Please be advised that this patient has been discharge from our facility  Below you will find the admission and discharge date and time including the patient’s disposition  UTILIZATION REVIEW CONTACT:  Lola Noble  Utilization   Network Utilization Review Department  Phone: [phone] x carefully listen to the prompts  All voicemails are confidential   Email: [email or web address]     ADMISSION INFORMATION  PRESENTATION DATE: 4/30/2023 12:24 PM  OBERVATION ADMISSION DATE:   INPATIENT ADMISSION DATE: 4/30/23  1:56 PM   DISCHARGE DATE: 5/5/2023  4:45 PM   DISPOSITION:Home/Self Care    IMPORTANT INFORMATION:  Send all requests for admission clinical reviews, approved or denied determinations and any other requests to dedicated fax number below belonging to the campus where the patient is receiving treatment   List of dedicated fax numbers:  [address] DENIALS (Administrative/Medical Necessity) [phone]   [address] (Maternity/NICU/Pediatrics) [phone]   [facility] [phone]   [facility] 87 [phone]   [address] [phone]   220 [facility] [phone] 90 [facility] [phone]   [address] [place], [place] [address] [phone]   [facility]  [phone]   40[facility] [phone]   412 [facility] 850 [address] [facility] [phone]

## 2023-05-09 NOTE — PROGRESS NOTES
Heart Failure Outpatient Care Coordinator Note: Patient discharged 5/5/23 from [address] [facility] with primary diagnosis of acute on chronic systolic HF  Chart notes reviewed  Patient follows with [facility] including PCP, outpatient care coordination and cardiology  Will do outreach x 1 for [facility]  Call made to patient- ? Not in service  Call made to daughterEleonora and left message

## 2023-05-16 DIAGNOSIS — B37.81 CANDIDA ESOPHAGITIS (HCC): Primary | ICD-10-CM

## 2023-05-16 RX ORDER — FLUCONAZOLE 200 MG/1
200 TABLET ORAL DAILY
Qty: 16 TABLET | Refills: 0 | Status: SHIPPED | OUTPATIENT
Start: 2023-05-16 | End: 2023-06-01

## 2023-05-17 ENCOUNTER — TELEPHONE (OUTPATIENT)
Dept: GASTROENTEROLOGY | Facility: CLINIC | Age: 68
End: 2023-05-17

## 2023-05-17 NOTE — TELEPHONE ENCOUNTER
----- Message from Maxime Ray MD sent at 5/16/2023  8:55 AM EDT -----  Dear staff: Please kindly communicate with patient that biopsy from stricture and esophagus showed fungal infection  I am prescribing an antifungal with advanced endoscopist Dr Mindy Hassan for management of esophageal stricture

## 2023-10-24 ENCOUNTER — APPOINTMENT (EMERGENCY)
Dept: RADIOLOGY | Facility: HOSPITAL | Age: 68
End: 2023-10-24
Payer: COMMERCIAL

## 2023-10-24 ENCOUNTER — HOSPITAL ENCOUNTER (EMERGENCY)
Facility: HOSPITAL | Age: 68
Discharge: HOME | End: 2023-10-24
Attending: EMERGENCY MEDICINE
Payer: COMMERCIAL

## 2023-10-24 VITALS
SYSTOLIC BLOOD PRESSURE: 166 MMHG | BODY MASS INDEX: 17.13 KG/M2 | TEMPERATURE: 98.2 F | RESPIRATION RATE: 17 BRPM | HEART RATE: 78 BPM | WEIGHT: 126.32 LBS | OXYGEN SATURATION: 95 % | DIASTOLIC BLOOD PRESSURE: 98 MMHG

## 2023-10-24 DIAGNOSIS — R07.9 CHEST PAIN WITH LOW RISK OF ACUTE CORONARY SYNDROME: ICD-10-CM

## 2023-10-24 DIAGNOSIS — R13.10 DYSPHAGIA: Primary | ICD-10-CM

## 2023-10-24 DIAGNOSIS — R03.0 ELEVATED BLOOD PRESSURE READING: ICD-10-CM

## 2023-10-24 LAB
2HR DELTA HS TROPONIN: 0 NG/L
ALBUMIN SERPL BCP-MCNC: 3.8 G/DL (ref 3.5–5)
ALP SERPL-CCNC: 165 U/L (ref 34–104)
ALT SERPL W P-5'-P-CCNC: 14 U/L (ref 7–52)
ANION GAP SERPL CALCULATED.3IONS-SCNC: 7 MMOL/L
AST SERPL W P-5'-P-CCNC: 25 U/L (ref 13–39)
ATRIAL RATE: 82 BPM
BASOPHILS # BLD AUTO: 0.1 THOUSANDS/ÂΜL (ref 0–0.1)
BASOPHILS NFR BLD AUTO: 2 % (ref 0–1)
BILIRUB DIRECT SERPL-MCNC: 0.13 MG/DL (ref 0–0.2)
BILIRUB SERPL-MCNC: 0.76 MG/DL (ref 0.2–1)
BNP SERPL-MCNC: 292 PG/ML (ref 0–100)
BUN SERPL-MCNC: 13 MG/DL (ref 5–25)
CALCIUM SERPL-MCNC: 10.8 MG/DL (ref 8.4–10.2)
CARDIAC TROPONIN I PNL SERPL HS: 9 NG/L
CARDIAC TROPONIN I PNL SERPL HS: 9 NG/L
CHLORIDE SERPL-SCNC: 105 MMOL/L (ref 96–108)
CO2 SERPL-SCNC: 29 MMOL/L (ref 21–32)
CREAT SERPL-MCNC: 0.74 MG/DL (ref 0.6–1.3)
EOSINOPHIL # BLD AUTO: 0.19 THOUSAND/ÂΜL (ref 0–0.61)
EOSINOPHIL NFR BLD AUTO: 4 % (ref 0–6)
ERYTHROCYTE [DISTWIDTH] IN BLOOD BY AUTOMATED COUNT: 16.2 % (ref 11.6–15.1)
GFR SERPL CREATININE-BSD FRML MDRD: 94 ML/MIN/1.73SQ M
GLUCOSE SERPL-MCNC: 99 MG/DL (ref 65–140)
HCT VFR BLD AUTO: 42 % (ref 36.5–49.3)
HGB BLD-MCNC: 13 G/DL (ref 12–17)
IMM GRANULOCYTES # BLD AUTO: 0.01 THOUSAND/UL (ref 0–0.2)
IMM GRANULOCYTES NFR BLD AUTO: 0 % (ref 0–2)
LYMPHOCYTES # BLD AUTO: 0.72 THOUSANDS/ÂΜL (ref 0.6–4.47)
LYMPHOCYTES NFR BLD AUTO: 14 % (ref 14–44)
MCH RBC QN AUTO: 28.8 PG (ref 26.8–34.3)
MCHC RBC AUTO-ENTMCNC: 31 G/DL (ref 31.4–37.4)
MCV RBC AUTO: 93 FL (ref 82–98)
MONOCYTES # BLD AUTO: 0.68 THOUSAND/ÂΜL (ref 0.17–1.22)
MONOCYTES NFR BLD AUTO: 13 % (ref 4–12)
NEUTROPHILS # BLD AUTO: 3.58 THOUSANDS/ÂΜL (ref 1.85–7.62)
NEUTS SEG NFR BLD AUTO: 67 % (ref 43–75)
NRBC BLD AUTO-RTO: 0 /100 WBCS
P AXIS: 75 DEGREES
PLATELET # BLD AUTO: 418 THOUSANDS/UL (ref 149–390)
PMV BLD AUTO: 9.5 FL (ref 8.9–12.7)
POTASSIUM SERPL-SCNC: 3.9 MMOL/L (ref 3.5–5.3)
PR INTERVAL: 186 MS
PROT SERPL-MCNC: 6.9 G/DL (ref 6.4–8.4)
QRS AXIS: 99 DEGREES
QRSD INTERVAL: 90 MS
QT INTERVAL: 354 MS
QTC INTERVAL: 413 MS
RBC # BLD AUTO: 4.52 MILLION/UL (ref 3.88–5.62)
SODIUM SERPL-SCNC: 141 MMOL/L (ref 135–147)
T WAVE AXIS: 88 DEGREES
VENTRICULAR RATE: 82 BPM
WBC # BLD AUTO: 5.28 THOUSAND/UL (ref 4.31–10.16)

## 2023-10-24 PROCEDURE — 71046 X-RAY EXAM CHEST 2 VIEWS: CPT

## 2023-10-24 PROCEDURE — 93010 ELECTROCARDIOGRAM REPORT: CPT | Performed by: STUDENT IN AN ORGANIZED HEALTH CARE EDUCATION/TRAINING PROGRAM

## 2023-10-24 PROCEDURE — 36415 COLL VENOUS BLD VENIPUNCTURE: CPT | Performed by: EMERGENCY MEDICINE

## 2023-10-24 PROCEDURE — 99285 EMERGENCY DEPT VISIT HI MDM: CPT | Performed by: EMERGENCY MEDICINE

## 2023-10-24 PROCEDURE — 84484 ASSAY OF TROPONIN QUANT: CPT | Performed by: EMERGENCY MEDICINE

## 2023-10-24 PROCEDURE — 93005 ELECTROCARDIOGRAM TRACING: CPT

## 2023-10-24 PROCEDURE — 85025 COMPLETE CBC W/AUTO DIFF WBC: CPT | Performed by: EMERGENCY MEDICINE

## 2023-10-24 PROCEDURE — 99285 EMERGENCY DEPT VISIT HI MDM: CPT

## 2023-10-24 PROCEDURE — 80076 HEPATIC FUNCTION PANEL: CPT | Performed by: EMERGENCY MEDICINE

## 2023-10-24 PROCEDURE — 83880 ASSAY OF NATRIURETIC PEPTIDE: CPT | Performed by: EMERGENCY MEDICINE

## 2023-10-24 PROCEDURE — 80048 BASIC METABOLIC PNL TOTAL CA: CPT | Performed by: EMERGENCY MEDICINE

## 2023-10-24 RX ORDER — CARVEDILOL 3.12 MG/1
3.12 TABLET ORAL 2 TIMES DAILY WITH MEALS
Qty: 60 TABLET | Refills: 0 | Status: SHIPPED | OUTPATIENT
Start: 2023-10-24 | End: 2023-11-23

## 2023-10-24 RX ORDER — FLUCONAZOLE 100 MG/1
100 TABLET ORAL ONCE
Status: COMPLETED | OUTPATIENT
Start: 2023-10-24 | End: 2023-10-24

## 2023-10-24 RX ORDER — FLUCONAZOLE 100 MG/1
100 TABLET ORAL DAILY
Qty: 14 TABLET | Refills: 0 | Status: SHIPPED | OUTPATIENT
Start: 2023-10-24 | End: 2023-11-07

## 2023-10-24 RX ADMIN — FLUCONAZOLE 100 MG: 100 TABLET ORAL at 15:25

## 2023-10-24 NOTE — Clinical Note
Jose Neely was seen and treated in our emergency department on 10/24/2023. No restrictions            Diagnosis:     Nuha Hampton  may return to work on return date. He may return on this date: 10/26/2023         If you have any questions or concerns, please don't hesitate to call.       Evin Samano MD    ______________________________           _______________          _______________  Hospital Representative                              Date                                Time

## 2023-10-24 NOTE — DISCHARGE INSTRUCTIONS
Return to the ER if your chest pain changes in quality or location, or becomes associated with shortness of breath, lightheadedness, vomiting or sweating. Take the Diflucan daily for the next 14 days, make sure to finish off the entire course. Continue your carvedilol as previously prescribed. Call your cardiologist and your GI doctor, you should be seen in the office for further evaluation and management.

## 2023-10-24 NOTE — ED PROVIDER NOTES
History  Chief Complaint   Patient presents with    Chest Pain     Patient reports generalized chest pain for the past week, intermittent sob. Reports epigastric abdominal pain that has been going on "for awhile." Patient reports he has a weak heart and ran out of his medications recently. 75 YO male presents with chest pain for the last week. Patient states this is constant, throbbing, primarily in the central chest, non-radiating, no known exacerbating or alleviating factors. Patient states he has had similar in the past. He notes some shortness of breath which is intermittent and has been ongoing for some time. Daughter additionally states the patient has had increasing difficulty with swallowing. Patient has had dysphagia in the past, has followed with GI for this and has had endoscopies which have demonstrated Wills's esophagus. Patient has not called GI for evaluation of this, states it has been ongoing for a long time. Pt denies F/C/N/V/D/C, no dysuria, burning on urination or blood in urine. History provided by:  Patient and relative   used: No        Prior to Admission Medications   Prescriptions Last Dose Informant Patient Reported?  Taking?   ascorbic acid (VITAMIN C) 250 mg tablet   Yes Yes   Sig: Take 250 mg by mouth daily   carvedilol (COREG) 3.125 mg tablet   No No   Sig: Take 1 tablet (3.125 mg total) by mouth 2 (two) times a day with meals   ferrous sulfate 324 (65 Fe) mg   Yes Yes   Sig: Take 325 mg by mouth daily before breakfast    losartan (COZAAR) 25 mg tablet   No No   Sig: Take 1 tablet (25 mg total) by mouth daily   pantoprazole (PROTONIX) 40 mg tablet   No No   Sig: Take 1 tablet (40 mg total) by mouth 2 (two) times a day for 28 days   sucralfate (CARAFATE) 1 g tablet Not Taking  No No   Sig: Take 1 tablet (1 g total) by mouth 4 (four) times a day   Patient not taking: Reported on 10/24/2023      Facility-Administered Medications: None       Past Medical History:   Diagnosis Date    Anemia     AVM (arteriovenous malformation) of small bowel, acquired     Wills's esophagus     Cirilo ulcer     Esophagitis     History of blood transfusion     22 prior transfusions       Past Surgical History:   Procedure Laterality Date    APPENDECTOMY      COLONOSCOPY N/A 2017    Procedure: COLONOSCOPY;  Surgeon: Tracey Yadav MD;  Location: [facility]; Service: Gastroenterology    EGD AND COLONOSCOPY  2017    EGD AND COLONOSCOPY N/A 2018    Procedure: EGD with biopsy AND COLONOSCOPY-incomplete to sigmoid colon;  Surgeon: Billye Gosselin, DO;  Location: [facility]; Service: Gastroenterology    ESOPHAGOGASTRODUODENOSCOPY N/A 10/6/2017    Procedure: ESOPHAGOGASTRODUODENOSCOPY (EGD) with biopsy;  Surgeon: César Griffin MD;  Location: [facility]; Service: Gastroenterology    ESOPHAGOGASTRODUODENOSCOPY N/A 11/3/2017    Procedure: ESOPHAGOGASTRODUODENOSCOPY (EGD) with biopsy;  Surgeon: Álvaro Carlton MD;  Location: [facility]; Service: Gastroenterology    ESOPHAGOGASTRODUODENOSCOPY N/A 2018    Procedure: ESOPHAGOGASTRODUODENOSCOPY (EGD) with biopsy;  Surgeon: César Griffin MD;  Location: [facility]; Service: Gastroenterology    ESOPHAGOGASTRODUODENOSCOPY N/A 3/26/2018    Procedure: ESOPHAGOGASTRODUODENOSCOPY (EGD); Surgeon: Tracey Yadav MD;  Location: [facility]; Service: Gastroenterology       Family History   Problem Relation Age of Onset    Hemangiomas Father      I have reviewed and agree with the history as documented.     E-Cigarette/Vaping    E-Cigarette Use Never User      E-Cigarette/Vaping Substances     Social History     Tobacco Use    Smoking status: Former     Types: Cigarettes     Quit date: 11/3/2014     Years since quittin.9    Smokeless tobacco: Never   Vaping Use    Vaping Use: Never used   Substance Use Topics    Alcohol use: Not Currently     Comment: once or twice a week     Drug use: No       Review of Systems Constitutional:  Negative for fever. HENT:  Positive for trouble swallowing. Negative for dental problem. Eyes:  Negative for visual disturbance. Respiratory:  Positive for shortness of breath. Cardiovascular:  Positive for chest pain. Gastrointestinal:  Negative for abdominal pain, nausea and vomiting. Genitourinary:  Negative for dysuria and frequency. Musculoskeletal:  Negative for neck pain and neck stiffness. Skin:  Negative for rash. Neurological:  Negative for dizziness, weakness and light-headedness. Psychiatric/Behavioral:  Negative for agitation, behavioral problems and confusion. All other systems reviewed and are negative. Physical Exam  Physical Exam  Vitals and nursing note reviewed. Constitutional:       Appearance: He is well-developed. HENT:      Head: Normocephalic and atraumatic. Eyes:      Extraocular Movements: Extraocular movements intact. Cardiovascular:      Rate and Rhythm: Normal rate and regular rhythm. Pulses: Normal pulses. Heart sounds: Normal heart sounds. Pulmonary:      Effort: Pulmonary effort is normal.   Abdominal:      General: There is no distension. Musculoskeletal:         General: Normal range of motion. Cervical back: Normal range of motion. Skin:     Findings: No rash. Neurological:      Mental Status: He is alert and oriented to person, place, and time.    Psychiatric:         Behavior: Behavior normal.         Vital Signs  ED Triage Vitals [10/24/23 1143]   Temperature Pulse Respirations Blood Pressure SpO2   98.2 °F (36.8 °C) 85 16 (!) 198/104 100 %      Temp Source Heart Rate Source Patient Position - Orthostatic VS BP Location FiO2 (%)   Oral Monitor Lying Left arm --      Pain Score       4           Vitals:    10/24/23 1143 10/24/23 1154 10/24/23 1430   BP: (!) 198/104 149/98 166/98   Pulse: 85  78   Patient Position - Orthostatic VS: Lying Lying Sitting         Visual Acuity      ED Medications  Medications   fluconazole (DIFLUCAN) tablet 100 mg (100 mg Oral Given 10/24/23 1525)       Diagnostic Studies  Results Reviewed       Procedure Component Value Units Date/Time    HS Troponin I 2hr [[id number]]  (Normal) Collected: 10/24/23 1420    Lab Status: Final result Specimen: Blood from Arm, Right Updated: 10/24/23 1446     hs TnI 2hr 9 ng/L      Delta 2hr hsTnI 0 ng/L     HS Troponin 0hr (reflex protocol) [[id number]]  (Normal) Collected: 10/24/23 1209    Lab Status: Final result Specimen: Blood from Hand, Right Updated: 10/24/23 1240     hs TnI 0hr 9 ng/L     B-Type Natriuretic Peptide(BNP) [[id number]]  (Abnormal) Collected: 10/24/23 1209    Lab Status: Final result Specimen: Blood from Hand, Right Updated: 10/24/23 1239      pg/mL     Basic metabolic panel [[id number]]  (Abnormal) Collected: 10/24/23 1209    Lab Status: Final result Specimen: Blood from Hand, Right Updated: 10/24/23 1232     Sodium 141 mmol/L      Potassium 3.9 mmol/L      Chloride 105 mmol/L      CO2 29 mmol/L      ANION GAP 7 mmol/L      BUN 13 mg/dL      Creatinine 0.74 mg/dL      Glucose 99 mg/dL      Calcium 10.8 mg/dL      eGFR 94 ml/min/1.73sq m     Narrative:      [facility] guidelines for Chronic Kidney Disease (CKD):     Stage 1 with normal or high GFR (GFR > 90 mL/min/1.73 square meters)    Stage 2 Mild CKD (GFR = 60-89 mL/min/1.73 square meters)    Stage 3A Moderate CKD (GFR = 45-59 mL/min/1.73 square meters)    Stage 3B Moderate CKD (GFR = 30-44 mL/min/1.73 square meters)    Stage 4 Severe CKD (GFR = 15-29 mL/min/1.73 square meters)    Stage 5 End Stage CKD (GFR <15 mL/min/1.73 square meters)  Note: GFR calculation is accurate only with a steady state creatinine    Hepatic function panel [[id number]]  (Abnormal) Collected: 10/24/23 1209    Lab Status: Final result Specimen: Blood from Hand, Right Updated: 10/24/23 1232     Total Bilirubin 0.76 mg/dL      Bilirubin, Direct 0.13 mg/dL Alkaline Phosphatase 165 U/L      AST 25 U/L      ALT 14 U/L      Total Protein 6.9 g/dL      Albumin 3.8 g/dL     CBC and differential [[id number]]  (Abnormal) Collected: 10/24/23 1209    Lab Status: Final result Specimen: Blood from Hand, Right Updated: 10/24/23 1216     WBC 5.28 Thousand/uL      RBC 4.52 Million/uL      Hemoglobin 13.0 g/dL      Hematocrit 42.0 %      MCV 93 fL      MCH 28.8 pg      MCHC 31.0 g/dL      RDW 16.2 %      MPV 9.5 fL      Platelets 147 Thousands/uL      nRBC 0 /100 WBCs      Neutrophils Relative 67 %      Immat GRANS % 0 %      Lymphocytes Relative 14 %      Monocytes Relative 13 %      Eosinophils Relative 4 %      Basophils Relative 2 %      Neutrophils Absolute 3.58 Thousands/µL      Immature Grans Absolute 0.01 Thousand/uL      Lymphocytes Absolute 0.72 Thousands/µL      Monocytes Absolute 0.68 Thousand/µL      Eosinophils Absolute 0.19 Thousand/µL      Basophils Absolute 0.10 Thousands/µL                    XR chest 2 views    (Results Pending)              Procedures  ECG 12 Lead Documentation Only    Date/Time: 10/24/2023 11:50 AM    Performed by: Royal Valentine MD  Authorized by: Royal Valentine MD    ECG reviewed by me, the ED Provider: yes    Patient location:  ED  Interpretation:     Interpretation: normal    Rate:     ECG rate:  82    ECG rate assessment: normal    Rhythm:     Rhythm: sinus rhythm    QRS:     QRS axis:  Right    QRS intervals:  Normal  Conduction:     Conduction: normal    ST segments:     ST segments:  Non-specific  T waves:     T waves: normal             ED Course  ED Course as of 10/24/23 1733   Tue Oct 24, 2023   1248 ECG evaluated by myself, interpretation included in procedure section of note. 1249 Chest x-ray evaluated by me, interpretation:  No pneumonia or fluid overload. [id number] Daughter states additional concern as patient has had increasing difficulty with swallowing PO, particularly solids.  States he has had similar issues in the past and has required EGD. Looking through records, patient does have a history of EGD 5 months ago, finding of a fungal infection with recommendation to treat with an antifungal. Did reach out to GI, will look into which medication patient should have been prescribed. 1507 hs TnI 2hr: 9  Stable   1509 GI recommends using diflucan 100mg daily for 2 weeks, on call with discuss with office to facilitate close follow up with repeat EGD. HEART Risk Score      Flowsheet Row Most Recent Value   Heart Score Risk Calculator    History 0 Filed at: 10/24/2023 1519   ECG 1 Filed at: 10/24/2023 1519   Age 2 Filed at: 10/24/2023 1519   Risk Factors 2 Filed at: 10/24/2023 1519   Troponin 0 Filed at: 10/24/2023 1519   HEART Score 5 Filed at: 10/24/2023 1519                          SBIRT 22yo+      Flowsheet Row Most Recent Value   Initial Alcohol Screen: US AUDIT-C     1. How often do you have a drink containing alcohol? 0 Filed at: 10/24/2023 1143   2. How many drinks containing alcohol do you have on a typical day you are drinking? 0 Filed at: 10/24/2023 1143   3b. FEMALE Any Age, or MALE 65+: How often do you have 4 or more drinks on one occassion? 0 Filed at: 10/24/2023 1143   Audit-C Score 0 Filed at: 10/24/2023 1143   MERON: How many times in the past year have you. .. Used an illegal drug or used a prescription medication for non-medical reasons? Never Filed at: 10/24/2023 1143                      Medical Decision Making  1. Chest pain - Will order ECG and troponin to rule out acute MI, CBC for anemia,  LFT's to assess GB dysfunction, lipase for pancreatitis, metabolic panel for electrolyte abnormalities and dehydration. Will order CXR. 2. Dysphagia - Patient with ongoing issue with dysphagia, he is tolerating PO. Will discuss with GI. Amount and/or Complexity of Data Reviewed  Labs: ordered. Decision-making details documented in ED Course. Radiology: ordered.   ECG/medicine tests: ordered and independent interpretation performed. Decision-making details documented in ED Course. Risk  Prescription drug management. Disposition  Final diagnoses:   Dysphagia   Elevated blood pressure reading   Chest pain with low risk of acute coronary syndrome     Time reflects when diagnosis was documented in both MDM as applicable and the Disposition within this note       Time User Action Codes Description Comment    10/24/2023  3:20 PM Dimitri ZHU Add [R13.10] Dysphagia     10/24/2023  3:21 PM Theta Sophia, [address] [R03.0] Elevated blood pressure reading     10/24/2023  3:21 PM Dimitri ZHU Add [R07.9] Chest pain with low risk of acute coronary syndrome           ED Disposition       ED Disposition   Discharge    Condition   Stable    Date/Time   Tue Oct 24, 2023 3[facility]. discharge to home/self care.                    Follow-up Information       Follow up With Specialties Details Why Ping Palm MD Gastroenterology   [address] [address]  [address] [facility]  [phone]              Discharge Medication List as of 10/24/2023  3:26 PM        START taking these medications    Details   fluconazole (DIFLUCAN) 100 mg tablet Take 1 tablet (100 mg total) by mouth in the morning for 14 doses, Starting Tue 10/24/2023, Until Tue 11/7/2023, Normal           CONTINUE these medications which have CHANGED    Details   carvedilol (COREG) 3.125 mg tablet Take 1 tablet (3.125 mg total) by mouth 2 (two) times a day with meals, Starting Tue 10/24/2023, Until Thu 11/23/2023, Normal           CONTINUE these medications which have NOT CHANGED    Details   ascorbic acid (VITAMIN C) 250 mg tablet Take 250 mg by mouth daily, Historical Med      ferrous sulfate 324 (65 Fe) mg Take 325 mg by mouth daily before breakfast , Starting Thu 1/25/2018, Historical Med      losartan (COZAAR) 25 mg tablet Take 1 tablet (25 mg total) by mouth daily, Starting Fri 5/5/2023, Until Sun 6/4/2023, Normal      pantoprazole (PROTONIX) 40 mg tablet Take 1 tablet (40 mg total) by mouth 2 (two) times a day for 28 days, Starting Fri 5/5/2023, Until Fri 6/2/2023, Normal      sucralfate (CARAFATE) 1 g tablet Take 1 tablet (1 g total) by mouth 4 (four) times a day, Starting Tue 1/7/2020, Normal             No discharge procedures on file.     PDMP Review       None            ED Provider  Electronically Signed by             Neville Ceja MD  10/24/23 0078

## 2023-10-25 LAB
ATRIAL RATE: 72 BPM
P AXIS: 67 DEGREES
PR INTERVAL: 172 MS
QRS AXIS: 95 DEGREES
QRSD INTERVAL: 88 MS
QT INTERVAL: 382 MS
QTC INTERVAL: 418 MS
T WAVE AXIS: 96 DEGREES
VENTRICULAR RATE: 72 BPM

## 2023-10-25 PROCEDURE — 93010 ELECTROCARDIOGRAM REPORT: CPT | Performed by: INTERNAL MEDICINE

## 2023-11-01 ENCOUNTER — TELEPHONE (OUTPATIENT)
Dept: GASTROENTEROLOGY | Facility: MEDICAL CENTER | Age: 68
End: 2023-11-01

## 2023-11-01 NOTE — LETTER
[facility] [address]  [address] [address]  Phone#  [phone]  Fax#  [phone]      November 7, 2023      Dear:   Jerry Monzon. Our office has attempted to contact you several times regarding your Appointment. Could you please contact our office at [phone] option 1. Thank you.      Sincerely,    [facility]

## 2023-11-01 NOTE — TELEPHONE ENCOUNTER
----- Message from Bruno Benson DO sent at 10/24/2023  3:08 PM EDT -----  Regarding: Follow-up  Please schedule patient for office follow-up for dysphagia, history of stricture, Candida esophagitis. Also schedule him for repeat EGD with advanced endoscopist as recommended prior.

## 2024-05-13 ENCOUNTER — APPOINTMENT (EMERGENCY)
Dept: RADIOLOGY | Facility: HOSPITAL | Age: 69
End: 2024-05-13
Payer: COMMERCIAL

## 2024-05-13 ENCOUNTER — HOSPITAL ENCOUNTER (EMERGENCY)
Facility: HOSPITAL | Age: 69
Discharge: HOME | End: 2024-05-13
Attending: EMERGENCY MEDICINE
Payer: COMMERCIAL

## 2024-05-13 VITALS
HEART RATE: 62 BPM | TEMPERATURE: 97.6 F | RESPIRATION RATE: 18 BRPM | SYSTOLIC BLOOD PRESSURE: 166 MMHG | DIASTOLIC BLOOD PRESSURE: 85 MMHG | OXYGEN SATURATION: 91 %

## 2024-05-13 DIAGNOSIS — D64.9 ACUTE ON CHRONIC ANEMIA: Primary | ICD-10-CM

## 2024-05-13 DIAGNOSIS — S82.832A CLOSED FRACTURE OF DISTAL END OF LEFT FIBULA, UNSPECIFIED FRACTURE MORPHOLOGY, INITIAL ENCOUNTER: ICD-10-CM

## 2024-05-13 DIAGNOSIS — D68.59 THROMBOPHILIA (HCC): ICD-10-CM

## 2024-05-13 DIAGNOSIS — R21 RASH: ICD-10-CM

## 2024-05-13 LAB
ABO GROUP BLD: NORMAL
ALBUMIN SERPL BCP-MCNC: 3.3 G/DL (ref 3.5–5)
ALP SERPL-CCNC: 114 U/L (ref 34–104)
ALT SERPL W P-5'-P-CCNC: 8 U/L (ref 7–52)
ANION GAP SERPL CALCULATED.3IONS-SCNC: 6 MMOL/L (ref 4–13)
ANISOCYTOSIS BLD QL SMEAR: PRESENT
AST SERPL W P-5'-P-CCNC: 17 U/L (ref 13–39)
BASOPHILS # BLD MANUAL: 0 THOUSAND/UL (ref 0–0.1)
BASOPHILS NFR MAR MANUAL: 0 % (ref 0–1)
BILIRUB SERPL-MCNC: 0.5 MG/DL (ref 0.2–1)
BLD GP AB SCN SERPL QL: NEGATIVE
BUN SERPL-MCNC: 12 MG/DL (ref 5–25)
CALCIUM ALBUM COR SERPL-MCNC: 9.1 MG/DL (ref 8.3–10.1)
CALCIUM SERPL-MCNC: 8.5 MG/DL (ref 8.4–10.2)
CHLORIDE SERPL-SCNC: 106 MMOL/L (ref 96–108)
CO2 SERPL-SCNC: 30 MMOL/L (ref 21–32)
CREAT SERPL-MCNC: 1 MG/DL (ref 0.6–1.3)
EOSINOPHIL # BLD MANUAL: 1.81 THOUSAND/UL (ref 0–0.4)
EOSINOPHIL NFR BLD MANUAL: 19 % (ref 0–6)
ERYTHROCYTE [DISTWIDTH] IN BLOOD BY AUTOMATED COUNT: 17.8 % (ref 11.6–15.1)
FERRITIN SERPL-MCNC: 5 NG/ML (ref 24–336)
FOLATE SERPL-MCNC: 2.3 NG/ML
GFR SERPL CREATININE-BSD FRML MDRD: 76 ML/MIN/1.73SQ M
GLUCOSE SERPL-MCNC: 113 MG/DL (ref 65–140)
HCT VFR BLD AUTO: 24.5 % (ref 36.5–49.3)
HGB BLD-MCNC: 6.6 G/DL (ref 12–17)
IRON SATN MFR SERPL: 4 % (ref 15–50)
IRON SERPL-MCNC: 13 UG/DL (ref 50–212)
LYMPHOCYTES # BLD AUTO: 0.38 THOUSAND/UL (ref 0.6–4.47)
LYMPHOCYTES # BLD AUTO: 4 % (ref 14–44)
MAGNESIUM SERPL-MCNC: 1.8 MG/DL (ref 1.9–2.7)
MCH RBC QN AUTO: 21.2 PG (ref 26.8–34.3)
MCHC RBC AUTO-ENTMCNC: 26.9 G/DL (ref 31.4–37.4)
MCV RBC AUTO: 79 FL (ref 82–98)
MONOCYTES # BLD AUTO: 0.38 THOUSAND/UL (ref 0–1.22)
MONOCYTES NFR BLD: 4 % (ref 4–12)
NEUTROPHILS # BLD MANUAL: 6.94 THOUSAND/UL (ref 1.85–7.62)
NEUTS SEG NFR BLD AUTO: 73 % (ref 43–75)
PHOSPHATE SERPL-MCNC: 2.9 MG/DL (ref 2.3–4.1)
PLATELET # BLD AUTO: 754 THOUSANDS/UL (ref 149–390)
PLATELET BLD QL SMEAR: ABNORMAL
PMV BLD AUTO: 9.6 FL (ref 8.9–12.7)
POTASSIUM SERPL-SCNC: 4 MMOL/L (ref 3.5–5.3)
PROT SERPL-MCNC: 6.4 G/DL (ref 6.4–8.4)
RBC # BLD AUTO: 3.12 MILLION/UL (ref 3.88–5.62)
RBC MORPH BLD: PRESENT
RH BLD: POSITIVE
SODIUM SERPL-SCNC: 142 MMOL/L (ref 135–147)
SPECIMEN EXPIRATION DATE: NORMAL
TIBC SERPL-MCNC: 295 UG/DL (ref 250–450)
UIBC SERPL-MCNC: 282 UG/DL (ref 155–355)
VIT B12 SERPL-MCNC: 334 PG/ML (ref 180–914)
WBC # BLD AUTO: 9.5 THOUSAND/UL (ref 4.31–10.16)

## 2024-05-13 PROCEDURE — 99285 EMERGENCY DEPT VISIT HI MDM: CPT | Performed by: EMERGENCY MEDICINE

## 2024-05-13 PROCEDURE — 83550 IRON BINDING TEST: CPT | Performed by: EMERGENCY MEDICINE

## 2024-05-13 PROCEDURE — 86901 BLOOD TYPING SEROLOGIC RH(D): CPT | Performed by: EMERGENCY MEDICINE

## 2024-05-13 PROCEDURE — 99284 EMERGENCY DEPT VISIT MOD MDM: CPT

## 2024-05-13 PROCEDURE — 85027 COMPLETE CBC AUTOMATED: CPT | Performed by: EMERGENCY MEDICINE

## 2024-05-13 PROCEDURE — 36430 TRANSFUSION BLD/BLD COMPNT: CPT

## 2024-05-13 PROCEDURE — 83735 ASSAY OF MAGNESIUM: CPT | Performed by: EMERGENCY MEDICINE

## 2024-05-13 PROCEDURE — 36000 PLACE NEEDLE IN VEIN: CPT | Performed by: EMERGENCY MEDICINE

## 2024-05-13 PROCEDURE — 86850 RBC ANTIBODY SCREEN: CPT | Performed by: EMERGENCY MEDICINE

## 2024-05-13 PROCEDURE — 85007 BL SMEAR W/DIFF WBC COUNT: CPT | Performed by: EMERGENCY MEDICINE

## 2024-05-13 PROCEDURE — 82607 VITAMIN B-12: CPT | Performed by: EMERGENCY MEDICINE

## 2024-05-13 PROCEDURE — 76942 ECHO GUIDE FOR BIOPSY: CPT | Performed by: EMERGENCY MEDICINE

## 2024-05-13 PROCEDURE — 84100 ASSAY OF PHOSPHORUS: CPT | Performed by: EMERGENCY MEDICINE

## 2024-05-13 PROCEDURE — P9016 RBC LEUKOCYTES REDUCED: HCPCS

## 2024-05-13 PROCEDURE — 83540 ASSAY OF IRON: CPT | Performed by: EMERGENCY MEDICINE

## 2024-05-13 PROCEDURE — 82746 ASSAY OF FOLIC ACID SERUM: CPT | Performed by: EMERGENCY MEDICINE

## 2024-05-13 PROCEDURE — 86923 COMPATIBILITY TEST ELECTRIC: CPT

## 2024-05-13 PROCEDURE — 36415 COLL VENOUS BLD VENIPUNCTURE: CPT | Performed by: EMERGENCY MEDICINE

## 2024-05-13 PROCEDURE — 80053 COMPREHEN METABOLIC PANEL: CPT | Performed by: EMERGENCY MEDICINE

## 2024-05-13 PROCEDURE — 82728 ASSAY OF FERRITIN: CPT | Performed by: EMERGENCY MEDICINE

## 2024-05-13 PROCEDURE — 73610 X-RAY EXAM OF ANKLE: CPT

## 2024-05-13 PROCEDURE — 86900 BLOOD TYPING SEROLOGIC ABO: CPT | Performed by: EMERGENCY MEDICINE

## 2024-05-13 NOTE — ED PROVIDER NOTES
History  Chief Complaint   Patient presents with    Foot Pain     Pt to er with reports of left foot and achilles pain. Denies any trauma.      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  Patient denies any recent falls or injuries.  He states he had an admission at [facility] after sustaining fractures from a fall this past January.  In regards to rash, he denies any new exposures or changes to medications.  Patient had followed with hem/onc in the past for anemia and thrombophilia, but has not followed with them in a while.          Prior to Admission Medications   Prescriptions Last Dose Informant Patient Reported? Taking?   ascorbic acid (VITAMIN C) 250 mg tablet   Yes No   Sig: Take 250 mg by mouth daily   carvedilol (COREG) 3.125 mg tablet   No No   Sig: Take 1 tablet (3.125 mg total) by mouth 2 (two) times a day with meals   carvedilol (COREG) 3.125 mg tablet   No No   Sig: Take 1 tablet (3.125 mg total) by mouth 2 (two) times a day with meals   ferrous sulfate 324 (65 Fe) mg   Yes No   Sig: Take 325 mg by mouth daily before breakfast    losartan (COZAAR) 25 mg tablet   No No   Sig: Take 1 tablet (25 mg total) by mouth daily   pantoprazole (PROTONIX) 40 mg tablet   No No   Sig: Take 1 tablet (40 mg total) by mouth 2 (two) times a day for 28 days   sucralfate (CARAFATE) 1 g tablet   No No   Sig: Take 1 tablet (1 g total) by mouth 4 (four) times a day   Patient not taking: Reported on 10/24/2023      Facility-Administered Medications: None       Past Medical History:   Diagnosis Date    Anemia     AVM (arteriovenous malformation) of small bowel, acquired     Wills's esophagus     Cirilo ulcer     Esophagitis     History of blood transfusion     22 prior transfusions       Past Surgical History:   Procedure Laterality Date    APPENDECTOMY      COLONOSCOPY N/A 11/6/2017     Procedure: COLONOSCOPY;  Surgeon: Jose Greene MD;  Location: [facility];  Service: Gastroenterology    EGD AND COLONOSCOPY  2017    EGD AND COLONOSCOPY N/A 2018    Procedure: EGD with biopsy AND COLONOSCOPY-incomplete to sigmoid colon;  Surgeon: Tigist Ortiz DO;  Location: [facility];  Service: Gastroenterology    ESOPHAGOGASTRODUODENOSCOPY N/A 10/6/2017    Procedure: ESOPHAGOGASTRODUODENOSCOPY (EGD) with biopsy;  Surgeon: Felipe Nguyen MD;  Location: [facility];  Service: Gastroenterology    ESOPHAGOGASTRODUODENOSCOPY N/A 11/3/2017    Procedure: ESOPHAGOGASTRODUODENOSCOPY (EGD) with biopsy;  Surgeon: Ha Bojorquez MD;  Location: [facility];  Service: Gastroenterology    ESOPHAGOGASTRODUODENOSCOPY N/A 2018    Procedure: ESOPHAGOGASTRODUODENOSCOPY (EGD) with biopsy;  Surgeon: Felipe Nguyen MD;  Location: [facility];  Service: Gastroenterology    ESOPHAGOGASTRODUODENOSCOPY N/A 3/26/2018    Procedure: ESOPHAGOGASTRODUODENOSCOPY (EGD);  Surgeon: Jose Greene MD;  Location: [facility];  Service: Gastroenterology       Family History   Problem Relation Age of Onset    Hemangiomas Father      I have reviewed and agree with the history as documented.    E-Cigarette/Vaping    E-Cigarette Use Never User      E-Cigarette/Vaping Substances     Social History     Tobacco Use    Smoking status: Former     Current packs/day: 0.00     Types: Cigarettes     Quit date: 11/3/2014     Years since quittin.5    Smokeless tobacco: Never   Vaping Use    Vaping status: Never Used   Substance Use Topics    Alcohol use: Not Currently     Comment: once or twice a week     Drug use: No       Review of Systems    Physical Exam  Physical Exam  Vitals and nursing note reviewed.   Constitutional:       Appearance: He is underweight.   HENT:      Head: Normocephalic and atraumatic.      Mouth/Throat:      Mouth: Mucous membranes are dry.   Cardiovascular:      Rate and Rhythm: Normal rate and regular  rhythm.      Pulses: Normal pulses.   Pulmonary:      Effort: Pulmonary effort is normal. No respiratory distress.   Abdominal:      General: There is no distension.      Palpations: Abdomen is soft.      Tenderness: There is no abdominal tenderness.   Musculoskeletal:      Right lower leg: No edema.      Left lower leg: No edema.      Comments: Tenderness lateral malleolus of left ankle.  No significant edema.  2+ DP/PT pulses.   Skin:     General: Skin is warm and dry.      Comments: Dry skin with scatter macular rash and scabbed excoriations.      Purpuric rash of the face (chronic per patient)    Neurological:      Mental Status: He is alert.         Vital Signs  ED Triage Vitals   Temperature Pulse Respirations Blood Pressure SpO2   05/13/24 1341 05/13/24 1341 05/13/24 1341 05/13/24 1341 05/13/24 1341   98 °F (36.7 °C) 78 18 124/87 97 %      Temp Source Heart Rate Source Patient Position - Orthostatic VS BP Location FiO2 (%)   05/13/24 1341 05/13/24 1341 05/13/24 1341 05/13/24 1341 --   Temporal Monitor Sitting Right arm       Pain Score       05/13/24 1540       No Pain           Vitals:    05/13/24 1650 05/13/24 1655 05/13/24 1700 05/13/24 1845   BP: (!) 184/97 (!) 180/97 (!) 175/91 166/85   Pulse: 70 71 66 62   Patient Position - Orthostatic VS:   Sitting Sitting         Visual Acuity  Visual Acuity      Flowsheet Row Most Recent Value   L Pupil Size (mm) 3   R Pupil Size (mm) 3            ED Medications  Medications - No data to display    Diagnostic Studies  Results Reviewed       Procedure Component Value Units Date/Time    TIBC Panel (incl. Iron, TIBC, % Iron Saturation) [[id number]]  (Abnormal) Collected: 05/13/24 1425    Lab Status: Final result Specimen: Blood from Arm, Right Updated: 05/13/24 1947     Iron Saturation 4 %      TIBC 295 ug/dL      Iron 13 ug/dL      UIBC 282 ug/dL     Vitamin B12 [[id number]]  (Normal) Collected: 05/13/24 1425    Lab Status: Final result Specimen: Blood from Arm, Right  Updated: 05/13/24 1938     Vitamin B-12 334 pg/mL     Folate [[id number]]  (Abnormal) Collected: 05/13/24 1425    Lab Status: Final result Specimen: Blood from Arm, Right Updated: 05/13/24 1932     Folate 2.3 ng/mL     Ferritin [[id number]]  (Abnormal) Collected: 05/13/24 1425    Lab Status: Final result Specimen: Blood from Arm, Right Updated: 05/13/24 1928     Ferritin 5 ng/mL     RBC Morphology Reflex Test [[id number]] Collected: 05/13/24 1425    Lab Status: Final result Specimen: Blood from Arm, Right Updated: 05/13/24 1601    CBC and differential [[id number]]  (Abnormal) Collected: 05/13/24 1425    Lab Status: Final result Specimen: Blood from Arm, Right Updated: 05/13/24 1546     WBC 9.50 Thousand/uL      RBC 3.12 Million/uL      Hemoglobin 6.6 g/dL      Hematocrit 24.5 %      MCV 79 fL      MCH 21.2 pg      MCHC 26.9 g/dL      RDW 17.8 %      MPV 9.6 fL      Platelets 754 Thousands/uL     Narrative:      This is an appended report.  These results have been appended to a previously verified report.    Manual Differential(PHLEBS Do Not Order) [[id number]]  (Abnormal) Collected: 05/13/24 1425    Lab Status: Final result Specimen: Blood from Arm, Right Updated: 05/13/24 1546     Segmented % 73 %      Lymphocytes % 4 %      Monocytes % 4 %      Eosinophils % 19 %      Basophils % 0 %      Absolute Neutrophils 6.94 Thousand/uL      Absolute Lymphocytes 0.38 Thousand/uL      Absolute Monocytes 0.38 Thousand/uL      Absolute Eosinophils 1.81 Thousand/uL      Absolute Basophils 0.00 Thousand/uL      Total Counted --     RBC Morphology Present     Platelet Estimate Increased     Anisocytosis Present    Comprehensive metabolic panel [[id number]]  (Abnormal) Collected: 05/13/24 1425    Lab Status: Final result Specimen: Blood from Arm, Right Updated: 05/13/24 1452     Sodium 142 mmol/L      Potassium 4.0 mmol/L      Chloride 106 mmol/L      CO2 30 mmol/L      ANION GAP 6 mmol/L      BUN 12 mg/dL      Creatinine 1.00 mg/dL       Glucose 113 mg/dL      Calcium 8.5 mg/dL      Corrected Calcium 9.1 mg/dL      AST 17 U/L      ALT 8 U/L      Alkaline Phosphatase 114 U/L      Total Protein 6.4 g/dL      Albumin 3.3 g/dL      Total Bilirubin 0.50 mg/dL      eGFR 76 ml/min/1.73sq m     Narrative:      National Kidney Disease Foundation guidelines for Chronic Kidney Disease (CKD):     Stage 1 with normal or high GFR (GFR > 90 mL/min/1.73 square meters)    Stage 2 Mild CKD (GFR = 60-89 mL/min/1.73 square meters)    Stage 3A Moderate CKD (GFR = 45-59 mL/min/1.73 square meters)    Stage 3B Moderate CKD (GFR = 30-44 mL/min/1.73 square meters)    Stage 4 Severe CKD (GFR = 15-29 mL/min/1.73 square meters)    Stage 5 End Stage CKD (GFR <15 mL/min/1.73 square meters)  Note: GFR calculation is accurate only with a steady state creatinine    Phosphorus [[id number]]  (Normal) Collected: 05/13/24 1425    Lab Status: Final result Specimen: Blood from Arm, Right Updated: 05/13/24 1452     Phosphorus 2.9 mg/dL     Magnesium [[id number]]  (Abnormal) Collected: 05/13/24 1425    Lab Status: Final result Specimen: Blood from Arm, Right Updated: 05/13/24 1452     Magnesium 1.8 mg/dL                    XR ankle 3+ views LEFT   ED Interpretation by Nafisa Palm MD (05/13 1458)   Subacute distal fibula fracture with signs of callous formation                 Procedures  Procedures     Procedure Note - Ultrasound Guided Peripheral IV    Ultrasound dynamic guidance was used for peripheral line insertion.    Risks and benefits of the procedure were discussed with the patient.  Discussed risks included pain with the procedure, bleeding, and risk of infection.    Indication: Difficult non-ultrasound guided peripheral intravenous line insertion.    Location: Laterally: right upper extremity.    Procedure: The patient was prepped using standard ultrasound guided IV procedures.  Using direct visualization of the intravenous catheter/needle, the vessel was successfully  cannulated with return of blood and advancement of the catheter.  The catheter was secured in the standard technique.    Complications: None.    Interpretation: Successful ultrasound guided peripheral IV.    This is a billable ultrasound guided procedure.    Ultrasound images stored on the Pix4D ultrasound image , or as an attachment to this chart.      ED Course  ED Course as of 05/14/24 0734   Mon May 13, 2024   1437 Hemoglobin(!): 6.6   1438 Platelet Count(!): 754   1458 Discussed thrombophilia with hem/onc.  Recommends checking iron panel, b12 and folate levels    1554 Subacute distal left fibula fracture discussed with orthopedics.  No indication for immobilization or CAM boot.  WBAT.  Patient to follow up with orthopedics at [facility].                               SBIRT 20yo+      Flowsheet Row Most Recent Value   Initial Alcohol Screen: US AUDIT-C     1. How often do you have a drink containing alcohol? 0 Filed at: 05/13/2024 1342   2. How many drinks containing alcohol do you have on a typical day you are drinking?  0 Filed at: 05/13/2024 1342   3a. Male UNDER 65: How often do you have five or more drinks on one occasion? 0 Filed at: 05/13/2024 1342   3b. FEMALE Any Age, or MALE 65+: How often do you have 4 or more drinks on one occassion? 0 Filed at: 05/13/2024 1342   Audit-C Score 0 Filed at: 05/13/2024 1342   MERON: How many times in the past year have you...    Used an illegal drug or used a prescription medication for non-medical reasons? Never Filed at: 05/13/2024 1342                      Medical Decision Making  68 year old male presents for evaluation of left ankle pain and rash.  Labs with acute on chronic anemia and thrombophilia.  Discussed with hem/onc.  1 unit PRBCs ordered.  Xray consistent with healing subacute left distal fibula fracture on my independent interpretation.  Discussed with orthopedics.  WBAT.  Patient to follow up with his orthopedist at [facility].  No criteria for admission at this  time.  Patient to follow up with hem/onc as an outpatient.  Referral placed.     Amount and/or Complexity of Data Reviewed  Labs: ordered. Decision-making details documented in ED Course.  Radiology: ordered and independent interpretation performed.             Disposition  Final diagnoses:   Acute on chronic anemia   Thrombophilia (HCC)   Closed fracture of distal end of left fibula, unspecified fracture morphology, initial encounter - subacute   Rash     Time reflects when diagnosis was documented in both MDM as applicable and the Disposition within this note       Time User Action Codes Description Comment    5/13/2024  3:52 PM Arpita, Nafisa J Add [D64.9] Acute on chronic anemia     5/13/2024  3:52 PM Arpita, Nafisa J Add [D68.59] Thrombophilia (HCC)     5/13/2024  3:53 PM Arpita, Nafisa J Add [S82.832A] Closed fracture of distal end of left fibula, unspecified fracture morphology, initial encounter     5/13/2024  3:53 PM Arpita, Nafisa J Modify [S82.832A] Closed fracture of distal end of left fibula, unspecified fracture morphology, initial encounter subacute    5/13/2024  3:53 PM Arpita, Nafisa J Add [R21] Rash           ED Disposition       ED Disposition   Discharge    Condition   Stable    Date/Time   Mon May 13, 2024 1912    Comment   Daniel Kovacs Jr. discharge to home/self care.                   Follow-up Information       Follow up With Specialties Details Why Contact Info Additional Information    [facility] [place] [facility] Schedule an appointment as soon as possible for a visit in 1 week for re-evaluation of high platelets and anemia [address]  [facility] [address] [phone] [facility], [address], [address], [place], [place], [address] [phone]    Cara Herrera MD Family Medicine Schedule an appointment as soon as possible for a visit in 3 days for  re-evaluation of rash [address] [facility] [address]  [phone]        [facility] Emergency Department Emergency Medicine Go to  If symptoms worsen [address] [facility] [address] [phone] [facility] Emergency Department, [address] [place], [place] [address]    Andi Ferreira MD Orthopedic Surgery Schedule an appointment as soon as possible for a visit in 1 week for re-evaluation of distal left fibula fracture [address] [facility] [facility]  [address]  [facility] [id number] [phone]               Discharge Medication List as of 5/13/2024  7:12 PM        CONTINUE these medications which have NOT CHANGED    Details   ascorbic acid (VITAMIN C) 250 mg tablet Take 250 mg by mouth daily, Historical Med      carvedilol (COREG) 3.125 mg tablet Take 1 tablet (3.125 mg total) by mouth 2 (two) times a day with meals, Starting Fri 5/5/2023, Until Sun 6/4/2023, Normal      carvedilol (COREG) 3.125 mg tablet Take 1 tablet (3.125 mg total) by mouth 2 (two) times a day with meals, Starting Tue 10/24/2023, Until Thu 11/23/2023, Normal      ferrous sulfate 324 (65 Fe) mg Take 325 mg by mouth daily before breakfast , Starting Thu 1/25/2018, Historical Med      losartan (COZAAR) 25 mg tablet Take 1 tablet (25 mg total) by mouth daily, Starting Fri 5/5/2023, Until Sun 6/4/2023, Normal      pantoprazole (PROTONIX) 40 mg tablet Take 1 tablet (40 mg total) by mouth 2 (two) times a day for 28 days, Starting Fri 5/5/2023, Until Fri 6/2/2023, Normal      sucralfate (CARAFATE) 1 g tablet Take 1 tablet (1 g total) by mouth 4 (four) times a day, Starting Tue 1/7/2020, Normal                 PDMP Review       None            ED Provider  Electronically Signed by             Nafisa Palm MD  05/14/24 0747

## 2024-05-13 NOTE — QUICK NOTE
Notified by ED provider Dr. Palm regarding patient presenting to ED for evaluation of left ankle pain x 1 month. Per discussion with ED provider, patient had a fall approximately 4 months ago and was subsequently admitted to [facility] and then rehab. Per chart review, patient was admitted to [facility] 12/20/2023-1/3/2024 as a trauma following a fall from a conveyor belt from which he sustained a left humerus fracture and fracture of left ilium. Per review of care everywhere images, no XRs of left tib/tib nor left ankle were obtained during that admission. Per discussion with ED provider today, patient denies recent injuries since that hospitalization.     Case and XRs of left ankle reviewed and discussed with Dr. Lachman.   XR left ankle reveals fracture of distal fibular shaft with evidence of bony callous formation. Also appears left ankle is malaligned with valgus deformity.  No acute intervention at this time  WBAT LLE  Recommend outpatient f/u with orthopedics- appears patient is established with [facility], most recent appointment 3 weeks ago on 4/24/2024

## 2024-05-13 NOTE — ED CARE HANDOFF
"Emergency Department Sign Out Note        Sign out and transfer of care from [facility]. See Separate Emergency Department note.     The patient, Daniel Kovacs Jr., was evaluated by the previous provider for rash.    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 In regards to rash, he denies any new exposures or changes to medications. Patient had followed with hem/onc in the past for anemia and thrombophilia, but has not followed with them in a while.     ED Course / Workup Pending (followup):  H/H 6.6/24.5 - getting transfusion x 1 unit and will reassess H/H.    1910 -PRBCs have finished.  Patient is refusing repeat hemoglobin stating \"I feel great, I feel fine\".  Agrees to follow-up with PCP for repeat lab testing and further evaluation of rash.    Final diagnoses:   Acute on chronic anemia   Thrombophilia (HCC)   Closed fracture of distal end of left fibula, unspecified fracture morphology, initial encounter - subacute   Rash                                        Procedures  Medical Decision Making  Amount and/or Complexity of Data Reviewed  Labs: ordered.  Radiology: ordered and independent interpretation performed.            Disposition  Final diagnoses:   Acute on chronic anemia   Thrombophilia (HCC)   Closed fracture of distal end of left fibula, unspecified fracture morphology, initial encounter - subacute   Rash     Time reflects when diagnosis was documented in both MDM as applicable and the Disposition within this note       Time User Action Codes Description Comment    5/13/2024  3:52 PM Nafisa Palm Add [D64.9] Acute on chronic anemia     5/13/2024  3:52 PM Nafias Palm Add [D68.59] Thrombophilia (HCC)     5/13/2024  3:53 PM Nafisa Palm " [S82.852A] Closed fracture of distal end of left fibula, unspecified fracture morphology, initial encounter     5/13/2024  3:53 PM Nafisa Palm Modify [S82.192A] Closed fracture of distal end of left fibula, unspecified fracture morphology, initial encounter subacute    5/13/2024  3:53 PM Nafisa Palm Add [R21] Rash           ED Disposition       ED Disposition   Discharge    Condition   Stable    Date/Time   Mon May 13, 2024  7:12 PM    Comment   Daniel Kovacs Jr. discharge to home/self care.                   Follow-up Information       Follow up With Specialties Details Why Contact Info Additional Information    [facility] [place] [facility] Schedule an appointment as soon as possible for a visit in 1 week for re-evaluation of high platelets and anemia [address] [facility] [address]  [facility] [address] [phone] [facility] [place], [address] [place] [address], [address], [place], [place], [address]  [phone]    Cara Herrera MD Family Medicine Schedule an appointment as soon as possible for a visit in 3 days for re-evaluation of rash [address] [facility] [address] [phone]        [facility] Emergency Department Emergency Medicine Go to  If symptoms worsen [address] [facility] [address] [phone] [facility] Emergency Department, [address] [place], [place] [address]    Andi Ferreira MD Orthopedic Surgery Schedule an appointment as soon as possible for a visit in 1 week for re-evaluation of distal left fibula fracture [address] [facility]  [address]  [facility] [address] [phone]             Discharge Medication List as of 5/13/2024  7:12 PM        CONTINUE these medications which have NOT CHANGED    Details   ascorbic acid (VITAMIN C) 250 mg tablet Take 250 mg by mouth daily, Historical Med       carvedilol (COREG) 3.125 mg tablet Take 1 tablet (3.125 mg total) by mouth 2 (two) times a day with meals, Starting Fri 5/5/2023, Until Sun 6/4/2023, Normal      carvedilol (COREG) 3.125 mg tablet Take 1 tablet (3.125 mg total) by mouth 2 (two) times a day with meals, Starting Tue 10/24/2023, Until Thu 11/23/2023, Normal      ferrous sulfate 324 (65 Fe) mg Take 325 mg by mouth daily before breakfast , Starting Thu 1/25/2018, Historical Med      losartan (COZAAR) 25 mg tablet Take 1 tablet (25 mg total) by mouth daily, Starting Fri 5/5/2023, Until Sun 6/4/2023, Normal      pantoprazole (PROTONIX) 40 mg tablet Take 1 tablet (40 mg total) by mouth 2 (two) times a day for 28 days, Starting Fri 5/5/2023, Until Fri 6/2/2023, Normal      sucralfate (CARAFATE) 1 g tablet Take 1 tablet (1 g total) by mouth 4 (four) times a day, Starting Tue 1/7/2020, Normal                  ED Provider  Electronically Signed by     Lanie Ambrosio DO  05/13/24 2002

## 2024-05-13 NOTE — Clinical Note
Daniel Kovacs was seen and treated in our emergency department on 5/13/2024.                Diagnosis: Anemia    Daniel  may return to work on return date.    He may return on this date: 05/15/2024         If you have any questions or concerns, please don't hesitate to call.      Lanie Ambrosio, DO    ______________________________           _______________          _______________  Hospital Representative                              Date                                Time

## 2024-05-14 LAB
ABO GROUP BLD BPU: NORMAL
ABO GROUP BLD BPU: NORMAL
BPU ID: NORMAL
BPU ID: NORMAL
CROSSMATCH: NORMAL
CROSSMATCH: NORMAL
UNIT DISPENSE STATUS: NORMAL
UNIT DISPENSE STATUS: NORMAL
UNIT PRODUCT CODE: NORMAL
UNIT PRODUCT CODE: NORMAL
UNIT PRODUCT VOLUME: 350 ML
UNIT PRODUCT VOLUME: 350 ML
UNIT RH: NORMAL
UNIT RH: NORMAL

## 2024-05-15 ENCOUNTER — TELEPHONE (OUTPATIENT)
Dept: HEMATOLOGY ONCOLOGY | Facility: CLINIC | Age: 69
End: 2024-05-15

## 2024-05-15 NOTE — TELEPHONE ENCOUNTER
I called Daniel in response to a referral that was received for patient to establish care with Hematology.     Outreach was made to schedule a consultation.    Phone system states subscriber is unavailable and disconnects the call.   Another attempt will be made to contact patient.

## 2024-05-16 ENCOUNTER — TELEPHONE (OUTPATIENT)
Dept: HEMATOLOGY ONCOLOGY | Facility: CLINIC | Age: 69
End: 2024-05-16

## 2024-05-16 NOTE — TELEPHONE ENCOUNTER
I called Daniel in response to a referral that was received for patient to establish care with Hematology.     Outreach was made to schedule a consultation.    Patient does not have a voicemail set up. Another attempt will be made to contact patient.

## 2024-05-20 ENCOUNTER — TELEPHONE (OUTPATIENT)
Dept: HEMATOLOGY ONCOLOGY | Facility: CLINIC | Age: 69
End: 2024-05-20

## 2024-05-20 NOTE — TELEPHONE ENCOUNTER
I called Daniel in response to a referral that was received for patient to establish care with Hematology.     Outreach was made to schedule a consultation.    Patient does not have a voicemail set up. This is the third attempt to schedule patient unsuccessfully. The referral has been closed, a Orniceptt message has been sent to patient (if applicable).

## 2024-06-04 NOTE — CONSULTS
Agree with letter. Please send. Thanks!   Consultation - Hollis Nazario  59 y o  male MRN: [id number]    Unit/Bed#: [facility] 68 2 -02 Encounter: [id number]      Assessment/Plan     Assessment:  In summary, this is a 17-year-old male history of anemia dating back at least 2 5 years  He states that this is been going on for several years prior to that  He has been on oral iron in the past few months  His anemia is multifactorial including contributors such as anemia of chronic inflammation, probable iron deficiency, other substrate deficiencies  Creatinine is normal, calcium normal   I think multiple myeloma is unlikely contributor  Myelodysplasia is possible the less likely  Mild monocytosis is probably reactive  I would be in favor of rechecking iron status, substrates, SPEP, free light chains looking for reversible contributors  Other AVMs may be present in the small intestine contributing to anemia/iron deficiency  The patient tells me that he has had over 40 units of blood over the past few years  This speaks to an ongoing anemia state, at least possibly related to GI bleeding  The presence of cutaneous telangiectasias raises the possibility of HHT  He denies any family members with cutaneous telangiectasias  He denies any epistaxis  This seems unlikely, therefore  Small bowel enteroscopy/capsule endoscopy would be reasonable  Also I wonder what the approaches for patients with refractory Wills's and whether there may be any other medical options  Further consideration for other underlying contributors to his esophagitis is reasonable and may have been carried out by GI already  The cause of his weight loss is not apparent  The patient relates that he is sometimes fearful of eating due to concern for development of abdominal pain    He is actually familiar with the term "Sitophobia "  Plan:  See above    History of Present Illness     HPI: Hollis Nazario  is a 59y o  year old male who presents with Upper abdominal pain   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  He initially presented in October 2019  He was found to have Wills's esophagus as well as fungal esophagitis  He was treated with PPI and Carafate  May 2019 colonoscopy showed internal hemorrhoids  Repeat EGD on January 3, 2020 showed large irregular ulcer in the lower 3rd of the esophagus  Inflamed stricture in the upper 3rd of the esophagus  Wills's esophagus noted  CT of the abdomen and Pelvis showed nonobstructing right renal calculus, mild gallbladder lumen nodularity, previously noted on ultrasound in June 2019  He has had 12 lb unintentional weight loss  Consultations is requested  He has had anemia dating back several years with hemoglobins as low as 5 5  MCV generally in the 60-80 range  Platelets are generally normal although thrombocytosis up to 900 has been noted at times  WBC's normal   Differential shows mild relative monocytosis, 12-17%  Most recent iron saturation was 2 years ago, 45%  June 2019 CT chest abdomen pelvis shows dilated esophagus with wall thickening, air-fluid level  Mild bladder wall thickening and prostate enlargement noted  Inpatient consult to Hematology  Consult performed by: El Barba DO  Consult ordered by: Radha Gonzalez PA-C          Review of Systems    Historical Information   Past Medical History:   Diagnosis Date    Anemia     AVM (arteriovenous malformation) of small bowel, acquired     Wills's esophagus     Cirilo ulcer     Esophagitis     History of blood transfusion     22 prior transfusions     Past Surgical History:   Procedure Laterality Date    APPENDECTOMY      COLONOSCOPY N/A 11/6/2017    Procedure: COLONOSCOPY;  Surgeon: Zhanna Jefferson MD;  Location: [facility];   Service: Gastroenterology    EGD AND COLONOSCOPY  07/18/2017    EGD AND COLONOSCOPY N/A 9/17/2018    Procedure: EGD with biopsy AND COLONOSCOPY-incomplete to sigmoid colon;  Surgeon: Frankey Spillers, DO;  Location: [facility]; Service: Gastroenterology    ESOPHAGOGASTRODUODENOSCOPY N/A 10/6/2017    Procedure: ESOPHAGOGASTRODUODENOSCOPY (EGD) with biopsy;  Surgeon: Sasha Meehan MD;  Location: [facility]; Service: Gastroenterology    ESOPHAGOGASTRODUODENOSCOPY N/A 11/3/2017    Procedure: ESOPHAGOGASTRODUODENOSCOPY (EGD) with biopsy;  Surgeon: Fabian Gonzáles MD;  Location: [facility]; Service: Gastroenterology    ESOPHAGOGASTRODUODENOSCOPY N/A 2018    Procedure: ESOPHAGOGASTRODUODENOSCOPY (EGD) with biopsy;  Surgeon: Sasha Meehan MD;  Location: [facility]; Service: Gastroenterology    ESOPHAGOGASTRODUODENOSCOPY N/A 3/26/2018    Procedure: ESOPHAGOGASTRODUODENOSCOPY (EGD); Surgeon: Chago Almanzar MD;  Location: [facility]; Service: Gastroenterology     Social History   Social History     Substance and Sexual Activity   Alcohol Use Not Currently    Comment: once or twice a week      Social History     Substance and Sexual Activity   Drug Use No     Social History     Tobacco Use   Smoking Status Former Smoker    Last attempt to quit: 11/3/2014    Years since quittin 1   Smokeless Tobacco Never Used     Family History:   Family History   Problem Relation Age of Onset    Hemangiomas Father        Meds/Allergies   all current active meds have been reviewed  Allergies   Allergen Reactions    Doxylamine GI Intolerance    Lactose      Pt lactose intolerant         Objective   Vitals: Blood pressure 132/86, pulse 66, temperature 99 °F (37 2 °C), temperature source Tympanic, resp  rate 16, weight 63 1 kg (139 lb 1 8 oz), SpO2 98 %  Intake/Output Summary (Last 24 hours) at 2020 1209  Last data filed at 1/3/2020 2238  Gross per 24 hour   Intake 1170 ml   Output    Net 1170 ml     Invasive Devices     Peripheral Intravenous Line            Peripheral IV 20 Left Wrist 1 day                Physical Exam   Constitutional: He appears well-developed     HENT:   Head: Normocephalic  Eyes: Pupils are equal, round, and reactive to light  Neck: Normal range of motion  Cardiovascular: Regular rhythm  Pulmonary/Chest: Breath sounds normal    Abdominal: Soft  Musculoskeletal: He exhibits no edema  Lymphadenopathy:     He has no cervical adenopathy  Neurological: He is alert  Skin: Skin is warm  Telangiectasias, predominantly about the head and neck region  Few are scattered on the trunk  Psychiatric: He has a normal mood and affect  Lab Results: I have personally reviewed pertinent reports  Imaging Studies: I have personally reviewed pertinent reports  EKG, Pathology, and Other Studies: I have personally reviewed pertinent reports  Code Status: Level 1 - Full Code  Advance Directive and Living Will:      Power of :    POLST:      Counseling / Coordination of Care  Total floor / unit time spent today 40 minutes  Greater than 50% of total time was spent with the patient and / or family counseling and / or coordination of care   A description of the counseling / coordination of care:  See note

## (undated) DEVICE — SINGLE-USE BIOPSY FORCEPS: Brand: RADIAL JAW 4